# Patient Record
Sex: MALE | Race: WHITE | NOT HISPANIC OR LATINO | Employment: UNEMPLOYED | URBAN - METROPOLITAN AREA
[De-identification: names, ages, dates, MRNs, and addresses within clinical notes are randomized per-mention and may not be internally consistent; named-entity substitution may affect disease eponyms.]

---

## 2017-07-25 ENCOUNTER — TELEPHONE (OUTPATIENT)
Dept: PEDIATRICS | Facility: CLINIC | Age: 57
End: 2017-07-25

## 2017-07-25 NOTE — TELEPHONE ENCOUNTER
7/25/2017    Call Regarding Preventive Health Screening Colonoscopy    Attempt 1    Message Sounded like someone picked up, but did not say anything. Weird clicking noise in the backgrounds.     Comments:       Outreach   CC

## 2017-08-09 NOTE — TELEPHONE ENCOUNTER
8/9/2017    Call Regarding Preventive Health Screening Colonoscopy    Attempt 2    Message on voicemail     Comments:       Outreach   tina

## 2017-08-14 NOTE — TELEPHONE ENCOUNTER
8/14/2017    Call Regarding Preventive Health Screening Colonoscopy    Attempt 3    Message on voicemail     Comments:       Outreach   tina

## 2020-04-22 ENCOUNTER — HOSPITAL ENCOUNTER (INPATIENT)
Facility: CLINIC | Age: 60
LOS: 3 days | Discharge: HOME HEALTH | End: 2020-04-25
Attending: NURSE PRACTITIONER | Admitting: INTERNAL MEDICINE
Payer: COMMERCIAL

## 2020-04-22 ENCOUNTER — APPOINTMENT (OUTPATIENT)
Dept: GENERAL RADIOLOGY | Facility: CLINIC | Age: 60
End: 2020-04-22
Attending: INTERNAL MEDICINE
Payer: COMMERCIAL

## 2020-04-22 DIAGNOSIS — I10 BENIGN ESSENTIAL HYPERTENSION: Primary | ICD-10-CM

## 2020-04-22 DIAGNOSIS — M62.81 GENERALIZED MUSCLE WEAKNESS: ICD-10-CM

## 2020-04-22 DIAGNOSIS — N17.9 AKI (ACUTE KIDNEY INJURY) (H): ICD-10-CM

## 2020-04-22 DIAGNOSIS — R94.31 PROLONGED QT INTERVAL: ICD-10-CM

## 2020-04-22 DIAGNOSIS — E87.6 HYPOKALEMIA: ICD-10-CM

## 2020-04-22 LAB
ALBUMIN SERPL-MCNC: 3.4 G/DL (ref 3.4–5)
ALBUMIN UR-MCNC: NEGATIVE MG/DL
ALP SERPL-CCNC: 63 U/L (ref 40–150)
ALT SERPL W P-5'-P-CCNC: 36 U/L (ref 0–70)
ANION GAP SERPL CALCULATED.3IONS-SCNC: 10 MMOL/L (ref 3–14)
APPEARANCE UR: CLEAR
AST SERPL W P-5'-P-CCNC: 23 U/L (ref 0–45)
BASOPHILS # BLD AUTO: 0 10E9/L (ref 0–0.2)
BASOPHILS NFR BLD AUTO: 0.2 %
BILIRUB DIRECT SERPL-MCNC: 0.4 MG/DL (ref 0–0.2)
BILIRUB SERPL-MCNC: 2.1 MG/DL (ref 0.2–1.3)
BILIRUB UR QL STRIP: NEGATIVE
BUN SERPL-MCNC: 74 MG/DL (ref 7–30)
CALCIUM SERPL-MCNC: 9 MG/DL (ref 8.5–10.1)
CHLORIDE SERPL-SCNC: 87 MMOL/L (ref 94–109)
CO2 SERPL-SCNC: 33 MMOL/L (ref 20–32)
COLOR UR AUTO: ABNORMAL
CREAT SERPL-MCNC: 2.03 MG/DL (ref 0.66–1.25)
DIFFERENTIAL METHOD BLD: ABNORMAL
EOSINOPHIL # BLD AUTO: 0.1 10E9/L (ref 0–0.7)
EOSINOPHIL NFR BLD AUTO: 0.6 %
ERYTHROCYTE [DISTWIDTH] IN BLOOD BY AUTOMATED COUNT: 12.2 % (ref 10–15)
ETHANOL SERPL-MCNC: <0.01 G/DL
GFR SERPL CREATININE-BSD FRML MDRD: 35 ML/MIN/{1.73_M2}
GLUCOSE BLDC GLUCOMTR-MCNC: 138 MG/DL (ref 70–99)
GLUCOSE SERPL-MCNC: 126 MG/DL (ref 70–99)
GLUCOSE UR STRIP-MCNC: NEGATIVE MG/DL
HCO3 BLD-SCNC: 33 MMOL/L (ref 21–28)
HCT VFR BLD AUTO: 56.2 % (ref 40–53)
HGB BLD-MCNC: 19.6 G/DL (ref 13.3–17.7)
HGB UR QL STRIP: NEGATIVE
IMM GRANULOCYTES # BLD: 0.2 10E9/L (ref 0–0.4)
IMM GRANULOCYTES NFR BLD: 2.3 %
INR PPP: NORMAL (ref 0.86–1.14)
KETONES UR STRIP-MCNC: 10 MG/DL
LEUKOCYTE ESTERASE UR QL STRIP: NEGATIVE
LIPASE SERPL-CCNC: 624 U/L (ref 73–393)
LYMPHOCYTES # BLD AUTO: 1.5 10E9/L (ref 0.8–5.3)
LYMPHOCYTES NFR BLD AUTO: 17.6 %
MAGNESIUM SERPL-MCNC: 3 MG/DL (ref 1.6–2.3)
MCH RBC QN AUTO: 31.3 PG (ref 26.5–33)
MCHC RBC AUTO-ENTMCNC: 34.9 G/DL (ref 31.5–36.5)
MCV RBC AUTO: 90 FL (ref 78–100)
MONOCYTES # BLD AUTO: 1.8 10E9/L (ref 0–1.3)
MONOCYTES NFR BLD AUTO: 20.7 %
NEUTROPHILS # BLD AUTO: 5 10E9/L (ref 1.6–8.3)
NEUTROPHILS NFR BLD AUTO: 58.6 %
NITRATE UR QL: NEGATIVE
NRBC # BLD AUTO: 0 10*3/UL
NRBC BLD AUTO-RTO: 0 /100
O2/TOTAL GAS SETTING VFR VENT: ABNORMAL %
OXYHGB MFR BLD: 95 % (ref 92–100)
PCO2 BLD: 30 MM HG (ref 35–45)
PH BLD: 7.64 PH (ref 7.35–7.45)
PH UR STRIP: 6.5 PH (ref 5–7)
PLATELET # BLD AUTO: 227 10E9/L (ref 150–450)
PLATELET # BLD EST: ABNORMAL 10*3/UL
PO2 BLD: 68 MM HG (ref 80–105)
POTASSIUM SERPL-SCNC: 2.3 MMOL/L (ref 3.4–5.3)
PROT SERPL-MCNC: 7.5 G/DL (ref 6.8–8.8)
RBC # BLD AUTO: 6.27 10E12/L (ref 4.4–5.9)
RBC #/AREA URNS AUTO: 1 /HPF (ref 0–2)
RBC MORPH BLD: ABNORMAL
SODIUM SERPL-SCNC: 130 MMOL/L (ref 133–144)
SOURCE: ABNORMAL
SP GR UR STRIP: 1.02 (ref 1–1.03)
SQUAMOUS #/AREA URNS AUTO: <1 /HPF (ref 0–1)
UROBILINOGEN UR STRIP-MCNC: NORMAL MG/DL (ref 0–2)
WBC # BLD AUTO: 8.5 10E9/L (ref 4–11)
WBC #/AREA URNS AUTO: 3 /HPF (ref 0–5)

## 2020-04-22 PROCEDURE — 99285 EMERGENCY DEPT VISIT HI MDM: CPT | Mod: 25

## 2020-04-22 PROCEDURE — 82805 BLOOD GASES W/O2 SATURATION: CPT | Performed by: INTERNAL MEDICINE

## 2020-04-22 PROCEDURE — 80320 DRUG SCREEN QUANTALCOHOLS: CPT | Performed by: NURSE PRACTITIONER

## 2020-04-22 PROCEDURE — 84484 ASSAY OF TROPONIN QUANT: CPT | Performed by: NURSE PRACTITIONER

## 2020-04-22 PROCEDURE — 96365 THER/PROPH/DIAG IV INF INIT: CPT

## 2020-04-22 PROCEDURE — 25000132 ZZH RX MED GY IP 250 OP 250 PS 637: Performed by: NURSE PRACTITIONER

## 2020-04-22 PROCEDURE — 00000146 ZZHCL STATISTIC GLUCOSE BY METER IP

## 2020-04-22 PROCEDURE — 80076 HEPATIC FUNCTION PANEL: CPT | Performed by: NURSE PRACTITIONER

## 2020-04-22 PROCEDURE — 93005 ELECTROCARDIOGRAM TRACING: CPT

## 2020-04-22 PROCEDURE — 40000275 ZZH STATISTIC RCP TIME EA 10 MIN

## 2020-04-22 PROCEDURE — 25800030 ZZH RX IP 258 OP 636: Performed by: NURSE PRACTITIONER

## 2020-04-22 PROCEDURE — 25000128 H RX IP 250 OP 636: Performed by: NURSE PRACTITIONER

## 2020-04-22 PROCEDURE — 81001 URINALYSIS AUTO W/SCOPE: CPT | Performed by: NURSE PRACTITIONER

## 2020-04-22 PROCEDURE — 83735 ASSAY OF MAGNESIUM: CPT | Performed by: NURSE PRACTITIONER

## 2020-04-22 PROCEDURE — 87635 SARS-COV-2 COVID-19 AMP PRB: CPT | Performed by: INTERNAL MEDICINE

## 2020-04-22 PROCEDURE — 96366 THER/PROPH/DIAG IV INF ADDON: CPT

## 2020-04-22 PROCEDURE — 85379 FIBRIN DEGRADATION QUANT: CPT | Performed by: NURSE PRACTITIONER

## 2020-04-22 PROCEDURE — 71045 X-RAY EXAM CHEST 1 VIEW: CPT

## 2020-04-22 PROCEDURE — 74018 RADEX ABDOMEN 1 VIEW: CPT

## 2020-04-22 PROCEDURE — 87086 URINE CULTURE/COLONY COUNT: CPT | Performed by: NURSE PRACTITIONER

## 2020-04-22 PROCEDURE — 85025 COMPLETE CBC W/AUTO DIFF WBC: CPT | Performed by: NURSE PRACTITIONER

## 2020-04-22 PROCEDURE — 96375 TX/PRO/DX INJ NEW DRUG ADDON: CPT

## 2020-04-22 PROCEDURE — 80076 HEPATIC FUNCTION PANEL: CPT | Performed by: INTERNAL MEDICINE

## 2020-04-22 PROCEDURE — 25000125 ZZHC RX 250: Performed by: NURSE PRACTITIONER

## 2020-04-22 PROCEDURE — 80048 BASIC METABOLIC PNL TOTAL CA: CPT | Performed by: NURSE PRACTITIONER

## 2020-04-22 PROCEDURE — 36600 WITHDRAWAL OF ARTERIAL BLOOD: CPT

## 2020-04-22 PROCEDURE — 12000000 ZZH R&B MED SURG/OB

## 2020-04-22 PROCEDURE — 83690 ASSAY OF LIPASE: CPT | Performed by: NURSE PRACTITIONER

## 2020-04-22 RX ORDER — PROCHLORPERAZINE MALEATE 5 MG
10 TABLET ORAL EVERY 6 HOURS PRN
Status: DISCONTINUED | OUTPATIENT
Start: 2020-04-22 | End: 2020-04-25 | Stop reason: HOSPADM

## 2020-04-22 RX ORDER — LIDOCAINE 40 MG/G
CREAM TOPICAL
Status: DISCONTINUED | OUTPATIENT
Start: 2020-04-22 | End: 2020-04-25

## 2020-04-22 RX ORDER — POTASSIUM CL/LIDO/0.9 % NACL 10MEQ/0.1L
10 INTRAVENOUS SOLUTION, PIGGYBACK (ML) INTRAVENOUS
Status: DISCONTINUED | OUTPATIENT
Start: 2020-04-22 | End: 2020-04-24

## 2020-04-22 RX ORDER — LIDOCAINE 40 MG/G
CREAM TOPICAL
Status: DISCONTINUED | OUTPATIENT
Start: 2020-04-22 | End: 2020-04-25 | Stop reason: HOSPADM

## 2020-04-22 RX ORDER — METOCLOPRAMIDE HYDROCHLORIDE 5 MG/ML
10 INJECTION INTRAMUSCULAR; INTRAVENOUS ONCE
Status: COMPLETED | OUTPATIENT
Start: 2020-04-22 | End: 2020-04-22

## 2020-04-22 RX ORDER — POTASSIUM CHLORIDE 1.5 G/1.58G
20-40 POWDER, FOR SOLUTION ORAL
Status: DISCONTINUED | OUTPATIENT
Start: 2020-04-22 | End: 2020-04-24

## 2020-04-22 RX ORDER — POTASSIUM CHLORIDE 7.45 MG/ML
10 INJECTION INTRAVENOUS
Status: DISCONTINUED | OUTPATIENT
Start: 2020-04-22 | End: 2020-04-24

## 2020-04-22 RX ORDER — POTASSIUM CHLORIDE 29.8 MG/ML
20 INJECTION INTRAVENOUS
Status: DISCONTINUED | OUTPATIENT
Start: 2020-04-22 | End: 2020-04-24

## 2020-04-22 RX ORDER — POTASSIUM CHLORIDE 1500 MG/1
20 TABLET, EXTENDED RELEASE ORAL ONCE
Status: COMPLETED | OUTPATIENT
Start: 2020-04-22 | End: 2020-04-22

## 2020-04-22 RX ORDER — SODIUM CHLORIDE 9 MG/ML
INJECTION, SOLUTION INTRAVENOUS CONTINUOUS
Status: DISCONTINUED | OUTPATIENT
Start: 2020-04-22 | End: 2020-04-23

## 2020-04-22 RX ORDER — NITROGLYCERIN 0.4 MG/1
0.4 TABLET SUBLINGUAL EVERY 5 MIN PRN
Status: DISCONTINUED | OUTPATIENT
Start: 2020-04-22 | End: 2020-04-25 | Stop reason: HOSPADM

## 2020-04-22 RX ORDER — ACETAMINOPHEN 650 MG/1
650 SUPPOSITORY RECTAL EVERY 4 HOURS PRN
Status: DISCONTINUED | OUTPATIENT
Start: 2020-04-22 | End: 2020-04-25 | Stop reason: HOSPADM

## 2020-04-22 RX ORDER — NALOXONE HYDROCHLORIDE 0.4 MG/ML
.1-.4 INJECTION, SOLUTION INTRAMUSCULAR; INTRAVENOUS; SUBCUTANEOUS
Status: DISCONTINUED | OUTPATIENT
Start: 2020-04-22 | End: 2020-04-25 | Stop reason: HOSPADM

## 2020-04-22 RX ORDER — POTASSIUM CHLORIDE 1500 MG/1
20-40 TABLET, EXTENDED RELEASE ORAL
Status: DISCONTINUED | OUTPATIENT
Start: 2020-04-22 | End: 2020-04-24

## 2020-04-22 RX ORDER — SODIUM CHLORIDE 9 MG/ML
INJECTION, SOLUTION INTRAVENOUS CONTINUOUS
Status: DISCONTINUED | OUTPATIENT
Start: 2020-04-23 | End: 2020-04-25

## 2020-04-22 RX ORDER — AMPICILLIN AND SULBACTAM 2; 1 G/1; G/1
3 INJECTION, POWDER, FOR SOLUTION INTRAMUSCULAR; INTRAVENOUS EVERY 6 HOURS
Status: DISCONTINUED | OUTPATIENT
Start: 2020-04-23 | End: 2020-04-24

## 2020-04-22 RX ORDER — PROCHLORPERAZINE 25 MG
25 SUPPOSITORY, RECTAL RECTAL EVERY 12 HOURS PRN
Status: DISCONTINUED | OUTPATIENT
Start: 2020-04-22 | End: 2020-04-25 | Stop reason: HOSPADM

## 2020-04-22 RX ADMIN — Medication 10 MEQ: at 20:58

## 2020-04-22 RX ADMIN — SODIUM CHLORIDE: 9 INJECTION, SOLUTION INTRAVENOUS at 21:01

## 2020-04-22 RX ADMIN — Medication 10 MEQ: at 22:35

## 2020-04-22 RX ADMIN — POTASSIUM CHLORIDE 20 MEQ: 1500 TABLET, EXTENDED RELEASE ORAL at 20:59

## 2020-04-22 RX ADMIN — LIDOCAINE HYDROCHLORIDE 30 ML: 20 SOLUTION ORAL; TOPICAL at 21:02

## 2020-04-22 RX ADMIN — METOCLOPRAMIDE HYDROCHLORIDE 10 MG: 5 INJECTION INTRAMUSCULAR; INTRAVENOUS at 20:56

## 2020-04-22 ASSESSMENT — ENCOUNTER SYMPTOMS
HEADACHES: 0
VOMITING: 1
FEVER: 0
NERVOUS/ANXIOUS: 1
CHILLS: 0
FATIGUE: 1
DYSURIA: 1
NAUSEA: 1
SHORTNESS OF BREATH: 0
ABDOMINAL PAIN: 0

## 2020-04-23 ENCOUNTER — APPOINTMENT (OUTPATIENT)
Dept: SPEECH THERAPY | Facility: CLINIC | Age: 60
End: 2020-04-23
Attending: HOSPITALIST
Payer: COMMERCIAL

## 2020-04-23 ENCOUNTER — APPOINTMENT (OUTPATIENT)
Dept: CARDIOLOGY | Facility: CLINIC | Age: 60
End: 2020-04-23
Attending: HOSPITALIST
Payer: COMMERCIAL

## 2020-04-23 ENCOUNTER — APPOINTMENT (OUTPATIENT)
Dept: PHYSICAL THERAPY | Facility: CLINIC | Age: 60
End: 2020-04-23
Attending: INTERNAL MEDICINE
Payer: COMMERCIAL

## 2020-04-23 LAB
ALBUMIN SERPL-MCNC: 2.8 G/DL (ref 3.4–5)
ALP SERPL-CCNC: 50 U/L (ref 40–150)
ALT SERPL W P-5'-P-CCNC: 29 U/L (ref 0–70)
ANION GAP SERPL CALCULATED.3IONS-SCNC: 7 MMOL/L (ref 3–14)
ANION GAP SERPL CALCULATED.3IONS-SCNC: 9 MMOL/L (ref 3–14)
AST SERPL W P-5'-P-CCNC: 18 U/L (ref 0–45)
BACTERIA SPEC CULT: ABNORMAL
BACTERIA SPEC CULT: ABNORMAL
BASE EXCESS BLDA CALC-SCNC: 13 MMOL/L
BASE EXCESS BLDV CALC-SCNC: 11.1 MMOL/L
BILIRUB SERPL-MCNC: 1.4 MG/DL (ref 0.2–1.3)
BUN SERPL-MCNC: 59 MG/DL (ref 7–30)
BUN SERPL-MCNC: 61 MG/DL (ref 7–30)
CALCIUM SERPL-MCNC: 7.9 MG/DL (ref 8.5–10.1)
CALCIUM SERPL-MCNC: 8.3 MG/DL (ref 8.5–10.1)
CHLORIDE SERPL-SCNC: 95 MMOL/L (ref 94–109)
CHLORIDE SERPL-SCNC: 96 MMOL/L (ref 94–109)
CO2 SERPL-SCNC: 31 MMOL/L (ref 20–32)
CO2 SERPL-SCNC: 33 MMOL/L (ref 20–32)
CREAT SERPL-MCNC: 1.9 MG/DL (ref 0.66–1.25)
CREAT SERPL-MCNC: 1.99 MG/DL (ref 0.66–1.25)
D DIMER PPP FEU-MCNC: 0.5 UG/ML FEU (ref 0–0.5)
DEPRECATED S PYO AG THROAT QL EIA: NEGATIVE
ERYTHROCYTE [DISTWIDTH] IN BLOOD BY AUTOMATED COUNT: 12.2 % (ref 10–15)
FERRITIN SERPL-MCNC: 326 NG/ML (ref 26–388)
GFR SERPL CREATININE-BSD FRML MDRD: 35 ML/MIN/{1.73_M2}
GFR SERPL CREATININE-BSD FRML MDRD: 37 ML/MIN/{1.73_M2}
GLUCOSE SERPL-MCNC: 108 MG/DL (ref 70–99)
GLUCOSE SERPL-MCNC: 111 MG/DL (ref 70–99)
GRAM STN SPEC: ABNORMAL
HCO3 BLD-SCNC: 34 MMOL/L (ref 21–28)
HCO3 BLDV-SCNC: 35 MMOL/L (ref 21–28)
HCT VFR BLD AUTO: 51.2 % (ref 40–53)
HGB BLD-MCNC: 17.2 G/DL (ref 13.3–17.7)
INTERPRETATION ECG - MUSE: NORMAL
IRON SATN MFR SERPL: 31 % (ref 15–46)
IRON SERPL-MCNC: 72 UG/DL (ref 35–180)
LIPASE SERPL-CCNC: 534 U/L (ref 73–393)
Lab: ABNORMAL
Lab: ABNORMAL
MCH RBC QN AUTO: 31 PG (ref 26.5–33)
MCHC RBC AUTO-ENTMCNC: 33.6 G/DL (ref 31.5–36.5)
MCV RBC AUTO: 92 FL (ref 78–100)
O2/TOTAL GAS SETTING VFR VENT: 1 %
O2/TOTAL GAS SETTING VFR VENT: ABNORMAL %
OXYHGB MFR BLD: 97 % (ref 92–100)
OXYHGB MFR BLDV: 95 %
PCO2 BLD: 32 MM HG (ref 35–45)
PCO2 BLDV: 43 MM HG (ref 40–50)
PH BLD: 7.64 PH (ref 7.35–7.45)
PH BLDV: 7.52 PH (ref 7.32–7.43)
PHOSPHATE SERPL-MCNC: 2.7 MG/DL (ref 2.5–4.5)
PLATELET # BLD AUTO: 185 10E9/L (ref 150–450)
PO2 BLD: 83 MM HG (ref 80–105)
PO2 BLDV: 76 MM HG (ref 25–47)
POTASSIUM SERPL-SCNC: 2.7 MMOL/L (ref 3.4–5.3)
POTASSIUM SERPL-SCNC: 2.8 MMOL/L (ref 3.4–5.3)
POTASSIUM SERPL-SCNC: 3 MMOL/L (ref 3.4–5.3)
PROT SERPL-MCNC: 6.2 G/DL (ref 6.8–8.8)
RBC # BLD AUTO: 5.55 10E12/L (ref 4.4–5.9)
SARS-COV-2 PCR COMMENT: NORMAL
SARS-COV-2 RNA SPEC QL NAA+PROBE: NEGATIVE
SARS-COV-2 RNA SPEC QL NAA+PROBE: NORMAL
SODIUM SERPL-SCNC: 135 MMOL/L (ref 133–144)
SODIUM SERPL-SCNC: 136 MMOL/L (ref 133–144)
SPECIMEN SOURCE: ABNORMAL
SPECIMEN SOURCE: NORMAL
STREP GROUP A PCR: NOT DETECTED
TIBC SERPL-MCNC: 230 UG/DL (ref 240–430)
TROPONIN I SERPL-MCNC: 0.05 UG/L (ref 0–0.04)
TROPONIN I SERPL-MCNC: 0.05 UG/L (ref 0–0.04)
TROPONIN I SERPL-MCNC: 0.08 UG/L (ref 0–0.04)
WBC # BLD AUTO: 8.9 10E9/L (ref 4–11)

## 2020-04-23 PROCEDURE — 87205 SMEAR GRAM STAIN: CPT | Performed by: INTERNAL MEDICINE

## 2020-04-23 PROCEDURE — 36415 COLL VENOUS BLD VENIPUNCTURE: CPT | Performed by: HOSPITALIST

## 2020-04-23 PROCEDURE — 25800030 ZZH RX IP 258 OP 636: Performed by: INTERNAL MEDICINE

## 2020-04-23 PROCEDURE — 84484 ASSAY OF TROPONIN QUANT: CPT | Performed by: INTERNAL MEDICINE

## 2020-04-23 PROCEDURE — 40000275 ZZH STATISTIC RCP TIME EA 10 MIN

## 2020-04-23 PROCEDURE — 36600 WITHDRAWAL OF ARTERIAL BLOOD: CPT

## 2020-04-23 PROCEDURE — 80053 COMPREHEN METABOLIC PANEL: CPT | Performed by: INTERNAL MEDICINE

## 2020-04-23 PROCEDURE — 82728 ASSAY OF FERRITIN: CPT | Performed by: INTERNAL MEDICINE

## 2020-04-23 PROCEDURE — 84132 ASSAY OF SERUM POTASSIUM: CPT | Performed by: HOSPITALIST

## 2020-04-23 PROCEDURE — 87205 SMEAR GRAM STAIN: CPT | Performed by: HOSPITALIST

## 2020-04-23 PROCEDURE — 93010 ELECTROCARDIOGRAM REPORT: CPT | Performed by: INTERNAL MEDICINE

## 2020-04-23 PROCEDURE — 36415 COLL VENOUS BLD VENIPUNCTURE: CPT | Performed by: INTERNAL MEDICINE

## 2020-04-23 PROCEDURE — 92610 EVALUATE SWALLOWING FUNCTION: CPT | Mod: GN

## 2020-04-23 PROCEDURE — 85027 COMPLETE CBC AUTOMATED: CPT | Performed by: INTERNAL MEDICINE

## 2020-04-23 PROCEDURE — 92526 ORAL FUNCTION THERAPY: CPT | Mod: GN

## 2020-04-23 PROCEDURE — 97161 PT EVAL LOW COMPLEX 20 MIN: CPT | Mod: GP | Performed by: PHYSICAL THERAPIST

## 2020-04-23 PROCEDURE — 82805 BLOOD GASES W/O2 SATURATION: CPT | Performed by: INTERNAL MEDICINE

## 2020-04-23 PROCEDURE — 25000132 ZZH RX MED GY IP 250 OP 250 PS 637: Performed by: INTERNAL MEDICINE

## 2020-04-23 PROCEDURE — 40001204 ZZHCL STATISTIC STREP A RAPID: Performed by: INTERNAL MEDICINE

## 2020-04-23 PROCEDURE — 93306 TTE W/DOPPLER COMPLETE: CPT

## 2020-04-23 PROCEDURE — 83540 ASSAY OF IRON: CPT | Performed by: INTERNAL MEDICINE

## 2020-04-23 PROCEDURE — 83540 ASSAY OF IRON: CPT | Performed by: HOSPITALIST

## 2020-04-23 PROCEDURE — 83550 IRON BINDING TEST: CPT | Performed by: INTERNAL MEDICINE

## 2020-04-23 PROCEDURE — 84100 ASSAY OF PHOSPHORUS: CPT | Performed by: INTERNAL MEDICINE

## 2020-04-23 PROCEDURE — 12000011 ZZH R&B MS OVERFLOW

## 2020-04-23 PROCEDURE — 93005 ELECTROCARDIOGRAM TRACING: CPT

## 2020-04-23 PROCEDURE — 82805 BLOOD GASES W/O2 SATURATION: CPT | Performed by: HOSPITALIST

## 2020-04-23 PROCEDURE — 99233 SBSQ HOSP IP/OBS HIGH 50: CPT | Performed by: HOSPITALIST

## 2020-04-23 PROCEDURE — 80048 BASIC METABOLIC PNL TOTAL CA: CPT | Performed by: INTERNAL MEDICINE

## 2020-04-23 PROCEDURE — 25000132 ZZH RX MED GY IP 250 OP 250 PS 637: Performed by: HOSPITALIST

## 2020-04-23 PROCEDURE — 87651 STREP A DNA AMP PROBE: CPT | Performed by: INTERNAL MEDICINE

## 2020-04-23 PROCEDURE — 83690 ASSAY OF LIPASE: CPT | Performed by: INTERNAL MEDICINE

## 2020-04-23 PROCEDURE — 93306 TTE W/DOPPLER COMPLETE: CPT | Mod: 26 | Performed by: INTERNAL MEDICINE

## 2020-04-23 PROCEDURE — 97530 THERAPEUTIC ACTIVITIES: CPT | Mod: GP | Performed by: PHYSICAL THERAPIST

## 2020-04-23 PROCEDURE — 25000128 H RX IP 250 OP 636: Performed by: INTERNAL MEDICINE

## 2020-04-23 RX ORDER — TEMAZEPAM 15 MG/1
15 CAPSULE ORAL
Status: DISCONTINUED | OUTPATIENT
Start: 2020-04-23 | End: 2020-04-25 | Stop reason: HOSPADM

## 2020-04-23 RX ORDER — CALCIUM CARBONATE 500 MG/1
500 TABLET, CHEWABLE ORAL 2 TIMES DAILY PRN
Status: DISCONTINUED | OUTPATIENT
Start: 2020-04-23 | End: 2020-04-25 | Stop reason: HOSPADM

## 2020-04-23 RX ORDER — HYDROMORPHONE HYDROCHLORIDE 1 MG/ML
0.2 INJECTION, SOLUTION INTRAMUSCULAR; INTRAVENOUS; SUBCUTANEOUS
Status: DISCONTINUED | OUTPATIENT
Start: 2020-04-23 | End: 2020-04-25 | Stop reason: HOSPADM

## 2020-04-23 RX ORDER — MAGNESIUM SULFATE HEPTAHYDRATE 40 MG/ML
4 INJECTION, SOLUTION INTRAVENOUS EVERY 4 HOURS PRN
Status: DISCONTINUED | OUTPATIENT
Start: 2020-04-23 | End: 2020-04-25

## 2020-04-23 RX ORDER — BACLOFEN 10 MG/1
10 TABLET ORAL 3 TIMES DAILY
Status: DISCONTINUED | OUTPATIENT
Start: 2020-04-23 | End: 2020-04-25 | Stop reason: HOSPADM

## 2020-04-23 RX ORDER — ASPIRIN 81 MG/1
81 TABLET ORAL DAILY
Status: DISCONTINUED | OUTPATIENT
Start: 2020-04-23 | End: 2020-04-25 | Stop reason: HOSPADM

## 2020-04-23 RX ADMIN — BACLOFEN 10 MG: 10 TABLET ORAL at 16:18

## 2020-04-23 RX ADMIN — AMPICILLIN SODIUM AND SULBACTAM SODIUM 3 G: 2; 1 INJECTION, POWDER, FOR SOLUTION INTRAMUSCULAR; INTRAVENOUS at 01:12

## 2020-04-23 RX ADMIN — Medication 10 MEQ: at 04:02

## 2020-04-23 RX ADMIN — Medication 10 MEQ: at 02:55

## 2020-04-23 RX ADMIN — Medication 10 MEQ: at 10:47

## 2020-04-23 RX ADMIN — AMPICILLIN SODIUM AND SULBACTAM SODIUM 3 G: 2; 1 INJECTION, POWDER, FOR SOLUTION INTRAMUSCULAR; INTRAVENOUS at 13:42

## 2020-04-23 RX ADMIN — Medication 10 MEQ: at 11:56

## 2020-04-23 RX ADMIN — BACLOFEN 10 MG: 10 TABLET ORAL at 11:46

## 2020-04-23 RX ADMIN — AMPICILLIN SODIUM AND SULBACTAM SODIUM 3 G: 2; 1 INJECTION, POWDER, FOR SOLUTION INTRAMUSCULAR; INTRAVENOUS at 19:22

## 2020-04-23 RX ADMIN — Medication 10 MEQ: at 09:39

## 2020-04-23 RX ADMIN — Medication 10 MEQ: at 08:20

## 2020-04-23 RX ADMIN — POTASSIUM CHLORIDE 20 MEQ: 1500 TABLET, EXTENDED RELEASE ORAL at 20:37

## 2020-04-23 RX ADMIN — POTASSIUM CHLORIDE 40 MEQ: 1500 TABLET, EXTENDED RELEASE ORAL at 18:20

## 2020-04-23 RX ADMIN — SODIUM CHLORIDE: 9 INJECTION, SOLUTION INTRAVENOUS at 01:12

## 2020-04-23 RX ADMIN — Medication 10 MEQ: at 05:56

## 2020-04-23 RX ADMIN — AMPICILLIN SODIUM AND SULBACTAM SODIUM 3 G: 2; 1 INJECTION, POWDER, FOR SOLUTION INTRAMUSCULAR; INTRAVENOUS at 07:04

## 2020-04-23 ASSESSMENT — MIFFLIN-ST. JEOR
SCORE: 1664.02
SCORE: 1648.15

## 2020-04-23 ASSESSMENT — ACTIVITIES OF DAILY LIVING (ADL)
ADLS_ACUITY_SCORE: 21
ADLS_ACUITY_SCORE: 21
ADLS_ACUITY_SCORE: 13

## 2020-04-23 NOTE — ED TRIAGE NOTES
pt comes in from home with abd pain, constipation, anxiety, weakness, possible syncopal episodes? (pt reports waking up on floor) no BM for 2 days only drinking gatorade. evertime he eats he vomits. pt has been self quarwntining for almost 2 months. pt brother has been btinging him food pt lives at home alone.

## 2020-04-23 NOTE — PROGRESS NOTES
[facility]    Hospitalist Progress Note  Name: Delroy Christianson    MRN: [id number]  Provider:  Dale Nguyen DO MPH  Date of Service: 04/23/2020    Summary of Stay: Delroy Christianson is a 60 year old male with no previous known medical history admitted on 4/22/2020 with weakness, cough, and hiccups.  Developed some vomiting about 3 days ago which led to a subsequent cough and hiccups.  Since that time he has had progressively more weakness and feeling generally unwell so he came to the ED for evaluation.  Vitals were stable on arrival with only mild hypoxia.  He was noted to have a creatinine of 2.0 with potassium of 2.3.  Troponin elevation of 0.049.  Hemoglobin of 19.6.  COVID-19 PCR was negative.  Chest x-ray showed streaky atelectasis or infiltrate in the left lung base suspicious for aspiration pneumonia given his history.  EKG shows QTC of 613.  He was started on Unasyn and being admitted for further evaluation.    Problem List:   1. Acute hypoxic respiratory failure secondary to aspiration pneumonia: Seems like the most logical diagnosis.  Continue Unasyn and wean oxygen as able.  Currently n.p.o. on IV fluids, request SLP evaluation for dietary recommendations.  COVID-19 negative.  2. Acute versus chronic versus acute on chronic renal failure: Creatinine is 2.0 on admission and essentially unchanged despite IV fluids.  Creatinine in 2015 was 1.5 so this could be more chronic in nature.  Check iron studies and phosphorus level.  Consider renal ultrasound.  3. Elevated bilirubin: Suspect due to infection.  Trending down and no abdominal pain.  4. Hypokalemia: Aggressive replacement through the protocol.  Hesitant to add potassium to the IV fluids due to renal failure.  Could consider nephrology involvement to see if there is more of a genetic cause of his hypokalemia, renal failure, and alkalosis.  5. Troponin elevation: Suspect demand ischemia.  Troponin levels flat and not consistent with ACS.  Check  echocardiogram.  6. Prolonged QTC: Recheck EKG.  Suspect electrolyte derangement contributing.  Replace potassium and magnesium.  Recheck QTC on EKG today.  7. Hiccups: Suspect diaphragm irritation from his pneumonia.  Start scheduled baclofen, could add gabapentin as well.  Consider Protonix for possible GERD management if QTC improves.    DVT Prophylaxis: Pneumatic Compression Devices  Code Status: Full Code  Diet: Clear Liquid Diet    Owens Catheter: not present  Disposition: Expected discharge in 2 days to home. Goals prior to discharge include address the multiple issues outlined above.   Incidental Findings: None.  Family updated today: No     Interval History   Assumed care from previous hospitalist. The history was fully reviewed.  The patient reports doing well. Still hiccups. No chest pain or shortness of breath. No nausea, vomiting, diarrhea, constipation. No fevers. No other specific complaints identified.     -Data reviewed today: I personally reviewed all new labs and imaging results over the last 24 hours.     Physical Exam   Temp: 96.6  F (35.9  C) Temp src: Axillary BP: 126/78 Pulse: 60 Heart Rate: 72 Resp: 16 SpO2: 95 % O2 Device: None (Room air) Oxygen Delivery: 1 LPM  Vitals:    04/23/20 0020 04/23/20 0912   Weight: 80 kg (176 lb 6.4 oz) 81.6 kg (179 lb 14.4 oz)     Vital Signs with Ranges  Temp:  [96.6  F (35.9  C)-99.6  F (37.6  C)] 96.6  F (35.9  C)  Pulse:  [60-97] 60  Heart Rate:  [69-97] 72  Resp:  [14-35] 16  BP: ()/() 126/78  SpO2:  [83 %-98 %] 95 %  I/O last 3 completed shifts:  In: -   Out: 200 [Urine:200]    GENERAL: No apparent distress. Awake, alert, and fully oriented.  HEENT: Normocephalic, atraumatic. Extraocular movements intact.  CARDIOVASCULAR: Regular rate and rhythm without murmurs or rubs. No S3.  PULMONARY: Clear bilaterally.  GASTROINTESTINAL: Soft, non-tender, non-distended. Bowel sounds normoactive.   EXTREMITIES: No cyanosis or clubbing. No  edema.  NEUROLOGICAL: CN 2-12 grossly intact, no focal neurological deficits.  DERMATOLOGICAL: No rash, ulcer, bruising, nor jaundice.     Medications     sodium chloride 75 mL/hr at 04/23/20 0825       ampicillin-sulbactam (UNASYN) IV  3 g Intravenous Q6H     aspirin  81 mg Oral Daily     baclofen  10 mg Oral TID     sodium chloride (PF)  3 mL Intracatheter Q8H     Data     Laboratory:  Recent Labs   Lab 04/23/20  0650 04/22/20  1931   WBC 8.9 8.5   HGB 17.2 19.6*   HCT 51.2 56.2*   MCV 92 90    227     Recent Labs   Lab 04/23/20  0650 04/23/20  0408 04/22/20  1931    135 130*   POTASSIUM 2.8* 2.7* 2.3*   CHLORIDE 96 95 87*   CO2 33* 31 33*   ANIONGAP 7 9 10   * 111* 126*   BUN 59* 61* 74*   CR 1.99* 1.90* 2.03*   GFRESTIMATED 35* 37* 35*   GFRESTBLACK 41* 43* 40*   MINE 7.9* 8.3* 9.0     Recent Labs   Lab 04/23/20  0115 04/22/20 2008   CULT >10 Squamous epithelial cells/low power field indicates oral contamination. Please   recollect.  * PENDING       Imaging:  Recent Results (from the past 24 hour(s))   XR Chest Port 1 View    Narrative    EXAM: XR CHEST PORT 1 VW  LOCATION: [facility]  DATE/TIME: 4/22/2020 10:33 PM    INDICATION: Hypoxia.  COMPARISON: None.      Impression    IMPRESSION: Heart size within normal limits. The right lung is clear. Streaky atelectasis or infiltrate is present in the left lung base. The left upper lung zone is clear. No visible pneumothorax. No subdiaphragmatic free air is visualized.   XR Abdomen Port 1 View    Narrative    EXAM: XR ABDOMEN PORT 1 VW  LOCATION: [facility]  DATE/TIME: 4/22/2020 10:33 PM    INDICATION: Abdominal pain.  COMPARISON: None.      Impression    IMPRESSION: Low centered portable view of the abdomen. Visualized bowel gas pattern is nonobstructive. Multiple pelvic calcifications likely reflect phleboliths. Lower lumbar degenerative changes.          Dale Nguyen DO MPH  Formerly [facility] Hospitalist  [address] [place] [place] [address]  Pager: [phone]  04/23/2020

## 2020-04-23 NOTE — PLAN OF CARE
BP (!) 147/81 (BP Location: Left arm)   Pulse 60   Temp 96.4  F (35.8  C) (Axillary)   Resp 16   Ht 1.829 m (6')   Wt 81.6 kg (179 lb 14.4 oz)   SpO2 98%   BMI 24.40 kg/m    Neuro: AOx4.   Cardiac: SB/SR BBB and ST depression per tele. Echo-abnormal, CARDS consult  Lungs: Diminished in LLL. O/w clear. Possible aspiration PNA.   GI: Constipation.   : WNL  Pain: Reported a minor sore throat o/w denies  IV: NS @75  Meds: Baclofen for hiccups. IV unasyn q6.   Labs/tests: K+-2.8, replaced, re-check this afternoon. Cr-1.99. Blood gases abnormal. Covid-neg. Strep cultures pending. Sputum cultures pending,.   Diet: Speech cleared for regular diet  Activity: SBA  Plan: OT consult, PT-recommended home once strength returns, Speech-cleared for reg diet, CARDS consult.  Pend labs, fluids, IV abx.

## 2020-04-23 NOTE — ED NOTES
[facility]  ED Nurse Handoff Report    Delroy Christianson is a 60 year old male   ED Chief complaint: Generalized Weakness  . ED Diagnosis:   Final diagnoses:   Prolonged QT interval   Hypokalemia   Generalized muscle weakness   DAMION (acute kidney injury) (H)     Allergies: No Known Allergies    Code Status: Full Code  Activity level - Baseline/Home:  Independent. Activity Level - Current:   Assist X 1. Lift room needed: No. Bariatric: No   Needed: No   Isolation: No. Infection: Not Applicable.     Vital Signs:   Vitals:    04/22/20 2200 04/22/20 2215 04/22/20 2230 04/22/20 2245   BP: 115/70 104/70 108/73 124/76   Pulse: 86 85 79 84   Resp: 20 14 18 19   Temp:       TempSrc:       SpO2: 97% 92%  94%       Cardiac Rhythm:  ,      Pain level:    Patient confused: No. Patient Falls Risk: Yes.   Elimination Status: Has voided   Patient Report / Focused Assessment:    Neurological Cognitive - Cognitive/Neuro/Behavioral WDL:  (WEAKNESS AND ANXIETY)  [place] Coma Scale - Best Eye Response: 4-->(E4) spontaneous  Best Motor Response: 6-->(M6) obeys commands  Best Verbal Response: 5-->(V5) oriented  [place] Coma Scale Score: 15      Gastrointestinal Gastrointestinal - Gastrointestinal Comment: pt comes in from home with abd pain, constipation, anxiety, weakness, possible syncopal episodes? (pt reports waking up on floor) no BM for 2 days only drinking gatorade. evertime he eats he vomits. pt has been self quarwntining for almost 2 months. pt brother has been btinging him food pt lives at home alone.      Tests Performed/ Abnormal Results:    Labs Ordered and Resulted from Time of ED Arrival Up to the Time of Departure from the ED   ROUTINE UA WITH MICROSCOPIC - Abnormal; Notable for the following components:       Result Value    Ketones Urine 10 (*)     All other components within normal limits   CBC WITH PLATELETS DIFFERENTIAL - Abnormal; Notable for the following components:    RBC Count 6.27 (*)      Hemoglobin 19.6 (*)     Hematocrit 56.2 (*)     Absolute Monocytes 1.8 (*)     All other components within normal limits   BASIC METABOLIC PANEL - Abnormal; Notable for the following components:    Sodium 130 (*)     Potassium 2.3 (*)     Chloride 87 (*)     Carbon Dioxide 33 (*)     Glucose 126 (*)     Urea Nitrogen 74 (*)     Creatinine 2.03 (*)     GFR Estimate 35 (*)     GFR Estimate If Black 40 (*)     All other components within normal limits   MAGNESIUM - Abnormal; Notable for the following components:    Magnesium 3.0 (*)     All other components within normal limits   GLUCOSE BY METER - Abnormal; Notable for the following components:    Glucose 138 (*)     All other components within normal limits   GLUCOSE MONITOR NURSING POCT   INR   PULSE OXIMETRY NURSING   CARDIAC CONTINUOUS MONITORING   PERIPHERAL IV CATHETER   MAY SALINE LOCK IV   URINE CULTURE AEROBIC BACTERIAL     Labs Ordered and Resulted from Time of ED Arrival Up to the Time of Departure from the ED   ROUTINE UA WITH MICROSCOPIC - Abnormal; Notable for the following components:       Result Value    Ketones Urine 10 (*)     All other components within normal limits   CBC WITH PLATELETS DIFFERENTIAL - Abnormal; Notable for the following components:    RBC Count 6.27 (*)     Hemoglobin 19.6 (*)     Hematocrit 56.2 (*)     Absolute Monocytes 1.8 (*)     All other components within normal limits   BASIC METABOLIC PANEL - Abnormal; Notable for the following components:    Sodium 130 (*)     Potassium 2.3 (*)     Chloride 87 (*)     Carbon Dioxide 33 (*)     Glucose 126 (*)     Urea Nitrogen 74 (*)     Creatinine 2.03 (*)     GFR Estimate 35 (*)     GFR Estimate If Black 40 (*)     All other components within normal limits   MAGNESIUM - Abnormal; Notable for the following components:    Magnesium 3.0 (*)     All other components within normal limits   GLUCOSE BY METER - Abnormal; Notable for the following components:    Glucose 138 (*)     All other  components within normal limits   HEPATIC PANEL - Abnormal; Notable for the following components:    Bilirubin Direct 0.4 (*)     Bilirubin Total 2.1 (*)     All other components within normal limits   LIPASE - Abnormal; Notable for the following components:    Lipase 624 (*)     All other components within normal limits   GLUCOSE MONITOR NURSING POCT   INR   BLOOD GAS ARTERIAL AND OXYHGB   ALCOHOL ETHYL   PULSE OXIMETRY NURSING   CARDIAC CONTINUOUS MONITORING   PERIPHERAL IV CATHETER   MAY SALINE LOCK IV   URINE CULTURE AEROBIC BACTERIAL     No orders to display   Treatments provided: PIV, URINE, GLUCOSE, KRIDER, NS, CARDIAC, BP AND PULSE OX MONITORING.  Family Comments: NONE  OBS brochure/video discussed/provided to patient:  No  ED Medications:   Medications   sodium chloride 0.9% infusion ( Intravenous Stopped 4/22/20 2256)   potassium chloride 10 mEq in 100 mL intermittent infusion with 10 mg lidocaine (0 mEq Intravenous ED Infusing on Admission/transfer 4/22/20 2256)   potassium chloride 10 mEq in 100 mL intermittent infusion with 10 mg lidocaine (0 mEq Intravenous Stopped 4/22/20 2256)   metoclopramide (REGLAN) injection 10 mg (10 mg Intravenous Given 4/22/20 2056)   lidocaine (XYLOCAINE) 2 % 15 mL, alum & mag hydroxide-simethicone (MAALOX  ES) 15 mL GI Cocktail (30 mLs Oral Given 4/22/20 2102)   potassium chloride ER (KLOR-CON M) CR tablet 20 mEq (20 mEq Oral Given 4/22/20 2059)     Drips infusing:  Yes  For the majority of the shift, the patient's behavior Green. Interventions performed were none.    Sepsis treatment initiated: No     ED Nurse Name/Phone Number: Jam Barrios RN, [phone]  9:43 PM    RECEIVING UNIT ED HANDOFF REVIEW    Above ED Nurse Handoff Report was reviewed: Yes  Reviewed by: Kaitlynn Radford RN on April 22, 2020 at 11:26 PM

## 2020-04-23 NOTE — PROGRESS NOTES
Noted abnormal ABG on presentation while checking labs..  ABG repeated, due to Alkalosis and hypoxia on earlier one.  No change in PH, but improved hypoxemia.  He has both respiratory and metabolic alkalosis.   The metabolic alkalosis could be due to sever hypokalemia and cellular shift.  -Continue to replace K with caution as he has ARF.  VBG later today, expect to improve with K replacement.    Recent Labs   Lab 04/23/20  0323 04/22/20  2243   PH 7.64* 7.64*   PO2 83 68*   PCO2 32* 30*   HCO3 34* 33*

## 2020-04-23 NOTE — PLAN OF CARE
"OT mary attempted - pt declining participation stating \"I just need to get some sleep, I can't do anything until I get some sleep.\" Requesting to re-schedule evaluation at this time.   "

## 2020-04-23 NOTE — PLAN OF CARE
PT: Orders received. PT evaluation completed and treatment initiated. Pt reports living in a split level house with no stairs to enter. Pt lives alone. Pt does not use any assistive devices at baseline. Since early March pt has not left his home and has been using grocery delivery services for all shopping needs.     Discharge Planner PT   Patient plan for discharge: Home, once feeling better  Current status: Pt supine upon initiation. Educated in PT role and POC. Pt initially declining any OOB mobility due to fatigue and weakness, but after further education and encouragement pt participates in minimal session. Pt completes sit<>supine with Sudheer and increased time/effort. Pt completes sit<>stand with CGA. Pt reports that LISA LE feel very weak, but is able to ambulate with CGA x 10'. Pt limiting distance at this time due to fatigue. Anticipate pt would be able to tolerate more functional distance ambulation. Discussed with pt the importance of OOB mobility, particularly for daily tasks such as toileting and eating meals. Pt reports understanding. Pt supine upon completion of session, all needs in reach, alarm active. ECHO present with pt.     Pt would benefit from IP OT evaluation as well for ADLs management and energy conservation techniques. MD paged.     Barriers to return to prior living situation: Decreased strength and activity tolerance, decreased motivation, lives alone  Recommendations for discharge: Home   Rationale for recommendations: Anticipate that with continued IPPT and medical management the pt will be able to complete all mobility skills required for safe discharge home.       Entered by: Tabitha Calhoun 04/23/2020 11:09 AM

## 2020-04-23 NOTE — PROGRESS NOTES
Pt resulted NEGATIVE for COVID-19 swab, on-call provider discontinued isolation status and pt will be transferred to another unit. All personal belongings will be sent with patient and hand-off report given.  Switched to lab collect for draws.

## 2020-04-23 NOTE — PROGRESS NOTES
An ABG was completed on the pt's left radial @ 03:23 on a 1 L NC with no complications noted during the procedure.     Justo Dejesus RT

## 2020-04-23 NOTE — PROGRESS NOTES
Clinical Swallow Evaluation:      04/23/20 1419   General Information   Onset Date 04/22/20   Start of Care Date 04/23/20   Referring Physician Dr. Nguyen   Patient Profile Review/OT: Additional Occupational Profile Info See Profile for full history and prior level of function   Patient/Family Goals Statement to feel better and sleep   Swallowing Evaluation Bedside swallow evaluation   Behaviorial Observations Alert;Confused;Distractible   Mode of current nutrition Oral diet   Type of oral diet Thin liquid  (clear liquids)   Respiratory Status Room air   Comments Pt admitted with hiccups, nausea/vomiting followed by cough/SOB/mild hypoxia. CxR showing streaky atelectasis or infiltrate in the left lung base suspicious for aspiration pneumonia -- SLP evaluation given same. Pt reports the following timeline of symptoms: 1) started feeling food build up/food wasn't going down at mid chest/abdominal level causing discomfort, 2) regurgitation of solid food, 3) stopped eating food and just drank liquids, 4) vomiting up liquids, 5) throat pain and SOB after vomiting. He reports recalling this happening x1 time before, unable to provide time frame other than thinking it was within the last 2-3 years. He denies any symptoms of aspiration when asked - ? If aspiration occurred during vomiting instances. He also reports baseline reflux.   Clinical Swallow Evaluation   Oral Musculature generally intact   Oral Musculature Comments WNL   Additional Documentation Yes   Clinical Swallow Eval: Thin Liquid Texture Trial   Mode of Presentation, Thin Liquids cup;straw;self-fed   Volume of Liquid or Food Presented 8 oz   Oral Phase of Swallow WFL   Pharyngeal Phase of Swallow intact   Diagnostic Statement no signs/sx aspiration   Clinical Swallow Eval: Puree Solid Texture Trial   Mode of Presentation, Puree spoon;self-fed   Volume of Puree Presented 4 oz   Oral Phase, Puree WFL   Pharyngeal Phase, Puree intact   Diagnostic Statement no  signs/sx aspiration    Clinical Swallow Eval: Solid Food Texture Trial   Mode of Presentation, Solid self-fed   Volume of Solid Food Presented 1 michael cracker   Oral Phase, Solid WFL   Pharyngeal Phase, Solid intact   Diagnostic Statement no  signs/sx aspiration   Esophageal Phase of Swallow   Esophageal comments no globus sensation/regurgitation sensation during  evaluation - pt layed  down  after writer left  and  then reported  reflux sensation - encouraged to sit upright  for  30-60 more minutes   Swallow Eval: Clinical Impressions   Skilled Criteria for Therapy Intervention Skilled criteria met.  Treatment indicated.   Functional Assessment Scale (FAS) 6   Treatment Diagnosis minimal oropharyngeal dysphagia - suspect esophageal component   Diet texture recommendations Regular diet;Thin liquids   Recommended Feeding/Eating Techniques alternate between small bites and sips of food/liquid;maintain upright posture during/after eating for 30 mins;small sips/bites   Demonstrates Need for Referral to Another Service   (GI)   Therapy Frequency 3x/week   Predicted Duration of Therapy Intervention (days/wks) 1week   Anticipated Discharge Disposition home   Risks and Benefits of Treatment have been explained. Yes   Patient, family and/or staff in agreement with Plan of Care Yes   Clinical Impression Comments Clinical swallow evaluation completed with 8 oz thin liquids, 4 oz puree solids, 1-2 oz regular solids. He currently presents with a functional oropharyngeal swallow: timely mastication/oral clearance, adequate oral control, timely swallow, strong hyolaryngeal ROM to palpation; no signs/sx aspiration noted. Of note, hiccups observed prior to session which stopped during both conversation and swallow tasks with writer   Total Evaluation Time   Total Evaluation Time (Minutes) 58

## 2020-04-23 NOTE — H&P
[facility]    History and Physical - Hospitalist Service       Date of Admission:  4/22/2020    Assessment & Plan   Delroy Christianson is a 60 year old male admitted on 4/22/2020. He has no known significant past medical history.  He presented to the emergency room with nausea, vomiting, weakness, and abdominal pain.    1.  Pneumonia.  Possible aspiration.  Start IV Unasyn.  Check for SARS-CoV-2.    2.  Hypokalemia.  Potassium replacement protocol.    3.  Acute kidney injury.  Suspect dehydration.  Start continuous IV fluids.    4.  Hyponatremia.  Mild.  Continuous IV fluids.    5.  Nausea and vomiting.  N.p.o.  Continuous IV fluids.  Antiemetics as needed.    6.  Deconditioning.  Physical therapy consult.    7.  Sore throat.  Check check for strep.  IV Unasyn as noted above.    8.  Prolonged QT interval.  Monitor on telemetry.  Recheck EKG in the morning.  Replace potassium as noted above.      9.  Acute hypoxic respiratory failure.  Possibly due to aspiration pneumonia.  IV Unasyn as noted above.  Check d-dimer.  COVID 19 PCR pending.  Supplemental oxygen if needed.         Diet: NPO for Medical/Clinical Reasons Except for: No Exceptions    DVT Prophylaxis: Pneumatic Compression Devices  Owens Catheter: not present  Code Status: Full Code      Disposition Plan   Expected discharge: 2 - 3 days, recommended to prior living arrangement     Marco Antonio Patel, DO  [facility]    ______________________________________________________________________    Chief Complaint   Weakness.    History is obtained from the patient    History of Present Illness   Delroy Christianson is a 60 year old male who has no known significant past medical history.  He has been feeling quite weak for the past several days.  He states that he has been trying to isolate due to recent concerns for pandemic COVID 19.  He has not left his house for several days.  He began having hiccups, nausea, vomiting, and abdominal pain 3  days ago.  Has also been feeling short of breath.  He has been very concerned about the symptoms.  He has not been able to keep down much food or fluids for the past 3 days because of symptoms.  He has had some shortness of breath with this.  Has not noted a significant cough.  Has not had chest pain.  Has had chills.  He is not sure if he has had any fevers.  No diarrhea.  No dysuria.  He does not smoke or drink alcohol.  No other acute complaints.  He does feel better after medications given in emergency room.    Of note, due to current pandemic COVID 19 concerns, history taking was accomplished with face time on iPad.    Review of Systems    The 10 point Review of Systems is negative other than noted in the HPI     Past Medical History    I have reviewed this patient's medical history and updated it with pertinent information if needed.   Past Medical History:   Diagnosis Date     Allergic rhinitis      Sinusitis        Past Surgical History   I have reviewed this patient's surgical history and updated it with pertinent information if needed.  No past surgical history on file.    Social History   I have reviewed this patient's social history and updated it with pertinent information if needed.  Social History     Tobacco Use     Smoking status: Never Smoker     Smokeless tobacco: Never Used   Substance Use Topics     Alcohol use: No     Alcohol/week: 0.0 standard drinks     Drug use: No       Family History   I have reviewed this patient's family history and updated it with pertinent information if needed.   Family History   Problem Relation Age of Onset     Diabetes Mother      Coronary Artery Disease Mother      Hypertension Father        Prior to Admission Medications   None     Allergies   No Known Allergies    Physical Exam   Vital Signs: Temp: 99.6  F (37.6  C) Temp src: Oral BP: 117/80 Pulse: 79 Heart Rate: 77 Resp: 25 SpO2: 95 %      Weight: 0 lbs 0 oz    History and physical accomplished with face time  on iPad.    In general, patient is awake, alert, and seemingly oriented x3.  He does appear disheveled.    For the rest of physical exam, I did discuss case with emergency room provider, Wyatt Bates NP.    I also reviewed the physical exam section from his note of 4/22/2020 that was signed at 23:36.  It is noted that the patient appeared quite anxious and had poor eye contact during that exam.          Data   Data reviewed today: I reviewed all medications, new labs and imaging results over the last 24 hours. I personally reviewed the EKG tracing showing Sinus rhythm with occasional premature beats, prolonged QT interval.    Recent Labs   Lab 04/22/20  1931   WBC 8.5   HGB 19.6*   MCV 90      INR Canceled, Test credited   *   POTASSIUM 2.3*   CHLORIDE 87*   CO2 33*   BUN 74*   CR 2.03*   ANIONGAP 10   MINE 9.0   *   ALBUMIN 3.4   PROTTOTAL 7.5   BILITOTAL 2.1*   ALKPHOS 63   ALT 36   AST 23   LIPASE 624*

## 2020-04-23 NOTE — PROGRESS NOTES
04/23/20 1102   Quick Adds   Type of Visit Initial PT Evaluation   Living Environment   Lives With alone   Living Arrangements house   Home Accessibility stairs within home   Number of Stairs, Within Home, Primary 8  (Split level, ~8 stairs each flight)   Stair Railings, Within Home, Primary railing on left side (ascending)   Self-Care   Usual Activity Tolerance good   Current Activity Tolerance fair   Regular Exercise No   Equipment Currently Used at Home none   Functional Level Prior   Ambulation 0-->independent   Transferring 0-->independent   Toileting 0-->independent   Bathing 0-->independent   Fall history within last six months yes   Number of times patient has fallen within last six months 3   Which of the above functional risks had a recent onset or change? ambulation;transferring;toileting;dressing;bathing   General Information   Onset of Illness/Injury or Date of Surgery - Date 04/22/20   Referring Physician Marco Antonio Patel, DO    Patient/Family Goals Statement Discharge home when feeling better   Pertinent History of Current Problem (include personal factors and/or comorbidities that impact the POC) Delroy Christianson is a 60 year old male with no previous known medical history admitted on 4/22/2020 with weakness, cough, and hiccups.  Developed some vomiting about 3 days ago which led to a subsequent cough and hiccups.  Since that time he has had progressively more weakness and feeling generally unwell so he came to the ED for evaluation.  Vitals were stable on arrival with only mild hypoxia.  He was noted to have a creatinine of 2.0 with potassium of 2.3.  Troponin elevation of 0.049.  Hemoglobin of 19.6.  COVID-19 PCR was negative.  Chest x-ray showed streaky atelectasis or infiltrate in the left lung base suspicious for aspiration pneumonia given his history.  EKG shows QTC of 613.  He was started on Unasyn and being admitted for further evaluation.   Precautions/Limitations fall precautions    Weight-Bearing Status - LLE full weight-bearing   Weight-Bearing Status - RLE full weight-bearing   General Observations Pt supine upon initiation, agreeable to minimal mobility   Cognitive Status Examination   Orientation orientation to person, place and time   Level of Consciousness alert   Follows Commands and Answers Questions 100% of the time   Personal Safety and Judgment intact   Memory intact   Pain Assessment   Patient Currently in Pain No   Integumentary/Edema   Integumentary/Edema no deficits were identifed   Posture    Posture Forward head position;Protracted shoulders   Range of Motion (ROM)   ROM Comment WFL   Strength   Strength Comments Decreased functional strength as noted by pt's need for assist with mobility   Bed Mobility   Bed Mobility Comments sit<>supine Sudheer   Transfer Skills   Transfer Comments sit<>stand CGA   Gait   Gait Comments CGA with no AD   Balance   Balance Comments May benefit from UE support with dynamic mobility   Sensory Examination   Sensory Perception no deficits were identified   Coordination   Coordination no deficits were identified   Muscle Tone   Muscle Tone no deficits were identified   General Therapy Interventions   Planned Therapy Interventions bed mobility training;balance training;gait training;ROM;strengthening;stretching;transfer training;home program guidelines;progressive activity/exercise   Clinical Impression   Criteria for Skilled Therapeutic Intervention yes, treatment indicated   PT Diagnosis Impaired functional mobility   Influenced by the following impairments weakness, deconditioning, low motivation   Functional limitations due to impairments Difficulty with bed mobility, transfers, ambulation, stairs   Clinical Presentation Stable/Uncomplicated   Clinical Presentation Rationale medically progressing   Clinical Decision Making (Complexity) Low complexity   Therapy Frequency Daily   Predicted Duration of Therapy Intervention (days/wks) 5 days  "  Anticipated Discharge Disposition Home   Risk & Benefits of therapy have been explained Yes   Patient, Family & other staff in agreement with plan of care Yes   [facility] AM-PAC TM \"6 Clicks\"   2016, Trustees of [facility], under license to [employer].  All rights reserved.   6 Clicks Short Forms Basic Mobility Inpatient Short Form   [facility] AM-PAC  \"6 Clicks\" V.2 Basic Mobility Inpatient Short Form   1. Turning from your back to your side while in a flat bed without using bedrails? 4 - None   2. Moving from lying on your back to sitting on the side of a flat bed without using bedrails? 3 - A Little   3. Moving to and from a bed to a chair (including a wheelchair)? 3 - A Little   4. Standing up from a chair using your arms (e.g., wheelchair, or bedside chair)? 3 - A Little   5. To walk in hospital room? 3 - A Little   6. Climbing 3-5 steps with a railing? 2 - A Lot   Basic Mobility Raw Score (Score out of 24.Lower scores equate to lower levels of function) 18   Total Evaluation Time   Total Evaluation Time (Minutes) 8     "

## 2020-04-23 NOTE — PROGRESS NOTES
End of Shift Summary  For vital signs and complete assessments, please see documentation flowsheets.     Pertinent assessments: lethargic, a/o x4, denies pain, unsteady on feet, tele in place, using urinal at bedside  Major Shift Events consistent abn labs, COVID swab negative, sputum sent, strep sent, 1LPM O2 while sleeping  Treatment Plan: IVF, IV Unasyn, K+ replacement    Discharge Readiness: Medically active  Expected Discharge Date: 2-3 days  Discharge Disposition: Home with Self care  Barriers/Criteria for discharge: treatment plan

## 2020-04-23 NOTE — PROGRESS NOTES
An ABG was completed on the pt's right wrist @ 2243 on an FIO2 of RA with no complications noted during the procedure.      Sujata Shetty, RRT

## 2020-04-23 NOTE — PLAN OF CARE
Discharge Planner SLP   Patient plan for discharge: home  Current status: Pt admitted with hiccups, nausea/vomiting followed by cough/SOB/mild hypoxia. CxR showing streaky atelectasis or infiltrate in the left lung base suspicious for aspiration pneumonia -- SLP evaluation given same. Pt reports the following timeline of symptoms: 1) started feeling food build up/food wasn't going down at mid chest/abdominal level causing discomfort, 2) regurgitation of solid food, 3) stopped eating food and just drank liquids, 4) vomiting up liquids, 5) throat pain and SOB after vomiting. He reports recalling this happening x1 time before, unable to provide time frame other than thinking it was within the last 2-3 years. He denies any symptoms of aspiration when asked - ? If aspiration occurred during vomiting instances. He also reports baseline reflux.    Clinical swallow evaluation completed with 8 oz thin liquids, 4 oz puree solids, 1-2 oz regular solids. He currently presents with a functional oropharyngeal swallow: timely mastication/oral clearance, adequate oral control, timely swallow, strong hyolaryngeal ROM to palpation; no signs/sx aspiration noted. Of note, hiccups observed prior to session which stopped during both conversation and swallow tasks with writer   Addendum: hiccups resumed 5-10 minutes after evaluation when writer re-approached, pt was lying down and reporting reflux symptoms - encouraged him to remain upright for 30-60 minutes.     Recommendations: From an oropharyngeal standpoint regular/thin diet is appropriate - upright for 30-60 minutes post PO, slow pacing, alternate between solids/liquids. Pt's symptoms appear to be from an esophageal vs lower GI source - would recommend GI consult or pursuing a esophagram during IP stay for further assessment. Will text-page MD re: same.     Barriers to return to prior living situation: no speech barriers  Recommendations for discharge: home  Rationale for  recommendations: ongoing SLP for dysphagia management/work up.       Entered by: Krystyna Soto 04/23/2020 1:54 PM

## 2020-04-23 NOTE — ED PROVIDER NOTES
History     Chief Complaint:  Generalized Weakness       HPI   Delroy Christianson is a 60 year old male who presents with general complaint of feeling on well.  He states that over the last couple of days he has had hiccups and as a result has stopped eating and is only drinking Gatorade.  He states that he has had some vomiting as well.  He denies any diarrhea.  His main complaint is generalized weakness.  He has been living in self quarantine for almost 2 months in his house.  He states his only contact has been with brother who has been bringing food to him.  He denies any recent fevers, shortness of breath or cough or cold type symptoms.    Allergies:  No Known Allergies     Medications:    Medications reviewed. No current medications.      Past Medical History:    Medical history reviewed. No pertinent medical history.    Past Surgical History:    Surgical history reviewed. No pertinent surgical history.    Family History:    Diabetes   Coronary Artery Disease   Hypertension      Social History:  Smoking Status: Never Smoker  Smokeless Tobacco: Never Used  Alcohol Use: No  Drug Use: No  PCP: Chuy Holliday     Review of Systems   Constitutional: Positive for fatigue. Negative for chills and fever.   HENT: Negative for congestion.    Respiratory: Negative for shortness of breath.    Gastrointestinal: Positive for nausea and vomiting. Negative for abdominal pain.   Genitourinary: Positive for dysuria.   Neurological: Negative for headaches.   Psychiatric/Behavioral: The patient is nervous/anxious.    All other systems reviewed and are negative.        Physical Exam     Patient Vitals for the past 24 hrs:   BP Temp Temp src Pulse Heart Rate Resp SpO2   04/22/20 2145 135/77 -- -- 78 73 -- 95 %   04/22/20 2130 120/74 -- -- 81 84 28 98 %   04/22/20 2115 (!) 144/85 -- -- 68 69 (!) 35 91 %   04/22/20 2100 92/75 -- -- 95 95 26 95 %   04/22/20 1930 116/87 -- -- -- 85 (!) 33 95 %   04/22/20 1922 116/87 99.6  F (37.6   C) Oral 97 97 20 93 %   04/22/20 1915 (!) 121/109 -- -- 90 -- -- (!) 83 %        Physical Exam  General: Alert, Mild  discomfort, mildly unkempt  Eyes: PERRL, conjunctivae pink no scleral icterus or conjunctival injection  ENT:   Moist mucus membranes, posterior oropharynx clear without erythema or exudates, No lymphadenopathy, Normal voice  Resp:  Lungs clear to auscultation bilaterally, no crackles/rubs/wheezes. Good air movement  CV:  Normal rate and rhythm, no murmurs/rubs/gallops  GI:  Abdomen soft and non-distended.  Normoactive BS.  No tenderness, guarding or rebound, No masses  Skin:  Warm, dry.  No rashes or petechiae  Musculoskeletal: No peripheral edema or calf tenderness, Normal gross ROM   Neuro: Alert and oriented to person/place/time, normal sensation  Psychiatric: Extremely anxious, poor eye contact    Emergency Department Course   ECG:  ECG taken at 1934  Sinus rhythm with Premature atrial complexes  Nonspecific ST abnormality  Prolonged QT  Abnormal ECG  Vent. rate 93 BPM  MA interval 126 ms  QRS duration 100 ms  QT/QTc 494/614 ms  P-R-T axes 55 38 48    Laboratory:  Laboratory findings were communicated with the patient who voiced understanding of the findings.    UA with microscopic: urineketone 10 (A), o/w WNL     Urine culture: pending     Glucose by meter (1950): 138 (H)     CBC:  WBC 8.5, HGB 19.6 (H), , o/w WNL     BMP:  (L), Potassium 2.3 (LL), Chloride 87 (L), Co2 33 (H), Glucose 126 (H), BUN 74 (H), GFR estimate 35 (L), Creatinine 2.03 (H), o/w wnl      Magnesium: 3.0 (H)     Interventions:  2056 reglan 10 mg IV  2058 Potassium chloride ER 10 mEq IV infusion   2059 Potassium chloride ER 20 mEq oral    2101 0.9% sodium chloride infusion IV  2102 GI cocktail 30 mL oral     Emergency Department Course:  Past medical records, nursing notes, and vitals reviewed.    1935 I performed an exam of the patient as documented above.    IV was inserted and blood was drawn for laboratory  testing, results above.     The patient provided a urine sample here in the emergency department. This was sent for laboratory testing, findings above.       2147 I spoke with Dr. Patel of the Hospitalist service from [facility] regarding patient's presentation, findings, and plan of care.      2155 Patient rechecked and updated.       Findings and plan explained to the Patient who consents to admission. Discussed the patient with Dr. Patel, who will admit the patient to a adult med/surg bed for further monitoring, evaluation, and treatment.      Impression & Plan     Medical Decision Making:  Delroy Christianson is a 60 year old male who presents to the emergency department today with multiple complaints however main complaint is generalized weakness and feeling unwell.  Patient does state that he has had nausea and vomiting so is only been drinking Gatorade for the last couple of days.  His laboratory studies show a significantly low potassium at 2.3 and a prolonged QT segment on EKG.  His magnesium is actually slightly elevated and he has signs of dehydration with a mild acute kidney injury and a concentrated hemoglobin of 19.6.  The remainder his laboratory studies are noncontributory.  Given the hypokalemia and prolonged QT as well as the dehydration plan will be to replenish as above and admit to the hospitalist service for continued monitoring and treatment.  No evidence of urinary tract infection..  I discussed the case with the hospitalist who did accept patient to his service.  Patient will be admitted for continued treatment and monitoring.  Given that he has not left his house and does not have any respiratory symptoms it is unlikely to represent COVID-19.       Discharge Diagnosis:    ICD-10-CM    1. Prolonged QT interval  R94.31    2. Hypokalemia  E87.6    3. Generalized muscle weakness  M62.81    4. DAMION (acute kidney injury) (H)  N17.9        Disposition:  Admitted to Dr. Patel.    [place]  Disclosure:  I, Altaf Ellen, am serving as a scribe at 7:35 PM on 4/22/2020 to document services personally performed by Wyatt Bates APRN based on my observations and the provider's statements to me.      4/22/2020   Wyatt Bates APRN Dukes, Jason David, APRN CNP  04/22/20 9519

## 2020-04-23 NOTE — ED NOTES
"Bed: ED31  Expected date:   Expected time:   Means of arrival:   Comments:  HE? Evaluate by \"A\" RN before placement  "

## 2020-04-23 NOTE — PROGRESS NOTES
Attempted to collect sputum from patient- patient unable to get anything up for collection-will continue to attempt throughout shift.

## 2020-04-23 NOTE — PHARMACY-ADMISSION MEDICATION HISTORY
Admission medication history interview status for this patient is complete. See [facility] admission navigator for allergy information, prior to admission medications and immunization status.     Medication history interview done via telephone during Covid-19 pandemic, indicate source(s): Patient  Medication history resources (including written lists, pill bottles, clinic record):None  Primary pharmacy: N/A      Prior to Admission medications    Not on File

## 2020-04-24 ENCOUNTER — APPOINTMENT (OUTPATIENT)
Dept: OCCUPATIONAL THERAPY | Facility: CLINIC | Age: 60
End: 2020-04-24
Attending: HOSPITALIST
Payer: COMMERCIAL

## 2020-04-24 ENCOUNTER — APPOINTMENT (OUTPATIENT)
Dept: SPEECH THERAPY | Facility: CLINIC | Age: 60
End: 2020-04-24
Payer: COMMERCIAL

## 2020-04-24 ENCOUNTER — APPOINTMENT (OUTPATIENT)
Dept: CT IMAGING | Facility: CLINIC | Age: 60
End: 2020-04-24
Attending: INTERNAL MEDICINE
Payer: COMMERCIAL

## 2020-04-24 LAB
ANION GAP SERPL CALCULATED.3IONS-SCNC: 4 MMOL/L (ref 3–14)
BACTERIA SPEC CULT: NO GROWTH
BUN SERPL-MCNC: 37 MG/DL (ref 7–30)
CALCIUM SERPL-MCNC: 8 MG/DL (ref 8.5–10.1)
CHLORIDE SERPL-SCNC: 107 MMOL/L (ref 94–109)
CO2 SERPL-SCNC: 28 MMOL/L (ref 20–32)
CREAT SERPL-MCNC: 1.6 MG/DL (ref 0.66–1.25)
ERYTHROCYTE [DISTWIDTH] IN BLOOD BY AUTOMATED COUNT: 12.4 % (ref 10–15)
GFR SERPL CREATININE-BSD FRML MDRD: 46 ML/MIN/{1.73_M2}
GLUCOSE SERPL-MCNC: 89 MG/DL (ref 70–99)
HCT VFR BLD AUTO: 45.6 % (ref 40–53)
HGB BLD-MCNC: 15 G/DL (ref 13.3–17.7)
Lab: NORMAL
MAGNESIUM SERPL-MCNC: 2.6 MG/DL (ref 1.6–2.3)
MCH RBC QN AUTO: 30.3 PG (ref 26.5–33)
MCHC RBC AUTO-ENTMCNC: 32.9 G/DL (ref 31.5–36.5)
MCV RBC AUTO: 92 FL (ref 78–100)
PLATELET # BLD AUTO: 160 10E9/L (ref 150–450)
POTASSIUM SERPL-SCNC: 3.4 MMOL/L (ref 3.4–5.3)
POTASSIUM SERPL-SCNC: 3.5 MMOL/L (ref 3.4–5.3)
RBC # BLD AUTO: 4.95 10E12/L (ref 4.4–5.9)
SODIUM SERPL-SCNC: 139 MMOL/L (ref 133–144)
SPECIMEN SOURCE: NORMAL
WBC # BLD AUTO: 7.4 10E9/L (ref 4–11)

## 2020-04-24 PROCEDURE — 25000132 ZZH RX MED GY IP 250 OP 250 PS 637: Performed by: INTERNAL MEDICINE

## 2020-04-24 PROCEDURE — 36415 COLL VENOUS BLD VENIPUNCTURE: CPT | Performed by: HOSPITALIST

## 2020-04-24 PROCEDURE — 83735 ASSAY OF MAGNESIUM: CPT | Performed by: HOSPITALIST

## 2020-04-24 PROCEDURE — 97166 OT EVAL MOD COMPLEX 45 MIN: CPT | Mod: GO | Performed by: REHABILITATION PRACTITIONER

## 2020-04-24 PROCEDURE — 99221 1ST HOSP IP/OBS SF/LOW 40: CPT | Performed by: INTERNAL MEDICINE

## 2020-04-24 PROCEDURE — 74150 CT ABDOMEN W/O CONTRAST: CPT

## 2020-04-24 PROCEDURE — 85027 COMPLETE CBC AUTOMATED: CPT | Performed by: HOSPITALIST

## 2020-04-24 PROCEDURE — 84132 ASSAY OF SERUM POTASSIUM: CPT | Performed by: HOSPITALIST

## 2020-04-24 PROCEDURE — 12000011 ZZH R&B MS OVERFLOW

## 2020-04-24 PROCEDURE — 25000132 ZZH RX MED GY IP 250 OP 250 PS 637: Performed by: HOSPITALIST

## 2020-04-24 PROCEDURE — 97165 OT EVAL LOW COMPLEX 30 MIN: CPT | Mod: GO | Performed by: REHABILITATION PRACTITIONER

## 2020-04-24 PROCEDURE — 25800030 ZZH RX IP 258 OP 636: Performed by: INTERNAL MEDICINE

## 2020-04-24 PROCEDURE — 99233 SBSQ HOSP IP/OBS HIGH 50: CPT | Performed by: INTERNAL MEDICINE

## 2020-04-24 PROCEDURE — 80048 BASIC METABOLIC PNL TOTAL CA: CPT | Performed by: HOSPITALIST

## 2020-04-24 PROCEDURE — 92526 ORAL FUNCTION THERAPY: CPT | Mod: GN

## 2020-04-24 PROCEDURE — 25000128 H RX IP 250 OP 636: Performed by: INTERNAL MEDICINE

## 2020-04-24 PROCEDURE — 97530 THERAPEUTIC ACTIVITIES: CPT | Mod: GO | Performed by: REHABILITATION PRACTITIONER

## 2020-04-24 RX ORDER — POTASSIUM CHLORIDE 7.45 MG/ML
10 INJECTION INTRAVENOUS
Status: DISCONTINUED | OUTPATIENT
Start: 2020-04-24 | End: 2020-04-25 | Stop reason: HOSPADM

## 2020-04-24 RX ORDER — LORAZEPAM 2 MG/ML
.5-1 INJECTION INTRAMUSCULAR EVERY 4 HOURS PRN
Status: DISCONTINUED | OUTPATIENT
Start: 2020-04-24 | End: 2020-04-25 | Stop reason: HOSPADM

## 2020-04-24 RX ORDER — MAGNESIUM SULFATE HEPTAHYDRATE 40 MG/ML
4 INJECTION, SOLUTION INTRAVENOUS EVERY 4 HOURS PRN
Status: DISCONTINUED | OUTPATIENT
Start: 2020-04-24 | End: 2020-04-25 | Stop reason: HOSPADM

## 2020-04-24 RX ORDER — POTASSIUM CHLORIDE 1500 MG/1
20-40 TABLET, EXTENDED RELEASE ORAL
Status: DISCONTINUED | OUTPATIENT
Start: 2020-04-24 | End: 2020-04-25 | Stop reason: HOSPADM

## 2020-04-24 RX ORDER — POTASSIUM CHLORIDE 1.5 G/1.58G
20-40 POWDER, FOR SOLUTION ORAL
Status: DISCONTINUED | OUTPATIENT
Start: 2020-04-24 | End: 2020-04-25 | Stop reason: HOSPADM

## 2020-04-24 RX ORDER — POTASSIUM CL/LIDO/0.9 % NACL 10MEQ/0.1L
10 INTRAVENOUS SOLUTION, PIGGYBACK (ML) INTRAVENOUS
Status: DISCONTINUED | OUTPATIENT
Start: 2020-04-24 | End: 2020-04-25 | Stop reason: HOSPADM

## 2020-04-24 RX ORDER — POTASSIUM CHLORIDE 29.8 MG/ML
20 INJECTION INTRAVENOUS
Status: DISCONTINUED | OUTPATIENT
Start: 2020-04-24 | End: 2020-04-25 | Stop reason: HOSPADM

## 2020-04-24 RX ADMIN — SODIUM CHLORIDE: 9 INJECTION, SOLUTION INTRAVENOUS at 16:16

## 2020-04-24 RX ADMIN — BACLOFEN 10 MG: 10 TABLET ORAL at 16:11

## 2020-04-24 RX ADMIN — ASPIRIN 81 MG: 81 TABLET, COATED ORAL at 08:40

## 2020-04-24 RX ADMIN — AMPICILLIN SODIUM AND SULBACTAM SODIUM 3 G: 2; 1 INJECTION, POWDER, FOR SOLUTION INTRAMUSCULAR; INTRAVENOUS at 06:41

## 2020-04-24 RX ADMIN — BACLOFEN 10 MG: 10 TABLET ORAL at 08:40

## 2020-04-24 RX ADMIN — AMPICILLIN SODIUM AND SULBACTAM SODIUM 3 G: 2; 1 INJECTION, POWDER, FOR SOLUTION INTRAMUSCULAR; INTRAVENOUS at 00:45

## 2020-04-24 RX ADMIN — BACLOFEN 10 MG: 10 TABLET ORAL at 00:45

## 2020-04-24 ASSESSMENT — MIFFLIN-ST. JEOR: SCORE: 1654

## 2020-04-24 NOTE — PLAN OF CARE
"BP (!) 150/68 (BP Location: Left arm)   Pulse 60   Temp 98  F (36.7  C) (Oral)   Resp 20   Ht 1.829 m (6')   Wt 81.6 kg (179 lb 14.4 oz)   SpO2 98%   BMI 24.40 kg/m      Neuro: AOx4.   Cardiac: SB/SR per tele. Echo-abnormal, CARDS consult (will see 4/24  Lungs: Diminished in LLL. Possible aspiration PNA. on IV ABX   GI: Constipation. last BM 5 days ago. Patient states he \"doesn't have aything in him to poop out\". Patient tolerated clear and full liquid this evening.   : WNL  Pain: n/a   IV: NS @75  Meds: Baclofen for hiccups. IV unasyn q6.   Labs/tests: K+3.0, replaced, re-check at midnight thirty. Cr-1.99. Blood gases abnormal. Covid-neg. Strep cultures pending. Sputum cultures pending,.   Diet: Speech cleared for regular diet  Activity: IND  Plan: OT consult, PT-recommended home once strength returns, Speech-cleared for reg diet, CARDS consult.  Pend labs, fluids, IV abx.   "

## 2020-04-24 NOTE — PLAN OF CARE
Pt stated feeling overwhelmed this morning with all the consults and tests.  I discussed the plan of care with patient after he returned from CT ABD/chest today.  A large hiatal hernia was found.  Pt slept this afternoon.  Vitals are stable.  Gen surg consult states repair would be outpatient at [facility]. Pt has denied any pain or nausea today.  Speech consult done, see note.K is 3.5, Mag 2.6, Creat 1.60.  Continued IVF. Mech. Dental soft tray just ordered.  Will monitor pt tolerating.

## 2020-04-24 NOTE — PLAN OF CARE
"PRIMARY DIAGNOSIS: hypokalemia  OUTPATIENT/OBSERVATION GOALS TO BE MET BEFORE DISCHARGE:  ADLs back to baseline: No    Activity and level of assistance: Ax1 gait belt and walker    Pain status: currently denying pain    Return to near baseline physical activity: No     Discharge Planner Nurse   Safe discharge environment identified: No  Barriers to discharge: Yes       Entered by: Karen Rosas 04/24/2020 4:56 PM     Please review provider order for any additional goals.   Nurse to notify provider when observation goals have been met and patient is ready for discharge.    BP (!) 140/89 (BP Location: Left arm)   Pulse 60   Temp 97  F (36.1  C) (Oral)   Resp 16   Ht 1.829 m (6')   Wt 80.6 kg (177 lb 11.1 oz)   SpO2 98%   BMI 24.10 kg/m    Patient lying in bed, denying pain, however does have the hiccups. Reported he thought eating would make him \"more peppy\" however states he still feels fatigued.  Consults: surgery, cardiology, SLP - see notes. OT recommends TCU, SW following for safe discharge planning.  "

## 2020-04-24 NOTE — PROGRESS NOTES
[facility]    Hospitalist Progress Note  Name: Delroy Christianson    MRN: [id number]  Provider:  Olivia Martin MD,FACP    Date of Service: 04/24/2020    Summary of Stay: Delroy Christianson is a 60 year old male with no previous known medical history admitted on 4/22/2020 with weakness, cough, and hiccups.  Developed some vomiting about 3 days ago which led to a subsequent cough and hiccups.  Since that time he has had progressively more weakness and feeling generally unwell so he came to the ED for evaluation.  Vitals were stable on arrival with only mild hypoxia.  He was noted to have a creatinine of 2.0 with potassium of 2.3.  Troponin elevation of 0.049.  Hemoglobin of 19.6.  COVID-19 PCR was negative.  Chest x-ray showed streaky atelectasis or infiltrate in the left lung base suspicious for aspiration pneumonia given his history.  EKG shows QTC of 613.  He was started on Unasyn and being admitted for further evaluation.    Problem List:   1. Acute hypoxic respiratory failure secondary to aspiration pneumonia: --His hypoxemia is improved improved significantly, currently patient is in room air, COVID-19 negative, CT chest abdomen obtained does show atelectasis , no infiltrates, he received Unasyn for 2 days, will stop that and monitor without antibiotics, white count is within normal limits.    2. Acute on chronic CKD: Creatinine is 2.0 on admission .  Creatinine in 2015 was 1.5, his creatinine is improving significantly with IV fluids, today the creatinine is come down to 1.5.  Will monitor further on IV fluids.  CT abdomen obtained, no hydronephrosis noted.  3. Elevated bilirubin and lipase, possible pancreatitis versus the hiatal hernia identified with the stomach in the CT done on 4/24.  There is good because abnormal labs, will request them general surgery input regarding future surgery, patient is will effectively arrange terms of discussing medical issues, he have not seen a physician in night 10  years or more, and he is refusing many testing as well as discussions.  4. Hypokalemia: Patient has prolonged QT on admission, seen by cardiology, will aggressively replace potassium as well as magnesium levels.  5. Troponin elevation: Suspect demand ischemia.  Troponin levels flat and not consistent with ACS.  Echocardiogram noted which indicated possible left ventricular outlet obstruction and hypertrophic cardiomyopathy, mild for now he will need MRI when possible in the future to evaluate cardiac status.  6. Prolonged QTC: Still prolonged but improved since admission.  Replace electrolytes as needed  7. Hiccups: His hiccups possibly secondary to the hiatal hernia, they do have a contents of his stomach and it, this could worsen his ongoing hiccups.  Will have to avoid medications that would prolong QT.  Patient is quite anxious, I did spend more than 30 minutes discussing with him about the plan to obtain CT scan, I am not sure whether he would agree for further management of the hiatal hernia, still will request input from surgery.  Patient is quite deconditioned, he lives alone, due to lack of managed care over  time he is quite upset that he has multiple medical issues.  8. Appreciate PT/OT visiting with patient, he is very noncompliant to instructions at times.  Time spent 45 min, including discussion with patient , nursing and consultants  DVT Prophylaxis: Pneumatic Compression Devices  Code Status: Full Code  Diet: Mechanical/Dental Soft Diet    Owens Catheter: not present  Disposition: Expected discharge in 2 days to home. Goals prior to discharge include address the multiple issues outlined above.   Incidental Findings: None.  Family updated today:Patient did not want any family to be updated.     Interval History   Assumed care from previous hospitalist. The history was fully reviewed.  Patient continues to have hiccups, he is a concern of possibly diagnosis secondary to the large hiatal hernia he has,  his QTC is still prolonged but improved since admission, he is a  very poor historian, he is very suspicious about on most of the ER treatment plans that may provide to him, very emotional at times.  He wants to advance his diet, currently he is only on clears, he had elevated lipase so clears were started.    -Data reviewed today: I personally reviewed all new labs and imaging results over the last 24 hours.     Physical Exam   Temp: 97.2  F (36.2  C) Temp src: Oral BP: (!) 149/92   Heart Rate: 95 Resp: 16 SpO2: 94 % O2 Device: None (Room air)    Vitals:    04/23/20 0020 04/23/20 0912 04/24/20 0702   Weight: 80 kg (176 lb 6.4 oz) 81.6 kg (179 lb 14.4 oz) 80.6 kg (177 lb 11.1 oz)     Vital Signs with Ranges  Temp:  [96.5  F (35.8  C)-98  F (36.7  C)] 97.2  F (36.2  C)  Heart Rate:  [56-95] 95  Resp:  [16-20] 16  BP: (124-150)/(68-92) 149/92  SpO2:  [94 %-98 %] 94 %  I/O last 3 completed shifts:  In: 2164 [I.V.:2164]  Out: 1800 [Urine:1800]    Constitutional: Awake, alert, cooperative, no apparent distress  Respiratory: Clear to auscultation bilaterally, no crackles or wheezing  Cardiovascular: Regular rate and rhythm, normal S1 and S2, and no murmur noted  GI: Normal bowel sounds, soft, non-distended, non-tender  Skin/Integumen: No rashes, no cyanosis, no edema  Neuro : moving all 4 extremities, no focal deficit noted       Medications     sodium chloride 75 mL/hr at 04/24/20 1215       aspirin  81 mg Oral Daily     baclofen  10 mg Oral TID     sodium chloride (PF)  3 mL Intracatheter Q8H     Data     Laboratory:  Recent Labs   Lab 04/24/20  0621 04/23/20  0650 04/22/20  1931   WBC 7.4 8.9 8.5   HGB 15.0 17.2 19.6*   HCT 45.6 51.2 56.2*   MCV 92 92 90    185 227     Recent Labs   Lab 04/24/20  0621 04/24/20  0034 04/23/20  1707 04/23/20  0650 04/23/20  0408     --   --  136 135   POTASSIUM 3.5 3.4 3.0* 2.8* 2.7*   CHLORIDE 107  --   --  96 95   CO2 28  --   --  33* 31   ANIONGAP 4  --   --  7 9   GLC 89   --   --  108* 111*   BUN 37*  --   --  59* 61*   CR 1.60*  --   --  1.99* 1.90*   GFRESTIMATED 46*  --   --  35* 37*   GFRESTBLACK 53*  --   --  41* 43*   MINE 8.0*  --   --  7.9* 8.3*     Recent Labs   Lab 04/23/20  1150 04/23/20  0115 04/22/20 2008   CULT Canceled, Test credited  >10 Squamous epithelial cells/low power field indicates oral contamination. Please   recollect.  Notification of test cancellation was given to  Juani Clay RN on 4.23.20 at 1549. JRT  * >10 Squamous epithelial cells/low power field indicates oral contamination. Please   recollect.  * No growth       Imaging:  Recent Results (from the past 24 hour(s))   CT Chest Abodmen w/o Contrast    Narrative    CT CHEST ABDOMEN W/O CONTRAST 4/24/2020 11:50 AM    CLINICAL HISTORY: Weakness, cough, vomiting, hypoxia    TECHNIQUE: CT scan of the chest, abdomen was performed without IV  contrast. Multiplanar reformats were obtained. Dose reduction  techniques were used.   CONTRAST: None.    COMPARISON: None.    FINDINGS:   LUNGS AND PLEURA: There is mild compressive atelectasis adjacent to a  hiatal hernia. The lungs are otherwise clear.    MEDIASTINUM/AXILLAE: Large hiatal hernia containing an intrathoracic  stomach. No lymphadenopathy. Moderate coronary artery calcification.    HEPATOBILIARY: Hepatic steatosis.    PANCREAS: Normal.    SPLEEN: Normal.    ADRENAL GLANDS: Normal.    KIDNEYS/BLADDER: Normal.    BOWEL: Normal.    LYMPH NODES: Normal.    VASCULATURE: Unremarkable.    OTHER: None.    MUSCULOSKELETAL: Normal.      Impression    IMPRESSION:  1.  Large hiatal hernia.  2.  No evidence of pneumonia. No acute abnormality in the chest or  abdomen.    MD Dale JIMENES DO MPH  [facility] Hospitalist  [address] [facility] [place] [address]  Pager: [phone]  04/24/2020

## 2020-04-24 NOTE — PLAN OF CARE
PT: Pt attempted for session. Preparing for CT scan at this time, and is not available for PT at this time.

## 2020-04-24 NOTE — PROGRESS NOTES
04/24/20 0954   Quick Adds   Type of Visit Initial Occupational Therapy Evaluation   Living Environment   Lives With alone   Living Arrangements house   Home Accessibility stairs to enter home   Number of Stairs, Within Home, Primary 8  (Split level, ~8 stairs each flight)   Self-Care   Usual Activity Tolerance good   Current Activity Tolerance fair   Regular Exercise No   Equipment Currently Used at Home none   Activity/Exercise/Self-Care Comment patient working from home as    Functional Level   Ambulation 0-->independent   Transferring 0-->independent   Toileting 0-->independent   Bathing 0-->independent   Dressing 0-->independent   Eating 0-->independent   Communication 0-->understands/communicates without difficulty   Swallowing 0-->swallows foods/liquids without difficulty   Cognition 0 - no cognition issues reported   Fall history within last six months yes   Number of times patient has fallen within last six months 3   Which of the above functional risks had a recent onset or change? ambulation;transferring;toileting;bathing;dressing;fall history   General Information   Onset of Illness/Injury or Date of Surgery - Date 04/22/20   Referring Physician Dr. Nguyen   Patient/Family Goals Statement not stated   Additional Occupational Profile Info/Pertinent History of Current Problem Delroy Christianson is a 60 year old male with no previous known medical history admitted on 4/22/2020 with weakness, cough, and hiccups.  Developed some vomiting about 3 days ago which led to a subsequent cough and hiccups.  Since that time he has had progressively more weakness and feeling generally unwell so he came to the ED for evaluation.  Vitals were stable on arrival with only mild hypoxia.  He was noted to have a creatinine of 2.0 with potassium of 2.3.  Troponin elevation of 0.049.  Hemoglobin of 19.6.  COVID-19 PCR was negative.  Chest x-ray showed streaky atelectasis or infiltrate in the left lung base  suspicious for aspiration pneumonia given his history.  EKG shows QTC of 613.  He was started on Unasyn and being admitted for further evaluation.   Precautions/Limitations no known precautions/limitations   General Observations patient was in bed, agreeable to limited activity   Cognitive Status Examination   Orientation orientation to person, place and time   Level of Consciousness alert   Pain Assessment   Patient Currently in Pain Yes, see Vital Sign flowsheet  (reports pain in R shoulder area)   Integumentary/Edema   Integumentary/Edema no deficits were identifed   Range of Motion (ROM)   ROM Comment patient reports decreased ROM in R shoulder d/t increased  pain   Strength   Strength Comments generalized weakness, declined all OOB activity   Hand Strength   Hand Strength Comments intact   Muscle Tone Assessment   Muscle Tone Quick Adds No deficits were identified   Coordination   Upper Extremity Coordination No deficits were identified   Transfer Skills   Transfer Comments patient declined any OOB or EOB activity- defer to PT eval for details   Grooming   Level of [place]: Grooming   (treatment initiated-defer to OT daily note for details)   Eating/Self Feeding   Level of [place]: Eating independent   Instrumental Activities of Daily Living (IADL)   IADL Comments suspect patient was baseline for all IADl's, however patient not tolerating questions, will assess further as able   Activities of Daily Living Analysis   Impairments Contributing to Impaired Activities of Daily Living strength decreased;ROM decreased   General Therapy Interventions   Planned Therapy Interventions ADL retraining;progressive activity/exercise;transfer training;strengthening   Clinical Impression   Criteria for Skilled Therapeutic Interventions Met yes, treatment indicated   OT Diagnosis decreased ADLs/IADls   Influenced by the following impairments strength decreased;ROM decreased   Assessment of Occupational Performance 5 or  "more Performance Deficits   Identified Performance Deficits decreased ADLs'/IADLs- dsg, toileting, bathing, community/functional mobility, household chores, errands, outdoor chores   Clinical Decision Making (Complexity) Moderate complexity   Therapy Frequency 5x/week   Predicted Duration of Therapy Intervention (days/wks) 1 week   Anticipated Discharge Disposition Transitional Care Facility   Risks and Benefits of Treatment have been explained. Yes   Patient, Family & other staff in agreement with plan of care Yes   [facility] TM \"6 Clicks\"   2016, Trustees of [facility], under license to [employer].  All rights reserved.   6 Clicks Short Forms Daily Activity Inpatient Short Form   [facility] AM-PAC  \"6 Clicks\" Daily Activity Inpatient Short Form   1. Putting on and taking off regular lower body clothing? 3 - A Little   2. Bathing (including washing, rinsing, drying)? 2 - A Lot   3. Toileting, which includes using toilet, bedpan or urinal? 2 - A Lot   4. Putting on and taking off regular upper body clothing? 2 - A Lot   5. Taking care of personal grooming such as brushing teeth? 2 - A Lot   6. Eating meals? 4 - None   Daily Activity Raw Score (Score out of 24.Lower scores equate to lower levels of function) 15   Total Evaluation Time   Total Evaluation Time (Minutes) 10     "

## 2020-04-24 NOTE — CONSULTS
"[facility]    Cardiology Consultation     Date of Admission:  4/22/2020  Date of Consult: 4/24/2020  Requesting Physician: Dr. Nguyen  Primary care physician:Physician No Ref-Primary    Staff Cardiologist:  Dr. Romain Young    Reason for consult/ Chief Complaint: Troponin elevation, abnormal echocardiogram.       History of Present Illness   Delroy Christianson is a 60 year old male who does not routinely seek medical care (and no known significant PMH) who presents with complaints of generalized weakness, hiccups, and some vomiting with recent poor PO intake. He had been self isolating for several weeks due to COVID. On arrival he was found to be mildly hypoxemic, and CXR showed streaky atelectasis and infiltrate in left lung base suspicious for aspiration PNA. He was also found to be hypokalemic with potassium at 2.3 and a prolonged QT segment on EKG.  He was dehydrated with mild DAMION and concentrated hemoglobin of 19.6. He was admitted for treatment of pneumonia, and given IV fluids and antibiotics. His COVID testing was negative.    As part of his workup he had a troponin checked. This was mildly elevated and remained flat. 0.049->0.082->0.051. He went on to have an echocardiogram that showed hyperdynamic LV function with EF >70%. There was a question of hypertrophic cardiomyopathy with 2.2cm septum. Mild flow acceleration across the LVOT (rest gradient 15mmHg, peak 29mmHg). There were no signficant valvular abnormalities. It is in this setting we have been asked to participate in his care. Repeat EKG yesterday showed QTC down to 586 from 614 after electrolyte replacement. Renal function slowly improving.     He tells me that he is very overwhelmed with everything happening in the hospital and does \"not deal with medical issues well.\" While informing him of results of the echocardiogram he was very tearful and anxious and stated that \"if this is not life threatening and not related to what is going on, I do " "not want to talk about it anymore.\" He is a bit difficult to redirect but said that prior to the pandemic, he was active getting in and out of his house and running errands. He denied any ZAFAR and kept up fine with other people. He denies any chest pain, palpitations, or dizziness. He does not think he has had issues with swelling or lower extremity edema.       ASSESSMENT/PLAN:     1. Mild hypertrophic cardiomyopathy.   --Based on echocardiogram, hyperdynamic EF >70% (in the setting of dehydration), and a basal anteroseptum measurement of 2.2cm. Resting LVOT gradient was 15mmHg, and peak at 29mmHg with valsalva. No valvuar concerns.    --He has no cardiac complaints and is not currently interested in discussing this further unless it requires immediate intervention, as he is very overwhelmed. Should he choose to proceed with an outpatient workup later, can consider a CMRI and possibly an outpatient Ziopatch.     Please await final recommendations from attending MD, Dr Young.       Code Status    Full Code    Past Medical History   I have reviewed this patient's medical history and updated it with pertinent information if needed.   Past Medical History:   Diagnosis Date     Allergic rhinitis      Sinusitis        Past Surgical History   I have reviewed this patient's surgical history and updated it with pertinent information if needed.  No past surgical history on file.    Prior to Admission Medications   None     Allergies   No Known Allergies    Social History   I have reviewed this patient's social history and updated it with pertinent information if needed. Delroy Christianson  reports that he has never smoked. He has never used smokeless tobacco. He reports that he does not drink alcohol or use drugs.      Family History   I have reviewed this patient's family history and updated it with pertinent information if needed.   Family History   Problem Relation Age of Onset     Diabetes Mother      Coronary Artery Disease " Mother      Hypertension Father          Review of Systems:  Cardiovascular: negative for chest pain, palpitations, orthopnea, LE edema  Constitutional: negative for chills, sweats, fever.  Resp: Negative for dyspnea at rest, dyspnea on exertion, pos hiccups. Neg cough, sputum production, wheezing, known hx of chronic lung disease  Gastrointestinal: negative for abdominal pain, pos recent vomiting. Neg diarrhea.  Hematologic/lymphatic: negative for current blood thinners, hx of blood clots  Neurological: negative for focal weakness, LOC, seizures. Pos for feeling anxious and overwhelmed.    Physical Exam     /81 (BP Location: Left arm)   Pulse 60   Temp 96.6  F (35.9  C) (Oral)   Resp 16   Ht 1.829 m (6')   Wt 80.6 kg (177 lb 11.1 oz)   SpO2 95%   BMI 24.10 kg/m      Wt Readings from Last 4 Encounters:   04/24/20 80.6 kg (177 lb 11.1 oz)   12/06/16 90.3 kg (199 lb)   08/10/15 90.7 kg (200 lb)     I/O last 3 completed shifts:  In: 2164 [I.V.:2164]  Out: 1800 [Urine:1800]    Gen:   male, seen sitting up in bed, comfortable and in NAD on room air.   Neck: Supple, trachea midline, no JVD while upright.   Lungs: Clear to auscultation bilaterally with no wheezing, rales, or rhonchi.   CV:  RRR, no murmur, rub, or gallop appreciated.   Abd: Soft, nontender, nondistended.  Extr: Perfused with no clubbing or cyanosis.  No pitting LE edema.  Neuro: Awake and alert, interactive with no obvious focal deficits. He is quite anxious during exam, and was often tearful.       Data   Most Recent 3 CBC's:  Recent Labs   Lab Test 04/24/20  0621 04/23/20  0650 04/22/20  1931   WBC 7.4 8.9 8.5   HGB 15.0 17.2 19.6*   MCV 92 92 90    185 227     Most Recent 3 BMP's:  Recent Labs   Lab Test 04/24/20  0621 04/24/20  0034 04/23/20  1707 04/23/20  0650 04/23/20  0408     --   --  136 135   POTASSIUM 3.5 3.4 3.0* 2.8* 2.7*   CHLORIDE 107  --   --  96 95   CO2 28  --   --  33* 31   BUN 37*  --   --  59* 61*    CR 1.60*  --   --  1.99* 1.90*   ANIONGAP 4  --   --  7 9   MINE 8.0*  --   --  7.9* 8.3*   GLC 89  --   --  108* 111*     Most Recent 2 LFT's:  Recent Labs   Lab Test 04/23/20  0650 04/22/20 1931   AST 18 23   ALT 29 36   ALKPHOS 50 63   BILITOTAL 1.4* 2.1*     Most Recent 3 Troponin's:  Recent Labs   Lab Test 04/23/20  0650 04/23/20  0130 04/22/20 1931   TROPI 0.051* 0.082* 0.049*       Echo 4/23/2020  Hyperdynamic left ventricular function. The visual ejection fraction is  estimated at >70%.  Echocardiographic findings concerning for hypertrophic cardiomyopathy. Basal  anteroseptum measures 2.2cm. There is systolic anterior motion of the anterior  mitral leaflet present. Mild flow acceleration across the LVOT, rest gradient  15 mmHg. Peak gradient 29 mmHg wtih Valsalva maneuver.  Right ventricular global function is normal.  No significant valvular abnormalities.      Ni Saldivar PA-C  [facility]  Pager [phone]

## 2020-04-24 NOTE — PLAN OF CARE
OT- eval completed and treatment initiated.  Pt reports living in a split level house with no stairs to enter. Pt lives alone. Pt does not use any assistive devices at baseline. Since early March pt has not left his home and has been using grocery delivery services for all shopping needs.       Discharge Planner OT   Patient plan for discharge: home  Current status: Patient agreeable to very limited activity d/t weakness and generally unwell. Declined to sit EOB, preferred to sit in cross legged position in bed unsupported. With increased encouragement and extra time to complete, patient complete oral cares, light sponge bath/hair cleansing and combing hair I after set-up. Mod A to don/doff gown due to IV lines and fatigue. Patient self initiated multiple rest breaks throughout tasks.   Barriers to return to prior living situation: Decreased strength and activity tolerance, decreased motivation, lives alone, appears to have limited support and A available. Well below baseline for ADLs, functional mobility and activity tolerance to effectively complete IADLs  Recommendations for discharge: TCU  Rationale for recommendations: Patient would benefit from continued IP OT to advance ADLs, activity tolerance and functional mobility, at this time patient will well below baseline well below baseline for ADLs, functional mobility and activity tolerance to effectively complete ADLs/IADLs home. However, will continue to assess discharge needs and adjust recommendations as appropriate.    Entered by: Karolyn Shultz 04/24/2020 10:08 AM

## 2020-04-24 NOTE — CONSULTS
Received a consult request for a large hiatal hernia. None of the surgeons in our group currently repair hiatal hernias and we typically refer these patients to the [facility] bariatric group.  Recommend outpatient follow up at their clinic to discuss repair. Recommend PPI for GERD symptom management in the meantime.     [facility]  [facility]  [address] [place] [address]    Appointments:  [phone]    Diann Raines MD

## 2020-04-24 NOTE — PLAN OF CARE
Pt is alert and oriented, up with assist of 1. Pt's potassium level up to 3.4 with protocol. Pt voiding without difficulty per urinal. Pt tolerating full liquids, not interested in solids right now. Pt denies pain, denies nausea. Pt on IV Unasyn for aspiration pneumonia.

## 2020-04-24 NOTE — PLAN OF CARE
"Speech Language Therapy Discharge Summary    Reason for therapy discharge:    All goals and outcomes met, no further needs identified.    Progress towards therapy goal(s). See goals on Care Plan in [facility] electronic health record for goal details.  Goals met.    CT chest/abdomen completed this date which revealed very large hiatal hernia containing an intrathoracic stomach which was likely the case of his dysphagia symptoms (described in detail in my initial evaluation note from prior day). MD placed mechanical soft diet d/t same. Surgery consulted for hernia, though no ridges coverage for said surgery and therefore recommend OP follow up and management with PPI in meantime.     Swallow tx completed with lunch meal of mechanical soft solids, thin liquids. Pt aware of \"hernia\" during treatment session. Writer educated extensively on strategies to assist in esophageal clearance/transfer - pt verbalized understanding/demonstrated understanding during meal. No oropharyngeal dysphagia identified at bedside and no signs/sx aspiration. No further SLP intervention needed.     Recommendations: OK for mechanical soft, thin liquids per MD modifications. Strategies: reflux precautions (sit upright for meals, rec remain upright for a minimum of 60 minutes post PO, avoid caffeine/acidic foods/spicy foods), slow eating pace, alternate between solids/liquids, add extra moisture to solid food/avoid dryer meats/breads, 5-6 smaller meals a day vs 3 large.    Therapy recommendation(s):    No further therapy is recommended.        "

## 2020-04-24 NOTE — CONSULTS
"Care Transition Initial Assessment - RN        Met with: Patient.  DATA   Active Problems:    Hypokalemia       Cognitive Status: awake and alert.  Primary Care Clinic Name: (not established in clinic)     Contact information and PCP information verified: Yes, is not established with PCP or clinic.   Lives With: alone   Living Arrangements: house                 Insurance concerns: No Insurance issues identified  ASSESSMENT  Identified issues/concerns regarding health management: RN CTS met with patient as no PCP or clinic listed in patient chart. Patient verified that does not see a provider unless \"an emergency, they are available when I feel that I need them.\" Patient was overwhelmed and emotional with discussing recommendations for following up with a provider in clinic after discharge from the hospital. Discussed purpose and importance of establishing care with a PCP. Patient was resistant to recommendations and declined assistance at this time.   Patient stated that he will consider following up and making an appointment with a provider in clinic, but was unable to process that decision at this time.   PLAN  Financial costs for the patient include were not discussed .  Patient given options and choices for discharge Yes .  Patient/family is agreeable to the plan?  No     Appointments: None made at this time.       Care  (CTS) will continue to follow as needed.    Radha Green MA/RN Case Manager  Inpatient Care Coordination  [facility]   [phone]          "

## 2020-04-25 ENCOUNTER — APPOINTMENT (OUTPATIENT)
Dept: PHYSICAL THERAPY | Facility: CLINIC | Age: 60
End: 2020-04-25
Payer: COMMERCIAL

## 2020-04-25 ENCOUNTER — APPOINTMENT (OUTPATIENT)
Dept: OCCUPATIONAL THERAPY | Facility: CLINIC | Age: 60
End: 2020-04-25
Payer: COMMERCIAL

## 2020-04-25 VITALS
BODY MASS INDEX: 25.09 KG/M2 | DIASTOLIC BLOOD PRESSURE: 88 MMHG | HEART RATE: 60 BPM | TEMPERATURE: 97.2 F | OXYGEN SATURATION: 97 % | RESPIRATION RATE: 16 BRPM | WEIGHT: 185.2 LBS | HEIGHT: 72 IN | SYSTOLIC BLOOD PRESSURE: 149 MMHG

## 2020-04-25 LAB
ALBUMIN SERPL-MCNC: 2.6 G/DL (ref 3.4–5)
ALP SERPL-CCNC: 46 U/L (ref 40–150)
ALT SERPL W P-5'-P-CCNC: 30 U/L (ref 0–70)
ANION GAP SERPL CALCULATED.3IONS-SCNC: 6 MMOL/L (ref 3–14)
AST SERPL W P-5'-P-CCNC: 22 U/L (ref 0–45)
BILIRUB DIRECT SERPL-MCNC: 0.3 MG/DL (ref 0–0.2)
BILIRUB SERPL-MCNC: 0.8 MG/DL (ref 0.2–1.3)
BUN SERPL-MCNC: 28 MG/DL (ref 7–30)
CALCIUM SERPL-MCNC: 7.9 MG/DL (ref 8.5–10.1)
CHLORIDE SERPL-SCNC: 108 MMOL/L (ref 94–109)
CO2 SERPL-SCNC: 26 MMOL/L (ref 20–32)
CREAT SERPL-MCNC: 1.51 MG/DL (ref 0.66–1.25)
GFR SERPL CREATININE-BSD FRML MDRD: 49 ML/MIN/{1.73_M2}
GLUCOSE SERPL-MCNC: 79 MG/DL (ref 70–99)
LIPASE SERPL-CCNC: 380 U/L (ref 73–393)
POTASSIUM SERPL-SCNC: 3.7 MMOL/L (ref 3.4–5.3)
PROT SERPL-MCNC: 5.8 G/DL (ref 6.8–8.8)
SODIUM SERPL-SCNC: 140 MMOL/L (ref 133–144)

## 2020-04-25 PROCEDURE — 97116 GAIT TRAINING THERAPY: CPT | Mod: GP | Performed by: PHYSICAL THERAPIST

## 2020-04-25 PROCEDURE — 83690 ASSAY OF LIPASE: CPT | Performed by: INTERNAL MEDICINE

## 2020-04-25 PROCEDURE — 25000132 ZZH RX MED GY IP 250 OP 250 PS 637: Performed by: HOSPITALIST

## 2020-04-25 PROCEDURE — 25800030 ZZH RX IP 258 OP 636: Performed by: INTERNAL MEDICINE

## 2020-04-25 PROCEDURE — 99239 HOSP IP/OBS DSCHRG MGMT >30: CPT | Performed by: HOSPITALIST

## 2020-04-25 PROCEDURE — 25000132 ZZH RX MED GY IP 250 OP 250 PS 637: Performed by: INTERNAL MEDICINE

## 2020-04-25 PROCEDURE — 80076 HEPATIC FUNCTION PANEL: CPT | Performed by: INTERNAL MEDICINE

## 2020-04-25 PROCEDURE — 80048 BASIC METABOLIC PNL TOTAL CA: CPT | Performed by: INTERNAL MEDICINE

## 2020-04-25 PROCEDURE — 97530 THERAPEUTIC ACTIVITIES: CPT | Mod: GP | Performed by: PHYSICAL THERAPIST

## 2020-04-25 PROCEDURE — 97535 SELF CARE MNGMENT TRAINING: CPT | Mod: GO

## 2020-04-25 PROCEDURE — 36415 COLL VENOUS BLD VENIPUNCTURE: CPT | Performed by: INTERNAL MEDICINE

## 2020-04-25 RX ORDER — SPIRONOLACTONE 25 MG/1
25 TABLET ORAL DAILY
Qty: 30 TABLET | Refills: 1 | Status: SHIPPED | OUTPATIENT
Start: 2020-04-26 | End: 2020-05-04

## 2020-04-25 RX ORDER — SPIRONOLACTONE 25 MG/1
25 TABLET ORAL DAILY
Qty: 30 TABLET | Refills: 1 | Status: SHIPPED | OUTPATIENT
Start: 2020-04-26 | End: 2020-04-25

## 2020-04-25 RX ORDER — SPIRONOLACTONE 25 MG/1
25 TABLET ORAL DAILY
Status: DISCONTINUED | OUTPATIENT
Start: 2020-04-25 | End: 2020-04-25 | Stop reason: HOSPADM

## 2020-04-25 RX ADMIN — SODIUM CHLORIDE: 9 INJECTION, SOLUTION INTRAVENOUS at 05:33

## 2020-04-25 RX ADMIN — SPIRONOLACTONE 25 MG: 25 TABLET ORAL at 09:27

## 2020-04-25 RX ADMIN — ASPIRIN 81 MG: 81 TABLET, COATED ORAL at 08:12

## 2020-04-25 RX ADMIN — BACLOFEN 10 MG: 10 TABLET ORAL at 08:12

## 2020-04-25 ASSESSMENT — MIFFLIN-ST. JEOR: SCORE: 1688.06

## 2020-04-25 NOTE — PLAN OF CARE
PRIMARY DIAGNOSIS: HYPOKALEMIA  OUTPATIENT/OBSERVATION GOALS TO BE MET BEFORE DISCHARGE:  ADLs back to baseline: No    Activity and level of assistance: Up with Ax1 GB and walker    Pain status: Pain free.    Return to near baseline physical activity: No     Discharge Planner Nurse   Safe discharge environment identified: No  Barriers to discharge: Yes       Entered by: Iris Felder 04/25/2020 4:26 AM     Please review provider order for any additional goals.   Nurse to notify provider when observation goals have been met and patient is ready for discharge.    A/O cooperative with  cares. CT recommending TCU at discharge. Able to eat some tonight. Tolerated well. Will continue to monitor.

## 2020-04-25 NOTE — PLAN OF CARE
PRIMARY DIAGNOSIS: HYPOKALEMIA  OUTPATIENT/OBSERVATION GOALS TO BE MET BEFORE DISCHARGE:  1. ADLs back to baseline: No    2. Activity and level of assistance: Up with Ax1 GB and walker    3. Pain status: Pain free.    4. Return to near baseline physical activity: No     Discharge Planner Nurse   Safe discharge environment identified: No  Barriers to discharge: Yes       Entered by: Iris Felder 04/25/2020 2:22 AM     Please review provider order for any additional goals.   Nurse to notify provider when observation goals have been met and patient is ready for discharge.    Denies hiccups. Frustrated with requiring hospital stay. SW following for safe discharge. OT recommends TCU. Will continue to monitor

## 2020-04-25 NOTE — PLAN OF CARE
PRIMARY DIAGNOSIS: hypokalemia  OUTPATIENT/OBSERVATION GOALS TO BE MET BEFORE DISCHARGE:  ADLs back to baseline: No     Activity and level of assistance: Ax1 gait belt and walker     Pain status: currently denying pain     Return to near baseline physical activity: No          Discharge Planner Nurse   Safe discharge environment identified: No  Barriers to discharge: Yes            Please review provider order for any additional goals.   Nurse to notify provider when observation goals have been met and patient is ready for discharge.     BP (!) 153/79 (BP Location: Left arm)   Pulse 60   Temp 96.5  F (35.8  C) (Oral)   Resp 16   Ht 1.829 m (6')   Wt 80.6 kg (177 lb 11.1 oz)   SpO2 91%   BMI 24.10 kg/m      Patient sleeping.   Consults: surgery, cardiology, SLP - see notes. OT recommends TCU, SW following for safe discharge planning.

## 2020-04-25 NOTE — PLAN OF CARE
Discharge Planner OT   Patient plan for discharge: home    Current status: Pt in supine upon arrival, agreeable to session however very difficult to engage pt in OOB ax. Pt completed g/h tasks seated in song cross  position in bed with very kyphotic position. Pt would not complete tasks in bathroom or demonstrate any OOB mobility at this time. Pt very tangantile and slow to respond, asks off topic questions, and needs cues for attention. Discussed d/c options. Pt completed all g/h tasks and UE dressing with set- up A.     Barriers to return to prior living situation: Decreased strength and activity tolerance, decreased motivation, lives alone, appears to have limited support and A available. Well below baseline for ADLs, functional mobility and activity tolerance to effectively complete IADLs    Recommendations for discharge: per discussion with PT, pt stable on his feet for safe discharge home therefore rec changed to home with HHOT.     Rationale for recommendations: Pt would benefit from continued skilled OT services to improve independence and safety with ADL's and functional transfers as pt is not at baseline.  Difficult to make appropriate discharge recs as pt would not participate in OOB activity. After consulting with PT, pt safe for discharge home with hh therapy.    Entered by: Belem Quiroga 04/25/2020 7:59 AM     Occupational Therapy Discharge Summary    Reason for therapy discharge:    Discharged to home with home therapy.    Progress towards therapy goal(s). See goals on Care Plan in [facility] electronic health record for goal details.  Goals not met.  Barriers to achieving goals:   discharge from facility.    Therapy recommendation(s):    Continued therapy is recommended.  Rationale/Recommendations:  see above.

## 2020-04-25 NOTE — PROGRESS NOTES
OBSERVATION patient END time: 1517    Patient's After Visit Summary was reviewed with patient.   Patient verbalized understanding of After Visit Summary, recommended follow up and was given an opportunity to ask questions.   Discharge medications sent home with patient/family: YES, Rx sent to home pharmacy   Discharged with brother

## 2020-04-25 NOTE — PLAN OF CARE
Discharge Planner PT   Patient plan for discharge: Home  Current status: Pt needing increased time as pt is very methodical. Pt likes to think through plans and mobility and can be particular. Pt also fatigued with minimal movement, but not SOB, or lethargic. P Pt was educated on plan of ambulation and W/C transport to set of practice stairs. Pt agreeable. Pt was cued for supine to sit, SBA/mod I. Pt needing time before standing, more again due to processing and mild fatigue not SOB. Pt was cued for sit to stand. SBA/mod I with FWW, needing reminders for hand placement. Pt was cued for 25 feet of walking, conserving enegry for stairs trial. Pt was cued for 4 stairs with B railing, then 1 rail, then 1 rail and carrying walker up and down stairs. Pt able to perform all reciprocally, no LOB, no SOB, seemingly not effortful for patient.   Barriers to return to prior living situation: Decreased strength and activity tolerance, decreased motivation, lives alone  Recommendations for discharge: Home with Home PT   Rationale for recommendations: Anticipate that with continued IPPT and medical management the pt will be able to complete all mobility skills required for safe discharge home. HHPT indicated as it would be a taxing effort for the pt to leave the home for OPPT, limiting his ability to participate in therapies.          Entered by: Kenna Hui 04/25/2020 12:23 PM     Physical Therapy Discharge Summary    Reason for therapy discharge:    Discharged to home with home therapy.    Progress towards therapy goal(s). See goals on Care Plan in [facility] electronic health record for goal details.  Goals met    Therapy recommendation(s):    Continued therapy is recommended.  Rationale/Recommendations: Pt is below baseline for functional mobility, ROM and strength. Pt would benefit from continued skilled therapy to address these deficits.

## 2020-04-25 NOTE — DISCHARGE SUMMARY
[facility]  Hospitalist Discharge Summary      Date of Admission:  4/22/2020  Date of Discharge:  4/25/2020  Discharging Provider: Dick Greene MD      Discharge Diagnoses   Abdominal pain, nausea, vomiting. Acute renal failure. Acute hypoxic respiratory failure due to aspiration PNA    Follow-ups Needed After Discharge   Follow-up Appointments     Follow-up and recommended labs and tests       Follow up with primary care provider, within 7 days for hospital follow-   up.  The following labs/tests are recommended: chem 7 to check potassium   levels. Follow-up on your blood pressure. Our care coordinator is working   on getting you a primary care provider appointment, which will be listed   elsewhere in your Discharge Instructions.             Unresulted Labs Ordered in the Past 30 Days of this Admission     No orders found from 3/23/2020 to 4/23/2020.      These results will be followed up by NA    Discharge Disposition   Discharged to home  Condition at discharge: Stable      Hospital Course   Delroy Christianson is a 60 year old male who has no known significant past medical history.  He has been feeling quite weak for the past several days.  He states that he has been trying to isolate due to recent concerns for pandemic COVID 19.  He has not left his house for several days.  He began having hiccups, nausea, vomiting, and abdominal pain 3 days ago.  Has also been feeling short of breath.  He has been very concerned about the symptoms.  He has not been able to keep down much food or fluids for the past 3 days because of symptoms.  He has had some shortness of breath with this.  Has not noted a significant cough.  Has not had chest pain.  Has had chills.  He is not sure if he has had any fevers.  No diarrhea.  No dysuria.  He does not smoke or drink alcohol.  No other acute complaints.  He does feel better after medications given in emergency room.     Of note, due to current pandemic COVID 19  concerns, history taking was accomplished with face time on iPad.    Patient was negative for COVID 19. It was felt he had acute hypoxic respiratory failure due to an aspiration PNA/pneumonitis. He was seen by cards for prolonged QT and mild hypertrophic CMP, had trop leak due to demand. Patient declined any further workup. Patient was started on aldactone for HTN. Patient was arranged outpt follow-up. He was assessed on stairs by PT, as patient was hesitant to go home due to not having anyone there to help. He walked the whole unit with a walker, one was provided on discharge.    Consultations This Hospital Stay   PHYSICAL THERAPY ADULT IP CONSULT  SPEECH LANGUAGE PATH ADULT IP CONSULT  OCCUPATIONAL THERAPY ADULT IP CONSULT  CARDIOLOGY IP CONSULT  CARE COORDINATOR IP CONSULT  SURGERY GENERAL IP CONSULT    Code Status   Full Code    Time Spent on this Encounter   I, Dick Greene MD, personally saw the patient today and spent greater than 30 minutes discharging this patient.       Dick Greene MD  [facility]  ______________________________________________________________________    Physical Exam   Vital Signs: Temp: 99.2  F (37.3  C) Temp src: Oral BP: (!) 148/95   Heart Rate: 65 Resp: 16 SpO2: 95 % O2 Device: None (Room air)    Weight: 185 lbs 3.2 oz  Constitutional: awake, alert, cooperative, no apparent distress, and appears stated age  Eyes: Lids and lashes normal, pupils equal, round and reactive to light, extra ocular muscles intact, sclera clear, conjunctiva normal  ENT: Normocephalic, without obvious abnormality, atraumatic, sinuses nontender on palpation, external ears without lesions, oral pharynx with moist mucous membranes, tonsils without erythema or exudates, gums normal and good dentition.  Respiratory: No increased work of breathing, good air exchange, clear to auscultation bilaterally, no crackles or wheezing  Cardiovascular: Normal apical impulse, regular rate and rhythm,  normal S1 and S2, no S3 or S4, and no murmur noted  GI: No scars, normal bowel sounds, soft, non-distended, non-tender, no masses palpated, no hepatosplenomegally  Skin: no bruising or bleeding  Musculoskeletal: no lower extremity pitting edema present       Primary Care Physician   Physician No Ref-Primary    Discharge Orders      Home care nursing referral      Home Care PT Referral for Hospital Discharge      Reason for your hospital stay    Abdominal pain, nausea, vomiting, weakness     Follow-up and recommended labs and tests     Follow up with primary care provider, within 7 days for hospital follow- up.  The following labs/tests are recommended: chem 7 to check potassium levels. Follow-up on your blood pressure. Our care coordinator is working on getting you a primary care provider appointment, which will be listed elsewhere in your Discharge Instructions.     Activity    Your activity upon discharge: activity as tolerated     MD face to face encounter    Documentation of Face to Face and Certification for Home Health Services    I certify that patient: Delroy Christianson is under my care and that I, or a nurse practitioner or physician's assistant working with me, had a face-to-face encounter that meets the physician face-to-face encounter requirements with this patient on: 4/25/2020.    This encounter with the patient was in whole, or in part, for the following medical condition, which is the primary reason for home health care: weakness, Hypertension.    I certify that, based on my findings, the following services are medically necessary home health services: Nursing, Physical Therapy and home health aide.    My clinical findings support the need for the above services because: Nurse is needed: To assess blood pressure after changes in medications or other medical regimen.., Physical Therapy Services are needed to assess and treat the following functional impairments: weakness. and home health aide for  assistance in ADLs    Further, I certify that my clinical findings support that this patient is homebound (i.e. absences from home require considerable and taxing effort and are for medical reasons or [employer] services or infrequently or of short duration when for other reasons) because: Requires assistance of another person or specialized equipment to access medical services because patient: Requires supervision of another for safe transfer...    Based on the above findings. I certify that this patient is confined to the home and needs intermittent skilled nursing care, physical therapy and/or speech therapy.  The patient is under my care, and I have initiated the establishment of the plan of care.  This patient will be followed by a physician who will periodically review the plan of care.  Physician/Provider to provide follow up care: No Ref-Primary, Physician    Attending hospital physician (the Medicare certified EL provider): Dick Greene MD  Physician Signature: See electronic signature associated with these discharge orders.  Date: 4/25/2020     Walker    DME Documentation:   Describe the reason for need to support medical necessity: gait instability.     I, the undersigned, certify that the above prescribed supplies are medically necessary for this patient and is both reasonable and necessary in reference to accepted standards of medical and necessary in reference to accepted standards of medical practice in the treatment of this patient's condition and is not prescribed as a convenience.     Diet    Follow this diet upon discharge: Regular       Significant Results and Procedures   Most Recent 3 CBC's:  Recent Labs   Lab Test 04/24/20  0621 04/23/20  0650 04/22/20  1931   WBC 7.4 8.9 8.5   HGB 15.0 17.2 19.6*   MCV 92 92 90    185 227     Most Recent 3 BMP's:  Recent Labs   Lab Test 04/25/20  0538 04/24/20  0621 04/24/20  0034  04/23/20  0650    139  --   --  136   POTASSIUM 3.7 3.5 3.4    < > 2.8*   CHLORIDE 108 107  --   --  96   CO2 26 28  --   --  33*   BUN 28 37*  --   --  59*   CR 1.51* 1.60*  --   --  1.99*   ANIONGAP 6 4  --   --  7   MINE 7.9* 8.0*  --   --  7.9*   GLC 79 89  --   --  108*    < > = values in this interval not displayed.     Most Recent 2 LFT's:  Recent Labs   Lab Test 04/25/20  0538 04/23/20  0650   AST 22 18   ALT 30 29   ALKPHOS 46 50   BILITOTAL 0.8 1.4*   ,   Results for orders placed or performed during the hospital encounter of 04/22/20   XR Chest Port 1 View    Narrative    EXAM: XR CHEST PORT 1 VW  LOCATION: [facility]  DATE/TIME: 4/22/2020 10:33 PM    INDICATION: Hypoxia.  COMPARISON: None.      Impression    IMPRESSION: Heart size within normal limits. The right lung is clear. Streaky atelectasis or infiltrate is present in the left lung base. The left upper lung zone is clear. No visible pneumothorax. No subdiaphragmatic free air is visualized.   XR Abdomen Port 1 View    Narrative    EXAM: XR ABDOMEN PORT 1 VW  LOCATION: [facility]  DATE/TIME: 4/22/2020 10:33 PM    INDICATION: Abdominal pain.  COMPARISON: None.      Impression    IMPRESSION: Low centered portable view of the abdomen. Visualized bowel gas pattern is nonobstructive. Multiple pelvic calcifications likely reflect phleboliths. Lower lumbar degenerative changes.    CT Chest Abodmen w/o Contrast    Narrative    CT CHEST ABDOMEN W/O CONTRAST 4/24/2020 11:50 AM    CLINICAL HISTORY: Weakness, cough, vomiting, hypoxia    TECHNIQUE: CT scan of the chest, abdomen was performed without IV  contrast. Multiplanar reformats were obtained. Dose reduction  techniques were used.   CONTRAST: None.    COMPARISON: None.    FINDINGS:   LUNGS AND PLEURA: There is mild compressive atelectasis adjacent to a  hiatal hernia. The lungs are otherwise clear.    MEDIASTINUM/AXILLAE: Large hiatal hernia containing an intrathoracic  stomach. No lymphadenopathy. Moderate coronary artery  calcification.    HEPATOBILIARY: Hepatic steatosis.    PANCREAS: Normal.    SPLEEN: Normal.    ADRENAL GLANDS: Normal.    KIDNEYS/BLADDER: Normal.    BOWEL: Normal.    LYMPH NODES: Normal.    VASCULATURE: Unremarkable.    OTHER: None.    MUSCULOSKELETAL: Normal.      Impression    IMPRESSION:  1.  Large hiatal hernia.  2.  No evidence of pneumonia. No acute abnormality in the chest or  abdomen.    ADITHYA LEMOS MD   Echocardiogram Complete    Narrative    [id number]  [id number]  [id number]  [id number]^MARGARITO^SB^[facility]  Echocardiography Laboratory  [address] [place] [address]        Name: STACY NICOLE  MRN: [id number]  : 1960  Study Date: 2020 11:18 AM  Age: 60 yrs  Gender: Male  Patient Location: [facility]  Reason For Study: CHF  Ordering Physician: SB PIMENTEL  Referring Physician: SHERYL RUBIO  Performed By: Nabor Carpenter RDCS     BSA: 2.0 m2  Height: 72 in  Weight: 179 lb  HR: 65  BP: 126/78 mmHg  _____________________________________________________________________________  __        Procedure  Complete Portable Echo Adult.  _____________________________________________________________________________  __        Interpretation Summary        Hyperdynamic left ventricular function. The visual ejection fraction is  estimated at >70%.  Echocardiographic findings concerning for hypertrophic cardiomyopathy. Basal  anteroseptum measures 2.2cm. There is systolic anterior motion of the anterior  mitral leaflet present. Mild flow acceleration across the LVOT, rest gradient  15 mmHg. Peak gradient 29 mmHg wtih Valsalva maneuver.  Right ventricular global function is normal.  No significant valvular abnormalities.     There are no prior studies available for comparison.  _____________________________________________________________________________  __        Left Ventricle  The left ventricle is normal in size. The echo findings are consistent with  hypertrophic  cardiomyopathy. Echocardiographic findings concerning for  hypertrophic cardiomyopathy. Basal anteroseptum measures 2.2cm. There is  systolic anterior motion of the anterior mitral leaflet present. Mild flow  acceleration across the LVOT, rest gradient 15 mmHg. Peak gradient 29 mmHg  wtih Valsalva maneuver. Hyperdynamic left ventricular function. The visual  ejection fraction is estimated at >70%. Left ventricular diastolic function is  not assessable. No regional wall motion abnormalities noted.     Right Ventricle  The right ventricle is normal in structure, function and size.     Atria  Normal left atrial size. Right atrial size is normal.     Mitral Valve  There is systolic anterior motion of the mitral valve. There is no mitral  regurgitation noted.        Tricuspid Valve  The tricuspid valve is not well visualized, but is grossly normal. There is  trace tricuspid regurgitation. Right ventricular systolic pressure could not  be approximated due to inadequate tricuspid regurgitation.     Aortic Valve  The aortic valve is normal in structure and function.     Pulmonic Valve  The pulmonic valve is not well seen, but is grossly normal.     Vessels  The aortic root is normal size. Normal size ascending aorta. The inferior vena  cava was normal in size with preserved respiratory variability.     Pericardium  There is no pericardial effusion.     _____________________________________________________________________________  __  MMode/2D Measurements & Calculations  IVSd: 1.0 cm  LVIDd: 4.6 cm  LVIDs: 3.4 cm  LVPWd: 1.1 cm  FS: 26.4 %  LV mass(C)d: 174.4 grams  LV mass(C)dI: 85.8 grams/m2     Ao root diam: 3.2 cm  LA dimension: 2.7 cm  asc Aorta Diam: 3.0 cm  LA/Ao: 0.84  RWT: 0.47                 _____________________________________________________________________________  __           Report approved by: Jadon Cain 04/23/2020 01:32 PM            Discharge Medications   Current Discharge Medication List       START taking these medications    Details   spironolactone (ALDACTONE) 25 MG tablet Take 1 tablet (25 mg) by mouth daily  Qty: 30 tablet, Refills: 1    Associated Diagnoses: Benign essential hypertension           Allergies   No Known Allergies

## 2020-04-25 NOTE — PLAN OF CARE
A&Ox4, SBA w/ walker, regular diet, heart sounds- WNL, lungs- clear, bowels- active, voiding, ambulated in the halls with staff. Plan to discharge this afternoon to home, friend providing transportation.

## 2020-04-25 NOTE — CONSULTS
Discharge Planner   Discharge Plans in progress: Yes. Patient discharging home with home care services. Met with patient to discuss discharge plan. Patient was agreeable to home care. We reviewed home care criteria. Patient provided choices for home care agency. Education was given on medicare.gov and that star ratings are updated/maintained by Medicare and can be reviewed by visiting www.medicare.gov.     Patient elected [facility]. A referral was sent to [facility] to follow when discharge.      Patient does not have a PCP. Patient encouraged to follow up with a provider of his choice and establish care.He would like to establish care with [facility]. Patient requested Dr. Holliday, as he has seen this provider in the past. A telephonic post hospitalization follow up was scheduled with Dr. Holliday on Monday, April 27th at 10:30am.     Patient reports he has been self quarantining and practicing social distancing. He orders his groceries online and has them delivered. He admits to not eating healthy or preparing meals. Patient given some resources for meal support: Meals on Wheels, loaves and fishes and help at Your Door.     Patient had some concerns about Covid-19 and getting a ride at discharge. Patient offered a taxi, he declined. Stating he would prefer his friend, Yesenia, pick him up at discharge. Patient will give Yesenia a call to provide transportation today.     Barriers to discharge plan: barriers addressed.  Follow up plan:Patient to discharge home with home care. Friend, Yesenia, to provide transportation. CTS will continue to follow and assist as needed with discharge planning.       Entered by: Albert Perry 04/25/2020 1:55 PM     Albert Perry RN BSN PHN CCM   Inpatient Care Coordination   [facility]   [phone]

## 2020-04-25 NOTE — DISCHARGE INSTRUCTIONS
A Telephonic hospital follow up appointment has been schedule for you with  at [facility] on Monday, April 27th at 10:30am. You will be establishing care with Dr. Holliday. Please note the doctor will call you and speak with you via phone. You do not need to go to the clinic . Please call the clinic at [phone] if you need to reschedule or have questions.       Here are some Community Resources available for meal support:     Meals on Wheels [phone]    [employer] [phone]    Help at Your Door  [phone]    Your home care referral was sent to [facility]  If you haven't heard from them within the next 24-48 hours,  Please call them at [phone]

## 2020-04-27 ENCOUNTER — VIRTUAL VISIT (OUTPATIENT)
Dept: PEDIATRICS | Facility: CLINIC | Age: 60
End: 2020-04-27
Payer: COMMERCIAL

## 2020-04-27 ENCOUNTER — DOCUMENTATION ONLY (OUTPATIENT)
Dept: CARE COORDINATION | Facility: CLINIC | Age: 60
End: 2020-04-27

## 2020-04-27 DIAGNOSIS — E87.6 HYPOKALEMIA: ICD-10-CM

## 2020-04-27 DIAGNOSIS — N18.30 CKD (CHRONIC KIDNEY DISEASE) STAGE 3, GFR 30-59 ML/MIN (H): ICD-10-CM

## 2020-04-27 DIAGNOSIS — I42.2 HYPERTROPHIC CARDIOMYOPATHY (H): Primary | ICD-10-CM

## 2020-04-27 DIAGNOSIS — K44.9 HIATAL HERNIA: ICD-10-CM

## 2020-04-27 DIAGNOSIS — R94.31 PROLONGED QT INTERVAL: ICD-10-CM

## 2020-04-27 LAB — INTERPRETATION ECG - MUSE: NORMAL

## 2020-04-27 PROCEDURE — 99443 ZZC PHYSICIAN TELEPHONE EVALUATION 21-30 MIN: CPT | Performed by: INTERNAL MEDICINE

## 2020-04-27 NOTE — PROGRESS NOTES
"Delroy Christianson is a 60 year old male who is being evaluated via a billable telephone visit.      **Pt reports he is doing well since discharge from hosp. No new symptoms.    The patient has been notified of following:     \"This telephone visit will be conducted via a call between you and your physician/provider. We have found that certain health care needs can be provided without the need for a physical exam.  This service lets us provide the care you need with a short phone conversation.  If a prescription is necessary we can send it directly to your pharmacy.  If lab work is needed we can place an order for that and you can then stop by our lab to have the test done at a later time.    Telephone visits are billed at different rates depending on your insurance coverage. During this emergency period, for some insurers they may be billed the same as an in-person visit.  Please reach out to your insurance provider with any questions.    If during the course of the call the physician/provider feels a telephone visit is not appropriate, you will not be charged for this service.\"    Patient has given verbal consent for Telephone visit?  Yes Silvina Byrne CMA on 4/27/2020 at 9:25 AM      How would you like to obtain your AVS? E-Mail (inform patient AVS not encrypted) email verified in demographics.    Subjective     Delroy Christianson is a 60 year old male who presents to clinic today for the following health issues:    60-year-old male with no significant past medical history presents today for follow-up of recent hospitalization for evaluation of acute onset abdominal pain nausea vomiting and possible aspiration pneumonia.   Patient presented to the ER with complaints of generalized weakness after several days of nausea and vomiting mild shortness of breath.     On presentation to the emergency department was noted to have mild hypoxia with an elevated creatinine of 2 and potassium of 2.3.  Hemoglobin was 19 and COVID " testing was negative.  Chest x-ray showed possible infiltrate, ultimately underwent chest CT which was negative for pneumonia.          Pneumonia: Patient was treated presumptively for pneumonia with ampicillin sulbactam for the few days of the hospitalization but then was discontinued as the CT scan showed no evidence of pneumonia and had normal white count.  Since hospital discharge, patient states he has improved with some residual fatigue, denies shortness of breath or cough.    CKD.  Creatinine in 2015 was 1.5.  Creatinine of 2.0 on admission likely secondary to dehydration associated with several days of nausea and vomiting.  Creatinine returned to baseline with IV fluids.    Hypokalemia.  Received IV potassium replacement while hospitalized, serum potassium was normal at discharge.    Prolonged QT noted on admission EKG.  Patient was seen by cardiology and echocardiogram showed some evidence of possible hypertrophic cardiomyopathy.  Also noted trace troponin elevation that was attributed to demand ischemia and not suggestive of acute coronary syndrome.  Cardiology did recommend follow-up MRI.    Hiatal hernia noted on admission chest x-ray.  Does have episodic GERD type symptoms.    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    Patient Active Problem List   Diagnosis     Hypertrophic cardiomyopathy (H)     Prolonged QT interval     Hiatal hernia     CKD (chronic kidney disease) stage 3, GFR 30-59 ml/min (H)     History reviewed. No pertinent surgical history.    Social History     Tobacco Use     Smoking status: Never Smoker     Smokeless tobacco: Never Used   Substance Use Topics     Alcohol use: No     Alcohol/week: 0.0 standard drinks     Family History   Problem Relation Age of Onset     Diabetes Mother      Coronary Artery Disease  Mother      Hypertension Father          Current Outpatient Medications   Medication Sig Dispense Refill     spironolactone (ALDACTONE) 25 MG tablet Take 1 tablet (25 mg) by mouth daily 30 tablet 1     No Known Allergies    Reviewed and updated as needed this visit by Provider         Review of Systems   ROS COMP: Constitutional, HEENT, cardiovascular, pulmonary, gi and gu systems are negative, except as otherwise noted.       Objective   Reported vitals:  There were no vitals taken for this visit.         Assessment/Plan:  1. Hypertrophic cardiomyopathy (H)     Patient admits to a fair amount of anxiety with regard to medical evaluations.  Briefly discussed hypertrophic cardiomyopathy and recommended follow-up with cardiology and likely plan for additional imaging / MRI.   Patient states he is reluctant at this time but did agree to return for follow-up in about 2 months-patient indicates he is reluctant to leave home for the time being secondary to the coronavirus outbreak.    2. Prolonged QT interval      Prolonged QT, new diagnosis.  Discussed cardiology follow-up.    3. Hiatal hernia      Diagnosis discussed.  Patient does have ongoing intermittent GERD symptoms, discussed OTC Rx    4. CKD (chronic kidney disease) stage 3, GFR 30-59 ml/min (H)      History of CKD and recent acute on chronic renal failure.  Resolved with IV fluids.    5. Hypokalemia       Resolved prior to discharge.  ?secondary to N/V PTA    Also started on spironolactone at discharge for mildly elevated blood pressure.  Patient was resistant to coming in for a clinic visit in the setting of the virus outbreak, did agree to follow-up in about 2 months and likely repeat lab work at that time      No follow-ups on file.    Phone call duration:  25 minutes    Chuy Holliday MD

## 2020-04-27 NOTE — PROGRESS NOTES
FYI We received referral for Home Care SN and PT. Patient has refused these services and does not want home care.     Regina RN  [phone]

## 2020-04-30 ENCOUNTER — MYC MEDICAL ADVICE (OUTPATIENT)
Dept: PEDIATRICS | Facility: CLINIC | Age: 60
End: 2020-04-30

## 2020-05-02 ENCOUNTER — MYC MEDICAL ADVICE (OUTPATIENT)
Dept: PEDIATRICS | Facility: CLINIC | Age: 60
End: 2020-05-02

## 2020-05-02 DIAGNOSIS — I10 BENIGN ESSENTIAL HYPERTENSION: ICD-10-CM

## 2020-05-04 ENCOUNTER — MYC MEDICAL ADVICE (OUTPATIENT)
Dept: PEDIATRICS | Facility: CLINIC | Age: 60
End: 2020-05-04

## 2020-05-04 PROBLEM — I10 BENIGN ESSENTIAL HYPERTENSION: Status: ACTIVE | Noted: 2020-05-04

## 2020-05-04 RX ORDER — SPIRONOLACTONE 25 MG/1
25 TABLET ORAL DAILY
Qty: 90 TABLET | Refills: 3 | Status: SHIPPED | OUTPATIENT
Start: 2020-05-04 | End: 2021-03-19

## 2020-05-05 ENCOUNTER — MYC MEDICAL ADVICE (OUTPATIENT)
Dept: PEDIATRICS | Facility: CLINIC | Age: 60
End: 2020-05-05

## 2020-05-08 DIAGNOSIS — I10 BENIGN ESSENTIAL HYPERTENSION: ICD-10-CM

## 2020-05-08 LAB
ANION GAP SERPL CALCULATED.3IONS-SCNC: 6 MMOL/L (ref 3–14)
BUN SERPL-MCNC: 22 MG/DL (ref 7–30)
CALCIUM SERPL-MCNC: 9.5 MG/DL (ref 8.5–10.1)
CHLORIDE SERPL-SCNC: 105 MMOL/L (ref 94–109)
CO2 SERPL-SCNC: 26 MMOL/L (ref 20–32)
CREAT SERPL-MCNC: 1.45 MG/DL (ref 0.66–1.25)
GFR SERPL CREATININE-BSD FRML MDRD: 52 ML/MIN/{1.73_M2}
GLUCOSE SERPL-MCNC: 92 MG/DL (ref 70–99)
POTASSIUM SERPL-SCNC: 4.1 MMOL/L (ref 3.4–5.3)
SODIUM SERPL-SCNC: 137 MMOL/L (ref 133–144)

## 2020-05-08 PROCEDURE — 80048 BASIC METABOLIC PNL TOTAL CA: CPT | Performed by: INTERNAL MEDICINE

## 2020-05-08 PROCEDURE — 36415 COLL VENOUS BLD VENIPUNCTURE: CPT | Performed by: INTERNAL MEDICINE

## 2020-06-16 ENCOUNTER — MYC REFILL (OUTPATIENT)
Dept: PEDIATRICS | Facility: CLINIC | Age: 60
End: 2020-06-16

## 2020-06-16 DIAGNOSIS — I10 BENIGN ESSENTIAL HYPERTENSION: ICD-10-CM

## 2020-06-16 RX ORDER — SPIRONOLACTONE 25 MG/1
25 TABLET ORAL DAILY
Qty: 90 TABLET | Refills: 3 | Status: CANCELLED | OUTPATIENT
Start: 2020-06-16

## 2020-08-10 ENCOUNTER — MYC MEDICAL ADVICE (OUTPATIENT)
Dept: PEDIATRICS | Facility: CLINIC | Age: 60
End: 2020-08-10

## 2020-11-22 ENCOUNTER — HEALTH MAINTENANCE LETTER (OUTPATIENT)
Age: 60
End: 2020-11-22

## 2021-01-20 ENCOUNTER — NURSE TRIAGE (OUTPATIENT)
Dept: PEDIATRICS | Facility: CLINIC | Age: 61
End: 2021-01-20

## 2021-01-20 ENCOUNTER — TELEPHONE (OUTPATIENT)
Dept: PEDIATRICS | Facility: CLINIC | Age: 61
End: 2021-01-20

## 2021-01-20 ENCOUNTER — HOSPITAL ENCOUNTER (INPATIENT)
Facility: CLINIC | Age: 61
LOS: 2 days | Discharge: ANOTHER HOSPITAL | End: 2021-01-23
Attending: EMERGENCY MEDICINE | Admitting: INTERNAL MEDICINE
Payer: COMMERCIAL

## 2021-01-20 ENCOUNTER — APPOINTMENT (OUTPATIENT)
Dept: GENERAL RADIOLOGY | Facility: CLINIC | Age: 61
End: 2021-01-20
Attending: EMERGENCY MEDICINE
Payer: COMMERCIAL

## 2021-01-20 DIAGNOSIS — K44.9 HIATAL HERNIA: ICD-10-CM

## 2021-01-20 DIAGNOSIS — K31.89 MESENTEROAXIAL GASTRIC VOLVULUS: Primary | ICD-10-CM

## 2021-01-20 DIAGNOSIS — R11.2 INTRACTABLE NAUSEA AND VOMITING: ICD-10-CM

## 2021-01-20 LAB
ALBUMIN SERPL-MCNC: 3.9 G/DL (ref 3.4–5)
ALP SERPL-CCNC: 70 U/L (ref 40–150)
ALT SERPL W P-5'-P-CCNC: 23 U/L (ref 0–70)
ANION GAP SERPL CALCULATED.3IONS-SCNC: 6 MMOL/L (ref 3–14)
AST SERPL W P-5'-P-CCNC: 16 U/L (ref 0–45)
BASOPHILS # BLD AUTO: 0 10E9/L (ref 0–0.2)
BASOPHILS NFR BLD AUTO: 0.2 %
BILIRUB SERPL-MCNC: 0.8 MG/DL (ref 0.2–1.3)
BUN SERPL-MCNC: 16 MG/DL (ref 7–30)
CALCIUM SERPL-MCNC: 9.9 MG/DL (ref 8.5–10.1)
CHLORIDE SERPL-SCNC: 105 MMOL/L (ref 94–109)
CO2 SERPL-SCNC: 30 MMOL/L (ref 20–32)
CREAT SERPL-MCNC: 1.32 MG/DL (ref 0.66–1.25)
DIFFERENTIAL METHOD BLD: ABNORMAL
EOSINOPHIL # BLD AUTO: 0 10E9/L (ref 0–0.7)
EOSINOPHIL NFR BLD AUTO: 0.1 %
ERYTHROCYTE [DISTWIDTH] IN BLOOD BY AUTOMATED COUNT: 13.1 % (ref 10–15)
GFR SERPL CREATININE-BSD FRML MDRD: 58 ML/MIN/{1.73_M2}
GLUCOSE SERPL-MCNC: 152 MG/DL (ref 70–99)
HCT VFR BLD AUTO: 47.8 % (ref 40–53)
HGB BLD-MCNC: 16.2 G/DL (ref 13.3–17.7)
IMM GRANULOCYTES # BLD: 0 10E9/L (ref 0–0.4)
IMM GRANULOCYTES NFR BLD: 0.3 %
LIPASE SERPL-CCNC: 138 U/L (ref 73–393)
LYMPHOCYTES # BLD AUTO: 0.7 10E9/L (ref 0.8–5.3)
LYMPHOCYTES NFR BLD AUTO: 6.3 %
MCH RBC QN AUTO: 31.8 PG (ref 26.5–33)
MCHC RBC AUTO-ENTMCNC: 33.9 G/DL (ref 31.5–36.5)
MCV RBC AUTO: 94 FL (ref 78–100)
MONOCYTES # BLD AUTO: 0.6 10E9/L (ref 0–1.3)
MONOCYTES NFR BLD AUTO: 5.7 %
NEUTROPHILS # BLD AUTO: 9.2 10E9/L (ref 1.6–8.3)
NEUTROPHILS NFR BLD AUTO: 87.4 %
NRBC # BLD AUTO: 0 10*3/UL
NRBC BLD AUTO-RTO: 0 /100
PLATELET # BLD AUTO: 240 10E9/L (ref 150–450)
POTASSIUM SERPL-SCNC: 3.5 MMOL/L (ref 3.4–5.3)
PROT SERPL-MCNC: 8 G/DL (ref 6.8–8.8)
RBC # BLD AUTO: 5.09 10E12/L (ref 4.4–5.9)
SODIUM SERPL-SCNC: 141 MMOL/L (ref 133–144)
WBC # BLD AUTO: 10.5 10E9/L (ref 4–11)

## 2021-01-20 PROCEDURE — C9803 HOPD COVID-19 SPEC COLLECT: HCPCS

## 2021-01-20 PROCEDURE — C9113 INJ PANTOPRAZOLE SODIUM, VIA: HCPCS | Performed by: EMERGENCY MEDICINE

## 2021-01-20 PROCEDURE — 250N000011 HC RX IP 250 OP 636: Performed by: EMERGENCY MEDICINE

## 2021-01-20 PROCEDURE — 258N000003 HC RX IP 258 OP 636: Performed by: EMERGENCY MEDICINE

## 2021-01-20 PROCEDURE — 80053 COMPREHEN METABOLIC PANEL: CPT | Performed by: EMERGENCY MEDICINE

## 2021-01-20 PROCEDURE — 85025 COMPLETE CBC W/AUTO DIFF WBC: CPT | Performed by: EMERGENCY MEDICINE

## 2021-01-20 PROCEDURE — 250N000013 HC RX MED GY IP 250 OP 250 PS 637: Performed by: EMERGENCY MEDICINE

## 2021-01-20 PROCEDURE — 83690 ASSAY OF LIPASE: CPT | Performed by: EMERGENCY MEDICINE

## 2021-01-20 PROCEDURE — 99220 PR INITIAL OBSERVATION CARE,LEVEL III: CPT | Performed by: INTERNAL MEDICINE

## 2021-01-20 PROCEDURE — 96374 THER/PROPH/DIAG INJ IV PUSH: CPT

## 2021-01-20 PROCEDURE — 96375 TX/PRO/DX INJ NEW DRUG ADDON: CPT

## 2021-01-20 PROCEDURE — 93005 ELECTROCARDIOGRAM TRACING: CPT

## 2021-01-20 PROCEDURE — 71045 X-RAY EXAM CHEST 1 VIEW: CPT

## 2021-01-20 PROCEDURE — 99285 EMERGENCY DEPT VISIT HI MDM: CPT | Mod: 25

## 2021-01-20 PROCEDURE — 87635 SARS-COV-2 COVID-19 AMP PRB: CPT | Performed by: EMERGENCY MEDICINE

## 2021-01-20 PROCEDURE — 250N000009 HC RX 250: Performed by: EMERGENCY MEDICINE

## 2021-01-20 RX ORDER — LORAZEPAM 2 MG/ML
0.5 INJECTION INTRAMUSCULAR ONCE
Status: COMPLETED | OUTPATIENT
Start: 2021-01-20 | End: 2021-01-20

## 2021-01-20 RX ORDER — DIPHENHYDRAMINE HYDROCHLORIDE 50 MG/ML
25 INJECTION INTRAMUSCULAR; INTRAVENOUS ONCE
Status: DISCONTINUED | OUTPATIENT
Start: 2021-01-20 | End: 2021-01-21

## 2021-01-20 RX ORDER — DIPHENHYDRAMINE HYDROCHLORIDE 50 MG/ML
25 INJECTION INTRAMUSCULAR; INTRAVENOUS ONCE
Status: COMPLETED | OUTPATIENT
Start: 2021-01-20 | End: 2021-01-20

## 2021-01-20 RX ORDER — SUCRALFATE ORAL 1 G/10ML
1 SUSPENSION ORAL ONCE
Status: COMPLETED | OUTPATIENT
Start: 2021-01-20 | End: 2021-01-20

## 2021-01-20 RX ADMIN — SUCRALFATE 1 G: 1 SUSPENSION ORAL at 23:06

## 2021-01-20 RX ADMIN — SODIUM CHLORIDE 1000 ML: 9 INJECTION, SOLUTION INTRAVENOUS at 21:24

## 2021-01-20 RX ADMIN — LIDOCAINE HYDROCHLORIDE: 20 SOLUTION ORAL; TOPICAL at 21:58

## 2021-01-20 RX ADMIN — DIPHENHYDRAMINE HYDROCHLORIDE 25 MG: 50 INJECTION, SOLUTION INTRAMUSCULAR; INTRAVENOUS at 21:25

## 2021-01-20 RX ADMIN — LORAZEPAM 0.5 MG: 2 INJECTION INTRAMUSCULAR; INTRAVENOUS at 23:09

## 2021-01-20 RX ADMIN — PANTOPRAZOLE SODIUM 80 MG: 40 INJECTION, POWDER, FOR SOLUTION INTRAVENOUS at 23:05

## 2021-01-20 RX ADMIN — PROCHLORPERAZINE EDISYLATE 10 MG: 5 INJECTION INTRAMUSCULAR; INTRAVENOUS at 21:25

## 2021-01-20 NOTE — TELEPHONE ENCOUNTER
Addend on information given to patient as follows from Resources "[employer].      Epigastric Pain (Uncertain Cause)  Epigastric pain is pain in the upper abdomen. It can be a sign of disease. Common causes include:    Acid reflux (stomach acid flowing up into the esophagus)    Gastritis (irritation of the stomach lining) Most often this is from aspirin or NSAID medicines such as ibuprofen, bacteria called H. pylori, or frequent alcohol use.    Peptic ulcer disease    Inflammation of the pancreas    Gallstone    Infection in the gallbladder  Pain may be dull or burning. It may spread upward to the chest or to the back. There may be other symptoms such as belching, bloating, cramps or hunger pains. There may be weight loss or poor appetite, nausea or vomiting.  Since the cause of your pain is not certain yet,you may need more tests. Sometimes the doctor will treat you for the most likely condition to see if there is improvement before doing more tests.  Home care  Medicines    Antacids help neutralize the normal acids in your stomach.If you don t like the liquid, you can try a chewable one. You may find one works better than another for you. Overuse can cause diarrhea or constipation.    Acid blockers (H2 blockers) decrease acid production. Examples are cimetidine, famotidine, and ranitidine.    Acid inhibitors (PPIs) decrease acid production in a different way than the blockers. You may find they work better, but can take a little longer to take effect.  Examples are omeprazole, lansoprazole, pantoprazole, rabeprazole, and esomeprazole. Many of these are available over-the-counter or available as generics.    Take an antacid 30 to 60 minutes after eating and at bedtime, but not at the same time as an acid blocker.    Try not to take NSAIDs such as ibuprofen. Aspirin may also cause problems, but if taking it for your heart or other medical reasons, talk to your doctor before stopping it; you don't want to cause a  worse problem, like a heart attack or stroke.  Diet    If certain foods seem to cause your pain, try not to eat them. Certain foods can worsen symptoms of gastritis. Limit or avoid fatty, fried, and spicy foods, as well as coffee, chocolate, mint, and foods with high acid content such as tomatoes and citrus fruit and juices (orange, grapefruit, lemon).    Eat slowly and chew food well before swallowing. Symptoms of gastritis can be worsened by certain foods.    Don't drink alcohol. It can irritate the stomach.    Don't consume caffeine, or use tobacco. These can delay healing and worsen your problem.    Try eating smaller meals with snacks in between.    Keep an empty stomach for 2 to 3 hours before lying down.    Prop the head of the bed up if you have overnight symptoms. This helps acid clear from your esophagus.     Follow-up care  Follow up with your healthcare provider or as advised.  When to seek medical advice  Call your healthcare provider right away if any of the following occur:    Stomach pain worsens or moves to the right lower part of the abdomen    Chest pain appears, or if it worsens or spreads to the chest, back, neck, shoulder, or arm    Frequent vomiting (can t keep down liquids)    Blood in the stool or vomit (red or black color)    Feeling weak or dizzy, fainting, or having trouble breathing    Fever of 100.4 F (38 C) or higher, or as directed by your healthcare provider    Abdominal swelling  Carlos last reviewed this educational content on 3/1/2018    1743-5570 [employer]. [address] [place] [address], [place] [address]. All rights reserved. This information is not intended as a substitute for professional medical care. Always follow your healthcare professional's instructions.          Fern Elizondo RN

## 2021-01-20 NOTE — TELEPHONE ENCOUNTER
"Patient states he refuses to go to clinic or hospital or leave his house due to the fear of \"getting the COVID 19\".  Advised patient of home care remedies.  Advised patient to call with any further questions or concerns.  Advised patient to seek emergency care for worsening symptoms.  Patient stated understanding.    Additional Information    Negative: Passed out (i.e., fainted, collapsed and was not responding)    Negative: Shock suspected (e.g., cold/pale/clammy skin, too weak to stand, low BP, rapid pulse)    Negative: Sounds like a life-threatening emergency to the triager    Pain is mainly in upper abdomen (if needed ask: 'is it mainly above the belly button?')    Negative: Passed out (i.e., fainted, collapsed and was not responding)    Negative: Shock suspected (e.g., cold/pale/clammy skin, too weak to stand, low BP, rapid pulse)    Negative: Visible sweat on face or sweat is dripping down    Negative: Chest pain    Negative: SEVERE abdominal pain (e.g., excruciating)    Negative: Pain lasting > 10 minutes and over 50 years old    Negative: Pain lasting > 10 minutes and over 40 years old and associated chest, arm, neck, upper back, or jaw pain    Negative: Pain lasting > 10 minutes and over 35 years old and at least one cardiac risk factor    Negative: Pain lasting > 10 minutes and history of heart disease (i.e., heart attack, bypass surgery, angina, angioplasty, CHF)    Negative: Recent injury to the abdomen    Negative: Vomiting red blood or black (coffee ground) material    Negative: Bloody, black, or tarry bowel movements    Negative: Pregnant > 24 weeks and hand or face swelling    Negative: Constant abdominal pain lasting > 2 hours    Negative: Vomiting bile (green color)    Negative: Patient sounds very sick or weak to the triager    Negative: Vomiting and abdomen looks much more swollen than usual    Negative: White of the eyes have turned yellow (i.e.,  jaundice)    Negative: Fever > 103 F (39.4 C)    " "Negative: Fever > 101 F (38.3 C) and over 60 years of age    Negative: Fever > 100.0 F (37.8 C) and has diabetes mellitus or a weak immune system (e.g., HIV positive, cancer chemotherapy, organ transplant, splenectomy, chronic steroids)    Negative: Fever > 100.0 F (37.8 C) and bedridden (e.g., nursing home patient, stroke, chronic illness, recovering from surgery)    Negative: Age > 60 years    Negative: Patient wants to be seen    Negative: Mild pain that comes and goes (cramps) lasts > 24 hours    Negative: Alcohol abuse known or suspected    Intermittent burning pains radiating into chest or sour taste in mouth    Answer Assessment - Initial Assessment Questions  1. LOCATION: \"Where does it hurt?\"       Feels like in stomach, bloated    2. RADIATION: \"Does the pain shoot anywhere else?\" (e.g., chest, back)      No    3. ONSET: \"When did the pain begin?\" (Minutes, hours or days ago)       Yesterday had spicy food, vomiting    4. SUDDEN: \"Gradual or sudden onset?\"      Gradual    5. PATTERN \"Does the pain come and go, or is it constant?\"     - If constant: \"Is it getting better, staying the same, or worsening?\"       (Note: Constant means the pain never goes away completely; most serious pain is constant and it progresses)      - If intermittent: \"How long does it last?\" \"Do you have pain now?\"      (Note: Intermittent means the pain goes away completely between bouts)      Intermittent    6. SEVERITY: \"How bad is the pain?\"  (e.g., Scale 1-10; mild, moderate, or severe)     - MILD (1-3): doesn't interfere with normal activities, abdomen soft and not tender to touch      - MODERATE (4-7): interferes with normal activities or awakens from sleep, tender to touch      - SEVERE (8-10): excruciating pain, doubled over, unable to do any normal activities        Mild to moderate    7. RECURRENT SYMPTOM: \"Have you ever had this type of abdominal pain before?\" If so, ask: \"When was the last time?\" and \"What happened that " "time?\"       Yes, in April 2020 had food poisoning and went to ER for dehydration    8. CAUSE: \"What do you think is causing the abdominal pain?\"      Bad foot    9. RELIEVING/AGGRAVATING FACTORS: \"What makes it better or worse?\" (e.g., movement, antacids, bowel movement)      Vomiting, bowel movements    10. OTHER SYMPTOMS: \"Has there been any vomiting, diarrhea, constipation, or urine problems?\"        Vomiting, diarrhea    Answer Assessment - Initial Assessment Questions  1. LOCATION: \"Where does it hurt?\"       Upper stomach    2. RADIATION: \"Does the pain shoot anywhere else?\" (e.g., chest, back)      No    3. ONSET: \"When did the pain begin?\" (e.g., minutes, hours or days ago)       Yesterday    4. SUDDEN: \"Gradual or sudden onset?\"      Gradual after \"bad food\"    5. PATTERN \"Does the pain come and go, or is it constant?\"     - If constant: \"Is it getting better, staying the same, or worsening?\"       (Note: Constant means the pain never goes away completely; most serious pain is constant and it progresses)      - If intermittent: \"How long does it last?\" \"Do you have pain now?\"      (Note: Intermittent means the pain goes away completely between bouts)      Intermittent    6. SEVERITY: \"How bad is the pain?\"  (e.g., Scale 1-10; mild, moderate, or severe)     - MILD (1-3): doesn't interfere with normal activities, abdomen soft and not tender to touch      - MODERATE (4-7): interferes with normal activities or awakens from sleep, tender to touch      - SEVERE (8-10): excruciating pain, doubled over, unable to do any normal activities        Mild to moderate    7. RECURRENT SYMPTOM: \"Have you ever had this type of abdominal pain before?\" If so, ask: \"When was the last time?\" and \"What happened that time?\"       Yes, bad food    8. AGGRAVATING FACTORS: \"Does anything seem to cause this pain?\" (e.g., foods, stress, alcohol)      NA  9. CARDIAC SYMPTOMS: \"Do you have any of the following symptoms: chest pain, " "difficulty breathing, sweating, nausea?\"      No    10. OTHER SYMPTOMS: \"Do you have any other symptoms?\" (e.g., fever, vomiting, diarrhea)        Vomiting, diarrhea    11. PREGNANCY: \"Is there any chance you are pregnant?\" \"When was your last menstrual period?\"        NA    Protocols used: ABDOMINAL PAIN - MALE-A-OH, ABDOMINAL PAIN - UPPER-A-OH  Fern Elizondo RN    "

## 2021-01-20 NOTE — TELEPHONE ENCOUNTER
Called patient.   He states he has been vomiting since yesterday.   Thinks he has food poisoning.     Is worried about dehydration with taking Spironolactone.    Advised patient to drink small sips of fluids every 5-15 minutes. Increase amounts after a few hours of tolerating.  Eat bland foods and clear liquids if tolerated.    Informed he should still take him medications. Though it is very important he drinks fluids.     Patient has Gatorade being delivered.     Patient will continue to monitor at home and follow up prn.         
The patient call back and stated that he just got off the phone with Fern ruano nurse and he had one additional question.     While the patient was in the hospital last year, he was put on Blood pressure medication called  spironolactone, He stated that he was informed that this medication assists in removing water from his body to keep his B/P lower.  He is wondering if he should stop taking this medication for the next couple of days since he is fighting dehydration at the moment.  He would like a call back to discuss this.     Linda Lee          
oral

## 2021-01-21 ENCOUNTER — APPOINTMENT (OUTPATIENT)
Dept: GENERAL RADIOLOGY | Facility: CLINIC | Age: 61
End: 2021-01-21
Attending: HOSPITALIST
Payer: COMMERCIAL

## 2021-01-21 LAB
ALBUMIN UR-MCNC: 20 MG/DL
APPEARANCE UR: CLEAR
BILIRUB UR QL STRIP: NEGATIVE
COLOR UR AUTO: ABNORMAL
GLUCOSE UR STRIP-MCNC: 50 MG/DL
HGB UR QL STRIP: NEGATIVE
INTERPRETATION ECG - MUSE: NORMAL
KETONES UR STRIP-MCNC: 40 MG/DL
LABORATORY COMMENT REPORT: NORMAL
LEUKOCYTE ESTERASE UR QL STRIP: NEGATIVE
MUCOUS THREADS #/AREA URNS LPF: PRESENT /LPF
NITRATE UR QL: NEGATIVE
PH UR STRIP: 6.5 PH (ref 5–7)
RBC #/AREA URNS AUTO: 2 /HPF (ref 0–2)
SARS-COV-2 RNA RESP QL NAA+PROBE: NEGATIVE
SOURCE: ABNORMAL
SP GR UR STRIP: 1.03 (ref 1–1.03)
SPECIMEN SOURCE: NORMAL
UROBILINOGEN UR STRIP-MCNC: NORMAL MG/DL (ref 0–2)
WBC #/AREA URNS AUTO: 1 /HPF (ref 0–5)

## 2021-01-21 PROCEDURE — 258N000003 HC RX IP 258 OP 636: Performed by: INTERNAL MEDICINE

## 2021-01-21 PROCEDURE — 74250 X-RAY XM SM INT 1CNTRST STD: CPT

## 2021-01-21 PROCEDURE — G0378 HOSPITAL OBSERVATION PER HR: HCPCS

## 2021-01-21 PROCEDURE — 74019 RADEX ABDOMEN 2 VIEWS: CPT

## 2021-01-21 PROCEDURE — 250N000009 HC RX 250: Performed by: HOSPITALIST

## 2021-01-21 PROCEDURE — 81001 URINALYSIS AUTO W/SCOPE: CPT | Performed by: INTERNAL MEDICINE

## 2021-01-21 PROCEDURE — 250N000011 HC RX IP 250 OP 636: Performed by: INTERNAL MEDICINE

## 2021-01-21 PROCEDURE — 250N000013 HC RX MED GY IP 250 OP 250 PS 637: Performed by: INTERNAL MEDICINE

## 2021-01-21 PROCEDURE — 250N000011 HC RX IP 250 OP 636: Performed by: HOSPITALIST

## 2021-01-21 PROCEDURE — 250N000009 HC RX 250: Performed by: INTERNAL MEDICINE

## 2021-01-21 PROCEDURE — 99233 SBSQ HOSP IP/OBS HIGH 50: CPT | Performed by: HOSPITALIST

## 2021-01-21 PROCEDURE — 120N000004 HC R&B MS OVERFLOW

## 2021-01-21 RX ORDER — SODIUM CHLORIDE 9 MG/ML
INJECTION, SOLUTION INTRAVENOUS CONTINUOUS
Status: DISCONTINUED | OUTPATIENT
Start: 2021-01-21 | End: 2021-01-21

## 2021-01-21 RX ORDER — SPIRONOLACTONE 25 MG/1
25 TABLET ORAL DAILY
Status: DISCONTINUED | OUTPATIENT
Start: 2021-01-21 | End: 2021-01-23 | Stop reason: HOSPADM

## 2021-01-21 RX ORDER — SODIUM CHLORIDE AND POTASSIUM CHLORIDE 150; 900 MG/100ML; MG/100ML
INJECTION, SOLUTION INTRAVENOUS CONTINUOUS
Status: DISCONTINUED | OUTPATIENT
Start: 2021-01-21 | End: 2021-01-22

## 2021-01-21 RX ORDER — LORAZEPAM 2 MG/ML
0.5 INJECTION INTRAMUSCULAR EVERY 6 HOURS PRN
Status: DISCONTINUED | OUTPATIENT
Start: 2021-01-21 | End: 2021-01-21

## 2021-01-21 RX ORDER — SUCRALFATE 1 G/1
1 TABLET ORAL
Status: DISCONTINUED | OUTPATIENT
Start: 2021-01-21 | End: 2021-01-21

## 2021-01-21 RX ORDER — CALCIUM CARBONATE 500 MG/1
1 TABLET, CHEWABLE ORAL PRN
COMMUNITY
End: 2024-08-08

## 2021-01-21 RX ORDER — HYDRALAZINE HYDROCHLORIDE 20 MG/ML
10 INJECTION INTRAMUSCULAR; INTRAVENOUS EVERY 6 HOURS PRN
Status: DISCONTINUED | OUTPATIENT
Start: 2021-01-21 | End: 2021-01-23 | Stop reason: HOSPADM

## 2021-01-21 RX ORDER — LORAZEPAM 2 MG/ML
.5-1 INJECTION INTRAMUSCULAR EVERY 4 HOURS PRN
Status: DISCONTINUED | OUTPATIENT
Start: 2021-01-21 | End: 2021-01-21

## 2021-01-21 RX ORDER — CALCIUM CARBONATE 500 MG/1
1000 TABLET, CHEWABLE ORAL EVERY 4 HOURS PRN
Status: DISCONTINUED | OUTPATIENT
Start: 2021-01-21 | End: 2021-01-23 | Stop reason: HOSPADM

## 2021-01-21 RX ORDER — LORAZEPAM 2 MG/ML
0.5 INJECTION INTRAMUSCULAR ONCE
Status: COMPLETED | OUTPATIENT
Start: 2021-01-21 | End: 2021-01-21

## 2021-01-21 RX ORDER — DIPHENHYDRAMINE HYDROCHLORIDE 50 MG/ML
25 INJECTION INTRAMUSCULAR; INTRAVENOUS EVERY 6 HOURS PRN
Status: DISCONTINUED | OUTPATIENT
Start: 2021-01-21 | End: 2021-01-23 | Stop reason: HOSPADM

## 2021-01-21 RX ORDER — MAGNESIUM HYDROXIDE/ALUMINUM HYDROXICE/SIMETHICONE 120; 1200; 1200 MG/30ML; MG/30ML; MG/30ML
30 SUSPENSION ORAL EVERY 4 HOURS PRN
Status: DISCONTINUED | OUTPATIENT
Start: 2021-01-21 | End: 2021-01-23 | Stop reason: HOSPADM

## 2021-01-21 RX ORDER — AMLODIPINE BESYLATE 5 MG/1
5 TABLET ORAL DAILY
Status: DISCONTINUED | OUTPATIENT
Start: 2021-01-21 | End: 2021-01-23 | Stop reason: HOSPADM

## 2021-01-21 RX ORDER — METOPROLOL TARTRATE 1 MG/ML
5 INJECTION, SOLUTION INTRAVENOUS EVERY 6 HOURS
Status: DISCONTINUED | OUTPATIENT
Start: 2021-01-21 | End: 2021-01-23 | Stop reason: HOSPADM

## 2021-01-21 RX ORDER — ACETAMINOPHEN 325 MG/1
650 TABLET ORAL EVERY 4 HOURS PRN
Status: DISCONTINUED | OUTPATIENT
Start: 2021-01-21 | End: 2021-01-23 | Stop reason: HOSPADM

## 2021-01-21 RX ORDER — LORAZEPAM 2 MG/ML
0.5 INJECTION INTRAMUSCULAR EVERY 4 HOURS PRN
Status: DISCONTINUED | OUTPATIENT
Start: 2021-01-21 | End: 2021-01-23 | Stop reason: HOSPADM

## 2021-01-21 RX ORDER — ACETAMINOPHEN 650 MG/1
650 SUPPOSITORY RECTAL EVERY 4 HOURS PRN
Status: DISCONTINUED | OUTPATIENT
Start: 2021-01-21 | End: 2021-01-23 | Stop reason: HOSPADM

## 2021-01-21 RX ADMIN — FAMOTIDINE 20 MG: 10 INJECTION, SOLUTION INTRAVENOUS at 01:06

## 2021-01-21 RX ADMIN — SUCRALFATE 1 G: 1 TABLET ORAL at 08:04

## 2021-01-21 RX ADMIN — METOPROLOL TARTRATE 5 MG: 5 INJECTION INTRAVENOUS at 17:37

## 2021-01-21 RX ADMIN — METOPROLOL TARTRATE 5 MG: 5 INJECTION INTRAVENOUS at 11:57

## 2021-01-21 RX ADMIN — CALCIUM CARBONATE (ANTACID) CHEW TAB 500 MG 1000 MG: 500 CHEW TAB at 01:55

## 2021-01-21 RX ADMIN — SODIUM CHLORIDE: 9 INJECTION, SOLUTION INTRAVENOUS at 01:06

## 2021-01-21 RX ADMIN — AMLODIPINE BESYLATE 5 MG: 5 TABLET ORAL at 01:06

## 2021-01-21 RX ADMIN — DIPHENHYDRAMINE HYDROCHLORIDE 25 MG: 50 INJECTION, SOLUTION INTRAMUSCULAR; INTRAVENOUS at 08:07

## 2021-01-21 RX ADMIN — POTASSIUM CHLORIDE AND SODIUM CHLORIDE: 900; 150 INJECTION, SOLUTION INTRAVENOUS at 12:02

## 2021-01-21 RX ADMIN — METOPROLOL TARTRATE 5 MG: 5 INJECTION INTRAVENOUS at 23:53

## 2021-01-21 RX ADMIN — HYDRALAZINE HYDROCHLORIDE 10 MG: 20 INJECTION INTRAMUSCULAR; INTRAVENOUS at 04:30

## 2021-01-21 RX ADMIN — ALUMINUM HYDROXIDE, MAGNESIUM HYDROXIDE, AND SIMETHICONE 30 ML: 200; 200; 20 SUSPENSION ORAL at 05:17

## 2021-01-21 RX ADMIN — LORAZEPAM 0.5 MG: 2 INJECTION INTRAMUSCULAR; INTRAVENOUS at 04:40

## 2021-01-21 RX ADMIN — ACETAMINOPHEN 650 MG: 325 TABLET, FILM COATED ORAL at 01:06

## 2021-01-21 RX ADMIN — AMLODIPINE BESYLATE 5 MG: 5 TABLET ORAL at 08:04

## 2021-01-21 ASSESSMENT — MIFFLIN-ST. JEOR: SCORE: 1652.22

## 2021-01-21 NOTE — PROVIDER NOTIFICATION
4:55 AM  Provider notified: X cover  Reason: pt requesting order for Maalox for hiccups and gas.   Order: Dr. Belle ordered Maalox.

## 2021-01-21 NOTE — H&P (VIEW-ONLY)
[facility]  Hospitalist Admission Note  Name: Delroy Christianson    MRN: [id number]  YOB: 1960    Age: 61 year old  Date of admission: 1/20/2021  Primary care provider: Chuy Holliday            Assessment and Plan:   Delroy Christianson is a 61 year old male with history of hypertrophic cardiomyopathy, large hiatal hernia, prior hospitalization for nausea, vomiting complicated with DAMION, elevated creatinine, prolonged QT interval and lives by himself at home and reportedly was on her usual state of health until 1 night prior to this presentation the emergency room breath noticed multiple bouts of nausea and vomiting after taking some of his leftover food and had numerous recurrence the day after with mostly vomiting of previously ingested food with no reported bleeding tendencies.    1.  Nausea, vomiting  2.  Large hiatal hernia  3.  Elevated creatinine appears to be chronic  4.  Uncontrolled hypertension  5.  History of possible hypertrophic cardiomyopathy seen on prior echocardiogram done last year    [place] under observation.  Continue with IV fluid support.  Symptomatic management however has some limitations with pharmacotherapy in the setting of known QTC prolongation.  Receive Benadryl, lorazepam and Compazine earlier.  Added 1 dose of IV Pepcid.  May utilize as needed lorazepam and diphenhydramine for symptom control.  Started on Norvasc given elevated blood pressure with as needed Apresoline.  No previous notable diagnosis of hypertension listed in the chart.    Suspecting his large hiatal hernia might be playing a role here, continued earlier started sucralfate.  May benefit for PPI upon discharge  -Check UA for completion given nausea and vomiting    Code status: full    Prophylaxis: ambulate  Disposition: home in 1 day          Chief Complaint:   Nausea, vomiting of at least 1 day duration       Source of Information:   Patient with poor to fair reliability as patient is  currently sedated as he received multiple antiemetics in the emergency room.  Discussion with ED physician  Review of E chart records         History of Present Illness:   Delroy Christianson is a 61 year old male with history of hypertrophic cardiomyopathy, large hiatal hernia, prior hospitalization for nausea, vomiting complicated with DAMION, elevated creatinine, prolonged QT interval and lives by himself at home and reportedly was on her usual state of health until 1 night prior to this presentation the emergency room breath noticed multiple bouts of nausea and vomiting after taking some of his leftover food and had numerous recurrence the day after with mostly vomiting of previously ingested food with no reported bleeding tendencies.  He mentioned that this was accompanied with some loose bowel movement but no bleeding tendencies seen.  He has some epigastric tenderness most pronounced during vomiting spells but endorses no shortness of breath, chest pain, fevers, chills, urinary symptomatology, coughing spells, changes in mental state or fall events.   Upon presentation the emergency room he was found with elevated creatinine but appears to be stable levels, normal lipase levels, reassuring complete blood count and EKG did reveal previously known prolonged QTc interval.  He eventually received numerous dosing of antiemetics with limited due to prolonged QTC and had lorazepam, diphenhydramine and Compazine but still complaining of nausea and needs referral to us for further evaluation and care with this hospitalization.   During the time my exam I had some limited interaction with him given current sedated state.  However no further nausea vomiting reported by nursing service.            Past Medical History:     Past Medical History:   Diagnosis Date     Allergic rhinitis      Sinusitis              Past Surgical History:   No past surgical history on file.          Social History:     Social History     Tobacco Use      Smoking status: Never Smoker     Smokeless tobacco: Never Used   Substance Use Topics     Alcohol use: No     Alcohol/week: 0.0 standard drinks             Family History:   Family history was fully reviewed and non-contributory in this case.         Allergies:   No Known Allergies          Medications:     Prior to Admission medications    Medication Sig Last Dose Taking? Auth Provider   spironolactone (ALDACTONE) 25 MG tablet Take 1 tablet (25 mg) by mouth daily   Chuy Holliday MD             Review of Systems:   Cannot be obtained accurately given current sedated state           Physical Exam:   Blood pressure (!) 168/104, pulse 87, temperature 99  F (37.2  C), temperature source Oral, resp. rate 18, SpO2 (!) 89 %.  Wt Readings from Last 1 Encounters:   04/25/20 84 kg (185 lb 3.2 oz)     Exam:  GENERAL: No apparent distress.  Sedated but not in distress  HEENT: Normocephalic, atraumatic. Extraocular movements intact.  CARDIOVASCULAR: Regular rate and rhythm without murmurs or rubs. No JVD  PULMONARY: Clear to auscultation, no wheezes, crackles  ABDOMINAL: Soft, non-tender, non-distended. Bowel sounds normoactive. No hepatosplenomegaly.  EXTREMITIES: No cyanosis or clubbing. No edema.  NEUROLOGICAL: CN 2-12 grossly intact, awake and alert x1, spontaneous and coherent speech. no focal neurological deficits.  Sleepy but easily aroused with verbal stimuli  DERMATOLOGICAL: No rash, ulcer, ecchymoses, jaundice.  Psych: not agitation, not combative, pleasant mood         Data:   EKG:  EKG reviewed showed normal sinus rhythm at 94 bpm, prolonged QT with QTC of 485     Imaging:  Results for orders placed or performed during the hospital encounter of 01/20/21   XR Chest Port 1 View    Narrative    EXAM: XR CHEST PORT 1 VW  LOCATION: [facility]  DATE/TIME: 1/20/2021 10:40 PM    INDICATION: Chest pain  COMPARISON: 04/22/2020      Impression    IMPRESSION: Large hiatal hernia. Mild left basilar  atelectasis. Right lung clear. No vascular congestion. Tiny left effusion or pleural thickening.       Labs:  No results for input(s): CULT in the last 168 hours.  Recent Labs   Lab 01/20/21 2019   WBC 10.5   HGB 16.2   HCT 47.8   MCV 94        Recent Labs   Lab 01/20/21 2019      POTASSIUM 3.5   CHLORIDE 105   CO2 30   ANIONGAP 6   *   BUN 16   CR 1.32*   GFRESTIMATED 58*   GFRESTBLACK 67   MINE 9.9   PROTTOTAL 8.0   ALBUMIN 3.9   BILITOTAL 0.8   ALKPHOS 70   AST 16   ALT 23     No results for input(s): SED, CRP in the last 168 hours.  Recent Labs   Lab 01/20/21 2019   *     No results for input(s): INR in the last 168 hours.  Recent Labs   Lab 01/20/21 2019   LIPASE 138     No results for input(s): TROPONIN, TROPI, TROPR in the last 168 hours.    Invalid input(s): TROP, TROPONINIES  No results for input(s): COLOR, APPEARANCE, URINEGLC, URINEBILI, URINEKETONE, SG, UBLD, URINEPH, PROTEIN, UROBILINOGEN, NITRITE, LEUKEST, RBCU, WBCU in the last 168 hours.

## 2021-01-21 NOTE — PROGRESS NOTES
"HOSPITALIST X-COVER    Called by radiologist regarding SBFT results: see below.     \"gastric fundus and proximal body are  located inferiorly in the upper abdomen and the gastric antrum and  pylorus are located superiorly in an intrathoracic position. The  appearance is suggestive of a mesentero-axial gastric volvulus  (possibly with intermittent/subacute volvulus) although the  configuration is similar on the CT on 4/24/2020. \"    Looks like he may have an intermittent gastric volvulus but imaging is stable compared to CT in April so there is some component of chronicity.   Currently pt is pain free after NGT. Belly soft.   D/w Dr. Toth who reviewed the films as well, depending on if he clears stomach content may need to have this fixed this admission vs potentially discharge and then follow up. Needs specialized foregut surgeon so [facility] might be best.     Will get CT c/a/p with PO and IV contrast to better characterize hernia.   Formal GS consult tomorrow   KEEP NPO, IVF    "

## 2021-01-21 NOTE — PLAN OF CARE
PRIMARY DIAGNOSIS: Nausea, Vomiting, Abdominal pain    OUTPATIENT/OBSERVATION GOALS TO BE MET BEFORE DISCHARGE  1. Orthostatic performed: No    2. Tolerating PO fluid and/or antibiotics (if applicable):  pt taking sips of water, spitting up after    3. Nausea/Vomiting symptoms improved: Moderate to severe nausea upon arrival to unit. At 0200, pt stated nausea is mild and able to fall asleep for short periods. 50ml of brown liquid emesis noted.    4. Pain status: 4/10 abdominal pain. Tylenol given.    5. Return to near baseline physical activity: Yes    Discharge Planner Nurse   Safe discharge environment identified: Yes  Barriers to discharge: Yes       Entered by: Sujata Ruffin 01/21/2021 5:11 AM     Vitals are Temp: 97.7  F (36.5  C) Temp src: Oral BP: (!) 185/99 Pulse: 88   Resp: 18 SpO2: 97 %.  Patient is Alert and Oriented x4. They are SBA.  Pt is a Full liquid diet, but has only taken sips of water. They are complaining of 4/10 pain in their abdomen with hiccups. Tylenol given.  Pt appears exhausted and weak.  Patient has Normal Saline 0.9% running at 100 mL per hour. Will continue to monitor and provide cares.     Please review provider order for any additional goals.   Nurse to notify provider when observation goals have been met and patient is ready for discharge.

## 2021-01-21 NOTE — PLAN OF CARE
"PRIMARY DIAGNOSIS: hiatial hernia    OUTPATIENT/OBSERVATION GOALS TO BE MET BEFORE DISCHARGE  1. Orthostatic performed: No    2. Tolerating PO fluid and/or antibiotics (if applicable): No    3. Nausea/Vomiting/Diarrhea symptoms improved: No,  frequent and watery brown emesis    4. Pain status: Improved-controlled with oral pain medications.    5. Return to near baseline physical activity: Yes    Discharge Planner Nurse   Safe discharge environment identified: Yes  Barriers to discharge: Yes, continuous vomiting       Entered by: Sandy Leung 01/21/2021 10:08 AM   BP (!) 162/96 (BP Location: Right arm)   Pulse 97   Temp 98.4  F (36.9  C) (Oral)   Resp 18   Ht 1.803 m (5' 11\")   Wt 82.5 kg (181 lb 14.4 oz)   SpO2 95%   BMI 25.37 kg/m    Patient Aox4. Intermittent abdominal discomfort, declined interventions, states relieved with emesis. Patient states not nauseated, but frequent bouts of vomiting r/t hiccups and coughing. Not much improvement noted with PRNs. Abdominal xray noted distended with fluid. Will continue to monitor and follow plan of care.  Please review provider order for any additional goals.   Nurse to notify provider when observation goals have been met and patient is ready for discharge.  "

## 2021-01-21 NOTE — PLAN OF CARE
"PRIMARY DIAGNOSIS: hiatial hernia    OUTPATIENT/OBSERVATION GOALS TO BE MET BEFORE DISCHARGE  1. Orthostatic performed: No    2. Tolerating PO fluid and/or antibiotics (if applicable): No, NPO    3. Nausea/Vomiting/Diarrhea symptoms improved: yes, since NG placement    4. Pain status: Improved-controlled with oral pain medications.    5. Return to near baseline physical activity: Yes    Discharge Planner Nurse   Safe discharge environment identified: Yes  Barriers to discharge: Yes, continuous vomiting       Entered by: Sandy Leung 01/21/2021 2:04 PM   BP (!) 174/92 (BP Location: Right arm)   Pulse 99   Temp 99  F (37.2  C) (Oral)   Resp 18   Ht 1.803 m (5' 11\")   Wt 82.5 kg (181 lb 14.4 oz)   SpO2 94%   BMI 25.37 kg/m    Patient Aox4. Abdominal xray noted distended with fluid. Ng placed and got 2100 mL output since. Patient having  GI series with small bowel follow through. Is NPO. Meds changed to IV. Patient able to sleep since NG placement. Will continue to monitor and follow plan of care.  Please review provider order for any additional goals.   Nurse to notify provider when observation goals have been met and patient is ready for discharge.  "

## 2021-01-21 NOTE — PROGRESS NOTES
Maalox given for hiccups that reduced frequency of hiccups. Pt reports that after taking med, it made abdominal pain worse. Pt vomited additional 100ml brown liquid emesis. Will continue to monitor and provide cares.

## 2021-01-21 NOTE — PLAN OF CARE
PRIMARY DIAGNOSIS: Nausea, Vomiting, Abdominal pain    OUTPATIENT/OBSERVATION GOALS TO BE MET BEFORE DISCHARGE  1. Orthostatic performed: No    2. Tolerating PO fluid and/or antibiotics (if applicable):  pt taking sips of water, spitting up after    3. Nausea/Vomiting symptoms improved: Yes, ativan given. Pt denied nausea as of 0500. 100ml brown emesis in emesis bag. Pt stated amount is collected throughout the night.     4. Pain status: Pt stated he is unsure if pain is reduced with tylenol given earlier in shift. Pt has abdominal pain with hiccups, declined tylenol at this time.     5. Return to near baseline physical activity: Yes    Discharge Planner Nurse   Safe discharge environment identified: Yes  Barriers to discharge: Yes       Entered by: Sujata Ruffin 01/21/2021 5:02 AM     Vitals are Temp: 97.7  F (36.5  C) Temp src: Oral BP: (!) 185/99 Pulse: 88   Resp: 18 SpO2: 97 %.  Patient is Alert and Oriented x4. They are SBA.  Pt is a Full liquid diet, but has only taken sips of water. They are complaining of 4/10 pain in their abdomen with hiccups.  Declines need for intervention.  Pt appears exhausted and weak. Bed linens changed as emesis bag was spilled. Patient has Normal Saline 0.9% running at 100 mL per hour. Will continue to monitor and provide cares.     Please review provider order for any additional goals.   Nurse to notify provider when observation goals have been met and patient is ready for discharge.   Subjective


Serum bilirubin today at noon increased to 8.5 while on biliblanket.


In addition patient had a temperature of 100.8-this was taken after patient was 

nursing and was on the BiliBlanket and wrapped with another blanket





Mom report patient is cluster feeding-  feeding almost every hour














Objective





- Vital Signs


Vital signs: 


                                   Vital Signs











Temp  98.9 F   19 15:04


 


Pulse  148   19 12:00


 


Resp  44   19 12:00


 


BP      


 


Pulse Ox      








                                 Intake & Output











 19





 06:59 18:59 06:59


 


Intake Total 2 6 


 


Balance 2 6 


 


Weight 3.44 kg  


 


Intake:   


 


  Oral 2 6 


 


    Feeding Type 1 2 6 


 


Other:   


 


  Intake, Breast Feeding   





  Duration (minutes)   


 


    Feeding Type 1 20 0 


 


  # Voids 1 0 


 


  # Bowel Movements  0 














- Exam


General: Alert, strong cry, no gross facial dysmorphism


HEENT: Anterior fontanelle soft and flat. Ears appear normal bilateral. Nose is 

normal.


Mouth: Hard palate fused. Normal mucosa


Chest: Symmetrical movements.


Heart: S1 S2 heard, no murmurs. Femoral pulses palpable bilaterally.


Respiratory: Lungs clear to auscultation bilateral, respirations unlabored


Abdomen: Soft, non tender, no organomegaly. Bowel sounds normal. Umbilical cord 

looks intact








Assessment and Plan


(1) Single liveborn, born in hospital, delivered by vaginal delivery


Current Visit: Yes   Status: Acute   Code(s): Z38.00 - SINGLE LIVEBORN INFANT, 

DELIVERED VAGINALLY   SNOMED Code(s): 66351844723923


   





(2) Hyperbilirubinemia requiring phototherapy


Current Visit: Yes   Status: Acute   Code(s): P59.9 -  JAUNDICE, 

UNSPECIFIED   SNOMED Code(s): 28308090


   


Plan: 


Increase to double phototherapy  


Repeat serum bilirubin tomorrow at 6 AM





Continue to monitor temperatures


Supplement if needed

## 2021-01-21 NOTE — ED TRIAGE NOTES
"Nausea, hypertension, tachycardia. \"Sometimes when I burp the acid will get to me and cause a lot left upper abdominal pain\". Feels similar to episode last April when he was seen here.  "

## 2021-01-21 NOTE — UTILIZATION REVIEW
Admission Status; Secondary Review Determination       Under the authority of the Utilization Management Committee, the utilization review process indicated a secondary review on the above patient. The review outcome is based on review of the medical records, discussions with staff, and applying clinical experience noted on the date of the review.     (x) Inpatient Status Appropriate - This patient's medical care is consistent with medical management for inpatient care and reasonable inpatient medical practice.     RATIONALE FOR DETERMINATION   60 yo with history HTN, known hiatal hernia, CKD, hypertrophic CMP, prolonged QT admitted on 1/20/2021 with nausea and vomiting.  Initially placed on observation after being in observation overnight, she had no bowel sounds on exam and abdominal x-ray was obtained showing large amount of fluid in the stomach, a new NG tube inserted had almost 2 L out, patient n.p.o. IV fluid concern for obstruction.  Advance to inpatient appropriately for ongoing need for hospital care.  The expected length of stay at the time of admission was more than 2 nights because of the severity of illness, intensity of service provided, and risk for adverse outcome. Inpatient admission is appropriate.     This document was produced using voice recognition software       The information on this document is developed by the utilization review team in order for the business office to ensure compliance. This only denotes the appropriateness of proper admission status and does not reflect the quality of care rendered.   The definitions of Inpatient Status and Observation Status used in making the determination above are those provided in the CMS Coverage Manual, Chapter 1 and Chapter 6, section 70.4.   Sincerely,   NEENA MACIAS MD   System Medical Director   Utilization Management   [facility].

## 2021-01-21 NOTE — PROGRESS NOTES
[facility]    Medicine Progress Note - Hospitalist Service       Date of Admission:  1/20/2021  Assessment & Plan       62 yo with history HTN, known hiatal hernia, CKD, hypertrophic CMP, prolonged QT admitted on 1/20/2021 with nausea and vomiting.    Nausea and vomiting    - this am still having nausea and vomiting    - had absent bowel sounds on exam    - abdominal x rays obtained: large amount of fluid in stomach    - NG tube placed with >1950cc out immediately (within an hour)    - will obtain upper GI series with small bowel follow through to start    - depending on UGI, may consult GI    - keep NPO    - has prn antiemetics    - hold sucralfate    - change IVF to NS with KCL    Known large hiatal hernia    - this is likely contributing to his symptoms    HTN    - hold pta oral spironolactone    - start on scheduled IV metoprolol while NPO    CKD    - at his basleline    Hypertrophic CMP    - patient does not follow with Cards, he has been reluctant to see them/undergo any further testing    Has known prolonged QT    Offered to call family. He states he has a brother in [place], but they are not close  Admit to inpatient: continued NV, needing NGT, continued work-up needed     Diet: NPO per Anesthesia Guidelines for Procedure/Surgery Except for: Meds, Ice Chips    DVT Prophylaxis: Pneumatic Compression Devices, start heparin if has prolonged stay  Owens Catheter: not present  Code Status: Full Code           Disposition Plan   Expected discharge: 1-2 days, recommended to prior living arrangement once work-up done.  Entered: Dick Greene MD 01/21/2021, 11:04 AM       The patient's care was discussed with the Patient and GI Consultant.    Dick Greene MD  Hospitalist Service  [facility]  Contact information available via [facility] Paging/Directory    ______________________________________________________________________    Interval History   Patient seen  multiple times. Still had nausea and vomiting this am. Feels much better after NG tube placement    Data reviewed today: I reviewed all medications, new labs and imaging results over the last 24 hours. I personally reviewed the abdominal xray image(s) showing large amount of fluid in stomach.    Physical Exam   Vital Signs: Temp: 98.4  F (36.9  C) Temp src: Oral BP: (!) 162/96 Pulse: 97   Resp: 18 SpO2: 95 % O2 Device: None (Room air)    Weight: 181 lbs 14.4 oz  Constitutional: awake, alert, cooperative, no apparent distress, and appears stated age  Eyes: Lids and lashes normal, pupils equal, round and reactive to light, extra ocular muscles intact, sclera clear, conjunctiva normal  ENT: Normocephalic, without obvious abnormality, atraumatic, sinuses nontender on palpation, external ears without lesions, oral pharynx with moist mucous membranes, tonsils without erythema or exudates, gums normal and good dentition.  Respiratory: No increased work of breathing, good air exchange, clear to auscultation bilaterally, no crackles or wheezing  Cardiovascular: Normal apical impulse, regular rate and rhythm, normal S1 and S2, no S3 or S4, and no murmur noted  GI: soft, NT, absent bowel sounds  Skin: no bruising or bleeding  Musculoskeletal: no lower extremity pitting edema present    Data   Recent Labs   Lab 01/20/21 2019   WBC 10.5   HGB 16.2   MCV 94         POTASSIUM 3.5   CHLORIDE 105   CO2 30   BUN 16   CR 1.32*   ANIONGAP 6   MINE 9.9   *   ALBUMIN 3.9   PROTTOTAL 8.0   BILITOTAL 0.8   ALKPHOS 70   ALT 23   AST 16   LIPASE 138     Recent Results (from the past 24 hour(s))   XR Chest Port 1 View    Narrative    EXAM: XR CHEST PORT 1 VW  LOCATION: [facility]  DATE/TIME: 1/20/2021 10:40 PM    INDICATION: Chest pain  COMPARISON: 04/22/2020      Impression    IMPRESSION: Large hiatal hernia. Mild left basilar atelectasis. Right lung clear. No vascular congestion. Tiny left effusion or  pleural thickening.   XR Abdomen 2 Views    Narrative    ABDOMEN TWO VIEWS 1/21/2021 9:35 AM     HISTORY: Vomiting.    COMPARISON: 4/24/2020      Impression    IMPRESSION: Nonobstructive gas pattern. No evidence of free air,  organomegaly, or abnormal calculus. Stomach appears to be distended  with fluid.    WENDY BECERRIL MD

## 2021-01-21 NOTE — ED NOTES
[facility]  ED Nurse Handoff Report    Delroy Christianson is a 61 year old male   ED Chief complaint: No chief complaint on file.  . ED Diagnosis:   Final diagnoses:   Hiatal hernia   Intractable nausea and vomiting     Allergies: No Known Allergies    Code Status: Full Code  Activity level - Baseline/Home:  Stand by Assist. Activity Level - Current:   Stand by Assist. Lift room needed: No. Bariatric: No   Needed: No   Isolation: No. Infection: Not Applicable.     Vital Signs:   Vitals:    01/20/21 2045 01/20/21 2100 01/20/21 2115 01/20/21 2145   BP: (!) 162/102 (!) 160/105 (!) 157/97 (!) 168/104   Pulse:  87     Resp:       Temp:       TempSrc:       SpO2: 96% 97% 97% (!) 89%       Cardiac Rhythm:  ,      Pain level:    Patient confused: No. Patient Falls Risk: Yes.   Elimination Status: Has voided   Patient Report - Initial Complaint: complaints of nausea, and abdominal pain.  Previous episodes of this.  . Focused Assessment: Intermittent episodes, of pain.  Became anxious and called EMS for transport to ED.   Tests Performed: labs, . Abnormal Results:   Labs Ordered and Resulted from Time of ED Arrival Up to the Time of Departure from the ED   CBC WITH PLATELETS DIFFERENTIAL - Abnormal; Notable for the following components:       Result Value    Absolute Neutrophil 9.2 (*)     Absolute Lymphocytes 0.7 (*)     All other components within normal limits   COMPREHENSIVE METABOLIC PANEL - Abnormal; Notable for the following components:    Glucose 152 (*)     Creatinine 1.32 (*)     GFR Estimate 58 (*)     All other components within normal limits   LIPASE   SARS-COV-2 (COVID-19) VIRUS RT-PCR       .   Treatments provided: medications, fluids, ekg    Family Comments: no family present  OBS brochure/video discussed/provided to patient:  Yes  ED Medications:   Medications   diphenhydrAMINE (BENADRYL) injection 25 mg (has no administration in time range)   0.9% sodium chloride BOLUS (0 mLs Intravenous  Stopped 1/20/21 2225)   prochlorperazine (COMPAZINE) injection 10 mg (10 mg Intravenous Given 1/20/21 2125)   diphenhydrAMINE (BENADRYL) injection 25 mg (25 mg Intravenous Given 1/20/21 2125)   lidocaine (XYLOCAINE) 2 % 15 mL, alum & mag hydroxide-simethicone (MAALOX) 15 mL GI Cocktail ( Oral Given 1/20/21 2158)   sucralfate (CARAFATE) suspension 1 g (1 g Oral Given 1/20/21 2306)   pantoprazole (PROTONIX) IV push injection 80 mg (80 mg Intravenous Given 1/20/21 2305)   LORazepam (ATIVAN) injection 0.5 mg (0.5 mg Intravenous Given 1/20/21 2309)     Drips infusing no    For the majority of the shift, the patient's behavior Green. Interventions performed were     Sepsis treatment initiated: No     Patient tested for COVID 19 prior to admission: YES    ED Nurse Name/Phone Number: Ashlee Hooper RN,   11:45 PM    RECEIVING UNIT ED HANDOFF REVIEW    Above ED Nurse Handoff Report was reviewed: Yes  Reviewed by: Sujata Ruffin RN on January 21, 2021 at 12:22 AM

## 2021-01-21 NOTE — H&P
[facility]  Hospitalist Admission Note  Name: Delroy Christianson    MRN: [id number]  YOB: 1960    Age: 61 year old  Date of admission: 1/20/2021  Primary care provider: Chuy Holliday            Assessment and Plan:   Delroy Christianson is a 61 year old male with history of hypertrophic cardiomyopathy, large hiatal hernia, prior hospitalization for nausea, vomiting complicated with DAMION, elevated creatinine, prolonged QT interval and lives by himself at home and reportedly was on her usual state of health until 1 night prior to this presentation the emergency room breath noticed multiple bouts of nausea and vomiting after taking some of his leftover food and had numerous recurrence the day after with mostly vomiting of previously ingested food with no reported bleeding tendencies.    1.  Nausea, vomiting  2.  Large hiatal hernia  3.  Elevated creatinine appears to be chronic  4.  Uncontrolled hypertension  5.  History of possible hypertrophic cardiomyopathy seen on prior echocardiogram done last year    [place] under observation.  Continue with IV fluid support.  Symptomatic management however has some limitations with pharmacotherapy in the setting of known QTC prolongation.  Receive Benadryl, lorazepam and Compazine earlier.  Added 1 dose of IV Pepcid.  May utilize as needed lorazepam and diphenhydramine for symptom control.  Started on Norvasc given elevated blood pressure with as needed Apresoline.  No previous notable diagnosis of hypertension listed in the chart.    Suspecting his large hiatal hernia might be playing a role here, continued earlier started sucralfate.  May benefit for PPI upon discharge  -Check UA for completion given nausea and vomiting    Code status: full    Prophylaxis: ambulate  Disposition: home in 1 day          Chief Complaint:   Nausea, vomiting of at least 1 day duration       Source of Information:   Patient with poor to fair reliability as patient is  currently sedated as he received multiple antiemetics in the emergency room.  Discussion with ED physician  Review of E chart records         History of Present Illness:   Delroy Christianson is a 61 year old male with history of hypertrophic cardiomyopathy, large hiatal hernia, prior hospitalization for nausea, vomiting complicated with DAMION, elevated creatinine, prolonged QT interval and lives by himself at home and reportedly was on her usual state of health until 1 night prior to this presentation the emergency room breath noticed multiple bouts of nausea and vomiting after taking some of his leftover food and had numerous recurrence the day after with mostly vomiting of previously ingested food with no reported bleeding tendencies.  He mentioned that this was accompanied with some loose bowel movement but no bleeding tendencies seen.  He has some epigastric tenderness most pronounced during vomiting spells but endorses no shortness of breath, chest pain, fevers, chills, urinary symptomatology, coughing spells, changes in mental state or fall events.   Upon presentation the emergency room he was found with elevated creatinine but appears to be stable levels, normal lipase levels, reassuring complete blood count and EKG did reveal previously known prolonged QTc interval.  He eventually received numerous dosing of antiemetics with limited due to prolonged QTC and had lorazepam, diphenhydramine and Compazine but still complaining of nausea and needs referral to us for further evaluation and care with this hospitalization.   During the time my exam I had some limited interaction with him given current sedated state.  However no further nausea vomiting reported by nursing service.            Past Medical History:     Past Medical History:   Diagnosis Date     Allergic rhinitis      Sinusitis              Past Surgical History:   No past surgical history on file.          Social History:     Social History     Tobacco Use      Smoking status: Never Smoker     Smokeless tobacco: Never Used   Substance Use Topics     Alcohol use: No     Alcohol/week: 0.0 standard drinks             Family History:   Family history was fully reviewed and non-contributory in this case.         Allergies:   No Known Allergies          Medications:     Prior to Admission medications    Medication Sig Last Dose Taking? Auth Provider   spironolactone (ALDACTONE) 25 MG tablet Take 1 tablet (25 mg) by mouth daily   Chuy Holliday MD             Review of Systems:   Cannot be obtained accurately given current sedated state           Physical Exam:   Blood pressure (!) 168/104, pulse 87, temperature 99  F (37.2  C), temperature source Oral, resp. rate 18, SpO2 (!) 89 %.  Wt Readings from Last 1 Encounters:   04/25/20 84 kg (185 lb 3.2 oz)     Exam:  GENERAL: No apparent distress.  Sedated but not in distress  HEENT: Normocephalic, atraumatic. Extraocular movements intact.  CARDIOVASCULAR: Regular rate and rhythm without murmurs or rubs. No JVD  PULMONARY: Clear to auscultation, no wheezes, crackles  ABDOMINAL: Soft, non-tender, non-distended. Bowel sounds normoactive. No hepatosplenomegaly.  EXTREMITIES: No cyanosis or clubbing. No edema.  NEUROLOGICAL: CN 2-12 grossly intact, awake and alert x1, spontaneous and coherent speech. no focal neurological deficits.  Sleepy but easily aroused with verbal stimuli  DERMATOLOGICAL: No rash, ulcer, ecchymoses, jaundice.  Psych: not agitation, not combative, pleasant mood         Data:   EKG:  EKG reviewed showed normal sinus rhythm at 94 bpm, prolonged QT with QTC of 485     Imaging:  Results for orders placed or performed during the hospital encounter of 01/20/21   XR Chest Port 1 View    Narrative    EXAM: XR CHEST PORT 1 VW  LOCATION: [facility]  DATE/TIME: 1/20/2021 10:40 PM    INDICATION: Chest pain  COMPARISON: 04/22/2020      Impression    IMPRESSION: Large hiatal hernia. Mild left basilar  atelectasis. Right lung clear. No vascular congestion. Tiny left effusion or pleural thickening.       Labs:  No results for input(s): CULT in the last 168 hours.  Recent Labs   Lab 01/20/21 2019   WBC 10.5   HGB 16.2   HCT 47.8   MCV 94        Recent Labs   Lab 01/20/21 2019      POTASSIUM 3.5   CHLORIDE 105   CO2 30   ANIONGAP 6   *   BUN 16   CR 1.32*   GFRESTIMATED 58*   GFRESTBLACK 67   MINE 9.9   PROTTOTAL 8.0   ALBUMIN 3.9   BILITOTAL 0.8   ALKPHOS 70   AST 16   ALT 23     No results for input(s): SED, CRP in the last 168 hours.  Recent Labs   Lab 01/20/21 2019   *     No results for input(s): INR in the last 168 hours.  Recent Labs   Lab 01/20/21 2019   LIPASE 138     No results for input(s): TROPONIN, TROPI, TROPR in the last 168 hours.    Invalid input(s): TROP, TROPONINIES  No results for input(s): COLOR, APPEARANCE, URINEGLC, URINEBILI, URINEKETONE, SG, UBLD, URINEPH, PROTEIN, UROBILINOGEN, NITRITE, LEUKEST, RBCU, WBCU in the last 168 hours.

## 2021-01-21 NOTE — PROGRESS NOTES
ROOM # [id number]    Living Situation (if not independent, order SW consult): Home alone  Facility name:  : [facility]    Activity level at baseline: Ind  Activity level on admit: Sba      Patient registered to observation; given Patient Bill of Rights; given the opportunity to ask questions about observation status and their plan of care.  Patient has been oriented to the observation room, bathroom and call light is in place.    Discussed discharge goals and expectations with patient/family.

## 2021-01-21 NOTE — ED NOTES
Bed: ED15  Expected date: 1/20/21  Expected time: 8:03 PM  Means of arrival: Ambulance  Comments:  [facility]- 62 yo ABD pain

## 2021-01-21 NOTE — ED PROVIDER NOTES
History   Chief Complaint:  Abdominal pain  Nausea, vomiting     HPI   61-year-old male with a past medical history significant for hypertrophic cardiomyopathy, prolonged QT interval, hiatal hernia presents the emergency department with chief complaint of left lower quadrant epigastric abdominal pain, burning in nature, moderate to severe with associated nausea and vomiting, nonbloody onset last night while eating dinner.  Patient denies fevers, chills, cough, changes in urination, changes in bowel movements, rashes.  He reports a very similar episode that happened in April 2020 for which she was admitted and evaluated at [facility].  He denies previous abdominal surgeries.     Patient denies tobacco, alcohol, illicit drugs.    Review of Systems  Full ROS completed and negative other than pertinent positives and negatives noted in HPI      Allergies:  The patient has no known allergies.     Medications:  Aldactone    Past Medical History:    Allergic rhinitis   Benign essentia hypertension  Hypertrophic cardiomyopathy  Prolonged QT interval  Hiatal hernia  CKD Stage 3    Family History:     Diabetes  CAD  Hypertension     Social History:  The patient presents to the ED alone.  As above in HPI    Physical Exam     Patient Vitals for the past 24 hrs:   BP Temp Temp src Pulse Resp SpO2   01/20/21 2145 (!) 168/104 -- -- -- -- (!) 89 %   01/20/21 2115 (!) 157/97 -- -- -- -- 97 %   01/20/21 2100 (!) 160/105 -- -- 87 -- 97 %   01/20/21 2045 (!) 162/102 -- -- -- -- 96 %   01/20/21 2026 (!) 149/104 -- -- -- -- 97 %   01/20/21 2020 (!) 149/104 -- -- 104 -- 97 %   01/20/21 2019 -- -- -- -- -- 97 %   01/20/21 2017 (!) 152/123 99  F (37.2  C) Oral 110 18 94 %       Physical Exam  Constitutional: Well developed, nontox appearance  Head: Atraumatic.   Mouth/Throat: Oropharynx is clear and tacky  Neck:  no stridor  Eyes: no scleral icterus  Cardiovascular: RRR, 2+ bilat radial pulses  Pulmonary/Chest: nml resp  effort  Abdominal: ND, soft, NT, no rebound or guarding   Ext: Warm, well perfused, no edema  Neurological: A&O, symmetric facies, moves ext x4  Skin: Skin is warm and dry.   Psychiatric: Behavior is normal. Thought content normal.   Nursing note and vitals reviewed.    Emergency Department Course     ECG:  Indication: Abdominal Pain  Completed at 2055.  Read at 2059.   Normal sinus rhythm. Possible left atrial enlargement. Nonspecific T-wave abnormality. Prolonged Qt. Abnormal ECG.    Rate 94 bpm. KS interval 138. QRS duration 96. QT/QTc 388/485. P-R-T axes 38 35 112.    Imaging:  XR Chest Port 1 View:  IMPRESSION: Large hiatal hernia. Mild left basilar atelectasis. Right lung clear. No vascular congestion. Tiny left effusion or pleural thickening.  Report per radiology     Laboratory:  CBC: WBC 10.5, HGB 16.2,     CMP: Glucose 152 (H), Creatinine 1.32 (H), GFR 58 (L)    Lipase: 138     Asymptomatic COVID-19 Virus by PCR Nasopharyngeal swab: pending     Emergency Department Course:    Reviewed:  I reviewed nursing notes, vitals, past medical history and care everywhere    Assessments:  2050 I performed a physical exam of the patient. Findings as above.     2102 Patient rechecked and updated.     2224 Patient rechecked and updated. Plan of care discussed and questions answered.     Consults  2327 I discussed the patient with Dr. Salas, hospitalist, who agreed to admission     Interventions:  2124 NaCl 0.9% Bolus 1000 mL IV  2125 Compazine 10 mg IV  2125 Benadryl 25 mg IV  2158 GI Cocktail 15 mL Oral    Medications   sucralfate (CARAFATE) suspension 1 g (has no administration in time range)   pantoprazole (PROTONIX) IV push injection 80 mg (has no administration in time range)   diphenhydrAMINE (BENADRYL) injection 25 mg (has no administration in time range)   LORazepam (ATIVAN) injection 0.5 mg (has no administration in time range)   0.9% sodium chloride BOLUS (0 mLs Intravenous Stopped 1/20/21 2225)    prochlorperazine (COMPAZINE) injection 10 mg (10 mg Intravenous Given 21)   diphenhydrAMINE (BENADRYL) injection 25 mg (25 mg Intravenous Given 21)   lidocaine (XYLOCAINE) 2 % 15 mL, alum & mag hydroxide-simethicone (MAALOX) 15 mL GI Cocktail ( Oral Given 21)     Disposition:  The patient was admitted to the hospital under the care of Dr. Salas.       Impression & Plan     Medical Decision Makin year old male presenting w/ nausea, vomiting, abdominal pain     DDx includes gastroparesis, hiatal hernia, cyclic vomiting syndrome, electrolyte abnormality, pancreatitis, hepatitis, gastritis, GERD, esophageal spasm.  EKG interpretation as noted above and without acute ischemic findings.  The patient's previous hospitalization, he had a mild elevated troponin level and reassuring echo making ACS unlikely.  Given his reassuring EKG and description of symptoms, do not feel further work-up for cardiac etiology including troponin level is indicated at this time.  Doubt hollow viscus perforation, mesenteric ischemia, AAA given symptomatology, physical exam and previous episode.  Labs largely unremarkable.  Imaging sig for redemonstration of known hiatal hernia.  Interventions as noted above with minimal improvement in symptoms.  Symptoms are consistent with GERD and gastritis secondary to hiatal hernia.  Doubt esophageal food impaction.   Pt admitted to hospitalist service for further symptom control.  Patient counseled on all results, disposition and diagnosis.  They are understanding and agreeable to plan. Patient admitted in stable condition.      Covid-19  Delroy Christianson was evaluated during a global COVID-19 pandemic, which necessitated consideration that the patient might be at risk for infection with the SARS-CoV-2 virus that causes COVID-19.   Applicable protocols for evaluation were followed during the patient's care.   COVID-19 was considered as part of the patient's  evaluation. The plan for testing is:  a test was obtained during this visit.    Diagnosis:    ICD-10-CM    1. Hiatal hernia  K44.9 Asymptomatic SARS-CoV-2 COVID-19 Virus (Coronavirus) by PCR     UA with Microscopic reflex to Culture   2. Intractable nausea and vomiting  R11.2        Scribe Disclosure:  I, Roberto Britta, am serving as a scribe at 8:39 PM on 1/20/2021 to document services personally performed by Balta Watts MD based on my observations and the provider's statements to me.     [facility]         Balta Watts MD  01/21/21 1408

## 2021-01-21 NOTE — PROVIDER NOTIFICATION
1:31 AM  Provider notified: X cover  Reason: pt having severe nausea and is not time yet for prn anti-emetics. Can we get order for additional anti-emetic that does not prolong Qt?  Order: Dr. Belle ordered one time dose of ativan now and modified ativan order to 0.5mg IV q4h prn.

## 2021-01-21 NOTE — PHARMACY-ADMISSION MEDICATION HISTORY
Admission medication history interview status for this patient is complete. See [facility] admission navigator for allergy information, prior to admission medications and immunization status.     Medication history interview done via telephone during Covid-19 pandemic, indicate source(s): Patient  Medication history resources (including written lists, pill bottles, clinic record):None  Pharmacy: [facility]    Changes made to PTA medication list:  Added: antacid, aspirin  Deleted: none  Changed: none    Actions taken by pharmacist (provider contacted, etc):None     Additional medication history information:None    Medication reconciliation/reorder completed by provider prior to medication history?  Y   (Y/N)     Prior to Admission medications    Medication Sig Last Dose Taking? Auth Provider   aspirin (ASA) 325 MG EC tablet Take 325 mg by mouth as needed for moderate pain  at prn Yes Unknown, Entered By History   calcium carbonate (TUMS) 500 MG chewable tablet Take 1 chew tab by mouth as needed for heartburn  at prn Yes Unknown, Entered By History   spironolactone (ALDACTONE) 25 MG tablet Take 1 tablet (25 mg) by mouth daily 1/20/2021 at Unknown time Yes Chuy Holliday MD

## 2021-01-22 ENCOUNTER — APPOINTMENT (OUTPATIENT)
Dept: GENERAL RADIOLOGY | Facility: CLINIC | Age: 61
End: 2021-01-22
Attending: RADIOLOGY
Payer: COMMERCIAL

## 2021-01-22 ENCOUNTER — TRANSFERRED RECORDS (OUTPATIENT)
Dept: HEALTH INFORMATION MANAGEMENT | Facility: CLINIC | Age: 61
End: 2021-01-22

## 2021-01-22 LAB
ALBUMIN SERPL-MCNC: 3.1 G/DL (ref 3.4–5)
ALP SERPL-CCNC: 50 U/L (ref 40–150)
ALT SERPL W P-5'-P-CCNC: 18 U/L (ref 0–70)
ANION GAP SERPL CALCULATED.3IONS-SCNC: 3 MMOL/L (ref 3–14)
AST SERPL W P-5'-P-CCNC: 25 U/L (ref 0–45)
BILIRUB DIRECT SERPL-MCNC: 0.2 MG/DL (ref 0–0.2)
BILIRUB SERPL-MCNC: 0.9 MG/DL (ref 0.2–1.3)
BUN SERPL-MCNC: 20 MG/DL (ref 7–30)
CALCIUM SERPL-MCNC: 8.9 MG/DL (ref 8.5–10.1)
CHLORIDE SERPL-SCNC: 110 MMOL/L (ref 94–109)
CO2 SERPL-SCNC: 28 MMOL/L (ref 20–32)
CREAT SERPL-MCNC: 1.16 MG/DL (ref 0.66–1.25)
ERYTHROCYTE [DISTWIDTH] IN BLOOD BY AUTOMATED COUNT: 13.6 % (ref 10–15)
GFR SERPL CREATININE-BSD FRML MDRD: 68 ML/MIN/{1.73_M2}
GLUCOSE BLDC GLUCOMTR-MCNC: 74 MG/DL (ref 70–99)
GLUCOSE BLDC GLUCOMTR-MCNC: 85 MG/DL (ref 70–99)
GLUCOSE BLDC GLUCOMTR-MCNC: 87 MG/DL (ref 70–99)
GLUCOSE SERPL-MCNC: 85 MG/DL (ref 70–99)
HCT VFR BLD AUTO: 44.6 % (ref 40–53)
HGB BLD-MCNC: 14.5 G/DL (ref 13.3–17.7)
INR PPP: 1.29 (ref 0.86–1.14)
MAGNESIUM SERPL-MCNC: 2.2 MG/DL (ref 1.6–2.3)
MCH RBC QN AUTO: 31.4 PG (ref 26.5–33)
MCHC RBC AUTO-ENTMCNC: 32.5 G/DL (ref 31.5–36.5)
MCV RBC AUTO: 97 FL (ref 78–100)
PHOSPHATE SERPL-MCNC: 2.4 MG/DL (ref 2.5–4.5)
PLATELET # BLD AUTO: 211 10E9/L (ref 150–450)
POTASSIUM SERPL-SCNC: 3.6 MMOL/L (ref 3.4–5.3)
PROT SERPL-MCNC: 6.7 G/DL (ref 6.8–8.8)
RBC # BLD AUTO: 4.62 10E12/L (ref 4.4–5.9)
SODIUM SERPL-SCNC: 141 MMOL/L (ref 133–144)
TRIGL SERPL-MCNC: 130 MG/DL
WBC # BLD AUTO: 13.5 10E9/L (ref 4–11)

## 2021-01-22 PROCEDURE — 74019 RADEX ABDOMEN 2 VIEWS: CPT

## 2021-01-22 PROCEDURE — 120N000004 HC R&B MS OVERFLOW

## 2021-01-22 PROCEDURE — 272N000097 HC TRAY VALVED DOUBLE LUMEN

## 2021-01-22 PROCEDURE — 85610 PROTHROMBIN TIME: CPT | Performed by: INTERNAL MEDICINE

## 2021-01-22 PROCEDURE — 80076 HEPATIC FUNCTION PANEL: CPT | Performed by: INTERNAL MEDICINE

## 2021-01-22 PROCEDURE — 250N000009 HC RX 250: Performed by: INTERNAL MEDICINE

## 2021-01-22 PROCEDURE — 36569 INSJ PICC 5 YR+ W/O IMAGING: CPT

## 2021-01-22 PROCEDURE — 250N000011 HC RX IP 250 OP 636: Performed by: HOSPITALIST

## 2021-01-22 PROCEDURE — 36415 COLL VENOUS BLD VENIPUNCTURE: CPT | Performed by: INTERNAL MEDICINE

## 2021-01-22 PROCEDURE — 85027 COMPLETE CBC AUTOMATED: CPT | Performed by: INTERNAL MEDICINE

## 2021-01-22 PROCEDURE — 84100 ASSAY OF PHOSPHORUS: CPT | Performed by: INTERNAL MEDICINE

## 2021-01-22 PROCEDURE — 99233 SBSQ HOSP IP/OBS HIGH 50: CPT | Performed by: HOSPITALIST

## 2021-01-22 PROCEDURE — 80048 BASIC METABOLIC PNL TOTAL CA: CPT | Performed by: INTERNAL MEDICINE

## 2021-01-22 PROCEDURE — 999N001017 HC STATISTIC GLUCOSE BY METER IP

## 2021-01-22 PROCEDURE — 84478 ASSAY OF TRIGLYCERIDES: CPT | Performed by: INTERNAL MEDICINE

## 2021-01-22 PROCEDURE — 258N000003 HC RX IP 258 OP 636: Performed by: HOSPITALIST

## 2021-01-22 PROCEDURE — 250N000009 HC RX 250: Performed by: HOSPITALIST

## 2021-01-22 PROCEDURE — 250N000013 HC RX MED GY IP 250 OP 250 PS 637: Performed by: HOSPITALIST

## 2021-01-22 PROCEDURE — 83735 ASSAY OF MAGNESIUM: CPT | Performed by: INTERNAL MEDICINE

## 2021-01-22 RX ORDER — DEXTROSE MONOHYDRATE 100 MG/ML
INJECTION, SOLUTION INTRAVENOUS CONTINUOUS PRN
Status: DISCONTINUED | OUTPATIENT
Start: 2021-01-22 | End: 2021-01-23 | Stop reason: HOSPADM

## 2021-01-22 RX ORDER — DEXTROSE MONOHYDRATE 25 G/50ML
25-50 INJECTION, SOLUTION INTRAVENOUS
Status: DISCONTINUED | OUTPATIENT
Start: 2021-01-22 | End: 2021-01-23 | Stop reason: HOSPADM

## 2021-01-22 RX ORDER — SODIUM CHLORIDE, SODIUM LACTATE, POTASSIUM CHLORIDE, CALCIUM CHLORIDE 600; 310; 30; 20 MG/100ML; MG/100ML; MG/100ML; MG/100ML
INJECTION, SOLUTION INTRAVENOUS CONTINUOUS
Status: DISCONTINUED | OUTPATIENT
Start: 2021-01-22 | End: 2021-01-23 | Stop reason: HOSPADM

## 2021-01-22 RX ORDER — IOPAMIDOL 755 MG/ML
500 INJECTION, SOLUTION INTRAVASCULAR ONCE
Status: DISCONTINUED | OUTPATIENT
Start: 2021-01-22 | End: 2021-01-22 | Stop reason: CLARIF

## 2021-01-22 RX ORDER — SUCRALFATE ORAL 1 G/10ML
1 SUSPENSION ORAL 2 TIMES DAILY
Status: DISCONTINUED | OUTPATIENT
Start: 2021-01-22 | End: 2021-01-23 | Stop reason: HOSPADM

## 2021-01-22 RX ORDER — NICOTINE POLACRILEX 4 MG
15-30 LOZENGE BUCCAL
Status: DISCONTINUED | OUTPATIENT
Start: 2021-01-22 | End: 2021-01-23 | Stop reason: HOSPADM

## 2021-01-22 RX ADMIN — METOPROLOL TARTRATE 5 MG: 5 INJECTION INTRAVENOUS at 11:50

## 2021-01-22 RX ADMIN — SUCRALFATE 1 G: 1 SUSPENSION ORAL at 13:23

## 2021-01-22 RX ADMIN — I.V. FAT EMULSION 250 ML: 20 EMULSION INTRAVENOUS at 19:42

## 2021-01-22 RX ADMIN — PHENOL 1 ML: 1.5 LIQUID ORAL at 20:03

## 2021-01-22 RX ADMIN — POTASSIUM CHLORIDE AND SODIUM CHLORIDE: 900; 150 INJECTION, SOLUTION INTRAVENOUS at 03:26

## 2021-01-22 RX ADMIN — ASCORBIC ACID, VITAMIN A PALMITATE, CHOLECALCIFEROL, THIAMINE HYDROCHLORIDE, RIBOFLAVIN-5 PHOSPHATE SODIUM, PYRIDOXINE HYDROCHLORIDE, NIACINAMIDE, DEXPANTHENOL, ALPHA-TOCOPHEROL ACETATE, VITAMIN K1, FOLIC ACID, BIOTIN, CYANOCOBALAMIN: 200; 3300; 200; 6; 3.6; 6; 40; 15; 10; 150; 600; 60; 5 INJECTION, SOLUTION INTRAVENOUS at 15:57

## 2021-01-22 RX ADMIN — SODIUM CHLORIDE, POTASSIUM CHLORIDE, SODIUM LACTATE AND CALCIUM CHLORIDE: 600; 310; 30; 20 INJECTION, SOLUTION INTRAVENOUS at 09:19

## 2021-01-22 RX ADMIN — METOPROLOL TARTRATE 5 MG: 5 INJECTION INTRAVENOUS at 05:27

## 2021-01-22 RX ADMIN — PHENOL 1 ML: 1.5 LIQUID ORAL at 18:19

## 2021-01-22 RX ADMIN — PHENOL 1 ML: 1.5 LIQUID ORAL at 13:23

## 2021-01-22 RX ADMIN — METOPROLOL TARTRATE 5 MG: 5 INJECTION INTRAVENOUS at 18:20

## 2021-01-22 RX ADMIN — METOPROLOL TARTRATE 5 MG: 5 INJECTION INTRAVENOUS at 23:26

## 2021-01-22 NOTE — PROGRESS NOTES
[facility]    Medicine Progress Note - Hospitalist Service       Date of Admission:  1/20/2021  Assessment & Plan       60 yo with history HTN, known hiatal hernia, CKD, hypertrophic CMP, prolonged QT admitted on 1/20/2021 with nausea and vomiting.    Nausea and vomiting    - abdominal x rays obtained on 1/21: large amount of fluid in stomach    - NG tube placed with >1950cc out immediately (within an hour)    - next an upper GI series was done on 1/21 which showed: gastric fundus and proximal body are located inferiorly in the upper abdomen and the gastric antrum and pylorus are located superiorly in an intrathoracic position. The appearance is suggestive of a mesentero-axial gastric volvulus (possibly with intermittent/subacute volvulus).    - I personally spoke with our surgical team who recommended transfer to the [facility]    - I did order a CT chest/abdo/pelvis today (1/22) and patient was in radiology to have this done but  (Radiology) performed a KUB first and stated the CT scan would be non-diagnostic due to the contrast still present (of note, he did have contrast in the colon, which excludes a complete obstruction)    - I spoke with Dr. Meneses (Hospitalist [facility]) and Dr. Jernigan (Thoracic Surgery [facility])    - patient has been accepted at the [facility] for transfer but will need to wait for a bed (will be admitted to the Thoracic Surgery Service)    - cont IVF with LR    - will have central line placed to start TPN (as per Dr. Jernigan's request), nutrition consulted    Known large hiatal hernia    - this is likely contributing to his symptoms    - plan as above    HTN    - hold pta oral spironolactone    - start on scheduled IV metoprolol while NPO    CKD    - at his basleline    Hypertrophic CMP    - seen on ECHO done in April 2021    - patient denies any symptoms of chest pain or sob, including when climbing 2 flights of stairs    - patient does not follow with Cards, he has been  reluctant to see them/undergo any further testing    - patient currently on scheduled metoprolol, so is well beta-blocked     - would continue metoprolol (would change to oral dosing once tolerating PO)    - no further testing needed at this time    - likely moderate risk for an urgent/necessary surgery    Has known prolonged QT    Offered to call family. He states he has a brother in [place], but they are not close       Diet: NPO per Anesthesia Guidelines for Procedure/Surgery Except for: Meds, Ice Chips    DVT Prophylaxis: Pneumatic Compression Devices, start heparin if has prolonged stay  Owens Catheter: not present  Code Status: Full Code           Disposition Plan   Expected discharge: will transfer to [facility] for higher level of care, need for services not offered here, recommended to prior living arrangement once surgery done.  Entered: Dick Greene MD 01/22/2021, 12:27 PM       The patient's care was discussed with the patient and Surgical Team, as well as the [facility] Hospitalist and Thoracic Surgeon.    Dick Greene MD  Hospitalist [facility]  Contact information available via [facility] Paging/Directory    ______________________________________________________________________    Interval History   Patient seen multiple times. Still had nausea and vomiting this am. Feels much better after NG tube placement    Data reviewed today: I reviewed all medications, new labs and imaging results over the last 24 hours. I personally reviewed the abdominal xray image(s) showing large amount of fluid in stomach.    Physical Exam   Vital Signs: Temp: 97  F (36.1  C) Temp src: Oral BP: 133/89 Pulse: 75   Resp: 18 SpO2: 93 % O2 Device: None (Room air)    Weight: 181 lbs 14.4 oz  Constitutional: awake, alert, cooperative, no apparent distress, and appears stated age  Eyes: Lids and lashes normal, pupils equal, round and reactive to light, extra ocular muscles intact, sclera clear,  conjunctiva normal  ENT: Normocephalic, without obvious abnormality, atraumatic, sinuses nontender on palpation, external ears without lesions, oral pharynx with moist mucous membranes, tonsils without erythema or exudates, gums normal and good dentition.  Respiratory: No increased work of breathing, good air exchange, clear to auscultation bilaterally, no crackles or wheezing  Cardiovascular: Normal apical impulse, regular rate and rhythm, normal S1 and S2, no S3 or S4, and no murmur noted  GI: soft, NT, +BS, NG tube in place  Skin: no bruising or bleeding  Musculoskeletal: no lower extremity pitting edema present    Data   Recent Labs   Lab 01/22/21  0704 01/20/21 2019   WBC 13.5* 10.5   HGB 14.5 16.2   MCV 97 94    240    141   POTASSIUM 3.6 3.5   CHLORIDE 110* 105   CO2 28 30   BUN 20 16   CR 1.16 1.32*   ANIONGAP 3 6   MINE 8.9 9.9   GLC 85 152*   ALBUMIN 3.1* 3.9   PROTTOTAL 6.7* 8.0   BILITOTAL 0.9 0.8   ALKPHOS 50 70   ALT 18 23   AST 25 16   LIPASE  --  138     Recent Results (from the past 24 hour(s))   XR Small Bowel    Narrative    XR SMALL BOWEL 1/21/2021 4:26 PM    HISTORY: nausea, vomiting hiatal hernia    COMPARISON: CT chest, abdomen and pelvis 4/4/2020    FINDINGS: An upper GI evaluation was performed. Approximately 150 cc  dilute barium contrast was injected through the existing NG tube. The  NG tube tube is in the gastroesophageal junction/gastric fundus. Bowel  gas pattern is nonobstructive.    Injected contrast opacifies the stomach. The gastric fundus and body  are in the abdomen but the distal stomach/antrum and pylorus is in an  intrathoracic location compatible with the known hiatal hernia. The  orientation of the stomach appears similar to the CT on 4/24/2020.  There is at least partial obstruction at the gastric antrum and the  enteric contrast passes to the duodenum only after 60 minutes. The  opacified portions of the proximal duodenum are normal in caliber.      Impression     IMPRESSION:   1. Large hiatal hernia. The gastric fundus and proximal body are  located inferiorly in the upper abdomen and the gastric antrum and  pylorus are located superiorly in an intrathoracic position. The  appearance is suggestive of a mesentero-axial gastric volvulus  (possibly with intermittent/subacute volvulus) although the  configuration is similar on the CT on 4/24/2020. Partial obstruction  at the pylorus with contrast reaching the duodenum after 60 minutes.  2. Additional small bowel imaging was not performed as the bowel gas  pattern is nonobstructive.    Findings were discussed with the on-call hospitalist at 4:40 PM.    SOLO HULL MD   XR KUB    Narrative    ABDOMEN ONE VIEW  1/22/2021 8:32 AM     HISTORY: Nausea, vomiting.    COMPARISON: 1/21/2021      Impression    IMPRESSION: Contrast is present in nondilated colon. No dilated small  bowel loops are present. A nasogastric tube is present. A large hiatal  hernia is noted but much of this is above the imaged portions of the  abdomen. No free air.    WENDY BECERRIL MD

## 2021-01-22 NOTE — PLAN OF CARE
"[id number]    Inpatient Progress Note:    /87 (BP Location: Right arm)   Pulse 88   Temp 96.1  F (35.6  C) (Oral)   Resp 16   Ht 1.803 m (5' 11\")   Wt 82.5 kg (181 lb 14.4 oz)   SpO2 93%   BMI 25.37 kg/m         Orientation: WNL  Pain status: Denies, bedside a sore throat  Activity: SBAx1  Resp: LS dim  Cardiac: WNL, receiving Metoprolol Q6H IV. Amlodipine and Aldactone po held by provider.   GI: NG to low int. Suction, measuring at 44cm. BS active, passing flatus, denies nausea or pain. Very minimal output overnight- about 25cc of light brown fluid.   :  WNL  LDA: PIV infusing  Pertinent Labs: Covid neg, see xray results. CT to be done this AM.  Diet: NPO, ice chips  Consults: Gen surg to see  Discharge Plan: TBD    Will continue to monitor and provide cares.     Linda Doshi RN        "

## 2021-01-22 NOTE — PROGRESS NOTES
CLINICAL NUTRITION SERVICES  -  ASSESSMENT NOTE    Recommendations Ordered by Registered Dietitian (RD):   Diet per MD/Surgery   Recommend TPN as follows: via PICC  Initiation: Clinimix D5 AA4.25 at 90 mL/hr (2160 mL total) + 250 mL 20% lipids daily   Goal rate: Clinimix D15 AA5 at 90 mL/hr (2160 mL total) + 250 mL 20% lipids daily   This provides 2034 kcal/day, 108 g amino acids, 324 g dextrose and 25% kcal from fat.   Dosing weight: 83 kg  Total fluids (TPN + IVF) = 100 mL per hour or per MD order  Daily electrolyte check until goal rate reached - electrolyte replacement protocol    Malnutrition:   % Weight Loss:  Weight loss does not meet criteria for malnutrition   % Intake:  Decreased intake does not meet criteria for malnutrition   Subcutaneous Fat Loss:  Orbital region mild depletion and Upper arm region mild depletion  Muscle Loss:  Temporal region mild depletion, clavicle region mild depletion and clavicle region mild  Fluid Retention:  None noted    Malnutrition Diagnosis: Non-Severe malnutrition  In Context of:  Acute illness or injury     REASON FOR ASSESSMENT  Delroy Christianson is a 61 year old male seen by Registered Dietitian for Pharmacy/Nutrition to Start and Manage PN    NUTRITION HISTORY  - Information obtained from patient and chart   - Patient admitted for nausea/vomiting and a large hiatal hernia   - History of hypertrophic cardiomyopathy, large hiatal hernia, prior hospitalization for nausea, vomiting complicated with DAMION, elevated creatinine, prolonged QT interval and lives by himself at home   - Typical diet at home: regular diet   - Typical food/fluid intake PTA: pt states that he was eating normally up until Tuesday night when he felt a pain in his abdomen and experienced increased nausea/vomiting. Typically consumes 3 meals per day  - Supplements: none   - Chewing/swallowing difficulty: none   - Food allergies: NKFA    CURRENT NUTRITION ORDERS  Diet Order:     NPO     Current  "Intake/Tolerance:  NPO     NUTRITION FOCUSED PHYSICAL ASSESSMENT FOR DIAGNOSING MALNUTRITION)  Yes            Observed:    Muscle wasting (refer to documentation in Malnutrition section) and Subcutaneous fat loss (refer to documentation in Malnutrition section)    Obtained from Chart/Interdisciplinary Team:  - pt vomiting throughout the day on 1/21  - NG tube placed   - SBFT on 1/21  - Per surgery note on 1/22: \"known large HH which is now symptomatic, pylorus appears to be above diaphragm on small bowel series. Clearly needs this repaired at this point but his surgical needs are not met by the skill set in our facility. Discussed transfer with Dr Greene\"   - PICC line placed on 1/22    ANTHROPOMETRICS  Height: 5' 11\"  Weight: 82.5 kg ( 181 lbs 14.4 oz)   Body mass index is 25.37 kg/m .  Weight Status:  Overweight BMI 25-29.9  Weight History:  Weight is down slightly 1.5 kg over the past 9 months. Limited weight history to comment on. This equates to a weight loss of 2%. Pt states that his normal body weight is around 205 lbs, unsure of when he lost this weight. Does not have a scale.   Wt Readings from Last 10 Encounters:   01/21/21 82.5 kg (181 lb 14.4 oz)   04/25/20 84 kg (185 lb 3.2 oz)   12/06/16 90.3 kg (199 lb)   08/10/15 90.7 kg (200 lb)     LABS  Labs reviewed    Electrolytes  Potassium (mmol/L)   Date Value   01/22/2021 3.6   01/20/2021 3.5   05/08/2020 4.1     Phosphorus (mg/dL)   Date Value   04/23/2020 2.7    Blood Glucose  Glucose (mg/dL)   Date Value   01/22/2021 85   01/20/2021 152 (H)   05/08/2020 92   04/25/2020 79   04/24/2020 89    Inflammatory Markers  WBC (10e9/L)   Date Value   01/22/2021 13.5 (H)   01/20/2021 10.5   04/24/2020 7.4     Albumin (g/dL)   Date Value   01/20/2021 3.9   04/25/2020 2.6 (L)   04/23/2020 2.8 (L)      Magnesium (mg/dL)   Date Value   04/24/2020 2.6 (H)   04/22/2020 3.0 (H)     Sodium (mmol/L)   Date Value   01/22/2021 141   01/20/2021 141   05/08/2020 137    Renal  Urea " Nitrogen (mg/dL)   Date Value   01/22/2021 20   01/20/2021 16   05/08/2020 22     Creatinine (mg/dL)   Date Value   01/22/2021 1.16   01/20/2021 1.32 (H)   05/08/2020 1.45 (H)     Additional  Ketones Urine (mg/dL)   Date Value   01/21/2021 40 (A)        MEDICATIONS  Medications reviewed    [Held by provider] amLODIPine  5 mg Oral Daily     metoprolol  5 mg Intravenous Q6H     [Held by provider] spironolactone  25 mg Oral Daily        lactated ringers 100 mL/hr at 01/22/21 0919      acetaminophen, acetaminophen, alum & mag hydroxide-simethicone, calcium carbonate, diphenhydrAMINE, hydrALAZINE, LORazepam, melatonin     ASSESSED NUTRITION NEEDS PER APPROVED PRACTICE GUIDELINES:    Dosing Weight 82.5 kg   Estimated Energy Needs: 4376-5607 kcals (25-30 Kcal/Kg)  Justification: maintenance  Estimated Protein Needs: 83-99+ grams protein (1-1.2+ g pro/Kg)  Justification: maintenance  Estimated Fluid Needs: per MD     MALNUTRITION:  % Weight Loss:  Weight loss does not meet criteria for malnutrition   % Intake:  Decreased intake does not meet criteria for malnutrition   Subcutaneous Fat Loss:  Orbital region mild depletion and Upper arm region mild depletion  Muscle Loss:  Temporal region mild depletion, clavicle region mild depletion and clavicle region mild  Fluid Retention:  None noted    Malnutrition Diagnosis: Non-Severe malnutrition  In Context of:  Acute illness or injury    NUTRITION DIAGNOSIS:  Predicted inadequate nutrient intake (protein/energy) related to restricted diet order (NPO) in the setting of a hiatal hernia evidenced by pt NPO x 2 days with the potential to decline, awaiting TPN orders    NUTRITION INTERVENTIONS  Recommendations / Nutrition Prescription  Diet per MD/Surgery   Recommend TPN as follows:   Via PICC  Initiation: Clinimix D5 AA4.25 at 90 mL/hr (2160 mL total) + 250 mL 20% lipids daily   Goal rate: Clinimix D15 AA5 at 90 mL/hr (2160 mL total) + 250 mL 20% lipids daily   This provides 2034  kcal/day, 108 g amino acids, 324 g dextrose and 25% kcal from fat.   Dosing weight: 83 kg  Total fluids (TPN + IVF) = 100 mL per hour or per MD order  Daily electrolyte check until goal rate reached - electrolyte replacement protocol     Implementation  Nutrition education: Provided education on the role of the dietitian   PN Composition, PN Schedule: as above     Nutrition Goals  TPN to meet % of nutritional needs at goal   TPN to goal rate in 72 hours     MONITORING AND EVALUATION:  Progress towards goals will be monitored and evaluated per protocol and Practice Guidelines    Natalie Eugene, RD, LD  Clinical Dietitian

## 2021-01-22 NOTE — PROGRESS NOTES
"[facility]  Transfer Triage Note    Date of call: 01/22/21  Time of call: 8:42 AM    Is pandemic COVID-19 a concern? NO    Reason for transfer: Procedure can be done here and not at referring hospital   Diagnosis: mesentero-axial gastric volvulus chronic    Outside Records: Available  Additional records requested to be faxed to [phone].    Stability of Patient: Patient is at risk for instability, however patient requires urgent transfer and does not meet ICU criteria   ICU: No    Expected Time of Arrival for Transfer: 24-48 hours    Arrival Location:  [address] [address] Phone: [phone]    Recommendations for Management and Stabilization: - Start TPN and Keep NG in place. And repeat CT when able. If pain occurs - this could suggest ischemia. As long as not painful    Additional Comments Per Dr. David Greene at [facility] Mr. Delroy Christianson is a 61 y.o. male with HTN, CKD, hypertrophic CMP, long QT who was admitted with nausea and vomiting with known large hiatal hernia. Abdominal series had a lot of fluid in his stomach. NG placed and >2L in 2 hours. Small bowel series \"The gastric fundus and proximal body are located inferiorly in the upper abdomen and the gastric antrum and pylorus are located superiorly in an intrathoracic position. The appearance is suggestive of a mesentero-axial gastric volvulus (possibly with intermittent/subacute volvulus) although the configuration is similar on the CT on 4/24/2020\"     With NG in place patient is clinically stable, no isolation, negative covid testing, not bariatric. Would be sent the [facility] for a surgical procedure to correct the volvulus/hernia. Discussed with Dr. Reynolds from colorectal who stated that as this involves the stomach it should go to general surgery.Dr. Rizo with general surgery deferred to thoracic surgery. Discussed with Dr. Delon Brown in Thoracic and will " admit to Thoracic.       Due to the small bowel series and contrast, radiology at [facility] felt like a CT at this point in time would not be helpful. Therefore, they got a KUB which did show contrast into the colon, indicating likely only a partial obstruction and not complete obstruction at this time.     Dori Meneses MD

## 2021-01-22 NOTE — PROCEDURES
[facility]    Double Lumen PICC Placement    Date/Time: 1/22/2021 11:35 AM  Performed by: Maurice Walls RN  Authorized by: Dick Greene MD   Indications: vascular access    UNIVERSAL PROTOCOL   Site Marked: Yes  Prior Images Obtained and Reviewed:  Yes  Required items: Required blood products, implants, devices and special equipment available    Patient identity confirmed:  Verbally with patient, arm band and anonymous protocol, patient vented/unresponsive  NA - No sedation, light sedation, or local anesthesia  Confirmation Checklist:  Patient's identity using two indicators, relevant allergies, procedure was appropriate and matched the consent or emergent situation and correct equipment/implants were available  Time out: Immediately prior to the procedure a time out was called (with Sonu Ritter RN)    Universal Protocol: the Joint Commission Universal Protocol was followed    Preparation: Patient was prepped and draped in usual sterile fashion           ANESTHESIA    Anesthesia: Local infiltration  Local Anesthetic:  Lidocaine 1% without epinephrine  Anesthetic Total (mL):  1      SEDATION    Patient Sedated: No        Preparation: skin prepped with 2% chlorhexidine  Skin prep agent: skin prep agent completely dried prior to procedure  Sterile barriers: maximum sterile barriers were used: cap, mask, sterile gown, sterile gloves, and large sterile sheet  Hand hygiene: hand hygiene performed prior to central venous catheter insertion  Type of line used: Power PICC  Catheter type: double lumen  Lumen type: valved  Catheter size: 5 Fr  Brand: Bard  Lot number: [id number]  Placement method: venipuncture, ultrasound and tip confirmation system  Number of attempts: 1  Successful placement: yes  Orientation: right  Location: basilic vein (0.67cm)  Arm circumference: adults 10 cm  Extremity circumference: 37  Visible catheter length: 3  Internal length: 38 cm  Total catheter length: 41  Dressing  and securement: chlorhexidine disc applied, securement device and occlusive dressing applied  Post procedure assessment: blood return through all ports  PROCEDURE   Patient Tolerance:  Patient tolerated the procedure well with no immediate complications     PICC ok to use, verified with 3cg Tip Confirmation, Sonu Ritter RN informed

## 2021-01-22 NOTE — PROGRESS NOTES
Surgery-Teresa  Consult received, chart reviewed, specifically imaging from this admission and last April; known large HH which is now symptomatic, pylorus appears to be above diaphragm on small bowel series. Clearly needs this repaired at this point but his surgical needs are not met by the skill set in our facility. Discussed transfer with Dr Greene.

## 2021-01-22 NOTE — PLAN OF CARE
"Sift note for [id number]    AOx4. Up SBA. NG to intermittent suction-output 250 since 1500. NG at 44cm- per flowsheet previously at 49cm- provider updated, no need for re-imaging at this time unless nausea/pain/change in output. Denies nausea-denies pain. BS-hypoactive. NPO except ice chips/meds. CT abd w/contrast ordered-attempted to be done tonight- pt refusing use of current IV for contrast- and questioning why the need for contrast- educated pt- pt. Wanting provider to weigh in on need for contrast.  Brought pt back to floor, spoke with provider, re-educated pt on importance of contrast for good imaging-pt agreeable-to be done first thing tomorrow morning per CT dept. Gen surg consult tomorrow as well. BP (!) 155/99 (BP Location: Right arm)   Pulse 90   Temp 98.4  F (36.9  C) (Oral)   Resp 18   Ht 1.803 m (5' 11\")   Wt 82.5 kg (181 lb 14.4 oz)   SpO2 94%   BMI 25.37 kg/m     "

## 2021-01-22 NOTE — PLAN OF CARE
"Pt alert and oriented x4. Having sore throat pain - using chloraseptic sprayand ice chips. NG tube WNL, to intermittent low suction. Minimal output today. Denies stomach pain or nausea. Active bowel sounds, passing flatus. Up assist x1. NPO except meds and ice chips. Gen surgery consult - he will be transferring to [facility]. PICC line placed today for TPN and lipids. TPN to start this afternoon. LR @ 100 ml/hr. Will cont supportive cares.    /89 (BP Location: Right arm)   Pulse 75   Temp 97  F (36.1  C)   Resp 18   Ht 1.803 m (5' 11\")   Wt 82.5 kg (181 lb 14.4 oz)   SpO2 93%   BMI 25.37 kg/m      "

## 2021-01-23 ENCOUNTER — APPOINTMENT (OUTPATIENT)
Dept: GENERAL RADIOLOGY | Facility: CLINIC | Age: 61
End: 2021-01-23
Attending: STUDENT IN AN ORGANIZED HEALTH CARE EDUCATION/TRAINING PROGRAM
Payer: COMMERCIAL

## 2021-01-23 ENCOUNTER — HOSPITAL ENCOUNTER (INPATIENT)
Facility: CLINIC | Age: 61
LOS: 14 days | Discharge: HOME | End: 2021-02-06
Attending: THORACIC SURGERY (CARDIOTHORACIC VASCULAR SURGERY) | Admitting: THORACIC SURGERY (CARDIOTHORACIC VASCULAR SURGERY)
Payer: COMMERCIAL

## 2021-01-23 ENCOUNTER — APPOINTMENT (OUTPATIENT)
Dept: CT IMAGING | Facility: CLINIC | Age: 61
End: 2021-01-23
Attending: THORACIC SURGERY (CARDIOTHORACIC VASCULAR SURGERY)
Payer: COMMERCIAL

## 2021-01-23 VITALS
HEART RATE: 90 BPM | BODY MASS INDEX: 26.44 KG/M2 | OXYGEN SATURATION: 92 % | WEIGHT: 188.9 LBS | SYSTOLIC BLOOD PRESSURE: 145 MMHG | HEIGHT: 71 IN | DIASTOLIC BLOOD PRESSURE: 86 MMHG | RESPIRATION RATE: 16 BRPM | TEMPERATURE: 97.9 F

## 2021-01-23 DIAGNOSIS — K44.9 HIATAL HERNIA: Primary | ICD-10-CM

## 2021-01-23 LAB
ALBUMIN SERPL-MCNC: 3.2 G/DL (ref 3.4–5)
ALP SERPL-CCNC: 51 U/L (ref 40–150)
ALT SERPL W P-5'-P-CCNC: 17 U/L (ref 0–70)
ANION GAP SERPL CALCULATED.3IONS-SCNC: 4 MMOL/L (ref 3–14)
ANION GAP SERPL CALCULATED.3IONS-SCNC: 9 MMOL/L (ref 3–14)
APTT PPP: 28 SEC (ref 22–37)
AST SERPL W P-5'-P-CCNC: 19 U/L (ref 0–45)
BASOPHILS # BLD AUTO: 0 10E9/L (ref 0–0.2)
BASOPHILS # BLD AUTO: 0 10E9/L (ref 0–0.2)
BASOPHILS NFR BLD AUTO: 0.2 %
BASOPHILS NFR BLD AUTO: 0.2 %
BILIRUB DIRECT SERPL-MCNC: <0.1 MG/DL (ref 0–0.2)
BILIRUB SERPL-MCNC: 0.6 MG/DL (ref 0.2–1.3)
BUN SERPL-MCNC: 25 MG/DL (ref 7–30)
BUN SERPL-MCNC: 25 MG/DL (ref 7–30)
CALCIUM SERPL-MCNC: 8.7 MG/DL (ref 8.5–10.1)
CALCIUM SERPL-MCNC: 9 MG/DL (ref 8.5–10.1)
CHLORIDE SERPL-SCNC: 103 MMOL/L (ref 94–109)
CHLORIDE SERPL-SCNC: 104 MMOL/L (ref 94–109)
CO2 SERPL-SCNC: 25 MMOL/L (ref 20–32)
CO2 SERPL-SCNC: 30 MMOL/L (ref 20–32)
CREAT SERPL-MCNC: 1.05 MG/DL (ref 0.66–1.25)
CREAT SERPL-MCNC: 1.06 MG/DL (ref 0.66–1.25)
DIFFERENTIAL METHOD BLD: ABNORMAL
DIFFERENTIAL METHOD BLD: NORMAL
EOSINOPHIL # BLD AUTO: 0.1 10E9/L (ref 0–0.7)
EOSINOPHIL # BLD AUTO: 0.1 10E9/L (ref 0–0.7)
EOSINOPHIL NFR BLD AUTO: 0.5 %
EOSINOPHIL NFR BLD AUTO: 0.6 %
ERYTHROCYTE [DISTWIDTH] IN BLOOD BY AUTOMATED COUNT: 13.3 % (ref 10–15)
ERYTHROCYTE [DISTWIDTH] IN BLOOD BY AUTOMATED COUNT: 13.4 % (ref 10–15)
GFR SERPL CREATININE-BSD FRML MDRD: 75 ML/MIN/{1.73_M2}
GFR SERPL CREATININE-BSD FRML MDRD: 76 ML/MIN/{1.73_M2}
GLUCOSE BLDC GLUCOMTR-MCNC: 115 MG/DL (ref 70–99)
GLUCOSE BLDC GLUCOMTR-MCNC: 123 MG/DL (ref 70–99)
GLUCOSE SERPL-MCNC: 106 MG/DL (ref 70–99)
GLUCOSE SERPL-MCNC: 79 MG/DL (ref 70–99)
HCT VFR BLD AUTO: 45.1 % (ref 40–53)
HCT VFR BLD AUTO: 45.9 % (ref 40–53)
HGB BLD-MCNC: 14.7 G/DL (ref 13.3–17.7)
HGB BLD-MCNC: 15 G/DL (ref 13.3–17.7)
IMM GRANULOCYTES # BLD: 0 10E9/L (ref 0–0.4)
IMM GRANULOCYTES # BLD: 0 10E9/L (ref 0–0.4)
IMM GRANULOCYTES NFR BLD: 0.2 %
IMM GRANULOCYTES NFR BLD: 0.3 %
INR PPP: 1.13 (ref 0.86–1.14)
INR PPP: 1.18 (ref 0.86–1.14)
LYMPHOCYTES # BLD AUTO: 1.3 10E9/L (ref 0.8–5.3)
LYMPHOCYTES # BLD AUTO: 1.4 10E9/L (ref 0.8–5.3)
LYMPHOCYTES NFR BLD AUTO: 11.6 %
LYMPHOCYTES NFR BLD AUTO: 12.2 %
MAGNESIUM SERPL-MCNC: 2.2 MG/DL (ref 1.6–2.3)
MAGNESIUM SERPL-MCNC: 2.3 MG/DL (ref 1.6–2.3)
MCH RBC QN AUTO: 31.3 PG (ref 26.5–33)
MCH RBC QN AUTO: 31.3 PG (ref 26.5–33)
MCHC RBC AUTO-ENTMCNC: 32 G/DL (ref 31.5–36.5)
MCHC RBC AUTO-ENTMCNC: 33.3 G/DL (ref 31.5–36.5)
MCV RBC AUTO: 94 FL (ref 78–100)
MCV RBC AUTO: 98 FL (ref 78–100)
MONOCYTES # BLD AUTO: 1.2 10E9/L (ref 0–1.3)
MONOCYTES # BLD AUTO: 1.4 10E9/L (ref 0–1.3)
MONOCYTES NFR BLD AUTO: 10.8 %
MONOCYTES NFR BLD AUTO: 11.7 %
NEUTROPHILS # BLD AUTO: 8.1 10E9/L (ref 1.6–8.3)
NEUTROPHILS # BLD AUTO: 8.9 10E9/L (ref 1.6–8.3)
NEUTROPHILS NFR BLD AUTO: 75.7 %
NEUTROPHILS NFR BLD AUTO: 76 %
NRBC # BLD AUTO: 0 10*3/UL
NRBC # BLD AUTO: 0 10*3/UL
NRBC BLD AUTO-RTO: 0 /100
NRBC BLD AUTO-RTO: 0 /100
PHOSPHATE SERPL-MCNC: 2.8 MG/DL (ref 2.5–4.5)
PHOSPHATE SERPL-MCNC: 3.1 MG/DL (ref 2.5–4.5)
PLATELET # BLD AUTO: 194 10E9/L (ref 150–450)
PLATELET # BLD AUTO: 196 10E9/L (ref 150–450)
POTASSIUM SERPL-SCNC: 3.3 MMOL/L (ref 3.4–5.3)
POTASSIUM SERPL-SCNC: 3.6 MMOL/L (ref 3.4–5.3)
PREALB SERPL IA-MCNC: 17 MG/DL (ref 15–45)
PROT SERPL-MCNC: 6.9 G/DL (ref 6.8–8.8)
RBC # BLD AUTO: 4.69 10E12/L (ref 4.4–5.9)
RBC # BLD AUTO: 4.8 10E12/L (ref 4.4–5.9)
SODIUM SERPL-SCNC: 137 MMOL/L (ref 133–144)
SODIUM SERPL-SCNC: 138 MMOL/L (ref 133–144)
TRIGL SERPL-MCNC: 233 MG/DL
WBC # BLD AUTO: 10.7 10E9/L (ref 4–11)
WBC # BLD AUTO: 11.8 10E9/L (ref 4–11)

## 2021-01-23 PROCEDURE — 250N000011 HC RX IP 250 OP 636: Performed by: INTERNAL MEDICINE

## 2021-01-23 PROCEDURE — 999N001017 HC STATISTIC GLUCOSE BY METER IP

## 2021-01-23 PROCEDURE — 250N000011 HC RX IP 250 OP 636: Performed by: THORACIC SURGERY (CARDIOTHORACIC VASCULAR SURGERY)

## 2021-01-23 PROCEDURE — 84100 ASSAY OF PHOSPHORUS: CPT | Performed by: HOSPITALIST

## 2021-01-23 PROCEDURE — 84134 ASSAY OF PREALBUMIN: CPT | Performed by: HOSPITALIST

## 2021-01-23 PROCEDURE — 3E0436Z INTRODUCTION OF NUTRITIONAL SUBSTANCE INTO CENTRAL VEIN, PERCUTANEOUS APPROACH: ICD-10-PCS | Performed by: THORACIC SURGERY (CARDIOTHORACIC VASCULAR SURGERY)

## 2021-01-23 PROCEDURE — 71260 CT THORAX DX C+: CPT | Mod: 26 | Performed by: STUDENT IN AN ORGANIZED HEALTH CARE EDUCATION/TRAINING PROGRAM

## 2021-01-23 PROCEDURE — 85025 COMPLETE CBC W/AUTO DIFF WBC: CPT

## 2021-01-23 PROCEDURE — 85610 PROTHROMBIN TIME: CPT | Performed by: HOSPITALIST

## 2021-01-23 PROCEDURE — 84100 ASSAY OF PHOSPHORUS: CPT

## 2021-01-23 PROCEDURE — 250N000013 HC RX MED GY IP 250 OP 250 PS 637: Performed by: HOSPITALIST

## 2021-01-23 PROCEDURE — 250N000009 HC RX 250: Performed by: THORACIC SURGERY (CARDIOTHORACIC VASCULAR SURGERY)

## 2021-01-23 PROCEDURE — 85730 THROMBOPLASTIN TIME PARTIAL: CPT

## 2021-01-23 PROCEDURE — 80048 BASIC METABOLIC PNL TOTAL CA: CPT

## 2021-01-23 PROCEDURE — 999N000065 XR CHEST PORT 1 VW

## 2021-01-23 PROCEDURE — 99239 HOSP IP/OBS DSCHRG MGMT >30: CPT | Performed by: PHYSICIAN ASSISTANT

## 2021-01-23 PROCEDURE — 3E0436Z INTRODUCTION OF NUTRITIONAL SUBSTANCE INTO CENTRAL VEIN, PERCUTANEOUS APPROACH: ICD-10-PCS | Performed by: INTERNAL MEDICINE

## 2021-01-23 PROCEDURE — 74177 CT ABD & PELVIS W/CONTRAST: CPT | Mod: 26 | Performed by: STUDENT IN AN ORGANIZED HEALTH CARE EDUCATION/TRAINING PROGRAM

## 2021-01-23 PROCEDURE — 74018 RADEX ABDOMEN 1 VIEW: CPT | Mod: 26 | Performed by: STUDENT IN AN ORGANIZED HEALTH CARE EDUCATION/TRAINING PROGRAM

## 2021-01-23 PROCEDURE — 83735 ASSAY OF MAGNESIUM: CPT

## 2021-01-23 PROCEDURE — 250N000009 HC RX 250: Performed by: HOSPITALIST

## 2021-01-23 PROCEDURE — 71045 X-RAY EXAM CHEST 1 VIEW: CPT | Mod: 26 | Performed by: STUDENT IN AN ORGANIZED HEALTH CARE EDUCATION/TRAINING PROGRAM

## 2021-01-23 PROCEDURE — 36592 COLLECT BLOOD FROM PICC: CPT

## 2021-01-23 PROCEDURE — 84478 ASSAY OF TRIGLYCERIDES: CPT | Performed by: HOSPITALIST

## 2021-01-23 PROCEDURE — 83735 ASSAY OF MAGNESIUM: CPT | Performed by: HOSPITALIST

## 2021-01-23 PROCEDURE — 120N000002 HC R&B MED SURG/OB UMMC

## 2021-01-23 PROCEDURE — 71260 CT THORAX DX C+: CPT

## 2021-01-23 PROCEDURE — 250N000013 HC RX MED GY IP 250 OP 250 PS 637: Performed by: INTERNAL MEDICINE

## 2021-01-23 PROCEDURE — 85610 PROTHROMBIN TIME: CPT

## 2021-01-23 PROCEDURE — 258N000003 HC RX IP 258 OP 636

## 2021-01-23 PROCEDURE — 250N000011 HC RX IP 250 OP 636: Performed by: PHYSICIAN ASSISTANT

## 2021-01-23 PROCEDURE — 999N000065 XR ABDOMEN PORT 1 VW

## 2021-01-23 PROCEDURE — 82248 BILIRUBIN DIRECT: CPT | Performed by: HOSPITALIST

## 2021-01-23 PROCEDURE — 80053 COMPREHEN METABOLIC PANEL: CPT | Performed by: HOSPITALIST

## 2021-01-23 PROCEDURE — 85025 COMPLETE CBC W/AUTO DIFF WBC: CPT | Performed by: HOSPITALIST

## 2021-01-23 RX ORDER — PROCHLORPERAZINE 25 MG
25 SUPPOSITORY, RECTAL RECTAL EVERY 12 HOURS PRN
Status: DISCONTINUED | OUTPATIENT
Start: 2021-01-23 | End: 2021-01-24

## 2021-01-23 RX ORDER — POTASSIUM CHLORIDE 7.45 MG/ML
10 INJECTION INTRAVENOUS
Status: DISCONTINUED | OUTPATIENT
Start: 2021-01-23 | End: 2021-01-23 | Stop reason: HOSPADM

## 2021-01-23 RX ORDER — NALOXONE HYDROCHLORIDE 0.4 MG/ML
0.4 INJECTION, SOLUTION INTRAMUSCULAR; INTRAVENOUS; SUBCUTANEOUS
Status: DISCONTINUED | OUTPATIENT
Start: 2021-01-23 | End: 2021-02-06 | Stop reason: HOSPADM

## 2021-01-23 RX ORDER — METOPROLOL TARTRATE 1 MG/ML
5 INJECTION, SOLUTION INTRAVENOUS EVERY 6 HOURS
Status: DISCONTINUED | OUTPATIENT
Start: 2021-01-23 | End: 2021-01-23

## 2021-01-23 RX ORDER — METOPROLOL TARTRATE 1 MG/ML
5 INJECTION, SOLUTION INTRAVENOUS EVERY 6 HOURS
Status: ON HOLD | DISCHARGE
Start: 2021-01-23 | End: 2021-01-24

## 2021-01-23 RX ORDER — ONDANSETRON 2 MG/ML
4 INJECTION INTRAMUSCULAR; INTRAVENOUS EVERY 6 HOURS PRN
Status: DISCONTINUED | OUTPATIENT
Start: 2021-01-23 | End: 2021-01-24

## 2021-01-23 RX ORDER — ACETAMINOPHEN 325 MG/1
650 TABLET ORAL EVERY 6 HOURS
Status: DISCONTINUED | OUTPATIENT
Start: 2021-01-23 | End: 2021-01-29

## 2021-01-23 RX ORDER — LIDOCAINE 40 MG/G
CREAM TOPICAL
Status: DISCONTINUED | OUTPATIENT
Start: 2021-01-23 | End: 2021-02-06 | Stop reason: HOSPADM

## 2021-01-23 RX ORDER — PROCHLORPERAZINE MALEATE 5 MG
10 TABLET ORAL EVERY 6 HOURS PRN
Status: DISCONTINUED | OUTPATIENT
Start: 2021-01-23 | End: 2021-01-24

## 2021-01-23 RX ORDER — OXYCODONE HYDROCHLORIDE 5 MG/1
5-10 TABLET ORAL
Status: DISCONTINUED | OUTPATIENT
Start: 2021-01-23 | End: 2021-01-25

## 2021-01-23 RX ORDER — ONDANSETRON 4 MG/1
4 TABLET, ORALLY DISINTEGRATING ORAL EVERY 6 HOURS PRN
Status: DISCONTINUED | OUTPATIENT
Start: 2021-01-23 | End: 2021-01-24

## 2021-01-23 RX ORDER — IOPAMIDOL 755 MG/ML
114 INJECTION, SOLUTION INTRAVASCULAR ONCE
Status: COMPLETED | OUTPATIENT
Start: 2021-01-23 | End: 2021-01-23

## 2021-01-23 RX ORDER — NALOXONE HYDROCHLORIDE 0.4 MG/ML
0.2 INJECTION, SOLUTION INTRAMUSCULAR; INTRAVENOUS; SUBCUTANEOUS
Status: DISCONTINUED | OUTPATIENT
Start: 2021-01-23 | End: 2021-02-06 | Stop reason: HOSPADM

## 2021-01-23 RX ORDER — SPIRONOLACTONE 25 MG/1
25 TABLET ORAL DAILY
Status: DISCONTINUED | OUTPATIENT
Start: 2021-01-24 | End: 2021-01-25

## 2021-01-23 RX ORDER — HYDROMORPHONE HYDROCHLORIDE 1 MG/ML
.3-.5 INJECTION, SOLUTION INTRAMUSCULAR; INTRAVENOUS; SUBCUTANEOUS
Status: DISCONTINUED | OUTPATIENT
Start: 2021-01-23 | End: 2021-01-29

## 2021-01-23 RX ORDER — DEXTROSE MONOHYDRATE 100 MG/ML
INJECTION, SOLUTION INTRAVENOUS CONTINUOUS PRN
Status: DISCONTINUED | OUTPATIENT
Start: 2021-01-23 | End: 2021-02-02

## 2021-01-23 RX ORDER — SODIUM CHLORIDE 9 MG/ML
INJECTION, SOLUTION INTRAVENOUS CONTINUOUS
Status: DISCONTINUED | OUTPATIENT
Start: 2021-01-23 | End: 2021-01-25

## 2021-01-23 RX ADMIN — ASCORBIC ACID, VITAMIN A PALMITATE, CHOLECALCIFEROL, THIAMINE HYDROCHLORIDE, RIBOFLAVIN-5 PHOSPHATE SODIUM, PYRIDOXINE HYDROCHLORIDE, NIACINAMIDE, DEXPANTHENOL, ALPHA-TOCOPHEROL ACETATE, VITAMIN K1, FOLIC ACID, BIOTIN, CYANOCOBALAMIN: 200; 3300; 200; 6; 3.6; 6; 40; 15; 10; 150; 600; 60; 5 INJECTION, SOLUTION INTRAVENOUS at 20:21

## 2021-01-23 RX ADMIN — POTASSIUM CHLORIDE 10 MEQ: 7.46 INJECTION, SOLUTION INTRAVENOUS at 09:57

## 2021-01-23 RX ADMIN — SODIUM CHLORIDE: 9 INJECTION, SOLUTION INTRAVENOUS at 18:08

## 2021-01-23 RX ADMIN — IOPAMIDOL 114 ML: 755 INJECTION, SOLUTION INTRAVENOUS at 22:00

## 2021-01-23 RX ADMIN — I.V. FAT EMULSION 250 ML: 20 EMULSION INTRAVENOUS at 20:22

## 2021-01-23 RX ADMIN — DIPHENHYDRAMINE HYDROCHLORIDE 25 MG: 50 INJECTION, SOLUTION INTRAMUSCULAR; INTRAVENOUS at 02:32

## 2021-01-23 RX ADMIN — PHENOL 1 ML: 1.5 LIQUID ORAL at 08:33

## 2021-01-23 RX ADMIN — ACETAMINOPHEN 650 MG: 325 TABLET, FILM COATED ORAL at 03:21

## 2021-01-23 RX ADMIN — SUCRALFATE 1 G: 1 SUSPENSION ORAL at 08:35

## 2021-01-23 RX ADMIN — POTASSIUM CHLORIDE 10 MEQ: 7.46 INJECTION, SOLUTION INTRAVENOUS at 08:28

## 2021-01-23 RX ADMIN — METOPROLOL TARTRATE 5 MG: 5 INJECTION INTRAVENOUS at 06:25

## 2021-01-23 ASSESSMENT — MIFFLIN-ST. JEOR
SCORE: 1674.45
SCORE: 1683.97

## 2021-01-23 ASSESSMENT — ACTIVITIES OF DAILY LIVING (ADL): ADLS_ACUITY_SCORE: 12

## 2021-01-23 NOTE — PLAN OF CARE
End of Shift Note  See flowsheets for vital signs and complete assessments.    Pertinent Assessments: A&Ox4. Denies pain, nausea, SOB, numbness & tingling. TMax of 100.5, resolved with tylenol. NG to LIS with 50mL out.     Treatment Plan: TPN, Lipids, IVF, IV metoprolol    Discharge: Awaiting transfer to [facility]    Bedside RN: Jovana Khoury

## 2021-01-23 NOTE — PLAN OF CARE
VSS, on RA, afebrile. Pt SBA. A&O. Denies pain. Denies nausea. Uses bedside urinal. NG hooked up to low intermittent suction, output not enough to measure output brown. Passing gas, bowel sounds hypoactive. Pt gave himself a bed bath per pt preference, ng nose tape came off called spd for new tape but spd unsure if they had it when I called spd for a second time no answer so there is regular surgical tape on his nose to hold tube in place. TPN, lipids, and IVF all infusing through his PICC. Pt will transfer to the  of  for surgery when a bed becomes available.

## 2021-01-23 NOTE — PLAN OF CARE
"Vitals:    01/23/21 1211   BP: (!) 143/98   BP Location: Left arm   Pulse: 91   Resp: 16   Temp: 96.7  F (35.9  C)   TempSrc: Oral   SpO2: 92%   Weight: 84.7 kg (186 lb 12.8 oz)   Height: 1.803 m (5' 11\")       Neuro: alert and oriented     VS:afebrile, /89 slight tachycardic, sating 92% on room air,    Cardiac: 92    Pain/Nausea: denies  Pain,     Lines/ right double lumen Drains:NG to intermittent suction, TPN and lipid started     GI/:voided adequate amount,     Diet/Appetite: npo    Activity:up independent, had abdominal pelvic and chest CT     Plan:  pt just got here from [facility]   "

## 2021-01-23 NOTE — PLAN OF CARE
Patient's After Visit Summary was reviewed with patient -- transferring to [facility].   Patient verbalized understanding of After Visit Summary, recommended follow up and was given an opportunity to ask questions.   Discharge medications sent home with patient/family: Not applicable   Discharged with other:[facility]    Leaving to [facility] via [facility].

## 2021-01-23 NOTE — DISCHARGE SUMMARY
[facility]  Discharge Summary  Hospitalist    Date of Admission:  1/20/2021  Date of Discharge:  1/23/2021    Discharging Provider: Radha Orantes PAC    Discharge Diagnoses   1. Mesentero-axial gastric volvulus; transferring to [facility]  2. HTN  3. Stage II-III CKD with baseline Cr 1.3-1.5  4. Hypertrophic CM per Echo 4/2020 which showed EF >70% with findings consistent with hypertrophic cardiomyopathy (basal anteroseptum 2.2 cm with mild flow acceleration across the LVOT)  5. Prolonged QTc (485 m/s on EKG 1/20/2021)    History of Present Illness   Delroy Christianson is an 61 year old male with hypertension, stage II-III CKD, hypertrophic cardiomyopathy, prolonged QTc, and known hiatal hernia who presented to [facility] ED on 1/20/2021 with nausea and vomiting.  CMP notable for creatinine 1.32 with GFR 58.  Glucose 152.  CBC within normal limits.  UA relatively bland.  COVID-19 negative.  EKG with NSR, nonspecific T wave changes, and QTc 485.  CXR revealed a large hiatal hernia but no acute pulmonary process.  XR Abdomen 1/21/2021 revealed a nonobstructive gas pattern, distended stomach with fluid, and no evidence of free air.  NG placed with >1950 mL output within 60 minutes and significant improvement in symptoms.  General Surgery consulted and recommended small bowel follow-through.     XR Small Bowel 1/21/2021 revealed a large hiatal hernia with gastric fundus and proximal body located inferiorly in the abdomen and gastric antrum and pylorus located superiorly in the intrathoracic position concerning for mesentero-axial gastric volvulus with partial obstruction of the pylorus.  Requires foregut surgical eval not available at [facility].      [facility] contacted 1/22/2021 and case discussed with  Dr Nicholas (Thoracic) and Dr Rizo (General Surgery).  Patient was accepted but no beds available for same-day transfer.  At request of accepting provider, TPN + lipid started (PICC placed for access) and NG kept  to BIBIANA.  LR also running.      Patient seen this morning and he notes increased bloating and nausea today.  He is also reporting he had a sponge bath yesterday (1/22) and sounds as though the tape securing the NG to his nose became loose and the tube was pulled out about 4 inches.  Patient subsequently pushed it back in and the tube was secured to his nose with new tape.  Question if the NG is in the correct position?  Continues to have bilious output.  Patient leaving shortly for [facility]; defer additional investigation to the Thoracic team.       Please note, patient's PTA medications including ASA and spironolactone were held.  He has been receiving q6h IV metoprolol.  He does not follow with Cardiology regularly and has been reluctant to undergo any further testing for his HCM.  He has been largely asymptomatic with regard to chest pain or dyspnea and is able to climb 2 flights of stairs without stopping.     CHEYENNE Aguero      Code Status   Full Code       Primary Care Physician   Chuy Holliday MD    Discharge Disposition   Transferred to [facility]   Condition at discharge: Stable    Consultations This Hospital Stay   SURGERY GENERAL IP CONSULT  PHARMACY/NUTRITION TO START AND MANAGE TPN  VASCULAR ACCESS ADULT IP CONSULT  VASCULAR ACCESS ADULT IP CONSULT  PHARMACY IP CONSULT  PHARMACY IP CONSULT  PHARMACY IP CONSULT  PHARMACY IP CONSULT    Time Spent on this Encounter   I, CHEYENNE Aguero, personally saw the patient today and spent greater than 30 minutes discharging this patient.    Discharge Orders   No discharge procedures on file.    Discharge Medications   Current Discharge Medication List      CONTINUE these medications which have NOT CHANGED    Details   aspirin (ASA) 325 MG EC tablet Take 325 mg by mouth as needed for moderate pain      calcium carbonate (TUMS) 500 MG chewable tablet Take 1 chew tab by mouth as needed for heartburn      spironolactone (ALDACTONE) 25 MG tablet Take 1 tablet (25 mg) by  mouth daily  Qty: 90 tablet, Refills: 3    Associated Diagnoses: Benign essential hypertension             Allergies   No Known Allergies    Physical Exam   Temp: 97.9  F (36.6  C) Temp src: Oral BP: (!) 145/86 Pulse: 90   Resp: 16 SpO2: 92 % O2 Device: None (Room air)    Vitals:    01/21/21 0040 01/23/21 0621   Weight: 82.5 kg (181 lb 14.4 oz) 85.7 kg (188 lb 14.4 oz)     Vital Signs with Ranges  Temp:  [96.3  F (35.7  C)-100.5  F (38.1  C)] 97.9  F (36.6  C)  Pulse:  [74-90] 90  Resp:  [16-18] 16  BP: (127-149)/(74-90) 145/86  SpO2:  [92 %-94 %] 92 %  I/O last 3 completed shifts:  In: 1467   Out: 1125 [Urine:1075; Emesis/NG output:50]      GENERAL:  Pleasant, cooperative, alert. NAD. Appears depressed.    HEENT: Normocephalic, atraumatic.  Extra occular mm intact.  Sclera clear. PERRL   PULMONOLOGY: Clear    CARDIAC: Regular    ABD: GI with bilious drainage  MUSCULOSKELETAL:  Moving x 4 spontaneously with CMS intact x4.  Normal bulk and tone.     NEURO: Alert and oriented x3.  CN II-XII grossly intact and symmetric.  No ataxia or tremor.  Nonfocal.  SKIN: Warm, pink, dry      Data   Most Recent 3 CBC's:  Recent Labs   Lab Test 01/23/21  0600 01/22/21  0704 01/20/21 2019   WBC 11.8* 13.5* 10.5   HGB 14.7 14.5 16.2   MCV 98 97 94    211 240      Most Recent 3 BMP's:  Recent Labs   Lab Test 01/23/21  0600 01/22/21  0704 01/20/21 2019    141 141   POTASSIUM 3.3* 3.6 3.5   CHLORIDE 103 110* 105   CO2 30 28 30   BUN 25 20 16   CR 1.06 1.16 1.32*   ANIONGAP 4 3 6   MINE 8.7 8.9 9.9   * 85 152*     Most Recent 2 LFT's:  Recent Labs   Lab Test 01/23/21  0600 01/22/21  0704   AST 19 25   ALT 17 18   ALKPHOS 51 50   BILITOTAL 0.6 0.9     Most Recent INR's and Anticoagulation Dosing History:  Anticoagulation Dose History     Recent Dosing and Labs Latest Ref Rng & Units 4/22/2020 1/22/2021 1/23/2021    INR 0.86 - 1.14 Canceled, Test credited  1.29(H) 1.18(H)        Most Recent 3 Troponin's:  Recent Labs    Lab Test 04/23/20  0650 04/23/20  0130 04/22/20  1931   TROPI 0.051* 0.082* 0.049*     Most Recent Cholesterol Panel:  Recent Labs   Lab Test 01/23/21  0600   TRIG 233*     Most Recent 6 Bacteria Isolates From Any Culture (See EPIC Reports for Culture Details):  Recent Labs   Lab Test 04/23/20  1150 04/23/20  0115 04/22/20 2008   CULT Canceled, Test credited  >10 Squamous epithelial cells/low power field indicates oral contamination. Please   recollect.  Notification of test cancellation was given to  Juani Clay RN on 4.23.20 at 1549. JRT  * >10 Squamous epithelial cells/low power field indicates oral contamination. Please   recollect.  * No growth     Most Recent TSH, T4 and A1c Labs:No lab results found.  Results for orders placed or performed during the hospital encounter of 01/20/21   XR Chest Port 1 View    Narrative    EXAM: XR CHEST PORT 1 VW  LOCATION: [facility]  DATE/TIME: 1/20/2021 10:40 PM    INDICATION: Chest pain  COMPARISON: 04/22/2020      Impression    IMPRESSION: Large hiatal hernia. Mild left basilar atelectasis. Right lung clear. No vascular congestion. Tiny left effusion or pleural thickening.   XR Abdomen 2 Views    Narrative    ABDOMEN TWO VIEWS 1/21/2021 9:35 AM     HISTORY: Vomiting.    COMPARISON: 4/24/2020      Impression    IMPRESSION: Nonobstructive gas pattern. No evidence of free air,  organomegaly, or abnormal calculus. Stomach appears to be distended  with fluid.    WENDY BECERRIL MD   XR Small Bowel    Narrative    XR SMALL BOWEL 1/21/2021 4:26 PM    HISTORY: nausea, vomiting hiatal hernia    COMPARISON: CT chest, abdomen and pelvis 4/4/2020    FINDINGS: An upper GI evaluation was performed. Approximately 150 cc  dilute barium contrast was injected through the existing NG tube. The  NG tube tube is in the gastroesophageal junction/gastric fundus. Bowel  gas pattern is nonobstructive.    Injected contrast opacifies the stomach. The gastric fundus and body  are in  the abdomen but the distal stomach/antrum and pylorus is in an  intrathoracic location compatible with the known hiatal hernia. The  orientation of the stomach appears similar to the CT on 4/24/2020.  There is at least partial obstruction at the gastric antrum and the  enteric contrast passes to the duodenum only after 60 minutes. The  opacified portions of the proximal duodenum are normal in caliber.      Impression    IMPRESSION:   1. Large hiatal hernia. The gastric fundus and proximal body are  located inferiorly in the upper abdomen and the gastric antrum and  pylorus are located superiorly in an intrathoracic position. The  appearance is suggestive of a mesentero-axial gastric volvulus  (possibly with intermittent/subacute volvulus) although the  configuration is similar on the CT on 4/24/2020. Partial obstruction  at the pylorus with contrast reaching the duodenum after 60 minutes.  2. Additional small bowel imaging was not performed as the bowel gas  pattern is nonobstructive.    Findings were discussed with the on-call hospitalist at 4:40 PM.    SOLO HULL MD   XR KUB    Narrative    ABDOMEN ONE VIEW  1/22/2021 8:32 AM     HISTORY: Nausea, vomiting.    COMPARISON: 1/21/2021      Impression    IMPRESSION: Contrast is present in nondilated colon. No dilated small  bowel loops are present. A nasogastric tube is present. A large hiatal  hernia is noted but much of this is above the imaged portions of the  abdomen. No free air.    WENDY BECERRIL MD

## 2021-01-23 NOTE — H&P
[facility], [place]    Thoracic Surgery  History and Physical    Delroy Christianosn  : 1960  MRN # [id number]    ADMIT DATE: 2021    PCP: Chuy Holliday    CHIEF COMPLAINT:  N/V. Direct admit from [facility]    HPI: Delroy Christianson is a 61 year old male with hypertension, stage II-III CKD (Cr 1.06), hypertrophic cardiomyopathy, prolonged QTc, and known hiatal hernia who presented to [facility] ED on 2021 with nausea and vomiting. CXR revealed a large hiatal hernia but no acute pulmonary process. COVID-19 negative.   XR Abdomen 2021 revealed a nonobstructive gas pattern, distended stomach with fluid, and no evidence of free air.  NG placed with >1950 mL output within 60 minutes and significant improvement in symptoms.  XR Small Bowel 2021 revealed a large hiatal hernia with gastric fundus and proximal body located inferiorly in the abdomen and gastric antrum and pylorus located superiorly in the intrathoracic position concerning for mesentero-axial gastric volvulus with partial obstruction of the pylorus.  Requires foregut surgical eval not available at [facility].    The patient was discussed with  Dr Nicholas (Thoracic) and Dr Rizo (General Surgery). the patient was transferred from [facility] on 21. TPN + lipid started (PICC placed for access) and NG kept to LIS.  LR also running.   The patient does not complain of CP, SOB, epigastric pain, N/V since the NGT placement. Passed gas and had BM this AM.     ROS:   The 10 point Review of Systems is negative other than noted in the HPI     PMH:  Past Medical History:   Diagnosis Date     Allergic rhinitis      Sinusitis        PSH:  No past surgical history on file.    MEDICATIONS:  Prior to Admission Medications   Prescriptions Last Dose Informant Patient Reported? Taking?   aspirin (ASA) 325 MG EC tablet   Yes No   Sig: Take 325 mg by mouth as needed for moderate pain   calcium carbonate (TUMS) 500 MG chewable tablet    Yes No   Sig: Take 1 chew tab by mouth as needed for heartburn   lipids (INTRALIPID) 20 % infusion   No No   Sig: Inject 250 mLs into the vein every 24 hours   metoprolol (LOPRESSOR) 5 MG/5ML injection   No No   Sig: Inject 5 mLs (5 mg) into the vein every 6 hours   parenteral nutrition - PTA/DISCHARGE ORDER   No No   Sig: The TPN formula will print on the After Visit Summary Report.   spironolactone (ALDACTONE) 25 MG tablet   No No   Sig: Take 1 tablet (25 mg) by mouth daily      Facility-Administered Medications: None        ALLERGIES:   No Known Allergies    FAMILY HISTORY:  Family History   Problem Relation Age of Onset     Diabetes Mother      Coronary Artery Disease Mother      Hypertension Father        SOCIAL HISTORY:  Social History     Socioeconomic History     Marital status: Single     Spouse name: Not on file     Number of children: Not on file     Years of education: Not on file     Highest education level: Not on file   Occupational History     Not on file   Social Needs     Financial resource strain: Not on file     Food insecurity     Worry: Not on file     Inability: Not on file     Transportation needs     Medical: Not on file     Non-medical: Not on file   Tobacco Use     Smoking status: Never Smoker     Smokeless tobacco: Never Used   Substance and Sexual Activity     Alcohol use: No     Alcohol/week: 0.0 standard drinks     Drug use: No     Sexual activity: Never   Lifestyle     Physical activity     Days per week: Not on file     Minutes per session: Not on file     Stress: Not on file   Relationships     Social connections     Talks on phone: Not on file     Gets together: Not on file     Attends Latter-day service: Not on file     Active member of club or organization: Not on file     Attends meetings of clubs or organizations: Not on file     Relationship status: Not on file     Intimate partner violence     Fear of current or ex partner: Not on file     Emotionally abused: Not on file      "Physically abused: Not on file     Forced sexual activity: Not on file   Other Topics Concern     Not on file   Social History Narrative     Not on file       PHYSICAL EXAM:  Blood pressure 139/89, pulse 89, temperature 96.4  F (35.8  C), temperature source Oral, resp. rate 16, height 1.803 m (5' 11\"), weight 84.7 kg (186 lb 12.8 oz), SpO2 91 %.  GENERAL: Appears alert and oriented times three.   HEENT: Eye symmetrical and free of discharge bilaterally. Mucous membranes moist and without lesions. NGT in place.   NECK: Supple and without lymphadenopathy.  CV: RRR, S1S2 present without murmur, rub, or gallop.   RESPIRATORY: Respirations regular, even, and unlabored. Lungs CTA, decreased on L lung base.   GI: Soft and non distended and nontender in all quadrants. No rebound, guarding. No organomegaly.   EXTREMITIES: No peripheral edema. 2+ bilateral pedal pulses.   NEUROLOGIC: Alert and orientated x 3. CN II-XII grossly intact. No focal deficits.   MUSCULOSKELETAL: No joint swelling or tenderness. PICC on RUE  SKIN: No jaundice. No rashes or lesions.     LABS:  CBC:  Recent Labs   Lab Test 01/23/21  0600   WBC 11.8*   RBC 4.69   HGB 14.7   HCT 45.9   MCV 98   MCH 31.3   MCHC 32.0   RDW 13.4          CMP:  Recent Labs   Lab Test 01/23/21  0600      POTASSIUM 3.3*   CHLORIDE 103   MINE 8.7   CO2 30   BUN 25   CR 1.06   *   AST 19   ALT 17   BILITOTAL 0.6   ALBUMIN 3.2*   PROTTOTAL 6.9   ALKPHOS 51       Results for orders placed or performed during the hospital encounter of 01/23/21 (from the past 24 hour(s))   CBC with platelets differential   Result Value Ref Range    WBC 10.7 4.0 - 11.0 10e9/L    RBC Count 4.80 4.4 - 5.9 10e12/L    Hemoglobin 15.0 13.3 - 17.7 g/dL    Hematocrit 45.1 40.0 - 53.0 %    MCV 94 78 - 100 fl    MCH 31.3 26.5 - 33.0 pg    MCHC 33.3 31.5 - 36.5 g/dL    RDW 13.3 10.0 - 15.0 %    Platelet Count 196 150 - 450 10e9/L    Diff Method Automated Method     % Neutrophils 76.0 %    % " Lymphocytes 12.2 %    % Monocytes 10.8 %    % Eosinophils 0.6 %    % Basophils 0.2 %    % Immature Granulocytes 0.2 %    Nucleated RBCs 0 0 /100    Absolute Neutrophil 8.1 1.6 - 8.3 10e9/L    Absolute Lymphocytes 1.3 0.8 - 5.3 10e9/L    Absolute Monocytes 1.2 0.0 - 1.3 10e9/L    Absolute Eosinophils 0.1 0.0 - 0.7 10e9/L    Absolute Basophils 0.0 0.0 - 0.2 10e9/L    Abs Immature Granulocytes 0.0 0 - 0.4 10e9/L    Absolute Nucleated RBC 0.0    INR   Result Value Ref Range    INR 1.13 0.86 - 1.14   Partial thromboplastin time   Result Value Ref Range    PTT 28 22 - 37 sec   Basic metabolic panel   Result Value Ref Range    Sodium 138 133 - 144 mmol/L    Potassium 3.6 3.4 - 5.3 mmol/L    Chloride 104 94 - 109 mmol/L    Carbon Dioxide 25 20 - 32 mmol/L    Anion Gap 9 3 - 14 mmol/L    Glucose 79 70 - 99 mg/dL    Urea Nitrogen 25 7 - 30 mg/dL    Creatinine 1.05 0.66 - 1.25 mg/dL    GFR Estimate 76 >60 mL/min/[1.73_m2]    GFR Estimate If Black 88 >60 mL/min/[1.73_m2]    Calcium 9.0 8.5 - 10.1 mg/dL   Magnesium   Result Value Ref Range    Magnesium 2.3 1.6 - 2.3 mg/dL   Phosphorus   Result Value Ref Range    Phosphorus 2.8 2.5 - 4.5 mg/dL       IMAGING:  CXR, AXR consistent with hiatal hernia and incarceration.   CT CAP pending.  TTE pending    ASSESSMENT & PLAN:  Delroy Christianson is a 61 year old male with history of hypertension, 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 He came into [facility] ED on 1/20/21 d/t N/V. CXR, AXR and small bowel follow through were consistent with hiatal hernia. NG was placed, started TPN and LR were starte. The patient was transferred to [facility] on 1/23/21 for consideration of repair of hiatal hernia.  The patient does not complain of CP, SOB, epigastric pain, N/V since the NGT placement. Passed gas and had BM this AM. There are no signs of strangulation at the moment and bowel opened up this AM.    - Admit to thoracic surgery  - no acute surgery,  but likely OR this week if CT is unremarkable.   - NS at 100 ml/h and restart TPN.  - NPO, NGT to LIS  - AXR, CXR consistent with hiatal hernia  - CT CAP, TTE  - Prior to admission medications reviewed, metoprolol restarted  - pain control      Kathy Esqueda MD  Surgery, PGY1    The patient was discussed with thoracic fellow, Dr. Ramirez, who talked to staff, Dr. Jernigan.

## 2021-01-23 NOTE — PLAN OF CARE
"Pt alert and oriented x4. He denies pain and nausea this am. Up assist x1. NPO with ice chips. PICC WNL. TPN running continuously. LR @ 100 ml/hr. K 3.3 this am, given 2 bags of 10 mEq K this am before transfer. NG tube to low continuous suction. Clear brown output --350 ml today. VSS. transfer to [facility] today.     BP (!) 145/86 (BP Location: Left arm)   Pulse 90   Temp 97.9  F (36.6  C) (Oral)   Resp 16   Ht 1.803 m (5' 11\")   Wt 85.7 kg (188 lb 14.4 oz)   SpO2 92%   BMI 26.35 kg/m        Report given to Jose Francisco on unit 7B at 10:15 AM. PICC line flushed and NG tube clamped for HE transport ride to [facility].  "

## 2021-01-24 ENCOUNTER — ANESTHESIA (OUTPATIENT)
Dept: SURGERY | Facility: CLINIC | Age: 61
End: 2021-01-24
Payer: COMMERCIAL

## 2021-01-24 ENCOUNTER — APPOINTMENT (OUTPATIENT)
Dept: CARDIOLOGY | Facility: CLINIC | Age: 61
End: 2021-01-24
Attending: THORACIC SURGERY (CARDIOTHORACIC VASCULAR SURGERY)
Payer: COMMERCIAL

## 2021-01-24 ENCOUNTER — APPOINTMENT (OUTPATIENT)
Dept: GENERAL RADIOLOGY | Facility: CLINIC | Age: 61
End: 2021-01-24
Attending: THORACIC SURGERY (CARDIOTHORACIC VASCULAR SURGERY)
Payer: COMMERCIAL

## 2021-01-24 ENCOUNTER — ANESTHESIA EVENT (OUTPATIENT)
Dept: SURGERY | Facility: CLINIC | Age: 61
End: 2021-01-24
Payer: COMMERCIAL

## 2021-01-24 LAB
ALBUMIN SERPL-MCNC: 2.8 G/DL (ref 3.4–5)
ALP SERPL-CCNC: 49 U/L (ref 40–150)
ALT SERPL W P-5'-P-CCNC: 26 U/L (ref 0–70)
ANION GAP SERPL CALCULATED.3IONS-SCNC: 7 MMOL/L (ref 3–14)
AST SERPL W P-5'-P-CCNC: 22 U/L (ref 0–45)
BASOPHILS # BLD AUTO: 0 10E9/L (ref 0–0.2)
BASOPHILS NFR BLD AUTO: 0.1 %
BILIRUB DIRECT SERPL-MCNC: <0.1 MG/DL (ref 0–0.2)
BILIRUB SERPL-MCNC: 1 MG/DL (ref 0.2–1.3)
BUN SERPL-MCNC: 23 MG/DL (ref 7–30)
CALCIUM SERPL-MCNC: 8.6 MG/DL (ref 8.5–10.1)
CHLORIDE SERPL-SCNC: 107 MMOL/L (ref 94–109)
CO2 SERPL-SCNC: 24 MMOL/L (ref 20–32)
CREAT SERPL-MCNC: 1.03 MG/DL (ref 0.66–1.25)
DIFFERENTIAL METHOD BLD: NORMAL
EOSINOPHIL # BLD AUTO: 0.1 10E9/L (ref 0–0.7)
EOSINOPHIL NFR BLD AUTO: 1.3 %
ERYTHROCYTE [DISTWIDTH] IN BLOOD BY AUTOMATED COUNT: 13.2 % (ref 10–15)
GFR SERPL CREATININE-BSD FRML MDRD: 78 ML/MIN/{1.73_M2}
GLUCOSE BLDC GLUCOMTR-MCNC: 113 MG/DL (ref 70–99)
GLUCOSE BLDC GLUCOMTR-MCNC: 134 MG/DL (ref 70–99)
GLUCOSE BLDC GLUCOMTR-MCNC: 154 MG/DL (ref 70–99)
GLUCOSE SERPL-MCNC: 146 MG/DL (ref 70–99)
HCT VFR BLD AUTO: 43.6 % (ref 40–53)
HGB BLD-MCNC: 14.7 G/DL (ref 13.3–17.7)
IMM GRANULOCYTES # BLD: 0.1 10E9/L (ref 0–0.4)
IMM GRANULOCYTES NFR BLD: 0.5 %
INR PPP: 1.15 (ref 0.86–1.14)
LYMPHOCYTES # BLD AUTO: 1 10E9/L (ref 0.8–5.3)
LYMPHOCYTES NFR BLD AUTO: 9.5 %
MAGNESIUM SERPL-MCNC: 2.4 MG/DL (ref 1.6–2.3)
MCH RBC QN AUTO: 32 PG (ref 26.5–33)
MCHC RBC AUTO-ENTMCNC: 33.7 G/DL (ref 31.5–36.5)
MCV RBC AUTO: 95 FL (ref 78–100)
MONOCYTES # BLD AUTO: 1.1 10E9/L (ref 0–1.3)
MONOCYTES NFR BLD AUTO: 10.5 %
NEUTROPHILS # BLD AUTO: 7.9 10E9/L (ref 1.6–8.3)
NEUTROPHILS NFR BLD AUTO: 78.1 %
NRBC # BLD AUTO: 0 10*3/UL
NRBC BLD AUTO-RTO: 0 /100
PHOSPHATE SERPL-MCNC: 2.7 MG/DL (ref 2.5–4.5)
PLATELET # BLD AUTO: 192 10E9/L (ref 150–450)
POTASSIUM SERPL-SCNC: 3.4 MMOL/L (ref 3.4–5.3)
PROT SERPL-MCNC: 6.6 G/DL (ref 6.8–8.8)
RADIOLOGIST FLAGS: ABNORMAL
RADIOLOGIST FLAGS: NORMAL
RBC # BLD AUTO: 4.6 10E12/L (ref 4.4–5.9)
SODIUM SERPL-SCNC: 138 MMOL/L (ref 133–144)
WBC # BLD AUTO: 10.1 10E9/L (ref 4–11)

## 2021-01-24 PROCEDURE — 83735 ASSAY OF MAGNESIUM: CPT | Performed by: THORACIC SURGERY (CARDIOTHORACIC VASCULAR SURGERY)

## 2021-01-24 PROCEDURE — 93306 TTE W/DOPPLER COMPLETE: CPT | Mod: 26 | Performed by: INTERNAL MEDICINE

## 2021-01-24 PROCEDURE — 710N000010 HC RECOVERY PHASE 1, LEVEL 2, PER MIN: Performed by: THORACIC SURGERY (CARDIOTHORACIC VASCULAR SURGERY)

## 2021-01-24 PROCEDURE — 85025 COMPLETE CBC W/AUTO DIFF WBC: CPT | Performed by: THORACIC SURGERY (CARDIOTHORACIC VASCULAR SURGERY)

## 2021-01-24 PROCEDURE — C9113 INJ PANTOPRAZOLE SODIUM, VIA: HCPCS | Performed by: STUDENT IN AN ORGANIZED HEALTH CARE EDUCATION/TRAINING PROGRAM

## 2021-01-24 PROCEDURE — 999N000179 XR SURGERY CARM FLUORO LESS THAN 5 MIN W STILLS: Mod: TC

## 2021-01-24 PROCEDURE — 36592 COLLECT BLOOD FROM PICC: CPT | Performed by: THORACIC SURGERY (CARDIOTHORACIC VASCULAR SURGERY)

## 2021-01-24 PROCEDURE — 84100 ASSAY OF PHOSPHORUS: CPT | Performed by: THORACIC SURGERY (CARDIOTHORACIC VASCULAR SURGERY)

## 2021-01-24 PROCEDURE — C1769 GUIDE WIRE: HCPCS | Performed by: THORACIC SURGERY (CARDIOTHORACIC VASCULAR SURGERY)

## 2021-01-24 PROCEDURE — 258N000003 HC RX IP 258 OP 636: Performed by: STUDENT IN AN ORGANIZED HEALTH CARE EDUCATION/TRAINING PROGRAM

## 2021-01-24 PROCEDURE — 120N000002 HC R&B MED SURG/OB UMMC

## 2021-01-24 PROCEDURE — 258N000003 HC RX IP 258 OP 636

## 2021-01-24 PROCEDURE — 370N000017 HC ANESTHESIA TECHNICAL FEE, PER MIN: Performed by: THORACIC SURGERY (CARDIOTHORACIC VASCULAR SURGERY)

## 2021-01-24 PROCEDURE — 250N000009 HC RX 250: Performed by: THORACIC SURGERY (CARDIOTHORACIC VASCULAR SURGERY)

## 2021-01-24 PROCEDURE — 250N000025 HC SEVOFLURANE, PER MIN: Performed by: THORACIC SURGERY (CARDIOTHORACIC VASCULAR SURGERY)

## 2021-01-24 PROCEDURE — 80053 COMPREHEN METABOLIC PANEL: CPT | Performed by: THORACIC SURGERY (CARDIOTHORACIC VASCULAR SURGERY)

## 2021-01-24 PROCEDURE — 93306 TTE W/DOPPLER COMPLETE: CPT

## 2021-01-24 PROCEDURE — 250N000009 HC RX 250: Performed by: STUDENT IN AN ORGANIZED HEALTH CARE EDUCATION/TRAINING PROGRAM

## 2021-01-24 PROCEDURE — 272N000001 HC OR GENERAL SUPPLY STERILE: Performed by: THORACIC SURGERY (CARDIOTHORACIC VASCULAR SURGERY)

## 2021-01-24 PROCEDURE — 84134 ASSAY OF PREALBUMIN: CPT | Performed by: THORACIC SURGERY (CARDIOTHORACIC VASCULAR SURGERY)

## 2021-01-24 PROCEDURE — 82248 BILIRUBIN DIRECT: CPT | Performed by: THORACIC SURGERY (CARDIOTHORACIC VASCULAR SURGERY)

## 2021-01-24 PROCEDURE — 999N000141 HC STATISTIC PRE-PROCEDURE NURSING ASSESSMENT: Performed by: THORACIC SURGERY (CARDIOTHORACIC VASCULAR SURGERY)

## 2021-01-24 PROCEDURE — 43752 NASAL/OROGASTRIC W/TUBE PLMT: CPT | Mod: GC | Performed by: THORACIC SURGERY (CARDIOTHORACIC VASCULAR SURGERY)

## 2021-01-24 PROCEDURE — 999N001017 HC STATISTIC GLUCOSE BY METER IP

## 2021-01-24 PROCEDURE — 85610 PROTHROMBIN TIME: CPT | Performed by: THORACIC SURGERY (CARDIOTHORACIC VASCULAR SURGERY)

## 2021-01-24 PROCEDURE — 250N000011 HC RX IP 250 OP 636: Performed by: STUDENT IN AN ORGANIZED HEALTH CARE EDUCATION/TRAINING PROGRAM

## 2021-01-24 PROCEDURE — 360N000075 HC SURGERY LEVEL 2, PER MIN: Performed by: THORACIC SURGERY (CARDIOTHORACIC VASCULAR SURGERY)

## 2021-01-24 RX ORDER — FENTANYL CITRATE 50 UG/ML
INJECTION, SOLUTION INTRAMUSCULAR; INTRAVENOUS PRN
Status: DISCONTINUED | OUTPATIENT
Start: 2021-01-24 | End: 2021-01-24

## 2021-01-24 RX ORDER — LABETALOL HYDROCHLORIDE 5 MG/ML
10 INJECTION, SOLUTION INTRAVENOUS
Status: DISCONTINUED | OUTPATIENT
Start: 2021-01-24 | End: 2021-01-24 | Stop reason: HOSPADM

## 2021-01-24 RX ORDER — PROPOFOL 10 MG/ML
INJECTION, EMULSION INTRAVENOUS PRN
Status: DISCONTINUED | OUTPATIENT
Start: 2021-01-24 | End: 2021-01-24

## 2021-01-24 RX ORDER — ONDANSETRON 2 MG/ML
4 INJECTION INTRAMUSCULAR; INTRAVENOUS EVERY 30 MIN PRN
Status: DISCONTINUED | OUTPATIENT
Start: 2021-01-24 | End: 2021-01-24

## 2021-01-24 RX ORDER — NALOXONE HYDROCHLORIDE 0.4 MG/ML
0.4 INJECTION, SOLUTION INTRAMUSCULAR; INTRAVENOUS; SUBCUTANEOUS
Status: DISCONTINUED | OUTPATIENT
Start: 2021-01-24 | End: 2021-01-24

## 2021-01-24 RX ORDER — FENTANYL CITRATE 50 UG/ML
25-50 INJECTION, SOLUTION INTRAMUSCULAR; INTRAVENOUS
Status: DISCONTINUED | OUTPATIENT
Start: 2021-01-24 | End: 2021-01-24 | Stop reason: HOSPADM

## 2021-01-24 RX ORDER — SODIUM CHLORIDE, SODIUM LACTATE, POTASSIUM CHLORIDE, CALCIUM CHLORIDE 600; 310; 30; 20 MG/100ML; MG/100ML; MG/100ML; MG/100ML
INJECTION, SOLUTION INTRAVENOUS CONTINUOUS
Status: DISCONTINUED | OUTPATIENT
Start: 2021-01-24 | End: 2021-01-24 | Stop reason: HOSPADM

## 2021-01-24 RX ORDER — LIDOCAINE HYDROCHLORIDE 20 MG/ML
INJECTION, SOLUTION INFILTRATION; PERINEURAL PRN
Status: DISCONTINUED | OUTPATIENT
Start: 2021-01-24 | End: 2021-01-24

## 2021-01-24 RX ORDER — HYDROMORPHONE HYDROCHLORIDE 1 MG/ML
.3-.5 INJECTION, SOLUTION INTRAMUSCULAR; INTRAVENOUS; SUBCUTANEOUS EVERY 5 MIN PRN
Status: DISCONTINUED | OUTPATIENT
Start: 2021-01-24 | End: 2021-01-24 | Stop reason: HOSPADM

## 2021-01-24 RX ORDER — NALOXONE HYDROCHLORIDE 0.4 MG/ML
0.2 INJECTION, SOLUTION INTRAMUSCULAR; INTRAVENOUS; SUBCUTANEOUS
Status: DISCONTINUED | OUTPATIENT
Start: 2021-01-24 | End: 2021-01-24

## 2021-01-24 RX ORDER — ONDANSETRON 4 MG/1
4 TABLET, ORALLY DISINTEGRATING ORAL EVERY 30 MIN PRN
Status: DISCONTINUED | OUTPATIENT
Start: 2021-01-24 | End: 2021-01-24

## 2021-01-24 RX ORDER — ONDANSETRON 2 MG/ML
INJECTION INTRAMUSCULAR; INTRAVENOUS PRN
Status: DISCONTINUED | OUTPATIENT
Start: 2021-01-24 | End: 2021-01-24

## 2021-01-24 RX ORDER — DEXAMETHASONE SODIUM PHOSPHATE 4 MG/ML
INJECTION, SOLUTION INTRA-ARTICULAR; INTRALESIONAL; INTRAMUSCULAR; INTRAVENOUS; SOFT TISSUE PRN
Status: DISCONTINUED | OUTPATIENT
Start: 2021-01-24 | End: 2021-01-24

## 2021-01-24 RX ADMIN — PHENYLEPHRINE HYDROCHLORIDE 100 MCG: 10 INJECTION INTRAVENOUS at 11:41

## 2021-01-24 RX ADMIN — PROPOFOL 20 MG: 10 INJECTION, EMULSION INTRAVENOUS at 11:46

## 2021-01-24 RX ADMIN — PHENYLEPHRINE HYDROCHLORIDE 50 MCG: 10 INJECTION INTRAVENOUS at 11:59

## 2021-01-24 RX ADMIN — PROPOFOL 20 MG: 10 INJECTION, EMULSION INTRAVENOUS at 11:53

## 2021-01-24 RX ADMIN — ASCORBIC ACID, VITAMIN A PALMITATE, CHOLECALCIFEROL, THIAMINE HYDROCHLORIDE, RIBOFLAVIN-5 PHOSPHATE SODIUM, PYRIDOXINE HYDROCHLORIDE, NIACINAMIDE, DEXPANTHENOL, ALPHA-TOCOPHEROL ACETATE, VITAMIN K1, FOLIC ACID, BIOTIN, CYANOCOBALAMIN: 200; 3300; 200; 6; 3.6; 6; 40; 15; 10; 150; 600; 60; 5 INJECTION, SOLUTION INTRAVENOUS at 20:10

## 2021-01-24 RX ADMIN — I.V. FAT EMULSION 250 ML: 20 EMULSION INTRAVENOUS at 20:10

## 2021-01-24 RX ADMIN — ONDANSETRON 4 MG: 2 INJECTION INTRAMUSCULAR; INTRAVENOUS at 11:52

## 2021-01-24 RX ADMIN — PANTOPRAZOLE SODIUM 40 MG: 40 INJECTION, POWDER, FOR SOLUTION INTRAVENOUS at 20:10

## 2021-01-24 RX ADMIN — DEXAMETHASONE SODIUM PHOSPHATE 4 MG: 4 INJECTION, SOLUTION INTRA-ARTICULAR; INTRALESIONAL; INTRAMUSCULAR; INTRAVENOUS; SOFT TISSUE at 11:51

## 2021-01-24 RX ADMIN — FENTANYL CITRATE 100 MCG: 50 INJECTION, SOLUTION INTRAMUSCULAR; INTRAVENOUS at 11:32

## 2021-01-24 RX ADMIN — PHENYLEPHRINE HYDROCHLORIDE 100 MCG: 10 INJECTION INTRAVENOUS at 11:56

## 2021-01-24 RX ADMIN — SODIUM CHLORIDE: 9 INJECTION, SOLUTION INTRAVENOUS at 06:11

## 2021-01-24 RX ADMIN — LIDOCAINE HYDROCHLORIDE 100 MG: 20 INJECTION, SOLUTION INFILTRATION; PERINEURAL at 11:32

## 2021-01-24 RX ADMIN — PANTOPRAZOLE SODIUM 40 MG: 40 INJECTION, POWDER, FOR SOLUTION INTRAVENOUS at 08:38

## 2021-01-24 RX ADMIN — PROPOFOL 150 MG: 10 INJECTION, EMULSION INTRAVENOUS at 11:32

## 2021-01-24 ASSESSMENT — ACTIVITIES OF DAILY LIVING (ADL)
ADLS_ACUITY_SCORE: 12
ADLS_ACUITY_SCORE: 13

## 2021-01-24 ASSESSMENT — MIFFLIN-ST. JEOR: SCORE: 1657.21

## 2021-01-24 ASSESSMENT — ENCOUNTER SYMPTOMS: DYSRHYTHMIAS: 1

## 2021-01-24 NOTE — PROGRESS NOTES
CLINICAL NUTRITION SERVICES - ASSESSMENT NOTE     Nutrition Prescription    RECOMMENDATIONS FOR MDs/PROVIDERS TO ORDER:   --Decrease or d/c  IVF with TPN support to avoid volume up trend. Patient is currently receiving 2 liters of volume daily   --Ongoing Lyte monitoring, refeeding precaution    Malnutrition Status:    Non-Severe malnutrition In the context of acute illness or injury    Recommendations already ordered by Registered Dietitian (RD):  No changes to TPN support today     Future/Additional Recommendations:  Monitor daily wt and weekly LFT's   IF tolerating TPN without signs of refeeding x 24 hours, may consider to start an 18 hour PN cycle.        REASON FOR ASSESSMENT  Delroy Christianson is a/an 61 year old male assessed by the dietitian for Pharmacy/Nutrition to Start and Manage PN, indication: bowel obstruction     Chart reviewed: Forwarded surgery note below:   PMH: HTN, stage II-III CKD (Cr 1.06), hypertrophic cardiomyopathy, prolonged QTc, and known hiatal hernia,    --Presented to [facility] ED on 1/20/2021 with nausea and vomiting.   -CXR, AXR and small bowel follow through were consistent with hiatal hernia.    --The patient was transferred to [facility] on 1/23/21 for consideration of repair of hiatal hernia.    NUTRITION HISTORY  --Presented with N/V to OSH. Now direct admit from [facility]    --Information was obtained from the [facility] RD note on 1/22 + 1/23:   --Typical diet at home: regular diet   - Typical food/fluid intake PTA: pt states that he was eating normally up until Tuesday night when he felt a pain in his abdomen and experienced increased nausea/vomiting. Typically consumes 3 meals per day  - Supplements: none   - Chewing/swallowing difficulty: none   - Food allergies: NKFA    --Started on Peripheral Clinimix Per Surgery recommendation on 1/22, Switched over to central TPN on 1/23.      --TPN started 1/23 @ OSH  --Access: PICC placed 1/23  --Dosing wt: 83 kg dry admit wt on 1/21 to OSH  --PN: Clinimix  "E (D15, AA5), 2 liters daily @ 83 ml/hr  --Provision: 1920 kcal/day ( 23 kcal/kg), 100 gm AA (1.2 gm/kg), 300 gm dextrose/day (GIR: 2.5 mg/kg/min) with 26% daily lipids.     --MVI: Infuvite 10 ml daily, Trace elements: 1 ml daily     CURRENT NUTRITION ORDERS  Diet: NPO, NG to gravity drainage  (1900 ml NG output in 1 hour)    Intake/Tolerance: Restarted on TPN last night  (Standard Clinimix + lipid started), PICC placed for access,  NG kept to LIS.     IVF: NS at 100 ml/h.     LABS  K+:3.4, Mg++:2.4(elevated), Phos: 2.7 -all acceptable range   B (H)  LFT's: Alk phos:49, ALT:26, AST:22  T ()  BUN:23, Cr:1.03 -> both normal range   Prealbumin:17 (),     MEDICATIONS  Medications reviewed    ANTHROPOMETRICS  Height: 180.3 cm (5' 11\")  Most Recent Weight: 82.5 kg ( 181 lbs 14.4 oz) dry admit wt on  to OSH  IBW: 78 kg ( 106% IBW)  BMI: 25.4 kg /m2  Overweight BMI 25-29.9  Weight History:   Wt Readings from Last 10 Encounters:   21 84.7 kg (186 lb 12.8 oz)   21 85.7 kg (188 lb 14.4 oz)   20 84 kg (185 lb 3.2 oz)   16 90.3 kg (199 lb)   08/10/15 90.7 kg (200 lb)       Dosing Weight: 83 kg dry admit wt to OSH on , TPN dosing wt    ASSESSED NUTRITION NEEDS  Estimated Energy Needs: 3996-3415 kcals/day (20 - 25 kcals/kg)  Justification: Maintenance, lower end for TPN maintenance   Estimated Protein Needs:  - 125 grams protein/day (1.0- 1.2 - 1.5 grams of pro/kg)  Justification: Higher end for post op status pending CKD status  Estimated Fluid Needs: (1 mL/kcal)   Justification: Maintenance    PHYSICAL FINDINGS  EXTREMITIES: No peripheral edema.     MALNUTRITION  % Intake: Decreased intake does not meet criteria  % Weight Loss: Wt loss does not meet criteria   Subcutaneous Fat Loss: Orbital region mild depletion and Upper arm region mild depletion  Muscle Loss: Temporal region mild depletion, clavicle region mild depletion  Fluid Accumulation/Edema: None " noted  Malnutrition Diagnosis Non-Severe malnutrition In the context of acute illness or injury    NUTRITION DIAGNOSIS  Inadequate oral intake related to Gastric outlet obstruction associated with hiatal hernia, hindering patients ability to tolerate PO as evidenced by Kept NPO since admit to OSH on 1/21, NG for venting with TPN support to meet nutrition needs.       INTERVENTIONS  Implementation  Nutrition Education: No education needs assessed at this time   Parenteral Nutrition/IV Fluids - No changes today     Goals  Total avg nutritional intake to meet a minimum of 20 kcal/kg and 1.0 PRO/kg daily (per dosing wt 83 kg dry admit wt at OSH on 1/21).     Monitoring/Evaluation  Progress toward goals will be monitored and evaluated per protocol.    Deny Jaramillo RD/ASHLI  Weekend / Holiday Pager [phone]

## 2021-01-24 NOTE — PLAN OF CARE
Vitals:    21 1230 21 1245 21 1300 21 1315   BP: (!) 141/105 137/89 (!) 150/97 (!) 154/104   BP Location:       Pulse: 89 92 84 86   Resp: 18 18 16 14   Temp:  99.3  F (37.4  C)     TempSrc:  Oral     SpO2: 94% 92% 95% 95%   Weight:       Height:           Neuro: alert and oriented, pt arrived from PACU after having EGD and NG tube placed sutured,     VS:afebrile hypertensive, sating 95% on Capnography     B-136    Cardiac: 86    Pain/Nausea: denies any nausea, complain of throat discomfort, Chloraseptic spray given,   pt received 100 mcg fentanyl down in PACU,     Lines/Drains: right double lumen PICC, TPN and lipid infusing,  NG sutured, small blood around tube site, cleaned,     GI/: voided 450 on arrival, No BM yet, hypo  BS,     Diet/Appetite: NPO     Activity: up out of bed ambulated  with stand by assist, OT PT ordered     Plan:  continue with plan of care.

## 2021-01-24 NOTE — PLAN OF CARE
Neuro: A&Ox4.   Cardiac:  VSS.   Respiratory: Sating WNL on RA.  GI/: Adequate urine output. No BM. MD placed order to put NG to gravity drainage and then later put back to LIS. Has had 50 mls out tonight,  Diet/appetite: NPO   Activity:  Bedrest overnight. Pt instructed to move around in bed to prevent breakdown.  Pain: Denies.  Skin: Redness on sacrum and heels.  LDA's:  DL PICC with TPN, lipids, and NS infusing.    Plan: Continue with POC. Notify primary team with changes.

## 2021-01-24 NOTE — PROGRESS NOTES
2 person skin check done with Marcellus Quesada and myself. Blanchable redness of sacrum and heels.

## 2021-01-24 NOTE — PHARMACY-ADMISSION MEDICATION HISTORY
Admission Medication History Completed by Pharmacy    See [facility] Admission Navigator for allergy information, preferred outpatient pharmacy, prior to admission medications and immunization status.     Medication History Sources:     Admission medication history documentation by Karolyn Cohen Rph from [facility] on 1/21/2021    Discharge summary documentation from CHEYENNE Hurtado from [facility] on 1/23/2021    Changes made to PTA medication list (reason):    Added: None    Deleted:   o Lipids (not a PTA med)  o Metoprolol IV (not a PTA med)  o TPN (not a PTA med)    Changed: None    Additional Information:    Pt was transferred from OSH. Per discharge summary, aspirin and spironolactone were held at OSH.    Prior to Admission medications    Medication Sig Last Dose Taking? Auth Provider   aspirin (ASA) 325 MG EC tablet Take 325 mg by mouth as needed for moderate pain Unknown at Unknown Yes Unknown, Entered By History   calcium carbonate (TUMS) 500 MG chewable tablet Take 1 chew tab by mouth as needed for heartburn Unknown at Unknown Yes Unknown, Entered By History   spironolactone (ALDACTONE) 25 MG tablet Take 1 tablet (25 mg) by mouth daily Unknown at Unknown Yes Chuy Holliday MD       Date completed: 01/24/21    Medication history completed by: Neris Braden [facility]

## 2021-01-24 NOTE — PROGRESS NOTES
"Thoracic Surgery Progress Note    Subjective: Doing well today, nervous he has never had surgery     Objective:   BP (!) 149/90 (BP Location: Left arm)   Pulse 78   Temp 96.2  F (35.7  C) (Axillary)   Resp 16   Ht 1.803 m (5' 11\")   Wt 84.7 kg (186 lb 12.8 oz)   SpO2 92%   BMI 26.05 kg/m      PE:  Gen: Alert, oriented,   CV: Regular rate and non-cyanotic appearing   Resp: non-labored breathing on room air  Abd: Soft, non-distended, non-tender, no rigidity or guarding  Ext: warm and well perfused    Intake/Output Summary (Last 24 hours) at 1/24/2021 0714  Last data filed at 1/24/2021 0600  Gross per 24 hour   Intake 2208 ml   Output 950 ml   Net 1258 ml       Recent labs reviewed.    Recent imaging reviewed.    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 He came into [facility] ED on 1/20/21 d/t N/V. CXR, AXR and small bowel follow through were consistent with hiatal hernia. There are no signs of strangulation at the moment however imaging shows worsening hiatal hernia.        - LR at 100   -TPN 83mL/hr  - EGD and fluoroscopic guided NGT placement in OR  - Strict NPO  - NGT to LIS  - TTE  - PT/OT  - Protonix 40 BID      Patient seen and discussed with Dr. Jernigan.    Izabel Carrera MD  General Surgery, PGY-1               "

## 2021-01-24 NOTE — PROCEDURES
[facility] Brief Operative Note    Pre-operative diagnosis: Hiatal hernia [K44.9]   Post-operative diagnosis Same    Procedure: Procedure(s):  ESOPHAGOGASTRODUODENOSCOPY (EGD) WITH FLUOROSCOPIC GUIDED PLACEMENT OF NASOGASTRIC TUBE   Surgeon: Deejay Jernigan MD   Assistants(s): Izabel Carrera MD   Estimated blood loss: None    Specimens: None     Izabel Carrera MD  General Surgery, PGY-1

## 2021-01-24 NOTE — ANESTHESIA PROCEDURE NOTES
Airway   Date/Time: 1/24/2021 11:36 AM   Patient location during procedure: OR  Staff -   Anesthesiologist:  Zbigniew Stout MD  Resident/Fellow: Juan Luis Holly MD  Performed By: resident    Consent for Airway   Urgency: elective    Indications and Patient Condition  Indications for airway management: arjun-procedural  Induction type:RSIMask difficulty assessment: 0 - not attempted    Final Airway Details  Final airway type: endotracheal airway  Successful airway:ETT - single  Endotracheal Airway Details   ETT size (mm): 7.5  Cuffed: yes  Successful intubation technique: direct laryngoscopy  Grade View of Cords: 1  Adjucts: stylet  Measured from: gums/teeth  Secured at (cm): 23  Secured with: pink tape  Bite block used: None    Post intubation assessment   Placement verified by: capnometry, equal breath sounds and chest rise   Number of attempts at approach: 1  Secured with:pink tape  Ease of procedure: easy  Dentition: Intact and Unchanged

## 2021-01-24 NOTE — ANESTHESIA CARE TRANSFER NOTE
Patient: Delroy Christianson    Procedure(s):  ESOPHAGOGASTRODUODENOSCOPY (EGD) WITH FLUOROSCOPIC GUIDED PLACEMENT OF NASOGASTRIC TUBE    Diagnosis: Hiatal hernia [K44.9]  Diagnosis Additional Information: No value filed.    Anesthesia Type:   General     Note:    Oropharynx: oropharynx clear of all foreign objects and nasal airway in place  Level of Consciousness: drowsy  Oxygen Supplementation: nasal cannula  Level of Supplemental Oxygen: 4  Independent Airway: airway patency satisfactory and stable  Dentition: dentition unchanged  Vital Signs Stable: post-procedure vital signs reviewed and stable  Report to RN Given: handoff report given  Patient transferred to: PACU  Comments: Pt extubated after following commands and adequately ventilating independently. Was brought to PACU with NC in place. VSS on arrival.  Handoff Report: Identifed the Patient, Identified the Reponsible Provider, Reviewed the pertinent medical history, Discussed the surgical course, Reviewed Intra-OP anesthesia mangement and issues during anesthesia, Set expectations for post-procedure period and Allowed opportunity for questions and acknowledgement of understanding      Vitals: (Last set prior to Anesthesia Care Transfer)  CRNA VITALS  1/24/2021 1150 - 1/24/2021 1226      1/24/2021             NIBP:  (!) 144/99    Ht Rate:  90    SpO2:  93 %        Electronically Signed By: Juan Luis Holly MD  January 24, 2021  12:26 PM

## 2021-01-24 NOTE — PLAN OF CARE
7B - Per discussion with RN, plan to go to OR today. Will HOLD PT evaluation until after procedure.

## 2021-01-24 NOTE — PLAN OF CARE
OT/7B: Evaluation received however per PT patient going to OR today - will reschedule evaluation as appropriate.

## 2021-01-24 NOTE — ANESTHESIA POSTPROCEDURE EVALUATION
Patient: Delroy Christianson    Procedure(s):  ESOPHAGOGASTRODUODENOSCOPY (EGD) WITH FLUOROSCOPIC GUIDED PLACEMENT OF NASOGASTRIC TUBE    Diagnosis:Hiatal hernia [K44.9]  Diagnosis Additional Information: No value filed.    Anesthesia Type:  General    Note:  Disposition: Outpatient   Postop Pain Control: Uneventful            Sign Out: Well controlled pain   PONV: No   Neuro/Psych: Uneventful            Sign Out: Acceptable/Baseline neuro status   Airway/Respiratory: Uneventful            Sign Out: Acceptable/Baseline resp. status   CV/Hemodynamics: Uneventful            Sign Out: Acceptable CV status   Other NRE: NONE   DID A NON-ROUTINE EVENT OCCUR? No         Last vitals:  Vitals:    01/24/21 1408 01/24/21 1415 01/24/21 1417   BP: (!) 156/99 (!) 139/95 (!) 149/92   Pulse: 82 83 86   Resp: 16 16 16   Temp: 37.2  C (98.9  F)     SpO2: 95% 98% 95%       Electronically Signed By: Zbigniew Stout MD  January 24, 2021  2:33 PM

## 2021-01-25 ENCOUNTER — APPOINTMENT (OUTPATIENT)
Dept: GENERAL RADIOLOGY | Facility: CLINIC | Age: 61
End: 2021-01-25
Attending: STUDENT IN AN ORGANIZED HEALTH CARE EDUCATION/TRAINING PROGRAM
Payer: COMMERCIAL

## 2021-01-25 ENCOUNTER — ANESTHESIA (OUTPATIENT)
Dept: SURGERY | Facility: CLINIC | Age: 61
End: 2021-01-25
Payer: COMMERCIAL

## 2021-01-25 ENCOUNTER — APPOINTMENT (OUTPATIENT)
Dept: OCCUPATIONAL THERAPY | Facility: CLINIC | Age: 61
End: 2021-01-25
Attending: STUDENT IN AN ORGANIZED HEALTH CARE EDUCATION/TRAINING PROGRAM
Payer: COMMERCIAL

## 2021-01-25 ENCOUNTER — ANESTHESIA EVENT (OUTPATIENT)
Dept: SURGERY | Facility: CLINIC | Age: 61
End: 2021-01-25
Payer: COMMERCIAL

## 2021-01-25 LAB
ALBUMIN SERPL-MCNC: 2.6 G/DL (ref 3.4–5)
ALP SERPL-CCNC: 42 U/L (ref 40–150)
ALT SERPL W P-5'-P-CCNC: 34 U/L (ref 0–70)
ANION GAP SERPL CALCULATED.3IONS-SCNC: 5 MMOL/L (ref 3–14)
AST SERPL W P-5'-P-CCNC: 20 U/L (ref 0–45)
BASOPHILS # BLD AUTO: 0 10E9/L (ref 0–0.2)
BASOPHILS NFR BLD AUTO: 0.1 %
BILIRUB SERPL-MCNC: 0.3 MG/DL (ref 0.2–1.3)
BUN SERPL-MCNC: 21 MG/DL (ref 7–30)
CALCIUM SERPL-MCNC: 8.5 MG/DL (ref 8.5–10.1)
CHLORIDE SERPL-SCNC: 110 MMOL/L (ref 94–109)
CO2 SERPL-SCNC: 26 MMOL/L (ref 20–32)
CREAT SERPL-MCNC: 0.98 MG/DL (ref 0.66–1.25)
DIFFERENTIAL METHOD BLD: ABNORMAL
EOSINOPHIL # BLD AUTO: 0 10E9/L (ref 0–0.7)
EOSINOPHIL NFR BLD AUTO: 0.5 %
ERYTHROCYTE [DISTWIDTH] IN BLOOD BY AUTOMATED COUNT: 13.1 % (ref 10–15)
GFR SERPL CREATININE-BSD FRML MDRD: 83 ML/MIN/{1.73_M2}
GLUCOSE BLDC GLUCOMTR-MCNC: 108 MG/DL (ref 70–99)
GLUCOSE BLDC GLUCOMTR-MCNC: 108 MG/DL (ref 70–99)
GLUCOSE BLDC GLUCOMTR-MCNC: 139 MG/DL (ref 70–99)
GLUCOSE BLDC GLUCOMTR-MCNC: 84 MG/DL (ref 70–99)
GLUCOSE BLDC GLUCOMTR-MCNC: 99 MG/DL (ref 70–99)
GLUCOSE SERPL-MCNC: 132 MG/DL (ref 70–99)
HCT VFR BLD AUTO: 38.2 % (ref 40–53)
HGB BLD-MCNC: 12.5 G/DL (ref 13.3–17.7)
IMM GRANULOCYTES # BLD: 0.1 10E9/L (ref 0–0.4)
IMM GRANULOCYTES NFR BLD: 0.6 %
INR PPP: 1.13 (ref 0.86–1.14)
LYMPHOCYTES # BLD AUTO: 1.1 10E9/L (ref 0.8–5.3)
LYMPHOCYTES NFR BLD AUTO: 12.4 %
MAGNESIUM SERPL-MCNC: 2.3 MG/DL (ref 1.6–2.3)
MAGNESIUM SERPL-MCNC: 2.4 MG/DL (ref 1.6–2.3)
MCH RBC QN AUTO: 30.6 PG (ref 26.5–33)
MCHC RBC AUTO-ENTMCNC: 32.7 G/DL (ref 31.5–36.5)
MCV RBC AUTO: 94 FL (ref 78–100)
MONOCYTES # BLD AUTO: 1.2 10E9/L (ref 0–1.3)
MONOCYTES NFR BLD AUTO: 14.3 %
NEUTROPHILS # BLD AUTO: 6.1 10E9/L (ref 1.6–8.3)
NEUTROPHILS NFR BLD AUTO: 72.1 %
NRBC # BLD AUTO: 0 10*3/UL
NRBC BLD AUTO-RTO: 0 /100
PHOSPHATE SERPL-MCNC: 2.8 MG/DL (ref 2.5–4.5)
PHOSPHATE SERPL-MCNC: 3.3 MG/DL (ref 2.5–4.5)
PLATELET # BLD AUTO: 196 10E9/L (ref 150–450)
POTASSIUM SERPL-SCNC: 3.3 MMOL/L (ref 3.4–5.3)
POTASSIUM SERPL-SCNC: 3.4 MMOL/L (ref 3.4–5.3)
POTASSIUM SERPL-SCNC: 3.6 MMOL/L (ref 3.4–5.3)
PREALB SERPL IA-MCNC: 18 MG/DL (ref 15–45)
PREALB SERPL IA-MCNC: 18 MG/DL (ref 15–45)
PROT SERPL-MCNC: 6.2 G/DL (ref 6.8–8.8)
RBC # BLD AUTO: 4.08 10E12/L (ref 4.4–5.9)
SODIUM SERPL-SCNC: 140 MMOL/L (ref 133–144)
WBC # BLD AUTO: 8.5 10E9/L (ref 4–11)

## 2021-01-25 PROCEDURE — C9113 INJ PANTOPRAZOLE SODIUM, VIA: HCPCS | Performed by: STUDENT IN AN ORGANIZED HEALTH CARE EDUCATION/TRAINING PROGRAM

## 2021-01-25 PROCEDURE — 84132 ASSAY OF SERUM POTASSIUM: CPT | Performed by: THORACIC SURGERY (CARDIOTHORACIC VASCULAR SURGERY)

## 2021-01-25 PROCEDURE — 84100 ASSAY OF PHOSPHORUS: CPT | Performed by: STUDENT IN AN ORGANIZED HEALTH CARE EDUCATION/TRAINING PROGRAM

## 2021-01-25 PROCEDURE — 84134 ASSAY OF PREALBUMIN: CPT | Performed by: THORACIC SURGERY (CARDIOTHORACIC VASCULAR SURGERY)

## 2021-01-25 PROCEDURE — 85610 PROTHROMBIN TIME: CPT | Performed by: THORACIC SURGERY (CARDIOTHORACIC VASCULAR SURGERY)

## 2021-01-25 PROCEDURE — 250N000013 HC RX MED GY IP 250 OP 250 PS 637: Performed by: STUDENT IN AN ORGANIZED HEALTH CARE EDUCATION/TRAINING PROGRAM

## 2021-01-25 PROCEDURE — 80053 COMPREHEN METABOLIC PANEL: CPT | Performed by: THORACIC SURGERY (CARDIOTHORACIC VASCULAR SURGERY)

## 2021-01-25 PROCEDURE — 250N000011 HC RX IP 250 OP 636: Performed by: STUDENT IN AN ORGANIZED HEALTH CARE EDUCATION/TRAINING PROGRAM

## 2021-01-25 PROCEDURE — 84132 ASSAY OF SERUM POTASSIUM: CPT | Performed by: STUDENT IN AN ORGANIZED HEALTH CARE EDUCATION/TRAINING PROGRAM

## 2021-01-25 PROCEDURE — 0DJ08ZZ INSPECTION OF UPPER INTESTINAL TRACT, VIA NATURAL OR ARTIFICIAL OPENING ENDOSCOPIC: ICD-10-PCS | Performed by: THORACIC SURGERY (CARDIOTHORACIC VASCULAR SURGERY)

## 2021-01-25 PROCEDURE — 97165 OT EVAL LOW COMPLEX 30 MIN: CPT | Mod: GO

## 2021-01-25 PROCEDURE — 250N000013 HC RX MED GY IP 250 OP 250 PS 637: Performed by: THORACIC SURGERY (CARDIOTHORACIC VASCULAR SURGERY)

## 2021-01-25 PROCEDURE — 999N000147 HC STATISTIC PT IP EVAL DEFER

## 2021-01-25 PROCEDURE — 83735 ASSAY OF MAGNESIUM: CPT | Performed by: STUDENT IN AN ORGANIZED HEALTH CARE EDUCATION/TRAINING PROGRAM

## 2021-01-25 PROCEDURE — 97530 THERAPEUTIC ACTIVITIES: CPT | Mod: GO

## 2021-01-25 PROCEDURE — 36592 COLLECT BLOOD FROM PICC: CPT | Performed by: THORACIC SURGERY (CARDIOTHORACIC VASCULAR SURGERY)

## 2021-01-25 PROCEDURE — 85025 COMPLETE CBC W/AUTO DIFF WBC: CPT | Performed by: THORACIC SURGERY (CARDIOTHORACIC VASCULAR SURGERY)

## 2021-01-25 PROCEDURE — 83735 ASSAY OF MAGNESIUM: CPT | Performed by: THORACIC SURGERY (CARDIOTHORACIC VASCULAR SURGERY)

## 2021-01-25 PROCEDURE — 258N000003 HC RX IP 258 OP 636

## 2021-01-25 PROCEDURE — 36592 COLLECT BLOOD FROM PICC: CPT | Performed by: STUDENT IN AN ORGANIZED HEALTH CARE EDUCATION/TRAINING PROGRAM

## 2021-01-25 PROCEDURE — 99223 1ST HOSP IP/OBS HIGH 75: CPT | Mod: GC | Performed by: STUDENT IN AN ORGANIZED HEALTH CARE EDUCATION/TRAINING PROGRAM

## 2021-01-25 PROCEDURE — 250N000009 HC RX 250: Performed by: THORACIC SURGERY (CARDIOTHORACIC VASCULAR SURGERY)

## 2021-01-25 PROCEDURE — 84100 ASSAY OF PHOSPHORUS: CPT | Performed by: THORACIC SURGERY (CARDIOTHORACIC VASCULAR SURGERY)

## 2021-01-25 PROCEDURE — 97535 SELF CARE MNGMENT TRAINING: CPT | Mod: GO

## 2021-01-25 PROCEDURE — 120N000002 HC R&B MED SURG/OB UMMC

## 2021-01-25 PROCEDURE — 71045 X-RAY EXAM CHEST 1 VIEW: CPT

## 2021-01-25 PROCEDURE — 0D9670Z DRAINAGE OF STOMACH WITH DRAINAGE DEVICE, VIA NATURAL OR ARTIFICIAL OPENING: ICD-10-PCS | Performed by: THORACIC SURGERY (CARDIOTHORACIC VASCULAR SURGERY)

## 2021-01-25 PROCEDURE — 71045 X-RAY EXAM CHEST 1 VIEW: CPT | Mod: 26 | Performed by: RADIOLOGY

## 2021-01-25 PROCEDURE — 999N001017 HC STATISTIC GLUCOSE BY METER IP

## 2021-01-25 RX ORDER — SPIRONOLACTONE 25 MG/1
25 TABLET ORAL DAILY
Status: DISCONTINUED | OUTPATIENT
Start: 2021-01-25 | End: 2021-02-06 | Stop reason: HOSPADM

## 2021-01-25 RX ORDER — POTASSIUM CHLORIDE 750 MG/1
20 TABLET, EXTENDED RELEASE ORAL ONCE
Status: DISCONTINUED | OUTPATIENT
Start: 2021-01-25 | End: 2021-01-29

## 2021-01-25 RX ORDER — POTASSIUM CHLORIDE 7.45 MG/ML
10 INJECTION INTRAVENOUS
Status: COMPLETED | OUTPATIENT
Start: 2021-01-25 | End: 2021-01-25

## 2021-01-25 RX ORDER — POTASSIUM CHLORIDE 750 MG/1
20 TABLET, EXTENDED RELEASE ORAL ONCE
Status: COMPLETED | OUTPATIENT
Start: 2021-01-25 | End: 2021-01-25

## 2021-01-25 RX ORDER — POTASSIUM CHLORIDE 7.45 MG/ML
10 INJECTION INTRAVENOUS
Status: CANCELLED | OUTPATIENT
Start: 2021-01-25 | End: 2021-01-25

## 2021-01-25 RX ADMIN — POTASSIUM CHLORIDE 10 MEQ: 7.46 INJECTION, SOLUTION INTRAVENOUS at 13:44

## 2021-01-25 RX ADMIN — SPIRONOLACTONE 25 MG: 25 TABLET ORAL at 17:24

## 2021-01-25 RX ADMIN — Medication: at 19:52

## 2021-01-25 RX ADMIN — SODIUM CHLORIDE: 9 INJECTION, SOLUTION INTRAVENOUS at 08:03

## 2021-01-25 RX ADMIN — I.V. FAT EMULSION 250 ML: 20 EMULSION INTRAVENOUS at 19:52

## 2021-01-25 RX ADMIN — PANTOPRAZOLE SODIUM 40 MG: 40 INJECTION, POWDER, FOR SOLUTION INTRAVENOUS at 19:51

## 2021-01-25 RX ADMIN — POTASSIUM CHLORIDE 10 MEQ: 7.46 INJECTION, SOLUTION INTRAVENOUS at 15:00

## 2021-01-25 RX ADMIN — POTASSIUM CHLORIDE 10 MEQ: 7.46 INJECTION, SOLUTION INTRAVENOUS at 12:41

## 2021-01-25 RX ADMIN — POTASSIUM CHLORIDE 20 MEQ: 750 TABLET, EXTENDED RELEASE ORAL at 22:29

## 2021-01-25 RX ADMIN — POTASSIUM CHLORIDE 10 MEQ: 7.46 INJECTION, SOLUTION INTRAVENOUS at 11:24

## 2021-01-25 RX ADMIN — PANTOPRAZOLE SODIUM 40 MG: 40 INJECTION, POWDER, FOR SOLUTION INTRAVENOUS at 08:10

## 2021-01-25 ASSESSMENT — ACTIVITIES OF DAILY LIVING (ADL)
ADLS_ACUITY_SCORE: 12
PREVIOUS_RESPONSIBILITIES: MEAL PREP;HOUSEKEEPING;LAUNDRY;SHOPPING;DRIVING;MEDICATION MANAGEMENT;FINANCES
ADLS_ACUITY_SCORE: 12

## 2021-01-25 ASSESSMENT — ENCOUNTER SYMPTOMS: DYSRHYTHMIAS: 1

## 2021-01-25 NOTE — OP NOTE
Procedure Date: 01/24/2021      PREOPERATIVE DIAGNOSIS:  Giant paraesophageal hernia with incarceration.      POSTOPERATIVE DIAGNOSIS:  Giant paraesophageal hernia with incarceration.      PROCEDURES PERFORMED:   1.  Urgent EGD and endoscopic decompression of stomach.   2.  Nasogastric tube placement under fluoroscopic guidance.   3.  Supervision and interpretation of intraoperative imaging.      ANESTHESIA:  General endotracheal anesthesia.      SURGEON:  Deejay Brown MD      ASSISTANT:  Jennifer Day MD, General Surgery Resident      COMPLICATIONS:  None.      ESTIMATED BLOOD LOSS:  Minimal.      SPECIMENS:  None.      DRAINS:  An 18-Citizen of the [place] NG tube.      OPERATIVE FINDINGS:  The patient had an incarcerated giant hiatal hernia.  The stomach appeared to have inflammatory changes, but no evidence of ischemia or necrosis.  The scope was navigated into the gastric portion above the diaphragm, and an NG tube was placed in this location with excellent decompression of the stomach confirmed by fluoroscopy.      DESCRIPTION OF PROCEDURE IN DETAIL:  The patient was taken to the operating room and laid supine.  We use RSI for induction, given the risk of aspiration.  Once general anesthesia was induced, a formal timeout was carried out confirming the name of the patient and correct procedure.  He had SCDs in place and functioning prior to induction of anesthesia.  After the timeout was complete, we started by introducing the adult scope.  We carefully then navigated the scope through the esophagus into the stomach with minimal difficulty.  We were able to visualize clearly the part of the stomach that was above the diaphragm.  We identified the pylorus.  Next, the scope was advanced to the distended part of the stomach that was located above the diaphragm.  An Amplatz Super Stiff wire was then advanced through this location and under fluoroscopy, we then advanced an NG tube in this position.  The tube was secured  to the skin using a stitch.  We connected to suction and under fluoroscopy, we confirmed decompression of the stomach.  The patient tolerated the procedure well.      All instrument and sponge counts were correct at the end of the case.  The patient was extubated and transferred to PACU for recovery.         LANDRY STEVEN MD             D: 2021   T: 2021   MT: OKSANA      Name:     STACY NICOLE   MRN:      [id number]        Account:        [id number]   :      1960           Procedure Date: 2021      Document: [id number]

## 2021-01-25 NOTE — PLAN OF CARE
PT 7B: Defer    Discharge Planner PT   Patient plan for discharge: Home  Current status: PT orders acknowledged and appreciated. Following chart review and discussion with OT following their evaluation, pt does not require IP PT at this time. OT to continue following patient to reduce deconditioning risk and for therapy following potential additional surgical intervention. Pt is mobilizing SBA-IND >500ft without balance concerns. Will complete orders. Thank you for your referral.   Barriers to return to prior living situation: medical status  Recommendations for discharge: Defer to OT  Rationale for recommendations: See above.        Entered by: Ginny Castanon 01/25/2021 10:27 AM

## 2021-01-25 NOTE — CONSULTS
Cardiology Consult         Date of Service (when I saw the patient): 01/25/21    ASSESSMENT:   Delroy Christianson is a 61 year old male who presents with PMHx of HOCM who is being admitted for hiatal hernia with plan for surgery. Cardiology is consulted for pre-op clearance.    Has a noted history of basal septal hypertrophic cardiomyopathy with an obstructive gradient, he endorses no symptoms of SOB. His gradient, at this stage of his disease, is unlikely to have an effect on his functional status. Prior consultations with Cardiology did recommended cardiac MRI to further work up his disease, which we did discuss with him further today. With his history of HOCM, and NSTEMI on 4/2020, we feel that he would be best served undergoing a cardiac MRI with stress to assess both his HOCM, his LVOT gradient and to assess for ischemia. He has a history of chest pain a few years ago that could be anxiety related, but with his recent NSTEMI and his CT chest showing coronary calcification he would benefit from further risk stratifcation with a cardiac MRI.    Presently his RCRI score is 1(intraabdominal surgery) giving him a 6% risk of MACE up to 30 days after surgery. He is low risk. As above, we would like to obtain a cMRI to further risk stratify. We discussed this with Mr. Christianson and the thoracic surgery team and both parties favor this approach.       RECOMMEND:  - We have scheduled a cMRI with contrast and stress for 1/26/2020, ensure patient is NPO@Mn  - Resume PTA aldactone for hypertension  - Please stop IV fluids, patient is euvolemic, encourage oral hydration  - Once patient is discharged will need zio patch for risk stratification for sudden cardiac death. Will allow us to assess if there are PVCs or afib. Currently neither rhtyhm has been noted through patient's history.    Staff with Dr. Kaitlynn Feng MD  Cardiology Fellow  PGY5    REASON FOR CONSULT:  Pre op clearance     History of Present Illness    Delroy Christianson is a 61 year old male who presents with PMHx of HOCM who is being admitted for hiatal hernia with plan for surgery. Cardiology is consulted for pre-op clearance.    Mr. Christianson was admitted to [facility] for PNA on 4/2020. During the time of his visit, he bumpted a troponin of 0.082 that was thought to be secondary to demaind ischemia. TTE revealed a mild HOCM with peak gradient of 29mmHG on Valsalva. QTC:614 likely due to electrolyte deficiency. He was discharged with plans to follow up in the outpatient setting.    He was again admitted on 1/20/2021 for nasuea and vomiting. 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 He was then tras[facility]ferred to [facility] for surgical eval. Cardiology was consulted for pre-op eval.    At bedside, patient denied chest pain, SOB, lightheaedness and dizziness.    Past Medical History    I have reviewed this patient's medical history and updated it with pertinent information if needed.   Past Medical History:   Diagnosis Date     Allergic rhinitis      Sinusitis        Past Surgical History   I have reviewed this patient's surgical history and updated it with pertinent information if needed.  History reviewed. No pertinent surgical history.    Prior to Admission Medications   Prior to Admission Medications   Prescriptions Last Dose Informant Patient Reported? Taking?   aspirin (ASA) 325 MG EC tablet Unknown at Unknown  Yes Yes   Sig: Take 325 mg by mouth as needed for moderate pain   calcium carbonate (TUMS) 500 MG chewable tablet Unknown at Unknown  Yes Yes   Sig: Take 1 chew tab by mouth as needed for heartburn   spironolactone (ALDACTONE) 25 MG tablet Unknown at Unknown  No Yes   Sig: Take 1 tablet (25 mg) by mouth daily      Facility-Administered Medications: None     Allergies   No Known  Allergies    Social History   I have reviewed this patient's social history and updated it with pertinent information if needed. Delroy Christianson  reports that he has never smoked. He has never used smokeless tobacco. He reports that he does not drink alcohol or use drugs.    Family History   I have reviewed this patient's family history and updated it with pertinent information if needed.   Family History   Problem Relation Age of Onset     Diabetes Mother      Coronary Artery Disease Mother      Hypertension Father        Review of Systems   The 10 point Review of Systems is negative other than noted in the HPI or here. None    Physical Exam   Temp: 96.5  F (35.8  C) Temp src: Oral BP: (!) 147/80 Pulse: 70   Resp: 18 SpO2: 95 % O2 Device: Nasal cannula Oxygen Delivery: 3 LPM  Vital Signs with Ranges  Temp:  [96.5  F (35.8  C)-99.3  F (37.4  C)] 96.5  F (35.8  C)  Pulse:  [] 70  Resp:  [14-20] 18  BP: (135-162)/() 147/80  SpO2:  [91 %-98 %] 95 %  183 lbs 0 oz    GEN: NAD, pleasant  HEENT: no icterus  CV: RRR, normal s1/s2, no murmurs/rubs/s3/s4, no heave. JVP 6cm.   CHEST: CTAB  ABD: soft, NT/ND, NABS  : no flank/suprapubic tenderness  NEURO: AA&Ox3, fluent/appropriate, motor grossly nonfocal  PSYCH: cooperative, affect appropriate    Data   Data reviewed today:  I personally reviewed the EKG tracing showing NSR.  Recent Labs   Lab 01/25/21  0609 01/24/21  0722 01/23/21  1718 01/20/21 2019 01/20/21 2019   WBC 8.5 10.1 10.7   < > 10.5   HGB 12.5* 14.7 15.0   < > 16.2   MCV 94 95 94   < > 94    192 196   < > 240   INR 1.13 1.15* 1.13   < >  --     138 138   < > 141   POTASSIUM 3.3* 3.4 3.6   < > 3.5   CHLORIDE 110* 107 104   < > 105   CO2 26 24 25   < > 30   BUN 21 23 25   < > 16   CR 0.98 1.03 1.05   < > 1.32*   ANIONGAP 5 7 9   < > 6   MINE 8.5 8.6 9.0   < > 9.9   * 146* 79   < > 152*   ALBUMIN 2.6* 2.8*  --    < > 3.9   PROTTOTAL 6.2* 6.6*  --    < > 8.0   BILITOTAL 0.3 1.0  --     < > 0.8   ALKPHOS 42 49  --    < > 70   ALT 34 26  --    < > 23   AST 20 22  --    < > 16   LIPASE  --   --   --   --  138    < > = values in this interval not displayed.       Recent Results (from the past 24 hour(s))   Echo Complete    Narrative    [id number]  [id number]  [id number]  [id number]^JUAN MANUEL^ABIGAIL                                                                          Version 2        [facility]  Echocardiography Laboratory  [address] [place] [address]     Name: STACY NICOLE  MRN: [id number]  : 1960  Study Date: 2021 10:46 AM  Age: 61 yrs  Gender: Male  Patient Location: [facility]  Reason For Study: Abn EKG  Ordering Physician: ABIGAIL ZAMBRANO  Referring Physician: GRACE SHERMAN  Performed By: Zayra Cooper LUISITO     BSA: 2.0 m2  Height: 71 in  Weight: 186 lb  HR: 96  BP: 139/89 mmHg  _____________________________________________________________________________  __        Procedure  Complete Portable Echo Adult. Technically difficult study.  _____________________________________________________________________________  __        Interpretation Summary  Global and regional left ventricular function is hyperkinetic with an EF of  65-70%.  Global right ventricular function is normal.  Asymmetric septal hypertrophy noted with basal septum thickness at 1.5 cm.  Resting LVOT gradient of 19 mmHg increases to 35 mmHg. No discernible GINA.  LVOT gradient might be due to hyperdynamic LV function.  Recommend cardiac MRI as an elective test to further assess LVH pattern.  No significant valvular abnormalities were noted.     This study was compared with the study from 2020 .No GINA noted in this  study otherwise there has been no change.  _____________________________________________________________________________  __        Left Ventricle  Left ventricular size is normal. Global and regional left ventricular function  is hyperkinetic with an EF of 65-70%.  Asymmetric septal hypertrophy noted with  basal septum thickness at 1.5 cm. Diastolic function not assessed due to  tachycardia.     Right Ventricle  The right ventricle is normal size. Global right ventricular function is  normal.     Atria  The atria cannot be assessed.     Mitral Valve  The mitral valve is normal.        Aortic Valve  The valve leaflets are not well visualized. On Doppler interrogation, there is  no significant stenosis or regurgitation.     Tricuspid Valve  The tricuspid valve is normal.     Pulmonic Valve  On Doppler interrogation, there is no significant stenosis or regurgitation.     Vessels  The inferior vena cava was normal in size with preserved respiratory  variability. The aorta root cannot be assessed. IVC diameter <2.1 cm  collapsing >50% with sniff suggests a normal RA pressure of 3 mmHg.     Pericardium  No pericardial effusion is present.        Compared to Previous Study  This study was compared with the study from 2020 . There has been no  change.  _____________________________________________________________________________  __  MMode/2D Measurements & Calculations  IVSd: 1.6 cm     LVIDd: 2.2 cm  LVPWd: 0.83 cm  LV mass(C)d: 74.2 grams  LV mass(C)dI: 36.3 grams/m2  LVOT diam: 2.1 cm  LVOT area: 3.5 cm2  RWT: 0.75  TAPSE: 2.2 cm        Doppler Measurements & Calculations  LV V1 max P.6 mmHg  LV V1 max: 128.1 cm/sec  LV V1 VTI: 18.8 cm  SV(LVOT): 65.1 ml  SI(LVOT): 31.8 ml/m2     _____________________________________________________________________________  __           Report approved by: Kingston FISHER 2021 11:59 AM      XR Surgery FLORES L/T 5 Min Fluoro w Stills    Narrative    This exam was marked as non-reportable because it will not be read by a   radiologist or a [place] non-radiologist provider.

## 2021-01-25 NOTE — ANESTHESIA PREPROCEDURE EVALUATION
Anesthesia Pre-Procedure Evaluation    Patient: Delroy Christianson   MRN: [id number] : 1960        Preoperative Diagnosis: * No pre-op diagnosis entered *   Procedure : * No procedures listed *     Past Medical History:   Diagnosis Date     Allergic rhinitis      Sinusitis       History reviewed. No pertinent surgical history.   No Known Allergies   Social History     Tobacco Use     Smoking status: Never Smoker     Smokeless tobacco: Never Used   Substance Use Topics     Alcohol use: No     Alcohol/week: 0.0 standard drinks      Wt Readings from Last 1 Encounters:   21 83 kg (183 lb)        Anesthesia Evaluation   Pt has had prior anesthetic. Type: General (RSI. ETT 7.5 mm;  MAC 3; Attempts: 1; ).    No history of anesthetic complications       ROS/MED HX  ENT/Pulmonary:  - neg pulmonary ROS     Neurologic:  - neg neurologic ROS     Cardiovascular: Comment: Hypertrophic cardiomyopathy    (+) hypertension-----dysrhythmias, Other and Long QT, Previous cardiac testing (2021)   Echo: Date: 2020 Results:  Global and regional left ventricular function is hyperkinetic with an EF of 65-70%.  Global right ventricular function is normal.  Asymmetric septal hypertrophy noted with basal septum thickness at 1.5 cm.  Resting LVOT gradient of 19 mmHg increases to 35 mmHg. No discernible GINA.  LVOT gradient might be due to hyperdynamic LV function.  Recommend cardiac MRI as an elective test to further assess LVH pattern.  No significant valvular abnormalities were noted.     This study was compared with the study from 2020 .No GINA noted in this study otherwise there has been no change.  Stress Test: Date: Results:    ECG Reviewed: Date: 2021 Results:  Qtc 485, sinus rhythm, left atrial enlargement   Cath: Date: Results:      METS/Exercise Tolerance:     Hematologic:  - neg hematologic  ROS     Musculoskeletal:  - neg musculoskeletal ROS     GI/Hepatic:     (+) hiatal hernia,     Renal/Genitourinary:     (+)  renal disease, type: CRI,     Endo:  - neg endo ROS     Psychiatric/Substance Use:  - neg psychiatric ROS     Infectious Disease:  - neg infectious disease ROS     Malignancy:  - neg malignancy ROS     Other:  - neg other ROS          Physical Exam    Airway        Mallampati: II   TM distance: > 3 FB   Neck ROM: full     Respiratory Devices and Support         Dental  no notable dental history         Cardiovascular   cardiovascular exam normal          Pulmonary   pulmonary exam normal                OUTSIDE LABS:  CBC:   Lab Results   Component Value Date    WBC 8.5 01/25/2021    WBC 10.1 01/24/2021    HGB 12.5 (L) 01/25/2021    HGB 14.7 01/24/2021    HCT 38.2 (L) 01/25/2021    HCT 43.6 01/24/2021     01/25/2021     01/24/2021     BMP:   Lab Results   Component Value Date     01/25/2021     01/24/2021    POTASSIUM 3.4 01/25/2021    POTASSIUM 3.3 (L) 01/25/2021    CHLORIDE 110 (H) 01/25/2021    CHLORIDE 107 01/24/2021    CO2 26 01/25/2021    CO2 24 01/24/2021    BUN 21 01/25/2021    BUN 23 01/24/2021    CR 0.98 01/25/2021    CR 1.03 01/24/2021     (H) 01/25/2021     (H) 01/24/2021     COAGS:   Lab Results   Component Value Date    PTT 28 01/23/2021    INR 1.13 01/25/2021     POC:   Lab Results   Component Value Date     (H) 01/25/2021     HEPATIC:   Lab Results   Component Value Date    ALBUMIN 2.6 (L) 01/25/2021    PROTTOTAL 6.2 (L) 01/25/2021    ALT 34 01/25/2021    AST 20 01/25/2021    ALKPHOS 42 01/25/2021    BILITOTAL 0.3 01/25/2021     OTHER:   Lab Results   Component Value Date    PH 7.64 (HH) 04/23/2020    MINE 8.5 01/25/2021    PHOS 3.3 01/25/2021    MAG 2.3 01/25/2021    LIPASE 138 01/20/2021       Anesthesia Plan     History & Physical Review      ASA Status:  3. NPO Status:  ELEVATED Aspiration Risk/Unknown. .  Plan for General with RSI induction. Maintenance will be Inhalation.     Additional equipment: Arterial Line.    PONV prophylaxis:  Ondansetron (or  other 5HT-3) and Dexamethasone or Solumedrol.    Comments: Elevated aspiration risk. NG tube in place. Will place to suction prior to induction   HOCM, will place post induction arterial line with flotrac for intravascular volume status monitoring.       Consents  Anesthesia Plan(s) and associated risks, benefits, and realistic alternatives discussed.    Questions answered and patient/representative(s) expressed understanding.    Discussed with:  Patient.       Extended Intubation/Ventilatory Support Discussed No No     Use of blood products discussed: No.          Postoperative Care  Postoperative pain management: IV analgesics.           Jennie Hunter MD

## 2021-01-25 NOTE — PROGRESS NOTES
01/25/21 0840   Quick Adds   Type of Visit Initial Occupational Therapy Evaluation       Present no   Language Englsih   Living Environment   People in home alone   Current Living Arrangements other (see comments)  ([facility])   Home Accessibility stairs within home   Number of Stairs, Within Home, Primary 7   Stair Railings, Within Home, Primary railings safe and in good condition   Transportation Anticipated family or friend will provide;car, drives self   Living Environment Comments Pt lives alone with cat in split level home, has tub shower w/ no shower chair/grab bars   Self-Care   Usual Activity Tolerance good   Current Activity Tolerance good   Regular Exercise No   Equipment Currently Used at Home walker, standard  (Does not currently use)   Activity/Exercise/Self-Care Comment Pt was IND with ADL PTA. Pt reports walking around neighborhood in summer for exercise but has not done this so far this winter   Disability/Function   Hearing Difficulty or Deaf no   Wear Glasses or Blind yes   Vision Management glasses   Concentrating, Remembering or Making Decisions Difficulty no   Difficulty Communicating no   Difficulty Eating/Swallowing no   Walking or Climbing Stairs Difficulty no   Dressing/Bathing Difficulty no   Toileting issues no   Doing Errands Independently Difficulty (such as shopping) no   Fall history within last six months no   Change in Functional Status Since Onset of Current Illness/Injury yes   General Information   Onset of Illness/Injury or Date of Surgery 01/23/21   Referring Physician Deejay Lee MD   Patient/Family Therapy Goal Statement (OT) Return to PLOF, to go home   Additional Occupational Profile Info/Pertinent History of Current Problem Delroy Christianson is a 61 year old male with history of hypertension, 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 He came into [facility] ED on 1/20/21 d/t N/V. CXR, AXR and  small bowel follow through were consistent with hiatal hernia. There are no signs of strangulation at the moment however imaging shows worsening hiatal hernia. He is now POD#1 s/p EGD and fluoroscopically guided NGT placement.    Existing Precautions/Restrictions fall   Left Upper Extremity (Weight-bearing Status) full weight-bearing (FWB)   Right Upper Extremity (Weight-bearing Status) full weight-bearing (FWB)   Left Lower Extremity (Weight-bearing Status) full weight-bearing (FWB)   Right Lower Extremity (Weight-bearing Status) full weight-bearing (FWB)   Heart Disease Risk Factors Medical history;Gender;Age   General Observations and Info Activity: Up ad polo   Cognitive Status Examination   Orientation Status orientation to person, place and time   Cognitive Status Comments Appears WNL, will continue to monitor   Visual Perception   Visual Impairment/Limitations corrective lenses for distance   Impact of Vision Impairment on Function (Vision) Pt has very old glasses at hospital, does not have appropriate frames for distance i.e. has difficulty seeing his whiteboard, clock, etc   Sensory   Sensory Quick Adds No deficits were identified   Pain Assessment   Patient Currently in Pain No   Integumentary/Edema   Integumentary/Edema no deficits were identifed   Posture   Posture not impaired   Range of Motion Comprehensive   General Range of Motion no range of motion deficits identified   Strength Comprehensive (MMT)   General Manual Muscle Testing (MMT) Assessment no strength deficits identified   Muscle Tone Assessment   Muscle Tone Quick Adds No deficits were identified   Coordination   Upper Extremity Coordination No deficits were identified   Coordination Comments Mild hand weakness/tremor noted    Instrumental Activities of Daily Living (IADL)   Previous Responsibilities meal prep;housekeeping;laundry;shopping;driving;medication management;finances   Clinical Impression   Criteria for Skilled Therapeutic  Interventions Met (OT) yes;skilled treatment is necessary   OT Diagnosis Decreased ADL I   OT Problem List-Impairments impacting ADL problems related to;activity tolerance impaired;fear & anxiety;strength;other (see comments)  (post-surgical precautions if surgery is performed )   Assessment of Occupational Performance 1-3 Performance Deficits   Identified Performance Deficits home mgmt, dressing, g/h   Planned Therapy Interventions (OT) ADL retraining;strengthening;home program guidelines;progressive activity/exercise;risk factor education   Clinical Decision Making Complexity (OT) low complexity   Therapy Frequency (OT) 5x/week   Predicted Duration of Therapy 2 weeks   Anticipated Equipment Needs Upon Discharge (OT) shower chair;reacher   Risks and Benefits of Treatment have been explained. Yes   Patient, Family & other staff in agreement with plan of care Yes   Comment-Clinical Impression Pt will benefit from skilled OT services while inpatient to prevent deconditioning and support/progress ADL I   OT Discharge Planning    OT Discharge Recommendation (DC Rec) Home with assist   OT Rationale for DC Rec Pending progress/status after surgery, anticipate pt will be appropriate to discharge home w/ A. Pt is currently mobilizing w/ SBA and performing most ADL w/ SBA.    OT Brief overview of current status  SBA w/ IV pole   Total Evaluation Time (Minutes)   Total Evaluation Time (Minutes) 10

## 2021-01-25 NOTE — PROGRESS NOTES
CLINICAL NUTRITION SERVICES - BRIEF NOTE     Nutrition Prescription    RECOMMENDATIONS FOR MDs/PROVIDERS TO ORDER:  - Recommend decreasing MIVF unless NG output increases (also recommended by cardiologist).      Recommendations already ordered by Registered Dietitian (RD):  - **Rec begin PN/lipids with following formulation based on 83 kg dosing weight:    83 mL/hr (2000 mL total) with 250 grams dextrose, 110 grams AA and 250 mL lipids daily.  Provides: 1790 kcals/day (21.6 kcal/kg/day), 1.3 g PRO/kg/day, 250 g CHO/day, GIR 2.1 mg/kg/min and 28 % kcals from Fat.    Future/Additional Recommendations:  - Follow post op feeding plan, I/Os, weights.      EVALUATION OF THE PROGRESS TOWARD GOALS   Diet: NPO  Nutrition Support: CNP-83ml/hr with clinimix (D15, AA5) and 250 ml 20% daily lipids--providing 100 gm protein (1.2 gm/kg), 300 g dextrose (GIR 2.5) and 26% kcal from lipids.       NEW FINDINGS   Labs--renal labs wnl.    Weights noted.  No weight 1/25  No edema documented by nursing.    Dosing Weight: 83 kg dry admit wt to OSH on 1/21, TPN dosing wt    ASSESSED NUTRITION NEEDS  Estimated Energy Needs: [id number] kcals/day (20 - 25-30 kcals/kg)  Justification: Maintenance, lower end for TPN maintenance   Estimated Protein Needs: 100 - 125 grams protein/day (1.2 - 1.5 grams of pro/kg)  Justification: Higher end for post op status)  Estimated Fluid Needs: (1 mL/kcal)   Justification: Maintenance      INTERVENTIONS  See recommendations above.    Monitoring/Evaluation  Progress toward goals will be monitored and evaluated per protocol.     Viji Ivy RD, LD   5A (rooms 5201-1 through 5211-2) / 7B Pager: [phone]

## 2021-01-25 NOTE — PLAN OF CARE
"No acute events overnight. Delroy denies pain, nausea, or dyspnea. Did not get much sleep overnight. Afebrile, vitals stable. NGT to LIS with 200 mL bilious output. TPN/Lipid infusing via PICC. Glucose WNL.  BP (!) 147/80 (BP Location: Left arm)   Pulse 70   Temp 96.5  F (35.8  C) (Oral)   Resp 18   Ht 1.803 m (5' 11\")   Wt 83 kg (183 lb)   SpO2 95%   BMI 25.52 kg/m      Addendum: KCL result 3.3 this AM, no replacement protocol ordered. Britt Jurado on 1/25/2021 at 6:56 AM    Problem: Pain (Intestinal Obstruction)  Goal: Acceptable Pain Control  Outcome: No Change     Problem: Nausea and Vomiting (Intestinal Obstruction)  Goal: Nausea and Vomiting Relief  Outcome: No Change     "

## 2021-01-25 NOTE — PROGRESS NOTES
"Thoracic Surgery Progress Note    Subjective: Doing well today, he feels better but tired. Endorses gas but no BM today.     Objective:   BP (!) 147/80 (BP Location: Left arm)   Pulse 70   Temp 96.5  F (35.8  C) (Oral)   Resp 18   Ht 1.803 m (5' 11\")   Wt 83 kg (183 lb)   SpO2 95%   BMI 25.52 kg/m      PE:  Gen: Alert, oriented, NGT in place  CV: Regular rate and non-cyanotic appearing   Resp: non-labored breathing on room air  Abd: Soft, non-distended, non-tender, no rigidity or guarding  Ext: warm and well perfused    Intake/Output Summary (Last 24 hours) at 1/24/2021 0714  Last data filed at 1/24/2021 0600  Gross per 24 hour   Intake 2208 ml   Output 950 ml   Net 1258 ml       Recent labs reviewed.    Recent imaging reviewed.    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 He came into [facility] ED on 1/20/21 d/t N/V. CXR, AXR and small bowel follow through were consistent with hiatal hernia. There are no signs of strangulation at the moment however imaging shows worsening hiatal hernia. He is now POD#1 s/p EGD and fluoroscopically guided NGT placement.        - LR at down to 50   - TPN 83mL/hr   - EGD and fluoroscopic guided NGT placement yesterday   - NPO, ice chips okay   - Electrolyte replacement protocol   - NGT to LIS   - PT/OT  - Protonix 40 BID  - Cardiology and anesthesia consult for pre-operative clearance     Patient seen and discussed with Dr. Flores.    Izabel Carrera MD  General Surgery, PGY-1               "

## 2021-01-26 ENCOUNTER — APPOINTMENT (OUTPATIENT)
Dept: OCCUPATIONAL THERAPY | Facility: CLINIC | Age: 61
End: 2021-01-26
Attending: THORACIC SURGERY (CARDIOTHORACIC VASCULAR SURGERY)
Payer: COMMERCIAL

## 2021-01-26 ENCOUNTER — APPOINTMENT (OUTPATIENT)
Dept: GENERAL RADIOLOGY | Facility: CLINIC | Age: 61
End: 2021-01-26
Attending: STUDENT IN AN ORGANIZED HEALTH CARE EDUCATION/TRAINING PROGRAM
Payer: COMMERCIAL

## 2021-01-26 ENCOUNTER — APPOINTMENT (OUTPATIENT)
Dept: MRI IMAGING | Facility: CLINIC | Age: 61
End: 2021-01-26
Attending: STUDENT IN AN ORGANIZED HEALTH CARE EDUCATION/TRAINING PROGRAM
Payer: COMMERCIAL

## 2021-01-26 LAB
ANION GAP SERPL CALCULATED.3IONS-SCNC: 6 MMOL/L (ref 3–14)
BASOPHILS # BLD AUTO: 0 10E9/L (ref 0–0.2)
BASOPHILS NFR BLD AUTO: 0.4 %
BUN SERPL-MCNC: 23 MG/DL (ref 7–30)
CALCIUM SERPL-MCNC: 8.7 MG/DL (ref 8.5–10.1)
CHLORIDE SERPL-SCNC: 108 MMOL/L (ref 94–109)
CO2 SERPL-SCNC: 23 MMOL/L (ref 20–32)
CREAT SERPL-MCNC: 1.04 MG/DL (ref 0.66–1.25)
DIFFERENTIAL METHOD BLD: NORMAL
EOSINOPHIL # BLD AUTO: 0.4 10E9/L (ref 0–0.7)
EOSINOPHIL NFR BLD AUTO: 3.9 %
ERYTHROCYTE [DISTWIDTH] IN BLOOD BY AUTOMATED COUNT: 13.1 % (ref 10–15)
GFR SERPL CREATININE-BSD FRML MDRD: 77 ML/MIN/{1.73_M2}
GLUCOSE BLDC GLUCOMTR-MCNC: 110 MG/DL (ref 70–99)
GLUCOSE BLDC GLUCOMTR-MCNC: 92 MG/DL (ref 70–99)
GLUCOSE SERPL-MCNC: 106 MG/DL (ref 70–99)
HCT VFR BLD AUTO: 42.8 % (ref 40–53)
HGB BLD-MCNC: 14.2 G/DL (ref 13.3–17.7)
IMM GRANULOCYTES # BLD: 0.1 10E9/L (ref 0–0.4)
IMM GRANULOCYTES NFR BLD: 1 %
LYMPHOCYTES # BLD AUTO: 1.4 10E9/L (ref 0.8–5.3)
LYMPHOCYTES NFR BLD AUTO: 15.6 %
MAGNESIUM SERPL-MCNC: 2.3 MG/DL (ref 1.6–2.3)
MCH RBC QN AUTO: 31.3 PG (ref 26.5–33)
MCHC RBC AUTO-ENTMCNC: 33.2 G/DL (ref 31.5–36.5)
MCV RBC AUTO: 94 FL (ref 78–100)
MONOCYTES # BLD AUTO: 1.1 10E9/L (ref 0–1.3)
MONOCYTES NFR BLD AUTO: 12 %
NEUTROPHILS # BLD AUTO: 6 10E9/L (ref 1.6–8.3)
NEUTROPHILS NFR BLD AUTO: 67.1 %
NRBC # BLD AUTO: 0 10*3/UL
NRBC BLD AUTO-RTO: 0 /100
PHOSPHATE SERPL-MCNC: 3.5 MG/DL (ref 2.5–4.5)
PLATELET # BLD AUTO: 210 10E9/L (ref 150–450)
POTASSIUM SERPL-SCNC: 3.9 MMOL/L (ref 3.4–5.3)
RBC # BLD AUTO: 4.54 10E12/L (ref 4.4–5.9)
SODIUM SERPL-SCNC: 136 MMOL/L (ref 133–144)
WBC # BLD AUTO: 9 10E9/L (ref 4–11)

## 2021-01-26 PROCEDURE — 999N001017 HC STATISTIC GLUCOSE BY METER IP

## 2021-01-26 PROCEDURE — 97535 SELF CARE MNGMENT TRAINING: CPT | Mod: GO | Performed by: OCCUPATIONAL THERAPIST

## 2021-01-26 PROCEDURE — 75563 CARD MRI W/STRESS IMG & DYE: CPT

## 2021-01-26 PROCEDURE — 75563 CARD MRI W/STRESS IMG & DYE: CPT | Mod: 26 | Performed by: INTERNAL MEDICINE

## 2021-01-26 PROCEDURE — 93016 CV STRESS TEST SUPVJ ONLY: CPT | Performed by: INTERNAL MEDICINE

## 2021-01-26 PROCEDURE — 84100 ASSAY OF PHOSPHORUS: CPT

## 2021-01-26 PROCEDURE — 93005 ELECTROCARDIOGRAM TRACING: CPT

## 2021-01-26 PROCEDURE — 255N000002 HC RX 255 OP 636: Performed by: THORACIC SURGERY (CARDIOTHORACIC VASCULAR SURGERY)

## 2021-01-26 PROCEDURE — A9585 GADOBUTROL INJECTION: HCPCS | Performed by: THORACIC SURGERY (CARDIOTHORACIC VASCULAR SURGERY)

## 2021-01-26 PROCEDURE — 250N000013 HC RX MED GY IP 250 OP 250 PS 637

## 2021-01-26 PROCEDURE — 250N000009 HC RX 250: Performed by: THORACIC SURGERY (CARDIOTHORACIC VASCULAR SURGERY)

## 2021-01-26 PROCEDURE — 36592 COLLECT BLOOD FROM PICC: CPT

## 2021-01-26 PROCEDURE — 250N000013 HC RX MED GY IP 250 OP 250 PS 637: Performed by: THORACIC SURGERY (CARDIOTHORACIC VASCULAR SURGERY)

## 2021-01-26 PROCEDURE — 83735 ASSAY OF MAGNESIUM: CPT

## 2021-01-26 PROCEDURE — 85025 COMPLETE CBC W/AUTO DIFF WBC: CPT

## 2021-01-26 PROCEDURE — 71045 X-RAY EXAM CHEST 1 VIEW: CPT | Mod: 26 | Performed by: RADIOLOGY

## 2021-01-26 PROCEDURE — 80048 BASIC METABOLIC PNL TOTAL CA: CPT

## 2021-01-26 PROCEDURE — 93010 ELECTROCARDIOGRAM REPORT: CPT | Mod: 76 | Performed by: INTERNAL MEDICINE

## 2021-01-26 PROCEDURE — 250N000013 HC RX MED GY IP 250 OP 250 PS 637: Performed by: STUDENT IN AN ORGANIZED HEALTH CARE EDUCATION/TRAINING PROGRAM

## 2021-01-26 PROCEDURE — 93018 CV STRESS TEST I&R ONLY: CPT | Performed by: INTERNAL MEDICINE

## 2021-01-26 PROCEDURE — 120N000002 HC R&B MED SURG/OB UMMC

## 2021-01-26 PROCEDURE — 71045 X-RAY EXAM CHEST 1 VIEW: CPT

## 2021-01-26 PROCEDURE — 250N000011 HC RX IP 250 OP 636: Performed by: INTERNAL MEDICINE

## 2021-01-26 PROCEDURE — 93017 CV STRESS TEST TRACING ONLY: CPT

## 2021-01-26 RX ORDER — PANTOPRAZOLE SODIUM 40 MG/1
40 TABLET, DELAYED RELEASE ORAL
Status: DISCONTINUED | OUTPATIENT
Start: 2021-01-26 | End: 2021-01-29

## 2021-01-26 RX ORDER — REGADENOSON 0.08 MG/ML
0.4 INJECTION, SOLUTION INTRAVENOUS ONCE
Status: COMPLETED | OUTPATIENT
Start: 2021-01-26 | End: 2021-01-26

## 2021-01-26 RX ORDER — GADOBUTROL 604.72 MG/ML
10 INJECTION INTRAVENOUS ONCE
Status: COMPLETED | OUTPATIENT
Start: 2021-01-26 | End: 2021-01-26

## 2021-01-26 RX ORDER — LORAZEPAM 2 MG/ML
0.5 INJECTION INTRAMUSCULAR
Status: DISCONTINUED | OUTPATIENT
Start: 2021-01-26 | End: 2021-01-29

## 2021-01-26 RX ORDER — AMINOPHYLLINE 25 MG/ML
100 INJECTION, SOLUTION INTRAVENOUS ONCE
Status: COMPLETED | OUTPATIENT
Start: 2021-01-26 | End: 2021-01-26

## 2021-01-26 RX ADMIN — AMINOPHYLLINE 100 MG: 25 INJECTION, SOLUTION INTRAVENOUS at 11:45

## 2021-01-26 RX ADMIN — PANTOPRAZOLE SODIUM 40 MG: 40 TABLET, DELAYED RELEASE ORAL at 07:55

## 2021-01-26 RX ADMIN — GADOBUTROL 10 ML: 604.72 INJECTION INTRAVENOUS at 12:21

## 2021-01-26 RX ADMIN — SPIRONOLACTONE 25 MG: 25 TABLET ORAL at 07:55

## 2021-01-26 RX ADMIN — REGADENOSON 0.4 MG: 0.08 INJECTION, SOLUTION INTRAVENOUS at 11:42

## 2021-01-26 RX ADMIN — ACETAMINOPHEN 650 MG: 325 TABLET, FILM COATED ORAL at 13:26

## 2021-01-26 RX ADMIN — I.V. FAT EMULSION 250 ML: 20 EMULSION INTRAVENOUS at 21:07

## 2021-01-26 RX ADMIN — PANTOPRAZOLE SODIUM 40 MG: 40 TABLET, DELAYED RELEASE ORAL at 16:51

## 2021-01-26 RX ADMIN — Medication: at 21:07

## 2021-01-26 ASSESSMENT — ACTIVITIES OF DAILY LIVING (ADL)
ADLS_ACUITY_SCORE: 12

## 2021-01-26 NOTE — PLAN OF CARE
Alert, oriented and able to ambulate. Ng sutured to nose, LIS with approximately 100cc out this shift. Lungs clear, HR 70s and regular. Hypo bowel sounds. Good pulses and CMS throughout all extremities. To MRI today. Able to have ice chips again. Throat spray ordered.

## 2021-01-26 NOTE — PROGRESS NOTES
"Thoracic Surgery Progress Note    Subjective: Doing well today, he feels better but tired. Endorses gas and BM since hospitalization. No abdominal or chest pain at this time.     Objective:   BP (!) 150/91 (BP Location: Left arm)   Pulse 75   Temp 96.7  F (35.9  C) (Oral)   Resp 18   Ht 1.803 m (5' 11\")   Wt 83 kg (183 lb)   SpO2 95%   BMI 25.52 kg/m      PE:  Gen: Alert, oriented, NGT in place  CV: Regular rate and non-cyanotic appearing   Resp: non-labored breathing on room air  Abd: Soft, non-distended, non-tender, no rigidity or guarding  Ext: warm and well perfused    Intake/Output Summary (Last 24 hours) at 1/24/2021 0714  Last data filed at 1/24/2021 0600  Gross per 24 hour   Intake 2208 ml   Output 950 ml   Net 1258 ml     Recent labs reviewed.    Recent imaging reviewed.    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 He came into [facility] ED on 1/20/21 d/t N/V. CXR, AXR and small bowel follow through were consistent with hiatal hernia. There are no signs of strangulation at the moment however imaging shows worsening hiatal hernia. He is now s/p EGD and fluoroscopically guided NGT placement. Plan for cardiac MRI today and surgical planning for later this week.     - NPO at midnight for cardiac MRI   - No mIVF  - Restarted spironol actone yesterday  - Pills and sip/chips okay otherwise NPO  - TPN 83mL/hr   - Electrolyte replacement protocol   - NGT to LIS   - PT/OT  - Protonix 40 BID  - Cardiology and anesthesia consult for pre-operative clearance, appreciate recommendations   - Once patient is discharged will need zio patch for risk stratification for sudden cardiac death. Will allow us to assess if there are PVCs or afib. Currently neither rhtyhm has been noted through patient's history.    Patient seen and discussed with Dr. Flores.    Izabel Carrera MD  General Surgery, PGY-1  "

## 2021-01-26 NOTE — PROGRESS NOTES
Pt arrived for cardiac MRI with stress. Allergies, medications, and safety checklist reviewed with patient. Test explained and all questions were answered. Denies caffeine intake. Lungs are clear. Lexiscan 0.4 mg given over 10 seconds followed by 5 mL of saline. After stress imaging complete, patient was given 100mg IV Aminophylline per MD order. Pt tolerated the scan, medications, and contrast well. Pre and post EKG completed. Pt monitored after MRI and transported back to floor via wheelchair.

## 2021-01-26 NOTE — PLAN OF CARE
Time: 2404-0569    Reason for admission: Hiatal hernia    Pt is A&Ox4, up ad polo. VSS on RA ex mild HTN. Denies pain, refusing scheduled tylenol. Respiratory WNL. Voiding good, yellow UO in bedside urinal, LBM 1/25. Strict NPO since midnight. R DL PICC infusing TPN/lipids and TKO. NG to low intermittent suction    Plan: K recheck this AM. Stress test and CT w/ contrast today

## 2021-01-26 NOTE — PLAN OF CARE
NPO except for ice chips and medication. NG to LIS with 100cc of clear tan fluid. Positive bowel sounds and had BM today. Lungs clear. HR 70s and regular.  Up in chair and ambulated long distance x3 in vela. Denies pain. Ng sutured to nose. K+ 3.3 and replacement done. Redraw 3.4 but drawn too early. Repeat reordered. Bgs 132, 103 and 99.

## 2021-01-26 NOTE — PLAN OF CARE
2740-3844: AVSS. Denied pain and N/V. NPO except meds and ice chips. TPN infusing. NG to LIS. Adequate urine output. Reports not passing flatus. Bowel sounds hypoactive. Awaiting surgical plan. Continue with POC.

## 2021-01-26 NOTE — PROGRESS NOTES
Pt denies pain but c/o post nasal drip requiring him to clear his throat frequently. 20 mEq potassium given for level of 3.6. Voiding per urinal. NPO at midnight for CT and stress test tomorrow. Continue with plan of care.

## 2021-01-27 ENCOUNTER — APPOINTMENT (OUTPATIENT)
Dept: GENERAL RADIOLOGY | Facility: CLINIC | Age: 61
End: 2021-01-27
Attending: STUDENT IN AN ORGANIZED HEALTH CARE EDUCATION/TRAINING PROGRAM
Payer: COMMERCIAL

## 2021-01-27 ENCOUNTER — APPOINTMENT (OUTPATIENT)
Dept: OCCUPATIONAL THERAPY | Facility: CLINIC | Age: 61
End: 2021-01-27
Attending: THORACIC SURGERY (CARDIOTHORACIC VASCULAR SURGERY)
Payer: COMMERCIAL

## 2021-01-27 LAB
ANION GAP SERPL CALCULATED.3IONS-SCNC: 8 MMOL/L (ref 3–14)
BASOPHILS # BLD AUTO: 0.1 10E9/L (ref 0–0.2)
BASOPHILS NFR BLD AUTO: 0.5 %
BUN SERPL-MCNC: 26 MG/DL (ref 7–30)
CALCIUM SERPL-MCNC: 9.3 MG/DL (ref 8.5–10.1)
CHLORIDE SERPL-SCNC: 106 MMOL/L (ref 94–109)
CO2 SERPL-SCNC: 23 MMOL/L (ref 20–32)
CREAT SERPL-MCNC: 1.08 MG/DL (ref 0.66–1.25)
DIFFERENTIAL METHOD BLD: NORMAL
EOSINOPHIL # BLD AUTO: 0.3 10E9/L (ref 0–0.7)
EOSINOPHIL NFR BLD AUTO: 3.2 %
ERYTHROCYTE [DISTWIDTH] IN BLOOD BY AUTOMATED COUNT: 12.9 % (ref 10–15)
GFR SERPL CREATININE-BSD FRML MDRD: 74 ML/MIN/{1.73_M2}
GLUCOSE SERPL-MCNC: 111 MG/DL (ref 70–99)
HCT VFR BLD AUTO: 43.8 % (ref 40–53)
HGB BLD-MCNC: 14.8 G/DL (ref 13.3–17.7)
IMM GRANULOCYTES # BLD: 0.2 10E9/L (ref 0–0.4)
IMM GRANULOCYTES NFR BLD: 1.7 %
LYMPHOCYTES # BLD AUTO: 1.5 10E9/L (ref 0.8–5.3)
LYMPHOCYTES NFR BLD AUTO: 16.2 %
MAGNESIUM SERPL-MCNC: 2.4 MG/DL (ref 1.6–2.3)
MCH RBC QN AUTO: 31.2 PG (ref 26.5–33)
MCHC RBC AUTO-ENTMCNC: 33.8 G/DL (ref 31.5–36.5)
MCV RBC AUTO: 92 FL (ref 78–100)
MONOCYTES # BLD AUTO: 1.3 10E9/L (ref 0–1.3)
MONOCYTES NFR BLD AUTO: 13.6 %
NEUTROPHILS # BLD AUTO: 6.1 10E9/L (ref 1.6–8.3)
NEUTROPHILS NFR BLD AUTO: 64.8 %
NRBC # BLD AUTO: 0 10*3/UL
NRBC BLD AUTO-RTO: 0 /100
PHOSPHATE SERPL-MCNC: 3.9 MG/DL (ref 2.5–4.5)
PLATELET # BLD AUTO: 241 10E9/L (ref 150–450)
POTASSIUM SERPL-SCNC: 3.8 MMOL/L (ref 3.4–5.3)
RBC # BLD AUTO: 4.75 10E12/L (ref 4.4–5.9)
SODIUM SERPL-SCNC: 137 MMOL/L (ref 133–144)
WBC # BLD AUTO: 9.4 10E9/L (ref 4–11)

## 2021-01-27 PROCEDURE — 36592 COLLECT BLOOD FROM PICC: CPT

## 2021-01-27 PROCEDURE — 99233 SBSQ HOSP IP/OBS HIGH 50: CPT | Mod: GC | Performed by: STUDENT IN AN ORGANIZED HEALTH CARE EDUCATION/TRAINING PROGRAM

## 2021-01-27 PROCEDURE — 85025 COMPLETE CBC W/AUTO DIFF WBC: CPT

## 2021-01-27 PROCEDURE — 250N000013 HC RX MED GY IP 250 OP 250 PS 637: Performed by: THORACIC SURGERY (CARDIOTHORACIC VASCULAR SURGERY)

## 2021-01-27 PROCEDURE — 250N000013 HC RX MED GY IP 250 OP 250 PS 637: Performed by: STUDENT IN AN ORGANIZED HEALTH CARE EDUCATION/TRAINING PROGRAM

## 2021-01-27 PROCEDURE — 250N000009 HC RX 250: Performed by: THORACIC SURGERY (CARDIOTHORACIC VASCULAR SURGERY)

## 2021-01-27 PROCEDURE — 83735 ASSAY OF MAGNESIUM: CPT

## 2021-01-27 PROCEDURE — 71045 X-RAY EXAM CHEST 1 VIEW: CPT

## 2021-01-27 PROCEDURE — 80048 BASIC METABOLIC PNL TOTAL CA: CPT

## 2021-01-27 PROCEDURE — 84100 ASSAY OF PHOSPHORUS: CPT

## 2021-01-27 PROCEDURE — 120N000002 HC R&B MED SURG/OB UMMC

## 2021-01-27 PROCEDURE — 250N000013 HC RX MED GY IP 250 OP 250 PS 637

## 2021-01-27 PROCEDURE — 97535 SELF CARE MNGMENT TRAINING: CPT | Mod: GO | Performed by: OCCUPATIONAL THERAPIST

## 2021-01-27 PROCEDURE — 71045 X-RAY EXAM CHEST 1 VIEW: CPT | Mod: 26 | Performed by: RADIOLOGY

## 2021-01-27 PROCEDURE — 97110 THERAPEUTIC EXERCISES: CPT | Mod: GO | Performed by: OCCUPATIONAL THERAPIST

## 2021-01-27 PROCEDURE — 97530 THERAPEUTIC ACTIVITIES: CPT | Mod: GO | Performed by: OCCUPATIONAL THERAPIST

## 2021-01-27 PROCEDURE — 250N000011 HC RX IP 250 OP 636: Performed by: THORACIC SURGERY (CARDIOTHORACIC VASCULAR SURGERY)

## 2021-01-27 RX ORDER — POTASSIUM CHLORIDE 7.45 MG/ML
10 INJECTION INTRAVENOUS
Status: COMPLETED | OUTPATIENT
Start: 2021-01-27 | End: 2021-01-27

## 2021-01-27 RX ADMIN — SPIRONOLACTONE 25 MG: 25 TABLET ORAL at 11:24

## 2021-01-27 RX ADMIN — PANTOPRAZOLE SODIUM 40 MG: 40 TABLET, DELAYED RELEASE ORAL at 11:24

## 2021-01-27 RX ADMIN — Medication: at 20:26

## 2021-01-27 RX ADMIN — POTASSIUM CHLORIDE 10 MEQ: 7.46 INJECTION, SOLUTION INTRAVENOUS at 20:25

## 2021-01-27 RX ADMIN — ACETAMINOPHEN 650 MG: 325 TABLET, FILM COATED ORAL at 11:29

## 2021-01-27 RX ADMIN — ACETAMINOPHEN 650 MG: 325 TABLET, FILM COATED ORAL at 16:38

## 2021-01-27 RX ADMIN — PANTOPRAZOLE SODIUM 40 MG: 40 TABLET, DELAYED RELEASE ORAL at 16:38

## 2021-01-27 RX ADMIN — I.V. FAT EMULSION 250 ML: 20 EMULSION INTRAVENOUS at 20:26

## 2021-01-27 RX ADMIN — POTASSIUM CHLORIDE 10 MEQ: 7.46 INJECTION, SOLUTION INTRAVENOUS at 19:03

## 2021-01-27 ASSESSMENT — ACTIVITIES OF DAILY LIVING (ADL)
ADLS_ACUITY_SCORE: 12
ADLS_ACUITY_SCORE: 11
ADLS_ACUITY_SCORE: 12
ADLS_ACUITY_SCORE: 11

## 2021-01-27 ASSESSMENT — MIFFLIN-ST. JEOR: SCORE: 1656.31

## 2021-01-27 NOTE — PROGRESS NOTES
"Thoracic Surgery Progress Note    Subjective: Doing well today    Objective:   /89 (BP Location: Left arm)   Pulse 80   Temp 96.3  F (35.7  C) (Oral)   Resp 16   Ht 1.803 m (5' 11\")   Wt 83 kg (183 lb)   SpO2 98%   BMI 25.52 kg/m      PE:  Gen: Alert, oriented, NGT in place  CV: Regular rate and good color   Resp: non-labored breathing on room air  Abd: Soft, non-distended, non-tender, no rigidity or guarding  Ext: warm and well perfused      Intake/Output Summary (Last 24 hours) at 1/27/2021 1132  Last data filed at 1/27/2021 0800  Gross per 24 hour   Intake 30 ml   Output 1125 ml   Net -1095 ml          Recent labs reviewed.    Recent imaging reviewed.    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 He came into [facility] ED on 1/20/21 d/t N/V. CXR, AXR and small bowel follow through were consistent with hiatal hernia. There are no signs of strangulation at the moment however imaging shows worsening hiatal hernia. He is now s/p EGD and fluoroscopically guided NGT placement.     -Cards input for preop risk eval  -OR likely on 1/29  - No mIVF  - Restarted spironol actone yesterday  - Pills and sip/chips okay otherwise NPO  - TPN 83mL/hr   - Electrolyte replacement protocol   - NGT to LIS   - PT/OT  - Protonix 40 BID  - Once patient is discharged will need zio patch for risk stratification for sudden cardiac death. Will allow us to assess if there are PVCs or afib. Currently neither rhtyhm has been noted through patient's history.    D/w staff    Jajgit Raines PA-C  Thoracic & Foregut Surgery              "

## 2021-01-27 NOTE — PLAN OF CARE
"/75 (BP Location: Left arm)   Pulse 85   Temp 97.4  F (36.3  C) (Oral)   Resp 18   Ht 1.803 m (5' 11\")   Wt 83 kg (183 lb)   SpO2 93%   BMI 25.52 kg/m       Neuros: A&Ox4. Able to make needs known.   Activity: SBA  Cardiac: Denies cardiac chest pain. Vitals stable   Respiratory: WNL. Sating well on RA. Denies SOB  Diet: NPO  GI/Gu: Voiding without difficulty.No BM this shift. Hypoactive BS. Denies N/V  LDA: L DL PICC infusing TPN at 83mL/hr and lipids. NG to LIS  Pain: Denies pain   PRN: No PRNs this shift   Changes: No acute changes this shift   Plan: Continue POC     ROBIN CARCAMO RN on 1/27/2021 at 5:29 AM       "

## 2021-01-27 NOTE — PROGRESS NOTES
Cardiology Consult         Date of Service (when I saw the patient): 01/27/21    ASSESSMENT:   Delroy Christianson is a 61 year old male who presents with PMHx of HOCM who is being admitted for hiatal hernia with plan for surgery. Cardiology is consulted for pre-op clearance.    Has a noted history of basal septal hypertrophic cardiomyopathy with an obstructive gradient, he endorses no symptoms of SOB. His gradient, at this stage of his disease, is unlikely to have an effect on his functional status. Prior consultations with Cardiology did recommended cardiac MRI to further work up his disease, which we did discuss with him further today. With his history of HOCM, and NSTEMI on 4/2020, we feel that he would have been best served with a cMRI which he subsequently underwent on 1/26. cMRI revealed normal biventricular function and no ischemia or fibrosis. He does have a mild LVOT gradient seen on cMRI. This will not barricade him from getting surgery, but would benefit from having cardiac anesthesia involved for case. If patient were to decompensate, would highly consider LVOT obstruction as cause and treat with fluids, BB, and vasopressors such as vasopressin or phenylephrine.    Presently his RCRI score is 1(intraabdominal surgery) giving him a 6% risk of MACE up to 30 days after surgery. His risk is outlined above. There are no additional cardiac testing that will change his MACE. He can proceed to surgery.       RECOMMEND:  - Proceed with surgery  - Continue aldactone  - Be cognizant of LVOT obstruction: would require fluids, BB and phenyl/vasopressin if he decompensates during case and there is suspicion for LVOT obstruction.     We will sign off    Staff with Dr. Kaitlynn Feng MD  Cardiology Fellow  PGY5    Physician Attestation   Kaitlynn KIRKPATRICK, saw and evaluated Delroy Christianson as part of a shared visit.  I have reviewed and discussed with the advanced practice provider their history,  physical and plan.    I personally reviewed the vital signs, medications, labs, and imaging.    Kaitlynn Delacruz  Date of Service (when I saw the patient): 01/27/21    REASON FOR CONSULT:  Pre op clearance     History of Present Illness   Delroy Christianson is a 61 year old male who presents with PMHx of HOCM who is being admitted for hiatal hernia with plan for surgery. Cardiology is consulted for pre-op clearance.    S/IE:  - Underwent cMRI    Past Medical History    I have reviewed this patient's medical history and updated it with pertinent information if needed.   Past Medical History:   Diagnosis Date     Allergic rhinitis      Sinusitis        Past Surgical History   I have reviewed this patient's surgical history and updated it with pertinent information if needed.  Past Surgical History:   Procedure Laterality Date     ESOPHAGOSCOPY, GASTROSCOPY, DUODENOSCOPY (EGD), COMBINED N/A 1/24/2021    Procedure: ESOPHAGOGASTRODUODENOSCOPY (EGD) WITH FLUOROSCOPIC GUIDED PLACEMENT OF NASOGASTRIC TUBE;  Surgeon: eDejay Donovan MD;  Location: [facility] OR       Prior to Admission Medications   Prior to Admission Medications   Prescriptions Last Dose Informant Patient Reported? Taking?   aspirin (ASA) 325 MG EC tablet Unknown at Unknown  Yes Yes   Sig: Take 325 mg by mouth as needed for moderate pain   calcium carbonate (TUMS) 500 MG chewable tablet Unknown at Unknown  Yes Yes   Sig: Take 1 chew tab by mouth as needed for heartburn   spironolactone (ALDACTONE) 25 MG tablet Unknown at Unknown  No Yes   Sig: Take 1 tablet (25 mg) by mouth daily      Facility-Administered Medications: None     Allergies   No Known Allergies    Social History   I have reviewed this patient's social history and updated it with pertinent information if needed. Delroy Christianson  reports that he has never smoked. He has never used smokeless tobacco. He reports that he does not drink alcohol or use drugs.    Family History   I have reviewed  this patient's family history and updated it with pertinent information if needed.   Family History   Problem Relation Age of Onset     Diabetes Mother      Coronary Artery Disease Mother      Hypertension Father        Review of Systems   The 10 point Review of Systems is negative other than noted in the HPI or here. None    Physical Exam   Temp: 96.3  F (35.7  C) Temp src: Oral BP: 124/89 Pulse: 80   Resp: 16 SpO2: 98 % O2 Device: None (Room air)    Vital Signs with Ranges  Temp:  [96.1  F (35.6  C)-97.4  F (36.3  C)] 96.3  F (35.7  C)  Pulse:  [] 80  Resp:  [16-18] 16  BP: (116-139)/(58-89) 124/89  SpO2:  [93 %-98 %] 98 %  183 lbs 0 oz    GEN: NAD, pleasant  HEENT: no icterus  CV: RRR, normal s1/s2, no murmurs/rubs/s3/s4, no heave. JVP 6cm.   CHEST: CTAB  ABD: soft, NT/ND, NABS  : no flank/suprapubic tenderness  NEURO: AA&Ox3, fluent/appropriate, motor grossly nonfocal  PSYCH: cooperative, affect appropriate    Data   Data reviewed today:  I personally reviewed the EKG tracing showing NSR.  Recent Labs   Lab 01/27/21  0653 01/26/21  0619 01/25/21 2009 01/25/21  0609 01/25/21  0609 01/24/21  0722 01/23/21  1718 01/20/21 2019 01/20/21 2019   WBC 9.4 9.0  --   --  8.5 10.1 10.7   < > 10.5   HGB 14.8 14.2  --   --  12.5* 14.7 15.0   < > 16.2   MCV 92 94  --   --  94 95 94   < > 94    210  --   --  196 192 196   < > 240   INR  --   --   --   --  1.13 1.15* 1.13   < >  --     136  --   --  140 138 138   < > 141   POTASSIUM 3.8 3.9 3.6   < > 3.3* 3.4 3.6   < > 3.5   CHLORIDE 106 108  --   --  110* 107 104   < > 105   CO2 23 23  --   --  26 24 25   < > 30   BUN 26 23  --   --  21 23 25   < > 16   CR 1.08 1.04  --   --  0.98 1.03 1.05   < > 1.32*   ANIONGAP 8 6  --   --  5 7 9   < > 6   MINE 9.3 8.7  --   --  8.5 8.6 9.0   < > 9.9   * 106*  --   --  132* 146* 79   < > 152*   ALBUMIN  --   --   --   --  2.6* 2.8*  --    < > 3.9   PROTTOTAL  --   --   --   --  6.2* 6.6*  --    < > 8.0    BILITOTAL  --   --   --   --  0.3 1.0  --    < > 0.8   ALKPHOS  --   --   --   --  42 49  --    < > 70   ALT  --   --   --   --  34 26  --    < > 23   AST  --   --   --   --  20 22  --    < > 16   LIPASE  --   --   --   --   --   --   --   --  138    < > = values in this interval not displayed.       Recent Results (from the past 24 hour(s))   MR Cardiac w Contrast and Stress    Narrative                                                     [facility]                                                     CMR Report      MRN:                  [id number]                                  Name:            STACY NICOLE                                  :                  1960                                  Scan Date:   2021 11:11:58                                  Electronically signed by Sylvain Carrillo  15:10:01    SUMMARY   ==========================================================================================================    Clinical history: 61 year old male who suspected HCM on echo, NSTEMI last year. CMR for further  characterization of HCM and pre operative clearance.    Comparison CMR: none    1. The LV cavity size is small with moderate-severe asymmetric septal hypertrophy. The global systolic  function is hyper-dynamic. The LVEF is 65%. There are no regional wall motion abnormalities.    2. The RV is normal in cavity size. The global systolic function is normal. The RVEF is 61%.     3. Both atria are normal in size.    4. There is no significant valvular disease. There is mild chordal GINA with minimal LVOT obstruction at  rest.     5. Late gadolinium enhancement imaging shows no MI, fibrosis or infiltrative disease.     6. Regadenoson stress perfusion imaging shows no ischemia.    7. There is no pericardial effusion or thickening.    8. There is no intracardiac thrombus.    CONCLUSIONS: Hypertrophic cardiomyopathy, asymmetric septal phenotype. Maximal wall thickness is 2.0  cm, no  mitral valve GINA with minimal LVOT obstruction, and no fibrosis. No myocardial ischemia.     CORE EXAM   ==========================================================================================================    MEASUREMENTS   ----------------------------------------------------------------------------------------      VOLUMETRIC ANALYSIS       ----------------------------------------------  .--------------------------------------------------------.                  LV    Reference   RV    Reference    +-----+----------+------+------------+------+------------+   EDV  ml          75   (109-191)    92   (105-205)         ml/mï         36   (60-95)      45   ()     ESV  ml          26   (27-72)      36   (20-80)           ml/mï         13   (14-36)      17   (11-40)      CO   L/min     4.31              4.93                     L/min/mï    2.09              2.39                     g/mï                                              SV   ml          49   ()     56   ()          ml/mï         24   (40-64)      27   (37-69)      EF   %           65   (58-76)      61   (55-81)     '-----+----------+------+------------+------+------------'            CARDIAC OUTPUT HR:  88 bpm      LV DIMENSIONS       ----------------------------------------------          WALL THICKNESS - ANTEROSEPTAL:  2.0 cm          WALL THICKNESS - INFEROLATERAL:  1.1 cm          WALL THICKNESS - MAXIMUM:  2.0 cm          LV LAURA:  3.6 cm          LV ESD:  1.8 cm        LA DIMENSIONS (LV SYSTOLE)       ----------------------------------------------          DIAMETER:  3.1 cm        AORTIC ROOT DIMENSIONS       ----------------------------------------------          SINUS OF VALSALVA:  2.9 cm      ANATOMY   ----------------------------------------------------------------------------------------      LEFT VENTRICLE        ----------------------------------------------          WALL THICKNESS:  MODERATE HYPERTROPHY          LVH PATTERN:  ASYMMETRIC HYPERTROPHY          CAVITY SIZE:  Normal        RIGHT VENTRICLE       ----------------------------------------------          WALL THICKNESS:  Normal          CONTRACTILITY:  Normal          CAVITY SIZE:  Normal          MASS/THROMBUS:  None        LEFT ATRIUM       ----------------------------------------------          CAVITY SIZE:  Normal        RIGHT ATRIUM       ----------------------------------------------          CAVITY SIZE:  Normal        PERICARDIUM       ----------------------------------------------          Normal          EFFUSION:  None        PLEURAL EFFUSION       ----------------------------------------------   None       VALVES   ----------------------------------------------------------------------------------------      AORTIC VALVE       ----------------------------------------------          AORTIC VALVE LEAFLETS:  Trileaflet        MITRAL VALVE       ----------------------------------------------          MITRAL VALVE LEAFLETS:  Normal Leaflets   MITRAL MORPHOLOGY:  CHORDAL GINA         TRICUSPID VALVE       ----------------------------------------------          TRICUSPID VALVE LEAFLETS:  Normal Leaflets        PULMONIC VALVE       ----------------------------------------------          PULMONIC VALVE LEAFLETS:  Normal Leaflets      17 SEGMENT   ----------------------------------------------------------------------------------------  .------------------------------------------------------------------------------------------.   Segments            Wall Motion   Hyperenhancement  Stress Perfusion  Interpretation   +--------------------+--------------+------------------+------------------+----------------+   Base Anterior       Normal/Hyper  None              Normal            Normal            Base Anteroseptal   Normal/Hyper  None               Normal            Normal            Base Inferoseptal   Normal/Hyper  None              Normal            Normal            Base Inferior       Normal/Hyper  None              Normal            Normal            Base Inferolateral  Normal/Hyper  None              Normal            Normal            Base Anterolateral  Normal/Hyper  None              Normal            Normal            Mid Anterior        Normal/Hyper  None              Normal            Normal            Mid Anteroseptal    Normal/Hyper  None              Normal            Normal            Mid Inferoseptal    Normal/Hyper  None              Normal            Normal            Mid Inferior        Normal/Hyper  None              Normal            Normal            Mid Inferolateral   Normal/Hyper  None              Normal            Normal            Mid Anterolateral   Normal/Hyper  None              Normal            Normal            Apical Anterior     Normal/Hyper  None              Normal            Normal            Apical Septal       Normal/Hyper  None              Normal            Normal            Apical Inferior     Normal/Hyper  None              Normal            Normal            Apical Lateral      Normal/Hyper  None              Normal            Normal            [place]                Normal/Hyper  None              Normal            Normal           +--------------------+--------------+------------------+------------------+----------------+   RV Segments         Wall Motion   Hyperenhancement  Stress Perfusion  Interpretation   +--------------------+--------------+------------------+------------------+----------------+   RV Basal Anterior   Normal/Hyper  None              Normal            Normal            RV Basal Inferior   Normal/Hyper  None              Normal            Normal            RV Mid               Normal/Hyper  None              Normal            Normal            RV Apical           Normal/Hyper  None              Normal            Normal           '--------------------+--------------+------------------+------------------+----------------'        FINDINGS       ----------------------------------------------          SCAR SIZE:  0 %      STRESS   ==========================================================================================================    STRESS PROTOCOL   ----------------------------------------------------------------------------------------      Regadenoson      AMOUNT:  0.4 mg      AMINOPHYLLINE DOSE:  100 mg    RESTING DATA   ----------------------------------------------------------------------------------------      ECG:  NSR       SYSTOLIC BP:  155 mmHg      DIASTOLIC BP:  72 mmHg      RESTING HR:  76 BPM    STRESS DATA   ----------------------------------------------------------------------------------------      REASON FOR TERMINATION:  Protocol Complete      COMPLICATIONS:  None       POST STRESS ECG:  No change from pre-stress ECG      SCAN INFO   ==========================================================================================================    GENERAL   ----------------------------------------------------------------------------------------      CONTRAST AGENT       ----------------------------------------------          TYPE:  Gadavist          GD CONCENTRATION:  0.5 M          VOLUME ADMINISTERED:  20 ml          DOSAGE:  0.12 mmol/kg        SEDATION       ----------------------------------------------          SEDATION USED?:  No        VITALS       ----------------------------------------------          HEIGHT:  71.00 in          HEIGHT:  180.34 cm          WEIGHT:  189.99 lbs          WEIGHT:  86.18 kgs          BSA:  2.06 mï          PULSE SEQUENCES       ----------------------------------------------          Single-Shot SSFP, IR GRE - Segmented, IR  SSFP - Single Shot, SSFP Cine, SR GRE Perfusion         SETUP       ----------------------------------------------          SCAN TYPE:  Clinical          PATIENT TYPE:  Inpatient          INCOMPLETE SCAN:  No          REASON(S) FOR SCAN:  Ischemia eval: preop, HCM (known/suspect)           REFERRING PHYSICIAN:  GRACE SHERMAN          ATTENDING PHYSICIAN:  LANDRY STEVEN      Report generated by [employer], a product of [employer]   XR Chest Port 1 View    Narrative    Exam: XR CHEST PORT 1 VW, 1/27/2021 9:23 AM    Indication: known hiatal hernia, s/p NGT placement with fluoro/EGD    Comparison: 1/26/2021    Findings:   Heart enlarged but stable. Silhouetting of the medial aspect of the  left hemidiaphragm and blunting of the left costophrenic angle  remains. Pulmonary vasculature normal. Right IJ PICC tip in the high  superior vena cava. Enteric tube seen looping behind the heart.  Patient has known hiatal hernia.      Impression    Impression:   1. Stable support devices  2. Continued left lower lobe atelectasis.    ARNOLD REHMAN MD

## 2021-01-28 ENCOUNTER — APPOINTMENT (OUTPATIENT)
Dept: OCCUPATIONAL THERAPY | Facility: CLINIC | Age: 61
End: 2021-01-28
Attending: THORACIC SURGERY (CARDIOTHORACIC VASCULAR SURGERY)
Payer: COMMERCIAL

## 2021-01-28 LAB
ANION GAP SERPL CALCULATED.3IONS-SCNC: 5 MMOL/L (ref 3–14)
BASOPHILS # BLD AUTO: 0.1 10E9/L (ref 0–0.2)
BASOPHILS NFR BLD AUTO: 0.5 %
BUN SERPL-MCNC: 27 MG/DL (ref 7–30)
CALCIUM SERPL-MCNC: 9.1 MG/DL (ref 8.5–10.1)
CHLORIDE SERPL-SCNC: 106 MMOL/L (ref 94–109)
CO2 SERPL-SCNC: 24 MMOL/L (ref 20–32)
CREAT SERPL-MCNC: 1.11 MG/DL (ref 0.66–1.25)
DIFFERENTIAL METHOD BLD: NORMAL
EOSINOPHIL # BLD AUTO: 0.2 10E9/L (ref 0–0.7)
EOSINOPHIL NFR BLD AUTO: 2.4 %
ERYTHROCYTE [DISTWIDTH] IN BLOOD BY AUTOMATED COUNT: 12.9 % (ref 10–15)
GFR SERPL CREATININE-BSD FRML MDRD: 71 ML/MIN/{1.73_M2}
GLUCOSE BLDC GLUCOMTR-MCNC: 107 MG/DL (ref 70–99)
GLUCOSE SERPL-MCNC: 114 MG/DL (ref 70–99)
HCT VFR BLD AUTO: 44.9 % (ref 40–53)
HGB BLD-MCNC: 14.9 G/DL (ref 13.3–17.7)
IMM GRANULOCYTES # BLD: 0.2 10E9/L (ref 0–0.4)
IMM GRANULOCYTES NFR BLD: 2 %
INTERPRETATION ECG - MUSE: NORMAL
INTERPRETATION ECG - MUSE: NORMAL
LABORATORY COMMENT REPORT: NORMAL
LYMPHOCYTES # BLD AUTO: 1.5 10E9/L (ref 0.8–5.3)
LYMPHOCYTES NFR BLD AUTO: 15.6 %
MAGNESIUM SERPL-MCNC: 2.2 MG/DL (ref 1.6–2.3)
MCH RBC QN AUTO: 31.1 PG (ref 26.5–33)
MCHC RBC AUTO-ENTMCNC: 33.2 G/DL (ref 31.5–36.5)
MCV RBC AUTO: 94 FL (ref 78–100)
MONOCYTES # BLD AUTO: 1.2 10E9/L (ref 0–1.3)
MONOCYTES NFR BLD AUTO: 13.1 %
NEUTROPHILS # BLD AUTO: 6.2 10E9/L (ref 1.6–8.3)
NEUTROPHILS NFR BLD AUTO: 66.4 %
NRBC # BLD AUTO: 0 10*3/UL
NRBC BLD AUTO-RTO: 0 /100
PHOSPHATE SERPL-MCNC: 3.6 MG/DL (ref 2.5–4.5)
PLATELET # BLD AUTO: 212 10E9/L (ref 150–450)
POTASSIUM SERPL-SCNC: 4.2 MMOL/L (ref 3.4–5.3)
RBC # BLD AUTO: 4.79 10E12/L (ref 4.4–5.9)
SARS-COV-2 RNA RESP QL NAA+PROBE: NEGATIVE
SODIUM SERPL-SCNC: 135 MMOL/L (ref 133–144)
SPECIMEN SOURCE: NORMAL
WBC # BLD AUTO: 9.4 10E9/L (ref 4–11)

## 2021-01-28 PROCEDURE — 97530 THERAPEUTIC ACTIVITIES: CPT | Mod: GO | Performed by: OCCUPATIONAL THERAPIST

## 2021-01-28 PROCEDURE — 97535 SELF CARE MNGMENT TRAINING: CPT | Mod: GO | Performed by: OCCUPATIONAL THERAPIST

## 2021-01-28 PROCEDURE — 80048 BASIC METABOLIC PNL TOTAL CA: CPT

## 2021-01-28 PROCEDURE — 36415 COLL VENOUS BLD VENIPUNCTURE: CPT

## 2021-01-28 PROCEDURE — 250N000013 HC RX MED GY IP 250 OP 250 PS 637: Performed by: THORACIC SURGERY (CARDIOTHORACIC VASCULAR SURGERY)

## 2021-01-28 PROCEDURE — 250N000009 HC RX 250: Performed by: THORACIC SURGERY (CARDIOTHORACIC VASCULAR SURGERY)

## 2021-01-28 PROCEDURE — 250N000013 HC RX MED GY IP 250 OP 250 PS 637

## 2021-01-28 PROCEDURE — 250N000011 HC RX IP 250 OP 636

## 2021-01-28 PROCEDURE — 250N000013 HC RX MED GY IP 250 OP 250 PS 637: Performed by: STUDENT IN AN ORGANIZED HEALTH CARE EDUCATION/TRAINING PROGRAM

## 2021-01-28 PROCEDURE — 85025 COMPLETE CBC W/AUTO DIFF WBC: CPT

## 2021-01-28 PROCEDURE — U0005 INFEC AGEN DETEC AMPLI PROBE: HCPCS | Performed by: STUDENT IN AN ORGANIZED HEALTH CARE EDUCATION/TRAINING PROGRAM

## 2021-01-28 PROCEDURE — 999N001017 HC STATISTIC GLUCOSE BY METER IP

## 2021-01-28 PROCEDURE — 84100 ASSAY OF PHOSPHORUS: CPT

## 2021-01-28 PROCEDURE — 120N000002 HC R&B MED SURG/OB UMMC

## 2021-01-28 PROCEDURE — U0003 INFECTIOUS AGENT DETECTION BY NUCLEIC ACID (DNA OR RNA); SEVERE ACUTE RESPIRATORY SYNDROME CORONAVIRUS 2 (SARS-COV-2) (CORONAVIRUS DISEASE [COVID-19]), AMPLIFIED PROBE TECHNIQUE, MAKING USE OF HIGH THROUGHPUT TECHNOLOGIES AS DESCRIBED BY CMS-2020-01-R: HCPCS | Performed by: STUDENT IN AN ORGANIZED HEALTH CARE EDUCATION/TRAINING PROGRAM

## 2021-01-28 PROCEDURE — 83735 ASSAY OF MAGNESIUM: CPT

## 2021-01-28 PROCEDURE — 97110 THERAPEUTIC EXERCISES: CPT | Mod: GO | Performed by: OCCUPATIONAL THERAPIST

## 2021-01-28 RX ORDER — HEPARIN SODIUM 5000 [USP'U]/.5ML
5000 INJECTION, SOLUTION INTRAVENOUS; SUBCUTANEOUS EVERY 8 HOURS
Status: DISCONTINUED | OUTPATIENT
Start: 2021-01-28 | End: 2021-02-06 | Stop reason: HOSPADM

## 2021-01-28 RX ORDER — MAGNESIUM CARB/ALUMINUM HYDROX 105-160MG
148 TABLET,CHEWABLE ORAL ONCE
Status: COMPLETED | OUTPATIENT
Start: 2021-01-28 | End: 2021-01-28

## 2021-01-28 RX ADMIN — PANTOPRAZOLE SODIUM 40 MG: 40 TABLET, DELAYED RELEASE ORAL at 08:45

## 2021-01-28 RX ADMIN — MAGNESIUM CITRATE 148 ML: 1.75 LIQUID ORAL at 08:48

## 2021-01-28 RX ADMIN — HEPARIN SODIUM 5000 UNITS: 5000 INJECTION, SOLUTION INTRAVENOUS; SUBCUTANEOUS at 11:29

## 2021-01-28 RX ADMIN — ACETAMINOPHEN 650 MG: 325 TABLET, FILM COATED ORAL at 18:11

## 2021-01-28 RX ADMIN — Medication: at 20:15

## 2021-01-28 RX ADMIN — ACETAMINOPHEN 650 MG: 325 TABLET, FILM COATED ORAL at 11:29

## 2021-01-28 RX ADMIN — HEPARIN SODIUM 5000 UNITS: 5000 INJECTION, SOLUTION INTRAVENOUS; SUBCUTANEOUS at 18:11

## 2021-01-28 RX ADMIN — ACETAMINOPHEN 650 MG: 325 TABLET, FILM COATED ORAL at 06:12

## 2021-01-28 RX ADMIN — PANTOPRAZOLE SODIUM 40 MG: 40 TABLET, DELAYED RELEASE ORAL at 16:15

## 2021-01-28 RX ADMIN — I.V. FAT EMULSION 250 ML: 20 EMULSION INTRAVENOUS at 20:13

## 2021-01-28 RX ADMIN — SPIRONOLACTONE 25 MG: 25 TABLET ORAL at 08:45

## 2021-01-28 ASSESSMENT — ACTIVITIES OF DAILY LIVING (ADL)
ADLS_ACUITY_SCORE: 11

## 2021-01-28 NOTE — PLAN OF CARE
"Vital signs:  /88 (BP Location: Left arm)   Pulse 74   Temp 98.1  F (36.7  C) (Oral)   Resp 16   Ht 1.803 m (5' 11\")   Wt 82.9 kg (182 lb 12.8 oz)   SpO2 96%   BMI 25.50 kg/m      Shift:4305-2800       Activity: SBA  Neuros: A&O x4  Cardiac:WDL, denies chest pain  Respiratory: LS: clear bilaterally, denies SOB, room air    GI/: hypoactive BS, LBM 1/25, voiding adequately, +gas  Diet: NPO ex ice chips   Skin: intact ex PICC line. NG tube on LIS  Lines:NG on LIS, R DL PICC  Labs:K 3.8-replaced and reordered in the morning, MG 2.4 Phos 3.9-daily labs already ordered, WBC 9.4 Hgb 14.8   Pain/nausea:denies   Plan:Cont POC, Hiatal hernia repair this week.       "

## 2021-01-28 NOTE — PLAN OF CARE
Alert, oriented and pleasant. Lungs clear, HR 80s and regular. Bowel sounds faint. Pt states he passed small amount of flatus. No BM today. Walked in vela with standby assist. Showered with OT. Denies pain. NG sutures to nose, output 400cc of light brown, clear fluid. K+ 3.8 and replacement started. Redraw in AM.

## 2021-01-28 NOTE — PLAN OF CARE
"Vital signs:  /87 (BP Location: Left arm)   Pulse 82   Temp 95.8  F (35.4  C) (Oral)   Resp 18   Ht 1.803 m (5' 11\")   Wt 82.9 kg (182 lb 12.8 oz)   SpO2 94%   BMI 25.50 kg/m      Shift:1164-1086       Activity: SBA  Neuros: A&O x4  Cardiac:WDL, denies chest pain   Respiratory: LS:clear bilaterally, denies SOB, adequate oxygen on room air   GI/: hypoactive BS, voiding adequately, LBM 1/25  Diet: NPO ex ice chips and meds  Skin: intact ex R DL PICC  Lines:R DL PICC  Labs: K 4.2 Mg 2.2 Phos 3.6- all electrolytes reordered for tomorrow WBC 9.4 Hgb 14.9   Pain/nausea: denies   Plan:Cont POC, surgery tomorrow       "

## 2021-01-28 NOTE — PLAN OF CARE
B/P slightly high; pt is on B/P meds.A&Ox4. NG to LIS with greenish liquid output.NPO except meds and ice chips.TPN is infusing at 83ml/hr.Continue with POC.

## 2021-01-28 NOTE — PROGRESS NOTES
"Thoracic Surgery Progress Note    Subjective: Doing well today. Patient states he has not had a bowel movement in the past several days. He has spent the majority of his days laying in his bed and has walked minimally. Patient was educated on OR procedure that is scheduled for tomorrow in detail. Extensively answered patients questions regarding his procedure and his hiatal hernia.     Objective:   BP (!) 132/93 (BP Location: Left arm)   Pulse 85   Temp 96.6  F (35.9  C) (Oral)   Resp 18   Ht 1.803 m (5' 11\")   Wt 82.9 kg (182 lb 12.8 oz)   SpO2 95%   BMI 25.50 kg/m      PE:  Gen: Alert, oriented, in no acute distress, NGT in place  CV: Regular rate and good color   Resp: non-labored breathing on room air  Abd: Soft, non-distended, non-tender, no rigidity or guarding  Ext: warm and well perfused      Intake/Output Summary (Last 24 hours) at 1/27/2021 1132  Last data filed at 1/27/2021 0800  Gross per 24 hour   Intake 30 ml   Output 1125 ml   Net -1095 ml     Recent labs reviewed.    Recent imaging reviewed.    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 He came into [facility] ED on 1/20/21 d/t N/V. CXR, AXR and small bowel follow through were consistent with hiatal hernia. There are no signs of strangulation at the moment however imaging shows worsening hiatal hernia. He is now s/p EGD and fluoroscopically guided NGT placement.     -OR for hiatal hernia repair 1/29  -Start subcutaneous heparin  -increased bowel regimen: Mg citrate given  -Cards recs: proceed with Surgery  -Continue spironol actone  - No mIVF  - Pills and sip/chips okay otherwise NPO  - TPN 83mL/hr   - Electrolyte replacement protocol   - NGT to LIS   - COVID test for pre-op placed   - Patient consented for surgery and pre-operative orders placed   - PT/OT  - Protonix 40 BID  - Once patient is discharged will need zio patch " for risk stratification for sudden cardiac death. Will allow us to assess if there are PVCs or afib. Currently neither rhtyhm has been noted through patient's history.    Topher Junior, MS3,  has participated in the care of this patient.     Patient discussed with fellow and staff.    Izabel Carrera MD  General Surgery, PGY-1

## 2021-01-28 NOTE — PLAN OF CARE
"BP (!) 144/93 (BP Location: Left arm)   Pulse 81   Temp 97.7  F (36.5  C) (Oral)   Resp 18   Ht 1.803 m (5' 11\")   Wt 82.9 kg (182 lb 12.8 oz)   SpO2 93%   BMI 25.50 kg/m       Neuros: A&Ox4. Able to make needs known.   Activity: SBA  Cardiac: Denies cardiac chest pain. Vitals stable   Respiratory: WNL. Sating well on RA. Denies SOB  Diet: NPO  GI/Gu: Voiding without difficulty.No BM this shift. Hypoactive BS. Denies N/V  LDA: L DL PICC infusing TPN at 83mL/hr and lipids. NG to LIS  Pain: Denies pain   PRN: No PRNs this shift   Changes: No acute changes this shift   Plan: Continue POC. Possible surgery tomorrow        ROBIN CARCAMO, RN on 1/28/2021 at 6:07 AM       "

## 2021-01-29 ENCOUNTER — APPOINTMENT (OUTPATIENT)
Dept: GENERAL RADIOLOGY | Facility: CLINIC | Age: 61
End: 2021-01-29
Attending: THORACIC SURGERY (CARDIOTHORACIC VASCULAR SURGERY)
Payer: COMMERCIAL

## 2021-01-29 LAB
ANION GAP SERPL CALCULATED.3IONS-SCNC: 5 MMOL/L (ref 3–14)
BASOPHILS # BLD AUTO: 0.1 10E9/L (ref 0–0.2)
BASOPHILS NFR BLD AUTO: 0.7 %
BUN SERPL-MCNC: 30 MG/DL (ref 7–30)
CALCIUM SERPL-MCNC: 9.3 MG/DL (ref 8.5–10.1)
CHLORIDE SERPL-SCNC: 106 MMOL/L (ref 94–109)
CO2 SERPL-SCNC: 24 MMOL/L (ref 20–32)
CREAT SERPL-MCNC: 1.17 MG/DL (ref 0.66–1.25)
DIFFERENTIAL METHOD BLD: NORMAL
EOSINOPHIL # BLD AUTO: 0.2 10E9/L (ref 0–0.7)
EOSINOPHIL NFR BLD AUTO: 2.1 %
ERYTHROCYTE [DISTWIDTH] IN BLOOD BY AUTOMATED COUNT: 13 % (ref 10–15)
GFR SERPL CREATININE-BSD FRML MDRD: 67 ML/MIN/{1.73_M2}
GLUCOSE BLDC GLUCOMTR-MCNC: 133 MG/DL (ref 70–99)
GLUCOSE BLDC GLUCOMTR-MCNC: 138 MG/DL (ref 70–99)
GLUCOSE BLDC GLUCOMTR-MCNC: 151 MG/DL (ref 70–99)
GLUCOSE BLDC GLUCOMTR-MCNC: 174 MG/DL (ref 70–99)
GLUCOSE BLDC GLUCOMTR-MCNC: 180 MG/DL (ref 70–99)
GLUCOSE BLDC GLUCOMTR-MCNC: 196 MG/DL (ref 70–99)
GLUCOSE SERPL-MCNC: 97 MG/DL (ref 70–99)
HCT VFR BLD AUTO: 44.7 % (ref 40–53)
HGB BLD-MCNC: 14.6 G/DL (ref 13.3–17.7)
HGB BLD-MCNC: 14.8 G/DL (ref 13.3–17.7)
IMM GRANULOCYTES # BLD: 0.2 10E9/L (ref 0–0.4)
IMM GRANULOCYTES NFR BLD: 2.1 %
LYMPHOCYTES # BLD AUTO: 1.5 10E9/L (ref 0.8–5.3)
LYMPHOCYTES NFR BLD AUTO: 14.7 %
MAGNESIUM SERPL-MCNC: 2.2 MG/DL (ref 1.6–2.3)
MCH RBC QN AUTO: 30.8 PG (ref 26.5–33)
MCHC RBC AUTO-ENTMCNC: 33.1 G/DL (ref 31.5–36.5)
MCV RBC AUTO: 93 FL (ref 78–100)
MONOCYTES # BLD AUTO: 1.2 10E9/L (ref 0–1.3)
MONOCYTES NFR BLD AUTO: 12.6 %
NEUTROPHILS # BLD AUTO: 6.7 10E9/L (ref 1.6–8.3)
NEUTROPHILS NFR BLD AUTO: 67.8 %
NRBC # BLD AUTO: 0 10*3/UL
NRBC BLD AUTO-RTO: 0 /100
PHOSPHATE SERPL-MCNC: 3.6 MG/DL (ref 2.5–4.5)
PLATELET # BLD AUTO: 267 10E9/L (ref 150–450)
POTASSIUM SERPL-SCNC: 3.9 MMOL/L (ref 3.4–5.3)
RBC # BLD AUTO: 4.81 10E12/L (ref 4.4–5.9)
SODIUM SERPL-SCNC: 135 MMOL/L (ref 133–144)
WBC # BLD AUTO: 9.9 10E9/L (ref 4–11)

## 2021-01-29 PROCEDURE — 250N000011 HC RX IP 250 OP 636: Performed by: STUDENT IN AN ORGANIZED HEALTH CARE EDUCATION/TRAINING PROGRAM

## 2021-01-29 PROCEDURE — 370N000017 HC ANESTHESIA TECHNICAL FEE, PER MIN: Performed by: STUDENT IN AN ORGANIZED HEALTH CARE EDUCATION/TRAINING PROGRAM

## 2021-01-29 PROCEDURE — 250N000011 HC RX IP 250 OP 636

## 2021-01-29 PROCEDURE — 88302 TISSUE EXAM BY PATHOLOGIST: CPT | Mod: 26 | Performed by: PATHOLOGY

## 2021-01-29 PROCEDURE — 85018 HEMOGLOBIN: CPT

## 2021-01-29 PROCEDURE — 999N000157 HC STATISTIC RCP TIME EA 10 MIN

## 2021-01-29 PROCEDURE — 88304 TISSUE EXAM BY PATHOLOGIST: CPT | Mod: TC | Performed by: STUDENT IN AN ORGANIZED HEALTH CARE EDUCATION/TRAINING PROGRAM

## 2021-01-29 PROCEDURE — 0D9640Z DRAINAGE OF STOMACH WITH DRAINAGE DEVICE, PERCUTANEOUS ENDOSCOPIC APPROACH: ICD-10-PCS | Performed by: STUDENT IN AN ORGANIZED HEALTH CARE EDUCATION/TRAINING PROGRAM

## 2021-01-29 PROCEDURE — 85025 COMPLETE CBC W/AUTO DIFF WBC: CPT

## 2021-01-29 PROCEDURE — 32551 INSERTION OF CHEST TUBE: CPT | Mod: GC | Performed by: STUDENT IN AN ORGANIZED HEALTH CARE EDUCATION/TRAINING PROGRAM

## 2021-01-29 PROCEDURE — 250N000011 HC RX IP 250 OP 636: Performed by: THORACIC SURGERY (CARDIOTHORACIC VASCULAR SURGERY)

## 2021-01-29 PROCEDURE — 258N000003 HC RX IP 258 OP 636

## 2021-01-29 PROCEDURE — 710N000011 HC RECOVERY PHASE 1, LEVEL 3, PER MIN: Performed by: STUDENT IN AN ORGANIZED HEALTH CARE EDUCATION/TRAINING PROGRAM

## 2021-01-29 PROCEDURE — 250N000009 HC RX 250

## 2021-01-29 PROCEDURE — 360N000077 HC SURGERY LEVEL 4, PER MIN: Performed by: STUDENT IN AN ORGANIZED HEALTH CARE EDUCATION/TRAINING PROGRAM

## 2021-01-29 PROCEDURE — 120N000002 HC R&B MED SURG/OB UMMC

## 2021-01-29 PROCEDURE — C9113 INJ PANTOPRAZOLE SODIUM, VIA: HCPCS | Performed by: STUDENT IN AN ORGANIZED HEALTH CARE EDUCATION/TRAINING PROGRAM

## 2021-01-29 PROCEDURE — 250N000024 HC ISOFLURANE, PER MIN: Performed by: STUDENT IN AN ORGANIZED HEALTH CARE EDUCATION/TRAINING PROGRAM

## 2021-01-29 PROCEDURE — 250N000013 HC RX MED GY IP 250 OP 250 PS 637

## 2021-01-29 PROCEDURE — 272N000002 HC OR SUPPLY OTHER OPNP: Performed by: STUDENT IN AN ORGANIZED HEALTH CARE EDUCATION/TRAINING PROGRAM

## 2021-01-29 PROCEDURE — 999N001017 HC STATISTIC GLUCOSE BY METER IP

## 2021-01-29 PROCEDURE — 250N000009 HC RX 250: Performed by: STUDENT IN AN ORGANIZED HEALTH CARE EDUCATION/TRAINING PROGRAM

## 2021-01-29 PROCEDURE — 80048 BASIC METABOLIC PNL TOTAL CA: CPT

## 2021-01-29 PROCEDURE — 0BQT4ZZ REPAIR DIAPHRAGM, PERCUTANEOUS ENDOSCOPIC APPROACH: ICD-10-PCS | Performed by: STUDENT IN AN ORGANIZED HEALTH CARE EDUCATION/TRAINING PROGRAM

## 2021-01-29 PROCEDURE — 999N000141 HC STATISTIC PRE-PROCEDURE NURSING ASSESSMENT: Performed by: STUDENT IN AN ORGANIZED HEALTH CARE EDUCATION/TRAINING PROGRAM

## 2021-01-29 PROCEDURE — 36592 COLLECT BLOOD FROM PICC: CPT

## 2021-01-29 PROCEDURE — 43281 LAP PARAESOPHAG HERN REPAIR: CPT | Mod: GC | Performed by: STUDENT IN AN ORGANIZED HEALTH CARE EDUCATION/TRAINING PROGRAM

## 2021-01-29 PROCEDURE — 272N000001 HC OR GENERAL SUPPLY STERILE: Performed by: STUDENT IN AN ORGANIZED HEALTH CARE EDUCATION/TRAINING PROGRAM

## 2021-01-29 PROCEDURE — 71045 X-RAY EXAM CHEST 1 VIEW: CPT | Mod: 26 | Performed by: RADIOLOGY

## 2021-01-29 PROCEDURE — 999N000015 HC STATISTIC ARTERIAL MONITORING DAILY

## 2021-01-29 PROCEDURE — 84100 ASSAY OF PHOSPHORUS: CPT

## 2021-01-29 PROCEDURE — 83735 ASSAY OF MAGNESIUM: CPT

## 2021-01-29 PROCEDURE — 999N000065 XR CHEST PORT 1 VW

## 2021-01-29 RX ORDER — NALOXONE HYDROCHLORIDE 0.4 MG/ML
0.4 INJECTION, SOLUTION INTRAMUSCULAR; INTRAVENOUS; SUBCUTANEOUS
Status: DISCONTINUED | OUTPATIENT
Start: 2021-01-29 | End: 2021-01-29 | Stop reason: HOSPADM

## 2021-01-29 RX ORDER — ONDANSETRON 4 MG/1
4 TABLET, ORALLY DISINTEGRATING ORAL EVERY 30 MIN PRN
Status: DISCONTINUED | OUTPATIENT
Start: 2021-01-29 | End: 2021-01-29 | Stop reason: HOSPADM

## 2021-01-29 RX ORDER — MAGNESIUM SULFATE HEPTAHYDRATE 40 MG/ML
2 INJECTION, SOLUTION INTRAVENOUS ONCE
Status: COMPLETED | OUTPATIENT
Start: 2021-01-29 | End: 2021-01-29

## 2021-01-29 RX ORDER — ACETAMINOPHEN 325 MG/1
650 TABLET ORAL EVERY 6 HOURS PRN
Status: DISCONTINUED | OUTPATIENT
Start: 2021-01-29 | End: 2021-01-30

## 2021-01-29 RX ORDER — SODIUM CHLORIDE, SODIUM LACTATE, POTASSIUM CHLORIDE, CALCIUM CHLORIDE 600; 310; 30; 20 MG/100ML; MG/100ML; MG/100ML; MG/100ML
INJECTION, SOLUTION INTRAVENOUS CONTINUOUS
Status: DISCONTINUED | OUTPATIENT
Start: 2021-01-29 | End: 2021-01-29 | Stop reason: HOSPADM

## 2021-01-29 RX ORDER — CEFAZOLIN SODIUM 1 G/3ML
1 INJECTION, POWDER, FOR SOLUTION INTRAMUSCULAR; INTRAVENOUS SEE ADMIN INSTRUCTIONS
Status: DISCONTINUED | OUTPATIENT
Start: 2021-01-29 | End: 2021-01-29 | Stop reason: HOSPADM

## 2021-01-29 RX ORDER — METOPROLOL TARTRATE 1 MG/ML
INJECTION, SOLUTION INTRAVENOUS PRN
Status: DISCONTINUED | OUTPATIENT
Start: 2021-01-29 | End: 2021-01-29

## 2021-01-29 RX ORDER — DEXAMETHASONE SODIUM PHOSPHATE 4 MG/ML
INJECTION, SOLUTION INTRA-ARTICULAR; INTRALESIONAL; INTRAMUSCULAR; INTRAVENOUS; SOFT TISSUE PRN
Status: DISCONTINUED | OUTPATIENT
Start: 2021-01-29 | End: 2021-01-29

## 2021-01-29 RX ORDER — METOPROLOL TARTRATE 1 MG/ML
1 INJECTION, SOLUTION INTRAVENOUS EVERY 5 MIN PRN
Status: DISCONTINUED | OUTPATIENT
Start: 2021-01-29 | End: 2021-01-29 | Stop reason: HOSPADM

## 2021-01-29 RX ORDER — LIDOCAINE HYDROCHLORIDE 20 MG/ML
INJECTION, SOLUTION INFILTRATION; PERINEURAL PRN
Status: DISCONTINUED | OUTPATIENT
Start: 2021-01-29 | End: 2021-01-29

## 2021-01-29 RX ORDER — SODIUM CHLORIDE, SODIUM LACTATE, POTASSIUM CHLORIDE, CALCIUM CHLORIDE 600; 310; 30; 20 MG/100ML; MG/100ML; MG/100ML; MG/100ML
INJECTION, SOLUTION INTRAVENOUS CONTINUOUS PRN
Status: DISCONTINUED | OUTPATIENT
Start: 2021-01-29 | End: 2021-01-29

## 2021-01-29 RX ORDER — LABETALOL HYDROCHLORIDE 5 MG/ML
10 INJECTION, SOLUTION INTRAVENOUS EVERY 10 MIN PRN
Status: DISCONTINUED | OUTPATIENT
Start: 2021-01-29 | End: 2021-01-29 | Stop reason: HOSPADM

## 2021-01-29 RX ORDER — BUPIVACAINE HYDROCHLORIDE 2.5 MG/ML
INJECTION, SOLUTION INFILTRATION; PERINEURAL PRN
Status: DISCONTINUED | OUTPATIENT
Start: 2021-01-29 | End: 2021-01-29 | Stop reason: HOSPADM

## 2021-01-29 RX ORDER — PHENYLEPHRINE HCL IN 0.9% NACL 50MG/250ML
0.5-6 PLASTIC BAG, INJECTION (ML) INTRAVENOUS CONTINUOUS
Status: DISCONTINUED | OUTPATIENT
Start: 2021-01-29 | End: 2021-01-29 | Stop reason: HOSPADM

## 2021-01-29 RX ORDER — NALOXONE HYDROCHLORIDE 0.4 MG/ML
0.2 INJECTION, SOLUTION INTRAMUSCULAR; INTRAVENOUS; SUBCUTANEOUS
Status: DISCONTINUED | OUTPATIENT
Start: 2021-01-29 | End: 2021-01-29 | Stop reason: HOSPADM

## 2021-01-29 RX ORDER — FENTANYL CITRATE 50 UG/ML
INJECTION, SOLUTION INTRAMUSCULAR; INTRAVENOUS PRN
Status: DISCONTINUED | OUTPATIENT
Start: 2021-01-29 | End: 2021-01-29

## 2021-01-29 RX ORDER — FENTANYL CITRATE 50 UG/ML
25-50 INJECTION, SOLUTION INTRAMUSCULAR; INTRAVENOUS
Status: DISCONTINUED | OUTPATIENT
Start: 2021-01-29 | End: 2021-01-29 | Stop reason: HOSPADM

## 2021-01-29 RX ORDER — ESMOLOL HYDROCHLORIDE 10 MG/ML
INJECTION INTRAVENOUS PRN
Status: DISCONTINUED | OUTPATIENT
Start: 2021-01-29 | End: 2021-01-29

## 2021-01-29 RX ORDER — HYDROMORPHONE HYDROCHLORIDE 1 MG/ML
.3-.5 INJECTION, SOLUTION INTRAMUSCULAR; INTRAVENOUS; SUBCUTANEOUS EVERY 5 MIN PRN
Status: DISCONTINUED | OUTPATIENT
Start: 2021-01-29 | End: 2021-01-29 | Stop reason: HOSPADM

## 2021-01-29 RX ORDER — PROPOFOL 10 MG/ML
INJECTION, EMULSION INTRAVENOUS PRN
Status: DISCONTINUED | OUTPATIENT
Start: 2021-01-29 | End: 2021-01-29

## 2021-01-29 RX ORDER — CEFAZOLIN SODIUM 2 G/100ML
2 INJECTION, SOLUTION INTRAVENOUS
Status: COMPLETED | OUTPATIENT
Start: 2021-01-29 | End: 2021-01-29

## 2021-01-29 RX ORDER — CALCIUM CHLORIDE 100 MG/ML
INJECTION INTRAVENOUS; INTRAVENTRICULAR PRN
Status: DISCONTINUED | OUTPATIENT
Start: 2021-01-29 | End: 2021-01-29

## 2021-01-29 RX ORDER — ONDANSETRON 2 MG/ML
4 INJECTION INTRAMUSCULAR; INTRAVENOUS EVERY 30 MIN PRN
Status: DISCONTINUED | OUTPATIENT
Start: 2021-01-29 | End: 2021-01-29 | Stop reason: HOSPADM

## 2021-01-29 RX ADMIN — PROPOFOL 120 MG: 10 INJECTION, EMULSION INTRAVENOUS at 10:07

## 2021-01-29 RX ADMIN — SUGAMMADEX 200 MG: 100 INJECTION, SOLUTION INTRAVENOUS at 14:41

## 2021-01-29 RX ADMIN — PHENYLEPHRINE HYDROCHLORIDE 100 MCG: 10 INJECTION INTRAVENOUS at 12:29

## 2021-01-29 RX ADMIN — HUMAN INSULIN 2 UNITS/HR: 100 INJECTION, SOLUTION SUBCUTANEOUS at 13:15

## 2021-01-29 RX ADMIN — DEXMEDETOMIDINE HYDROCHLORIDE 8 MCG: 100 INJECTION, SOLUTION INTRAVENOUS at 09:45

## 2021-01-29 RX ADMIN — FENTANYL CITRATE 25 MCG: 50 INJECTION, SOLUTION INTRAMUSCULAR; INTRAVENOUS at 16:48

## 2021-01-29 RX ADMIN — DEXMEDETOMIDINE HYDROCHLORIDE 8 MCG: 100 INJECTION, SOLUTION INTRAVENOUS at 10:55

## 2021-01-29 RX ADMIN — Medication 2 G: at 10:40

## 2021-01-29 RX ADMIN — DEXMEDETOMIDINE HYDROCHLORIDE 8 MCG: 100 INJECTION, SOLUTION INTRAVENOUS at 13:37

## 2021-01-29 RX ADMIN — CALCIUM CHLORIDE 500 MG: 100 INJECTION INTRAVENOUS; INTRAVENTRICULAR at 11:41

## 2021-01-29 RX ADMIN — SODIUM CHLORIDE, POTASSIUM CHLORIDE, SODIUM LACTATE AND CALCIUM CHLORIDE: 600; 310; 30; 20 INJECTION, SOLUTION INTRAVENOUS at 10:07

## 2021-01-29 RX ADMIN — PHENYLEPHRINE HYDROCHLORIDE 100 MCG: 10 INJECTION INTRAVENOUS at 12:37

## 2021-01-29 RX ADMIN — PHENYLEPHRINE HYDROCHLORIDE 100 MCG: 10 INJECTION INTRAVENOUS at 13:42

## 2021-01-29 RX ADMIN — CALCIUM CHLORIDE 500 MG: 100 INJECTION INTRAVENOUS; INTRAVENTRICULAR at 12:29

## 2021-01-29 RX ADMIN — DEXMEDETOMIDINE HYDROCHLORIDE 8 MCG: 100 INJECTION, SOLUTION INTRAVENOUS at 11:15

## 2021-01-29 RX ADMIN — METOPROLOL TARTRATE 1 MG: 5 INJECTION INTRAVENOUS at 12:44

## 2021-01-29 RX ADMIN — DEXMEDETOMIDINE HYDROCHLORIDE 8 MCG: 100 INJECTION, SOLUTION INTRAVENOUS at 10:00

## 2021-01-29 RX ADMIN — METOPROLOL TARTRATE 1 MG: 5 INJECTION INTRAVENOUS at 11:50

## 2021-01-29 RX ADMIN — HEPARIN SODIUM 5000 UNITS: 5000 INJECTION, SOLUTION INTRAVENOUS; SUBCUTANEOUS at 01:18

## 2021-01-29 RX ADMIN — ROCURONIUM BROMIDE 20 MG: 10 INJECTION INTRAVENOUS at 11:49

## 2021-01-29 RX ADMIN — FENTANYL CITRATE 50 MCG: 50 INJECTION, SOLUTION INTRAMUSCULAR; INTRAVENOUS at 17:02

## 2021-01-29 RX ADMIN — ESMOLOL HYDROCHLORIDE 10 MG: 10 INJECTION, SOLUTION INTRAVENOUS at 11:34

## 2021-01-29 RX ADMIN — PHENYLEPHRINE HYDROCHLORIDE 50 MCG: 10 INJECTION INTRAVENOUS at 14:43

## 2021-01-29 RX ADMIN — HYDROMORPHONE HYDROCHLORIDE 0.5 MG: 1 INJECTION, SOLUTION INTRAMUSCULAR; INTRAVENOUS; SUBCUTANEOUS at 13:52

## 2021-01-29 RX ADMIN — Medication 100 MG: at 10:07

## 2021-01-29 RX ADMIN — PANTOPRAZOLE SODIUM 40 MG: 40 INJECTION, POWDER, FOR SOLUTION INTRAVENOUS at 20:23

## 2021-01-29 RX ADMIN — ROCURONIUM BROMIDE 10 MG: 10 INJECTION INTRAVENOUS at 13:15

## 2021-01-29 RX ADMIN — METOPROLOL TARTRATE 1 MG: 5 INJECTION INTRAVENOUS at 14:43

## 2021-01-29 RX ADMIN — FENTANYL CITRATE 100 MCG: 50 INJECTION, SOLUTION INTRAMUSCULAR; INTRAVENOUS at 11:12

## 2021-01-29 RX ADMIN — PHENYLEPHRINE HYDROCHLORIDE 100 MCG: 10 INJECTION INTRAVENOUS at 10:07

## 2021-01-29 RX ADMIN — LIDOCAINE HYDROCHLORIDE 100 MG: 20 INJECTION, SOLUTION INFILTRATION; PERINEURAL at 10:07

## 2021-01-29 RX ADMIN — FENTANYL CITRATE 150 MCG: 50 INJECTION, SOLUTION INTRAMUSCULAR; INTRAVENOUS at 10:49

## 2021-01-29 RX ADMIN — FENTANYL CITRATE 200 MCG: 50 INJECTION, SOLUTION INTRAMUSCULAR; INTRAVENOUS at 10:07

## 2021-01-29 RX ADMIN — SODIUM CHLORIDE, POTASSIUM CHLORIDE, SODIUM LACTATE AND CALCIUM CHLORIDE: 600; 310; 30; 20 INJECTION, SOLUTION INTRAVENOUS at 09:52

## 2021-01-29 RX ADMIN — MAGNESIUM SULFATE IN WATER 2 G: 40 INJECTION, SOLUTION INTRAVENOUS at 11:28

## 2021-01-29 RX ADMIN — ROCURONIUM BROMIDE 50 MG: 10 INJECTION INTRAVENOUS at 10:49

## 2021-01-29 RX ADMIN — Medication 1 MCG/KG/MIN: at 10:07

## 2021-01-29 RX ADMIN — Medication: at 17:50

## 2021-01-29 RX ADMIN — PROPOFOL 30 MG: 10 INJECTION, EMULSION INTRAVENOUS at 11:12

## 2021-01-29 RX ADMIN — DEXAMETHASONE SODIUM PHOSPHATE 8 MG: 4 INJECTION, SOLUTION INTRA-ARTICULAR; INTRALESIONAL; INTRAMUSCULAR; INTRAVENOUS; SOFT TISSUE at 11:21

## 2021-01-29 RX ADMIN — ACETAMINOPHEN 650 MG: 325 TABLET, FILM COATED ORAL at 06:16

## 2021-01-29 RX ADMIN — ROCURONIUM BROMIDE 20 MG: 10 INJECTION INTRAVENOUS at 12:46

## 2021-01-29 RX ADMIN — ROCURONIUM BROMIDE 20 MG: 10 INJECTION INTRAVENOUS at 13:50

## 2021-01-29 RX ADMIN — PHENYLEPHRINE HYDROCHLORIDE 50 MCG: 10 INJECTION INTRAVENOUS at 14:53

## 2021-01-29 RX ADMIN — FENTANYL CITRATE 25 MCG: 50 INJECTION, SOLUTION INTRAMUSCULAR; INTRAVENOUS at 16:38

## 2021-01-29 RX ADMIN — I.V. FAT EMULSION 250 ML: 20 EMULSION INTRAVENOUS at 20:23

## 2021-01-29 RX ADMIN — POTASSIUM CHLORIDE: 2 INJECTION, SOLUTION, CONCENTRATE INTRAVENOUS at 20:24

## 2021-01-29 RX ADMIN — CEFAZOLIN 1 G: 1 INJECTION, POWDER, FOR SOLUTION INTRAMUSCULAR; INTRAVENOUS at 12:40

## 2021-01-29 RX ADMIN — SODIUM CHLORIDE, POTASSIUM CHLORIDE, SODIUM LACTATE AND CALCIUM CHLORIDE: 600; 310; 30; 20 INJECTION, SOLUTION INTRAVENOUS at 16:14

## 2021-01-29 RX ADMIN — HYDROMORPHONE HYDROCHLORIDE 0.5 MG: 1 INJECTION, SOLUTION INTRAMUSCULAR; INTRAVENOUS; SUBCUTANEOUS at 11:12

## 2021-01-29 RX ADMIN — PROPOFOL 20 MG: 10 INJECTION, EMULSION INTRAVENOUS at 14:45

## 2021-01-29 RX ADMIN — HYDROMORPHONE HYDROCHLORIDE 0.5 MG: 1 INJECTION, SOLUTION INTRAMUSCULAR; INTRAVENOUS; SUBCUTANEOUS at 11:17

## 2021-01-29 ASSESSMENT — ACTIVITIES OF DAILY LIVING (ADL)
ADLS_ACUITY_SCORE: 11
ADLS_ACUITY_SCORE: 12
ADLS_ACUITY_SCORE: 11

## 2021-01-29 NOTE — PLAN OF CARE
Neuros: A&Ox4. Able to make needs known.   Activity: SBA  Cardiac: Denies cardiac chest pain. Vitals stable   Respiratory: WNL. Sating well on RA. Denies SOB  Diet: NPO  GI/Gu: Voiding without difficulty.1 episode of diahrea per patient report. Denies N/V  LDA: L DL PICC infusing TPN at 83mL/hr and lipids. NG to LIS  Pain: Denies pain   Skin: intact. Attempted to reposition NG tube to prevent nare breakdown but unable to due NG being sutured in place   PRN: No PRNs this shift   Changes: NG pulling bright red blood, provider aware, hgb stable at 14.6  Plan: Continue POC. Surgery today. Jamaal wipe done

## 2021-01-29 NOTE — ANESTHESIA PROCEDURE NOTES
Arterial Line Procedure Note    Staff -   Anesthesiologist:  Quincy Cabrera MD  Resident/Fellow: Rob Tsang MD  Performed By: resident  Procedure performed by resident/CRNA in presence of a teaching physician.    Location: In OR After Induction  Procedure Start/Stop Times:     patient identified, IV checked, site marked, risks and benefits discussed, informed consent, monitors and equipment checked, pre-op evaluation and at physician/surgeon's request      Correct Patient: Yes      Correct Position: Yes      Correct Site: Yes      Correct Procedure: Yes      Correct Laterality:  N/A    Site Marked:  N/A  Line Placement:     Procedure:  Arterial Line    Insertion Site:  Radial    Insertion laterality:  Right    Skin Prep: Chloraprep      Patient Prep: patient draped, sterile gloves, sterile gown, hat and hand hygiene      Local skin infiltration:  None    Ultrasound Guided?: Yes      Artery evaluated via ultrasound confirming patency.   Using realtime imaging, the artery was punctured and the needle was observed entering the artery.      A permanent image is entered into patient's chart.      Catheter size:  20 gauge, 12 cm    Cath secured with: suture      Dressing:  Tegaderm    Complications:  None obvious    Arterial waveform: Yes      IBP within 10% of NIBP: Yes

## 2021-01-29 NOTE — ANESTHESIA PROCEDURE NOTES
Airway   Date/Time: 1/29/2021 10:09 AM   Patient location during procedure: OR  Staff -   Anesthesiologist:  Quincy Cabrera MD  Performed By: anesthesiologist    Consent for Airway   Urgency: elective    Indications and Patient Condition  Indications for airway management: arjun-procedural  Induction type:RSIMask difficulty assessment: 0 - not attempted    Final Airway Details  Final airway type: endotracheal airway  Successful airway:ETT - single  Endotracheal Airway Details   ETT size (mm): 8.0  Cuffed: yes  Successful intubation technique: direct laryngoscopy  Grade View of Cords: 1  Adjucts: stylet  Secured at (cm): 24  Secured with: commercial tube gentile  Bite block used: None    Post intubation assessment   Placement verified by: capnometry, equal breath sounds and chest rise   Number of attempts at approach: 1  Secured with:commercial tube gentile  Ease of procedure: easy  Dentition: Intact and Unchanged

## 2021-01-29 NOTE — PROGRESS NOTES
CLINICAL NUTRITION SERVICES - REASSESSMENT NOTE     Nutrition Prescription    Malnutrition Status:    Patient does not meet two of the established criteria necessary for diagnosing malnutrition    Recommendations already ordered by Registered Dietitian (RD):  - Continue current TPN and lipids as below.    Future/Additional Recommendations:  - monitor fluid balance--may need MIVF if has increased NG output after surgery.   - Monitor post op diet advance.      EVALUATION OF THE PROGRESS TOWARD GOALS   Diet: NPO  Nutrition Support: TPN continues @ 83 mL/hr (2000 mL total) with 250 gm dextrose, 110 grams AA,  250 mL lipids daily.  Provides 1790 kcals/day (21.6 kcal/kg/day), 1.3 g PRO/kg/day, GIR 2.1 mg/kg/min and 28 % kcals from Fat.   Intake: TPN and lipids should meet 100% of needs        NEW FINDINGS   -General: Noted plan for surgery Friday  -Wt: down 4# from 1/23. No edema noted.   -Labs: noted  -GI: Last BM: 1/25  -PMH/Nutrition Hx: Pt notes that he was eating fine until 1 days prior to his admission to OSH.  He notes that he was tolerating oral intake with no problems until he ate some spicy food.  He notes that about a year ago he had a similar presentation of n/v x 3 days after spicy food that led to hospitalization.  He also notes having intermittent loose stools.  He correlated this with possible lactose intolerance. However he would note loose stools 3 days after having ice cream or other dairy which doesn't fit the typical presentation of lactose intolerance.     MALNUTRITION  % Intake: Decreased intake does not meet criteria  % Weight Loss: None noted  Subcutaneous Fat Loss: None observed  Muscle Loss: Facial & jaw region:  Moderate (however pt notes this is his usual appearence)  Fluid Accumulation/Edema: None noted  Malnutrition Diagnosis: Patient does not meet two of the established criteria necessary for diagnosing malnutrition    Previous Goals   Total avg nutritional intake to meet a minimum of 20  kcal/kg and 1.0 PRO/kg daily (per dosing wt 83 kg dry admit wt at OSH on 1/21).     Evaluation: Met    Previous Nutrition Diagnosis  Inadequate oral intake related to Gastric outlet obstruction associated with hiatal hernia, hindering patients ability to tolerate PO as evidenced by Kept NPO since admit to OSH on 1/21, NG for venting with TPN support to meet nutrition needs.        Evaluation: No change    CURRENT NUTRITION DIAGNOSIS  Inadequate oral intake related to Gastric outlet obstruction associated with hiatal hernia, hindering patients ability to tolerate PO as evidenced by Kept NPO since admit to OSH on 1/21, NG for venting with TPN support to meet nutrition needs.       INTERVENTIONS  Implementation  Education--answered patient's questions re: lactose intolerance.   See recommendations above.     Goals  Total avg nutritional intake to meet a minimum of 20 kcal/kg and 1.2 g PRO/kg daily (per dosing wt 83 kg).    Monitoring/Evaluation  Progress toward goals will be monitored and evaluated per protocol.    Viji Ivy RD, LD   5A (rooms 5201-1 through 5211-2) / 7B Pager: [phone]

## 2021-01-29 NOTE — PLAN OF CARE
Vitals:    01/28/21 0843 01/28/21 1542 01/29/21 0000 01/29/21 0738   BP: (!) 132/93 125/87 128/87 138/85   BP Location: Left arm Left arm  Left arm   Pulse: 85 82 94 89   Resp: 18 18 18 18   Temp: 96.6  F (35.9  C) 95.8  F (35.4  C) 96.4  F (35.8  C) 96.6  F (35.9  C)   TempSrc: Oral Oral Oral Oral   SpO2: 95% 94% 97% 97%   Weight:       Height:       Night RN reported off pt status, 3C called to the station informed that they are sending transport for this pt.   VSS and am assessment done explained pt the plan of care and sent pt  to 3C per plan. Continue POC.

## 2021-01-29 NOTE — PROGRESS NOTES
"Brief surgery cross cover note    Saw Delroy at bedside due to concerns of blood in NGT. Otherwise VSS. No complaints. Says he may have had a mild nose bleed earlier today that he thinks might be contributing. Denies pain.    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 He came into [facility] ED on 1/20/21 d/t N/V. CXR, AXR and small bowel follow through were consistent with hiatal hernia. There are no signs of strangulation at the moment however imaging shows worsening hiatal hernia. He is now s/p EGD and fluoroscopically guided NGT placement.    Doing well, minimal blood in tube, most proximal tubing is purely brown output.     - Continue to monitor NGT output, vitals, and clinical picture overnight  - Hb check  - PO protonix 40 BID ordered, in setting of prolonged QT on last EKG will not convert to IV at this time. Will re-evaluate for conversion to IV dosing in the AM if continued red output.     Ni Alba DO  Surgery PGY1  "

## 2021-01-29 NOTE — BRIEF OP NOTE
[facility]     Brief Operative Note    Pre-operative diagnosis: Hiatal hernia [K44.9]  Post-operative diagnosis Same as pre-operative diagnosis    Procedure: Procedure(s):  HERNIORRHAPHY, HIATAL, LAPAROSCOPIC, bilateral chest tubes, gastropexy with peg tube  Surgeon: Surgeon(s) and Role:     * Nancy Flores MD - Primary     * Jennifer Day MD  Anesthesia: General   Estimated blood loss: Minimal  Drains: 19 Fr Bassem drains x2 (bilateral pleural space), PEG tube  Specimens:   ID Type Source Tests Collected by Time Destination   A : Hiatal Hernia Sac Tissue Hernia Sac SURGICAL PATHOLOGY EXAM Nancy Flores MD 1/29/2021 11:19 AM      Findings:   Large haital hernia with stomach in the chest reduced, hernia sac dissected free, hiatus repaired. Bilateral pleural spaces entered, bilateral chest tubes placed. PEG placed. .  Complications: None.  Implants: * No implants in log *    Jennifer Day MD  Surgery Resident, PGY3

## 2021-01-29 NOTE — ANESTHESIA POSTPROCEDURE EVALUATION
Patient: Delroy Christianson    Procedure(s):  HERNIORRHAPHY, HIATAL, LAPAROSCOPIC, bilateral chest tubes, gastropexy with peg tube    Diagnosis:Hiatal hernia [K44.9]  Diagnosis Additional Information: No value filed.    Anesthesia Type:  General    Note:  Disposition: Admission   Postop Pain Control: Uneventful            Sign Out: Well controlled pain   PONV: No   Neuro/Psych: Uneventful            Sign Out: Acceptable/Baseline neuro status   Airway/Respiratory: Uneventful            Sign Out: Acceptable/Baseline resp. status   CV/Hemodynamics: Uneventful            Sign Out: Acceptable CV status   Other NRE: NONE   DID A NON-ROUTINE EVENT OCCUR? No         Last vitals:  Vitals:    01/29/21 1600 01/29/21 1615 01/29/21 1630   BP: 112/67 99/75 121/77   Pulse: 73 70 70   Resp: 9 12 18   Temp: 36.4  C (97.5  F) 36.4  C (97.5  F) 36.4  C (97.5  F)   SpO2: 95% 96% 97%       Electronically Signed By: Quincy Cabrera MD  January 29, 2021  5:46 PM

## 2021-01-29 NOTE — PROVIDER NOTIFICATION
Provider notification at 0018  Bright red blood being pulled from NG tube. Vitals stable. Can you come assess?    Reponse- provider came to assess, ordered hgb

## 2021-01-29 NOTE — ANESTHESIA CARE TRANSFER NOTE
Patient: Delroy Schofielduser    Procedure(s):  HERNIORRHAPHY, HIATAL, LAPAROSCOPIC, bilateral chest tubes, gastropexy with peg tube    Diagnosis: Hiatal hernia [K44.9]  Diagnosis Additional Information: No value filed.    Anesthesia Type:   General     Note:    Oropharynx: oropharynx clear of all foreign objects  Level of Consciousness: awake  Oxygen Supplementation: face mask    Independent Airway: airway patency satisfactory and stable    Vital Signs Stable: post-procedure vital signs reviewed and stable  Report to RN Given: handoff report given  Patient transferred to: PACU  Comments: Discussed hemodnyamic goals with patient's history of HOCM  Discussed history of prolonged QT   Patient hemodynamically stable on arrival to PACU.     Handoff Report: Identifed the Patient, Identified the Reponsible Provider, Reviewed the pertinent medical history, Discussed the surgical course, Reviewed Intra-OP anesthesia mangement and issues during anesthesia, Set expectations for post-procedure period and Allowed opportunity for questions and acknowledgement of understanding      Vitals: (Last set prior to Anesthesia Care Transfer)  CRNA VITALS  1/29/2021 1435 - 1/29/2021 1521      1/29/2021             Pulse:  80    ART BP:  127/62    ART Mean:  89    SpO2:  99 %    Resp Rate (observed):  10        Electronically Signed By: Rob Tsang MD  January 29, 2021  3:21 PM

## 2021-01-30 ENCOUNTER — APPOINTMENT (OUTPATIENT)
Dept: GENERAL RADIOLOGY | Facility: CLINIC | Age: 61
End: 2021-01-30
Attending: THORACIC SURGERY (CARDIOTHORACIC VASCULAR SURGERY)
Payer: COMMERCIAL

## 2021-01-30 ENCOUNTER — APPOINTMENT (OUTPATIENT)
Dept: OCCUPATIONAL THERAPY | Facility: CLINIC | Age: 61
End: 2021-01-30
Attending: THORACIC SURGERY (CARDIOTHORACIC VASCULAR SURGERY)
Payer: COMMERCIAL

## 2021-01-30 LAB
ANION GAP SERPL CALCULATED.3IONS-SCNC: 5 MMOL/L (ref 3–14)
BASOPHILS # BLD AUTO: 0 10E9/L (ref 0–0.2)
BASOPHILS NFR BLD AUTO: 0.1 %
BUN SERPL-MCNC: 30 MG/DL (ref 7–30)
CALCIUM SERPL-MCNC: 9.2 MG/DL (ref 8.5–10.1)
CHLORIDE SERPL-SCNC: 104 MMOL/L (ref 94–109)
CO2 SERPL-SCNC: 25 MMOL/L (ref 20–32)
CREAT SERPL-MCNC: 1 MG/DL (ref 0.66–1.25)
DIFFERENTIAL METHOD BLD: ABNORMAL
EOSINOPHIL # BLD AUTO: 0 10E9/L (ref 0–0.7)
EOSINOPHIL NFR BLD AUTO: 0.1 %
ERYTHROCYTE [DISTWIDTH] IN BLOOD BY AUTOMATED COUNT: 12.9 % (ref 10–15)
GFR SERPL CREATININE-BSD FRML MDRD: 81 ML/MIN/{1.73_M2}
GLUCOSE BLDC GLUCOMTR-MCNC: 139 MG/DL (ref 70–99)
GLUCOSE SERPL-MCNC: 137 MG/DL (ref 70–99)
HCT VFR BLD AUTO: 39.6 % (ref 40–53)
HGB BLD-MCNC: 13.4 G/DL (ref 13.3–17.7)
IMM GRANULOCYTES # BLD: 0.2 10E9/L (ref 0–0.4)
IMM GRANULOCYTES NFR BLD: 1 %
LYMPHOCYTES # BLD AUTO: 1.2 10E9/L (ref 0.8–5.3)
LYMPHOCYTES NFR BLD AUTO: 8.1 %
MAGNESIUM SERPL-MCNC: 2.1 MG/DL (ref 1.6–2.3)
MCH RBC QN AUTO: 31.8 PG (ref 26.5–33)
MCHC RBC AUTO-ENTMCNC: 33.8 G/DL (ref 31.5–36.5)
MCV RBC AUTO: 94 FL (ref 78–100)
MONOCYTES # BLD AUTO: 1.4 10E9/L (ref 0–1.3)
MONOCYTES NFR BLD AUTO: 9.9 %
NEUTROPHILS # BLD AUTO: 11.6 10E9/L (ref 1.6–8.3)
NEUTROPHILS NFR BLD AUTO: 80.8 %
NRBC # BLD AUTO: 0 10*3/UL
NRBC BLD AUTO-RTO: 0 /100
PHOSPHATE SERPL-MCNC: 3.3 MG/DL (ref 2.5–4.5)
PLATELET # BLD AUTO: 206 10E9/L (ref 150–450)
POTASSIUM SERPL-SCNC: 4.2 MMOL/L (ref 3.4–5.3)
RBC # BLD AUTO: 4.22 10E12/L (ref 4.4–5.9)
SODIUM SERPL-SCNC: 135 MMOL/L (ref 133–144)
WBC # BLD AUTO: 14.4 10E9/L (ref 4–11)

## 2021-01-30 PROCEDURE — 250N000013 HC RX MED GY IP 250 OP 250 PS 637: Performed by: STUDENT IN AN ORGANIZED HEALTH CARE EDUCATION/TRAINING PROGRAM

## 2021-01-30 PROCEDURE — 71045 X-RAY EXAM CHEST 1 VIEW: CPT

## 2021-01-30 PROCEDURE — C9113 INJ PANTOPRAZOLE SODIUM, VIA: HCPCS | Performed by: STUDENT IN AN ORGANIZED HEALTH CARE EDUCATION/TRAINING PROGRAM

## 2021-01-30 PROCEDURE — 84100 ASSAY OF PHOSPHORUS: CPT | Performed by: STUDENT IN AN ORGANIZED HEALTH CARE EDUCATION/TRAINING PROGRAM

## 2021-01-30 PROCEDURE — 250N000011 HC RX IP 250 OP 636: Performed by: STUDENT IN AN ORGANIZED HEALTH CARE EDUCATION/TRAINING PROGRAM

## 2021-01-30 PROCEDURE — 83735 ASSAY OF MAGNESIUM: CPT | Performed by: STUDENT IN AN ORGANIZED HEALTH CARE EDUCATION/TRAINING PROGRAM

## 2021-01-30 PROCEDURE — 71045 X-RAY EXAM CHEST 1 VIEW: CPT | Mod: 76

## 2021-01-30 PROCEDURE — 80048 BASIC METABOLIC PNL TOTAL CA: CPT | Performed by: STUDENT IN AN ORGANIZED HEALTH CARE EDUCATION/TRAINING PROGRAM

## 2021-01-30 PROCEDURE — 71045 X-RAY EXAM CHEST 1 VIEW: CPT | Mod: 26 | Performed by: RADIOLOGY

## 2021-01-30 PROCEDURE — 97535 SELF CARE MNGMENT TRAINING: CPT | Mod: GO

## 2021-01-30 PROCEDURE — 250N000009 HC RX 250: Performed by: THORACIC SURGERY (CARDIOTHORACIC VASCULAR SURGERY)

## 2021-01-30 PROCEDURE — 999N001017 HC STATISTIC GLUCOSE BY METER IP

## 2021-01-30 PROCEDURE — 71045 X-RAY EXAM CHEST 1 VIEW: CPT | Mod: 26

## 2021-01-30 PROCEDURE — 250N000009 HC RX 250: Performed by: STUDENT IN AN ORGANIZED HEALTH CARE EDUCATION/TRAINING PROGRAM

## 2021-01-30 PROCEDURE — 85025 COMPLETE CBC W/AUTO DIFF WBC: CPT | Performed by: STUDENT IN AN ORGANIZED HEALTH CARE EDUCATION/TRAINING PROGRAM

## 2021-01-30 PROCEDURE — 97530 THERAPEUTIC ACTIVITIES: CPT | Mod: GO

## 2021-01-30 PROCEDURE — 36592 COLLECT BLOOD FROM PICC: CPT | Performed by: STUDENT IN AN ORGANIZED HEALTH CARE EDUCATION/TRAINING PROGRAM

## 2021-01-30 PROCEDURE — 120N000002 HC R&B MED SURG/OB UMMC

## 2021-01-30 RX ORDER — ACETAMINOPHEN 325 MG/1
975 TABLET ORAL EVERY 8 HOURS SCHEDULED
Status: DISCONTINUED | OUTPATIENT
Start: 2021-01-30 | End: 2021-02-06 | Stop reason: HOSPADM

## 2021-01-30 RX ORDER — OXYCODONE HYDROCHLORIDE 5 MG/1
5-10 TABLET ORAL EVERY 4 HOURS PRN
Status: DISCONTINUED | OUTPATIENT
Start: 2021-01-30 | End: 2021-02-06 | Stop reason: HOSPADM

## 2021-01-30 RX ORDER — HYDROMORPHONE HCL IN WATER/PF 6 MG/30 ML
0.2 PATIENT CONTROLLED ANALGESIA SYRINGE INTRAVENOUS
Status: DISCONTINUED | OUTPATIENT
Start: 2021-01-30 | End: 2021-02-03

## 2021-01-30 RX ADMIN — ACETAMINOPHEN 975 MG: 325 TABLET, FILM COATED ORAL at 14:21

## 2021-01-30 RX ADMIN — OXYCODONE HYDROCHLORIDE 5 MG: 5 TABLET ORAL at 12:17

## 2021-01-30 RX ADMIN — HEPARIN SODIUM 5000 UNITS: 5000 INJECTION, SOLUTION INTRAVENOUS; SUBCUTANEOUS at 08:32

## 2021-01-30 RX ADMIN — OXYCODONE HYDROCHLORIDE 5 MG: 5 TABLET ORAL at 21:00

## 2021-01-30 RX ADMIN — HEPARIN SODIUM 5000 UNITS: 5000 INJECTION, SOLUTION INTRAVENOUS; SUBCUTANEOUS at 23:59

## 2021-01-30 RX ADMIN — Medication: at 19:57

## 2021-01-30 RX ADMIN — HEPARIN SODIUM 5000 UNITS: 5000 INJECTION, SOLUTION INTRAVENOUS; SUBCUTANEOUS at 16:25

## 2021-01-30 RX ADMIN — ACETAMINOPHEN 975 MG: 325 TABLET, FILM COATED ORAL at 21:00

## 2021-01-30 RX ADMIN — SPIRONOLACTONE 25 MG: 25 TABLET ORAL at 09:58

## 2021-01-30 RX ADMIN — PANTOPRAZOLE SODIUM 40 MG: 40 INJECTION, POWDER, FOR SOLUTION INTRAVENOUS at 08:32

## 2021-01-30 RX ADMIN — I.V. FAT EMULSION 250 ML: 20 EMULSION INTRAVENOUS at 19:57

## 2021-01-30 RX ADMIN — OXYCODONE HYDROCHLORIDE 5 MG: 5 TABLET ORAL at 16:25

## 2021-01-30 ASSESSMENT — ACTIVITIES OF DAILY LIVING (ADL)
ADLS_ACUITY_SCORE: 12
ADLS_ACUITY_SCORE: 11
ADLS_ACUITY_SCORE: 13
ADLS_ACUITY_SCORE: 13

## 2021-01-30 NOTE — PROGRESS NOTES
"Post operative check    Doing well, but feels \"odd\" and is unable to describe what he means. It may be due to the anesthesia/pain meds but he is unsure. He says he isn't having pain but is feeling general discomfort around his epigastrium, says he hasn't been using his PCA much but is aware of it. His breathing limited by pain with deep inspiration and forceful expiration. Practicing IS and pulling about 750ml.    /69 (BP Location: Left arm)   Pulse 73   Temp 95.5  F (35.3  C) (Oral)   Resp 20   Ht 1.803 m (5' 11\")   Wt 82.9 kg (182 lb 12.8 oz)   SpO2 96%   BMI 25.50 kg/m      AOx3, NAD, sitting comfortably in bed  RRR no cyanosis  Nonlabored breathing, shallow breaths, guards with deep breaths, symmetrical chest wall expansion, CTs in place  Abdomen: soft, nondistended, appropriately tender around epigastrium, no rebound or gaurding  Extremities WWP, no edema    A/P  Delroy is a 62 yo Male POD 0 from laparoscopic hiatal hernia repair with bilateral chest tubes and gastropexy with PEG tube placement. Doing well post operatively, is working on pain control and deep breathing.    - Continue plan per primary  - PCA for pain control  - Encourage IS  - NPO    Ni Alba DO  Surgery PGY1  "

## 2021-01-30 NOTE — PROGRESS NOTES
THORACIC & FOREGUT SURGERY    S:  No overnight events. Pt seen at bedside resting comfortably. Does complain of pain with deep inspiration.  Otherwise no acute complaints. No nausea or bloating. Has not passed flatus yet. Minimal output into g-tube. Chest tubes with minimal output, no air leak.      O:  Vitals:    01/30/21 0045 01/30/21 0229 01/30/21 0426 01/30/21 0818   BP:   (!) 141/79 (!) 143/89   BP Location:   Left arm Left arm   Pulse: 77 69 74 66   Resp: 16 18 24 22   Temp: 97.7  F (36.5  C)  97.7  F (36.5  C) 98.3  F (36.8  C)   TempSrc: Oral  Oral Oral   SpO2: 95% 98% 94% 96%   Weight:       Height:           A&Ox3, NAD  Breathing non-labored, weak cough, no increased WOB  Regular rate  Bilateral chest tubes in place, both to water seal, no air leak, scant serosanguinous drainage   Soft, NDNT, G tube in place, to gravity   Distal extremities warm, well perfused.  No calf tenderness.    Chest tube output 5, no air leak  Incisions c/d/i    BMP  Recent Labs   Lab 01/30/21  0646 01/29/21  0644 01/28/21  0646 01/27/21  0653    135 135 137   POTASSIUM 4.2 3.9 4.2 3.8   CHLORIDE 104 106 106 106   MINE 9.2 9.3 9.1 9.3   CO2 25 24 24 23   BUN 30 30 27 26   CR 1.00 1.17 1.11 1.08   * 97 114* 111*     CBC  Recent Labs   Lab 01/30/21  0646 01/29/21  0644 01/29/21  0104 01/28/21  0646 01/27/21  0653   WBC 14.4* 9.9  --  9.4 9.4   RBC 4.22* 4.81  --  4.79 4.75   HGB 13.4 14.8 14.6 14.9 14.8   HCT 39.6* 44.7  --  44.9 43.8   MCV 94 93  --  94 92   MCH 31.8 30.8  --  31.1 31.2   MCHC 33.8 33.1  --  33.2 33.8   RDW 12.9 13.0  --  12.9 12.9    267  --  212 241     INR  Recent Labs   Lab 01/25/21  0609 01/24/21  0722 01/23/21  1718   INR 1.13 1.15* 1.13        Most Recent Imaging Studies Reviewed:  CXR from 1/30 shows subcutaneous emphysema in bilateral supraclavicular soft tissue, improved from yesterday, no appreciable PTX     A/P: Delroy Christianson is a 61 year old male POD# 1 s/p laparoscopic hiatal hernia  repair, G tube placement, and bilateral CT placement. CTs are Y'ed together to water seal, no air leak, G tube with minimal drainage.     -Advance diet to clears, keep G-tube to gravity today, clamp later if tolerating clears  -Continue TPN  -CTs pulled today  -Pain control with nelli tylenol, prn oxycodone, prn dilaudid   -PT assist with ambulation  -Continue spironolactone  -Protonix 40 mg daily  -HSQ, IS, ambulation     Seen with staff, Dr. Flores.    Jennifer Day MD  Surgery Resident, PGY3

## 2021-01-30 NOTE — PROGRESS NOTES
STAFF ADDENDUM:  I saw and evaluated Mr. Christianson and agree with the resident s findings and plan of care     Clears by mouth with G tube open and then clamped if tolerating   remove chest tubes      Nancy Flores MD

## 2021-01-30 NOTE — PLAN OF CARE
7B Defer PT Consult  PT orders acknowledged and appreciated. Per chart review and OT, pt has been being followed by OT since admission and demonstrates no IP PT needs. Pt up SBA with gait and stairs - steady on feet. OT to continue to follow to advance mobility and ADLs. PT to complete orders. Thank you for your referral.

## 2021-01-30 NOTE — PLAN OF CARE
Vitals:    01/29/21 1800 01/29/21 1831 01/29/21 1834 01/29/21 1848   BP:   129/76 126/76   BP Location:   Left arm Left arm   Pulse:   65 70   Resp: 16  18 25   Temp:   95.5  F (35.3  C)    TempSrc:   Oral    SpO2: 96% 96% 97% 95%   Weight:       Height:       Pt has been to the OR all day back to the unit at  6pm alert and oriented times 4.   All three abd lap sites  clean dry and intact with adhesive dressing.  Two chest tube site  intact with old bloody drainage.  Scant amount of serosang drainage in the container. Peg tube to gravity  no output.    PCA Dilaudid for pain pt is using it appropriately.   Owens removed after surgery pt  stated minor discomfort during passing urine  100 cc clear yellow urine .   Left nare scab noted  from NG  MDs aware.  Continue to follow up per plan of care

## 2021-01-30 NOTE — PLAN OF CARE
Vital signs:  Temp: 97.7  F (36.5  C) Temp src: Oral BP: (!) 141/79 Pulse: 74   Resp: 24 SpO2: 94 % O2 Device: Nasal cannula Oxygen Delivery: 3 LPM     Time: 1930-0730  Status: POD #1 s/p laparoscopic hiatal hernia repair with bilateral chest tubes and gastropexy with PEG tube placement.  Neuros: A&Ox4, calls appropriately.   Cardiac: HTN, denies chest pain.   Respiratory: Sats 94-98% on 3L NC. LS diminished, shallow breaths, IS encouraged.   Pain: managed with dilaudid PCA.   Nausea: Denies nausea.   Diet: NPO. TPN & Lipids infusing per order.   GI/: Voiding in urinal adequate amount. Abdomen soft, nondistended, +tender. Hypo BS, -flatus, no BM.   Skin: Abdomen lap sites dermabond, delmar.   Lines: R PICC DL infusing.   Incisions/Drains: Two chest tubes Y-sited with scant serosang drainage. Peg tube to gravity with scant output.   Labs:  and 139.   Activity: Independently repositioning in bed, activity encouraged.   New Changes this Shift: No acute changes.   Plan: Continue POC.

## 2021-01-31 ENCOUNTER — APPOINTMENT (OUTPATIENT)
Dept: GENERAL RADIOLOGY | Facility: CLINIC | Age: 61
End: 2021-01-31
Attending: THORACIC SURGERY (CARDIOTHORACIC VASCULAR SURGERY)
Payer: COMMERCIAL

## 2021-01-31 LAB
ANION GAP SERPL CALCULATED.3IONS-SCNC: 6 MMOL/L (ref 3–14)
BASOPHILS # BLD AUTO: 0 10E9/L (ref 0–0.2)
BASOPHILS NFR BLD AUTO: 0.3 %
BUN SERPL-MCNC: 23 MG/DL (ref 7–30)
CALCIUM SERPL-MCNC: 9.5 MG/DL (ref 8.5–10.1)
CHLORIDE SERPL-SCNC: 101 MMOL/L (ref 94–109)
CO2 SERPL-SCNC: 27 MMOL/L (ref 20–32)
CREAT SERPL-MCNC: 0.94 MG/DL (ref 0.66–1.25)
DIFFERENTIAL METHOD BLD: ABNORMAL
EOSINOPHIL # BLD AUTO: 0 10E9/L (ref 0–0.7)
EOSINOPHIL NFR BLD AUTO: 0.3 %
ERYTHROCYTE [DISTWIDTH] IN BLOOD BY AUTOMATED COUNT: 12.8 % (ref 10–15)
GFR SERPL CREATININE-BSD FRML MDRD: 87 ML/MIN/{1.73_M2}
GLUCOSE SERPL-MCNC: 139 MG/DL (ref 70–99)
HCT VFR BLD AUTO: 43.1 % (ref 40–53)
HGB BLD-MCNC: 14.2 G/DL (ref 13.3–17.7)
IMM GRANULOCYTES # BLD: 0.1 10E9/L (ref 0–0.4)
IMM GRANULOCYTES NFR BLD: 0.9 %
LYMPHOCYTES # BLD AUTO: 0.9 10E9/L (ref 0.8–5.3)
LYMPHOCYTES NFR BLD AUTO: 6.2 %
MAGNESIUM SERPL-MCNC: 1.8 MG/DL (ref 1.6–2.3)
MCH RBC QN AUTO: 30.9 PG (ref 26.5–33)
MCHC RBC AUTO-ENTMCNC: 32.9 G/DL (ref 31.5–36.5)
MCV RBC AUTO: 94 FL (ref 78–100)
MONOCYTES # BLD AUTO: 1.6 10E9/L (ref 0–1.3)
MONOCYTES NFR BLD AUTO: 10.9 %
NEUTROPHILS # BLD AUTO: 12.2 10E9/L (ref 1.6–8.3)
NEUTROPHILS NFR BLD AUTO: 81.4 %
NRBC # BLD AUTO: 0 10*3/UL
NRBC BLD AUTO-RTO: 0 /100
PHOSPHATE SERPL-MCNC: 3.2 MG/DL (ref 2.5–4.5)
PLATELET # BLD AUTO: 224 10E9/L (ref 150–450)
POTASSIUM SERPL-SCNC: 3.9 MMOL/L (ref 3.4–5.3)
RBC # BLD AUTO: 4.6 10E12/L (ref 4.4–5.9)
SODIUM SERPL-SCNC: 134 MMOL/L (ref 133–144)
WBC # BLD AUTO: 14.9 10E9/L (ref 4–11)

## 2021-01-31 PROCEDURE — 36415 COLL VENOUS BLD VENIPUNCTURE: CPT | Performed by: STUDENT IN AN ORGANIZED HEALTH CARE EDUCATION/TRAINING PROGRAM

## 2021-01-31 PROCEDURE — 250N000011 HC RX IP 250 OP 636: Performed by: STUDENT IN AN ORGANIZED HEALTH CARE EDUCATION/TRAINING PROGRAM

## 2021-01-31 PROCEDURE — 250N000013 HC RX MED GY IP 250 OP 250 PS 637: Performed by: STUDENT IN AN ORGANIZED HEALTH CARE EDUCATION/TRAINING PROGRAM

## 2021-01-31 PROCEDURE — C9113 INJ PANTOPRAZOLE SODIUM, VIA: HCPCS | Performed by: STUDENT IN AN ORGANIZED HEALTH CARE EDUCATION/TRAINING PROGRAM

## 2021-01-31 PROCEDURE — 85025 COMPLETE CBC W/AUTO DIFF WBC: CPT | Performed by: STUDENT IN AN ORGANIZED HEALTH CARE EDUCATION/TRAINING PROGRAM

## 2021-01-31 PROCEDURE — 999N000157 HC STATISTIC RCP TIME EA 10 MIN

## 2021-01-31 PROCEDURE — 84100 ASSAY OF PHOSPHORUS: CPT | Performed by: STUDENT IN AN ORGANIZED HEALTH CARE EDUCATION/TRAINING PROGRAM

## 2021-01-31 PROCEDURE — 83735 ASSAY OF MAGNESIUM: CPT | Performed by: STUDENT IN AN ORGANIZED HEALTH CARE EDUCATION/TRAINING PROGRAM

## 2021-01-31 PROCEDURE — 94799 UNLISTED PULMONARY SVC/PX: CPT

## 2021-01-31 PROCEDURE — 120N000002 HC R&B MED SURG/OB UMMC

## 2021-01-31 PROCEDURE — 250N000009 HC RX 250: Performed by: STUDENT IN AN ORGANIZED HEALTH CARE EDUCATION/TRAINING PROGRAM

## 2021-01-31 PROCEDURE — 250N000009 HC RX 250: Performed by: THORACIC SURGERY (CARDIOTHORACIC VASCULAR SURGERY)

## 2021-01-31 PROCEDURE — 71045 X-RAY EXAM CHEST 1 VIEW: CPT | Mod: 26 | Performed by: RADIOLOGY

## 2021-01-31 PROCEDURE — 80048 BASIC METABOLIC PNL TOTAL CA: CPT | Performed by: STUDENT IN AN ORGANIZED HEALTH CARE EDUCATION/TRAINING PROGRAM

## 2021-01-31 PROCEDURE — 71045 X-RAY EXAM CHEST 1 VIEW: CPT

## 2021-01-31 PROCEDURE — 250N000011 HC RX IP 250 OP 636: Performed by: THORACIC SURGERY (CARDIOTHORACIC VASCULAR SURGERY)

## 2021-01-31 RX ORDER — POLYETHYLENE GLYCOL 3350 17 G/17G
17 POWDER, FOR SOLUTION ORAL DAILY
Status: DISCONTINUED | OUTPATIENT
Start: 2021-01-31 | End: 2021-02-03

## 2021-01-31 RX ORDER — AMOXICILLIN 250 MG
1 CAPSULE ORAL 2 TIMES DAILY
Status: DISCONTINUED | OUTPATIENT
Start: 2021-01-31 | End: 2021-02-03

## 2021-01-31 RX ORDER — MAGNESIUM SULFATE HEPTAHYDRATE 40 MG/ML
2 INJECTION, SOLUTION INTRAVENOUS ONCE
Status: COMPLETED | OUTPATIENT
Start: 2021-01-31 | End: 2021-01-31

## 2021-01-31 RX ADMIN — OXYCODONE HYDROCHLORIDE 5 MG: 5 TABLET ORAL at 10:32

## 2021-01-31 RX ADMIN — HEPARIN SODIUM 5000 UNITS: 5000 INJECTION, SOLUTION INTRAVENOUS; SUBCUTANEOUS at 23:50

## 2021-01-31 RX ADMIN — I.V. FAT EMULSION 250 ML: 20 EMULSION INTRAVENOUS at 20:03

## 2021-01-31 RX ADMIN — PANTOPRAZOLE SODIUM 40 MG: 40 INJECTION, POWDER, FOR SOLUTION INTRAVENOUS at 07:57

## 2021-01-31 RX ADMIN — ACETAMINOPHEN 975 MG: 325 TABLET, FILM COATED ORAL at 05:26

## 2021-01-31 RX ADMIN — SPIRONOLACTONE 25 MG: 25 TABLET ORAL at 07:57

## 2021-01-31 RX ADMIN — HEPARIN SODIUM 5000 UNITS: 5000 INJECTION, SOLUTION INTRAVENOUS; SUBCUTANEOUS at 17:21

## 2021-01-31 RX ADMIN — HEPARIN SODIUM 5000 UNITS: 5000 INJECTION, SOLUTION INTRAVENOUS; SUBCUTANEOUS at 07:57

## 2021-01-31 RX ADMIN — DOCUSATE SODIUM 50 MG AND SENNOSIDES 8.6 MG 1 TABLET: 8.6; 5 TABLET, FILM COATED ORAL at 10:32

## 2021-01-31 RX ADMIN — MAGNESIUM SULFATE IN WATER 2 G: 40 INJECTION, SOLUTION INTRAVENOUS at 17:18

## 2021-01-31 RX ADMIN — OXYCODONE HYDROCHLORIDE 5 MG: 5 TABLET ORAL at 05:26

## 2021-01-31 RX ADMIN — ACETAMINOPHEN 975 MG: 325 TABLET, FILM COATED ORAL at 17:16

## 2021-01-31 RX ADMIN — Medication: at 20:03

## 2021-01-31 RX ADMIN — DOCUSATE SODIUM 50 MG AND SENNOSIDES 8.6 MG 1 TABLET: 8.6; 5 TABLET, FILM COATED ORAL at 20:02

## 2021-01-31 ASSESSMENT — ACTIVITIES OF DAILY LIVING (ADL)
ADLS_ACUITY_SCORE: 15

## 2021-01-31 ASSESSMENT — MIFFLIN-ST. JEOR: SCORE: 1641.79

## 2021-01-31 NOTE — PLAN OF CARE
Vitals:    01/30/21 0818 01/30/21 1258 01/30/21 1604 01/30/21 1730   BP: (!) 143/89 (!) 151/85 (!) 148/92    BP Location: Left arm Left arm Left arm    Pulse: 66 90 101    Resp: 22 18 16    Temp: 98.3  F (36.8  C) 98.8  F (37.1  C) 99.3  F (37.4  C) 100.4  F (38  C)   TempSrc: Oral Axillary Oral Oral   SpO2: 96% 94% 95%    Weight:       Height:       Time:  7a-7:30p  Status: POD 1    Activity: up ad polo  ambulated in halls with nursing staff and also worked with OT (see notes)   Neuro: Anxious and forgetful at times.  Respiratory:  95-96 % RA  expressed SOB  and pain with deep breathing, encouraged IS.unable do more than 750 with few repeats. And also low grade temp, ice pack applied als o change in temp. MD notified  stopped by and assessed pt.   Cardiac: WDL  GI/: No Bm no gas.  Gastrostomy in place clamped most of the time unclamped for comfort.  Voiding spontaneously adequate amount.  Diet: TPN continuous and on clear liquid diet. Pt said that he dose not have any appetite he had only one cup of apple juice.   Skin:  Nasal nare  soreness from NG, improved.  Surgical Incisions open to air.  Left  CTube  site  bloody drainage  dressing reinforced. Right side  dressing clean dry and intact  Pain/Nausea: Pain well managed with Oxycodone and Tylenol  New Changes this Shift: Chest tubes removed  by MD's  Gtube clamped.   Plan:  Continue to follow up per plan of care.

## 2021-01-31 NOTE — PLAN OF CARE
Vitals:    01/31/21 0418 01/31/21 0755 01/31/21 1205 01/31/21 1613   BP: (!) 144/87 136/84 136/85 132/78   BP Location: Left arm  Left arm Left arm   Pulse: 107 103 93 95   Resp: 16 20 16 16   Temp: 98.6  F (37  C) 99  F (37.2  C) 97.3  F (36.3  C) 98.9  F (37.2  C)   TempSrc: Oral Oral Oral Oral   SpO2: 93% 94% 97% 94%   Weight:       Height:       Pain well managed with Oxycodone. Poor appetite. TPN infusing. Chest Xray done.   Voiding no BM positive BS  x4.   Pt gets anxious at times needs reassurance. HA seen up ambulating in halls with SBA .   GT to gravity  clamped after oral intakes.   Continue to follow up per plan of care

## 2021-01-31 NOTE — PROGRESS NOTES
THORACIC & FOREGUT SURGERY    S:  No overnight events. Pt seen at bedside resting comfortably. Reports pain with deep inspiration. He states he has been breathing swallow but it seems to be improving somewhat this morning. He has been unable to do more than 250 on IS. He experienced racing heart sensation yesterday during a sponge bath. Symptoms resolved when he returned to his bed. He denies any belly pain, nausea or vomiting. Denies calf pain or swelling. Tolerating clears, but not taking in much. No BM yesterday.    O:  Vitals:    01/30/21 2233 01/31/21 0015 01/31/21 0418 01/31/21 0755   BP: (!) 158/94  (!) 144/87 136/84   BP Location: Left arm  Left arm    Pulse: 112 105 107 103   Resp: 16  16 20   Temp: 99.6  F (37.6  C) 98.5  F (36.9  C) 98.6  F (37  C) 99  F (37.2  C)   TempSrc: Oral Oral Oral Oral   SpO2: 94%  93% 94%   Weight:       Height:           A&Ox3, NAD  Breathing swallow with some increased work of breathing, weak cough  Regular rate  Chest tube sites with dressings in place, clean  Soft, NDNT, G tube in place, clamped, laparoscopic incisions clean and dry with glue  Distal extremities warm, well perfused.  No calf tenderness.      BMP  Recent Labs   Lab 01/31/21  0655 01/30/21  0646 01/29/21  0644 01/28/21  0646    135 135 135   POTASSIUM 3.9 4.2 3.9 4.2   CHLORIDE 101 104 106 106   MINE 9.5 9.2 9.3 9.1   CO2 27 25 24 24   BUN 23 30 30 27   CR 0.94 1.00 1.17 1.11   * 137* 97 114*     CBC  Recent Labs   Lab 01/31/21  0655 01/30/21  0646 01/29/21  0644 01/29/21  0104 01/28/21  0646   WBC 14.9* 14.4* 9.9  --  9.4   RBC 4.60 4.22* 4.81  --  4.79   HGB 14.2 13.4 14.8 14.6 14.9   HCT 43.1 39.6* 44.7  --  44.9   MCV 94 94 93  --  94   MCH 30.9 31.8 30.8  --  31.1   MCHC 32.9 33.8 33.1  --  33.2   RDW 12.8 12.9 13.0  --  12.9    206 267  --  212     INR  Recent Labs   Lab 01/25/21  0609   INR 1.13        Most Recent Imaging Studies Reviewed:  CXR from 1/31 atelectasis, small left  pleural effusion, no PTX, gastric bubble     A/P: Delroy Christianson is a 61 year old male POD#2 s/p laparoscopic hiatal hernia repair, G tube placement, and bilateral CT placement.     -Continue CLD   -G-tube to gravity today given gastric bubble  -Continue TPN  -Pain control with nelli tylenol, prn oxycodone, prn dilaudid   -PT assist with ambulation  -Continue spironolactone  -Protonix 40 mg daily  -Out of bed and ambulation encouraged  -HSQ, IS    Seen with staff, Dr. Flores.    Jennifer Day MD  Surgery Resident, PGY3

## 2021-01-31 NOTE — PROGRESS NOTES
STAFF ADDENDUM:  I saw and evaluated Mr. Christianson and agree with the resident s findings and plan of care as documented in the resident s note and edited by me, as applicable.    Slow progress  Gastric distention on CXR  Continue clears today with tube vented  Encouraged ambulation    Nancy Flores MD

## 2021-01-31 NOTE — PLAN OF CARE
Patient has shallow breathing, O2 sats stable on RA. IS done with encouragement. Tmax 100.9; scheduled Tylenol given, last check 98.6. Pt has been tachy since evening shift; notified MD. Braulio CT sites with dressings, left CT dressing reinforced. Hypo BS, tender abdomen- mostly on left side. Denies nausea. G-tube clamped. Abdominal lap sites cdi. Pain is managed with scheduled Tylenol and PRN Oxycodone 5 mg x2. On continuous TPN at 83 ml/hr with Lipids. Frequent voiding. Intermittent sleep. Continue poc.   Vitals:    01/30/21 2025 01/30/21 2233 01/31/21 0015 01/31/21 0418   BP: (!) 166/85 (!) 158/94  (!) 144/87   BP Location: Left arm Left arm  Left arm   Pulse: 95 112 105 107   Resp: 18 16  16   Temp: 100.9  F (38.3  C) 99.6  F (37.6  C) 98.5  F (36.9  C) 98.6  F (37  C)   TempSrc: Oral Oral Oral Oral   SpO2: 95% 94%  93%   Weight:       Height:

## 2021-02-01 ENCOUNTER — APPOINTMENT (OUTPATIENT)
Dept: GENERAL RADIOLOGY | Facility: CLINIC | Age: 61
End: 2021-02-01
Attending: THORACIC SURGERY (CARDIOTHORACIC VASCULAR SURGERY)
Payer: COMMERCIAL

## 2021-02-01 LAB
ALBUMIN SERPL-MCNC: 2.5 G/DL (ref 3.4–5)
ALP SERPL-CCNC: 113 U/L (ref 40–150)
ALT SERPL W P-5'-P-CCNC: 1036 U/L (ref 0–70)
ANION GAP SERPL CALCULATED.3IONS-SCNC: 5 MMOL/L (ref 3–14)
AST SERPL W P-5'-P-CCNC: 291 U/L (ref 0–45)
BASOPHILS # BLD AUTO: 0.1 10E9/L (ref 0–0.2)
BASOPHILS NFR BLD AUTO: 0.4 %
BILIRUB SERPL-MCNC: 0.6 MG/DL (ref 0.2–1.3)
BUN SERPL-MCNC: 25 MG/DL (ref 7–30)
CALCIUM SERPL-MCNC: 9.3 MG/DL (ref 8.5–10.1)
CHLORIDE SERPL-SCNC: 102 MMOL/L (ref 94–109)
CO2 SERPL-SCNC: 27 MMOL/L (ref 20–32)
CREAT SERPL-MCNC: 1.06 MG/DL (ref 0.66–1.25)
DIFFERENTIAL METHOD BLD: ABNORMAL
EOSINOPHIL # BLD AUTO: 0.1 10E9/L (ref 0–0.7)
EOSINOPHIL NFR BLD AUTO: 0.9 %
ERYTHROCYTE [DISTWIDTH] IN BLOOD BY AUTOMATED COUNT: 12.9 % (ref 10–15)
GFR SERPL CREATININE-BSD FRML MDRD: 75 ML/MIN/{1.73_M2}
GLUCOSE BLDC GLUCOMTR-MCNC: 109 MG/DL (ref 70–99)
GLUCOSE BLDC GLUCOMTR-MCNC: 111 MG/DL (ref 70–99)
GLUCOSE BLDC GLUCOMTR-MCNC: 128 MG/DL (ref 70–99)
GLUCOSE BLDC GLUCOMTR-MCNC: 129 MG/DL (ref 70–99)
GLUCOSE SERPL-MCNC: 125 MG/DL (ref 70–99)
HCT VFR BLD AUTO: 42.3 % (ref 40–53)
HGB BLD-MCNC: 14.2 G/DL (ref 13.3–17.7)
IMM GRANULOCYTES # BLD: 0.2 10E9/L (ref 0–0.4)
IMM GRANULOCYTES NFR BLD: 1.5 %
INR PPP: 1.21 (ref 0.86–1.14)
LACTATE BLD-SCNC: 0.7 MMOL/L (ref 0.7–2)
LYMPHOCYTES # BLD AUTO: 1 10E9/L (ref 0.8–5.3)
LYMPHOCYTES NFR BLD AUTO: 6.8 %
MAGNESIUM SERPL-MCNC: 2.2 MG/DL (ref 1.6–2.3)
MCH RBC QN AUTO: 32.1 PG (ref 26.5–33)
MCHC RBC AUTO-ENTMCNC: 33.6 G/DL (ref 31.5–36.5)
MCV RBC AUTO: 96 FL (ref 78–100)
MONOCYTES # BLD AUTO: 1.5 10E9/L (ref 0–1.3)
MONOCYTES NFR BLD AUTO: 10.1 %
NEUTROPHILS # BLD AUTO: 11.7 10E9/L (ref 1.6–8.3)
NEUTROPHILS NFR BLD AUTO: 80.3 %
NRBC # BLD AUTO: 0 10*3/UL
NRBC BLD AUTO-RTO: 0 /100
PHOSPHATE SERPL-MCNC: 3.1 MG/DL (ref 2.5–4.5)
PLATELET # BLD AUTO: 230 10E9/L (ref 150–450)
POTASSIUM SERPL-SCNC: 4.1 MMOL/L (ref 3.4–5.3)
PREALB SERPL IA-MCNC: 17 MG/DL (ref 15–45)
PROT SERPL-MCNC: 6.7 G/DL (ref 6.8–8.8)
RBC # BLD AUTO: 4.43 10E12/L (ref 4.4–5.9)
SODIUM SERPL-SCNC: 134 MMOL/L (ref 133–144)
WBC # BLD AUTO: 14.5 10E9/L (ref 4–11)

## 2021-02-01 PROCEDURE — 84134 ASSAY OF PREALBUMIN: CPT | Performed by: STUDENT IN AN ORGANIZED HEALTH CARE EDUCATION/TRAINING PROGRAM

## 2021-02-01 PROCEDURE — 250N000013 HC RX MED GY IP 250 OP 250 PS 637: Performed by: STUDENT IN AN ORGANIZED HEALTH CARE EDUCATION/TRAINING PROGRAM

## 2021-02-01 PROCEDURE — 80053 COMPREHEN METABOLIC PANEL: CPT | Performed by: STUDENT IN AN ORGANIZED HEALTH CARE EDUCATION/TRAINING PROGRAM

## 2021-02-01 PROCEDURE — 85610 PROTHROMBIN TIME: CPT | Performed by: STUDENT IN AN ORGANIZED HEALTH CARE EDUCATION/TRAINING PROGRAM

## 2021-02-01 PROCEDURE — 85025 COMPLETE CBC W/AUTO DIFF WBC: CPT | Performed by: STUDENT IN AN ORGANIZED HEALTH CARE EDUCATION/TRAINING PROGRAM

## 2021-02-01 PROCEDURE — 84100 ASSAY OF PHOSPHORUS: CPT | Performed by: STUDENT IN AN ORGANIZED HEALTH CARE EDUCATION/TRAINING PROGRAM

## 2021-02-01 PROCEDURE — 250N000011 HC RX IP 250 OP 636: Performed by: STUDENT IN AN ORGANIZED HEALTH CARE EDUCATION/TRAINING PROGRAM

## 2021-02-01 PROCEDURE — 36592 COLLECT BLOOD FROM PICC: CPT | Performed by: THORACIC SURGERY (CARDIOTHORACIC VASCULAR SURGERY)

## 2021-02-01 PROCEDURE — 83735 ASSAY OF MAGNESIUM: CPT | Performed by: STUDENT IN AN ORGANIZED HEALTH CARE EDUCATION/TRAINING PROGRAM

## 2021-02-01 PROCEDURE — 999N000044 HC STATISTIC CVC DRESSING CHANGE

## 2021-02-01 PROCEDURE — 71045 X-RAY EXAM CHEST 1 VIEW: CPT | Mod: 26 | Performed by: RADIOLOGY

## 2021-02-01 PROCEDURE — 71045 X-RAY EXAM CHEST 1 VIEW: CPT

## 2021-02-01 PROCEDURE — 250N000009 HC RX 250: Performed by: THORACIC SURGERY (CARDIOTHORACIC VASCULAR SURGERY)

## 2021-02-01 PROCEDURE — 83605 ASSAY OF LACTIC ACID: CPT | Performed by: THORACIC SURGERY (CARDIOTHORACIC VASCULAR SURGERY)

## 2021-02-01 PROCEDURE — C9113 INJ PANTOPRAZOLE SODIUM, VIA: HCPCS | Performed by: STUDENT IN AN ORGANIZED HEALTH CARE EDUCATION/TRAINING PROGRAM

## 2021-02-01 PROCEDURE — 120N000002 HC R&B MED SURG/OB UMMC

## 2021-02-01 PROCEDURE — 999N000157 HC STATISTIC RCP TIME EA 10 MIN

## 2021-02-01 PROCEDURE — 36592 COLLECT BLOOD FROM PICC: CPT | Performed by: STUDENT IN AN ORGANIZED HEALTH CARE EDUCATION/TRAINING PROGRAM

## 2021-02-01 PROCEDURE — 999N001017 HC STATISTIC GLUCOSE BY METER IP

## 2021-02-01 RX ORDER — PANTOPRAZOLE SODIUM 40 MG/1
40 TABLET, DELAYED RELEASE ORAL DAILY
Status: DISCONTINUED | OUTPATIENT
Start: 2021-02-02 | End: 2021-02-06 | Stop reason: HOSPADM

## 2021-02-01 RX ADMIN — ACETAMINOPHEN 975 MG: 325 TABLET, FILM COATED ORAL at 22:11

## 2021-02-01 RX ADMIN — OXYCODONE HYDROCHLORIDE 5 MG: 5 TABLET ORAL at 12:44

## 2021-02-01 RX ADMIN — HEPARIN SODIUM 5000 UNITS: 5000 INJECTION, SOLUTION INTRAVENOUS; SUBCUTANEOUS at 17:47

## 2021-02-01 RX ADMIN — SODIUM PHOSPHATE 1 ENEMA: 7; 19 ENEMA RECTAL at 19:12

## 2021-02-01 RX ADMIN — I.V. FAT EMULSION 250 ML: 20 EMULSION INTRAVENOUS at 20:26

## 2021-02-01 RX ADMIN — POLYETHYLENE GLYCOL 3350 17 G: 17 POWDER, FOR SOLUTION ORAL at 10:02

## 2021-02-01 RX ADMIN — OXYCODONE HYDROCHLORIDE 5 MG: 5 TABLET ORAL at 23:57

## 2021-02-01 RX ADMIN — HEPARIN SODIUM 5000 UNITS: 5000 INJECTION, SOLUTION INTRAVENOUS; SUBCUTANEOUS at 23:57

## 2021-02-01 RX ADMIN — OXYCODONE HYDROCHLORIDE 5 MG: 5 TABLET ORAL at 19:11

## 2021-02-01 RX ADMIN — DOCUSATE SODIUM 50 MG AND SENNOSIDES 8.6 MG 1 TABLET: 8.6; 5 TABLET, FILM COATED ORAL at 09:56

## 2021-02-01 RX ADMIN — ACETAMINOPHEN 975 MG: 325 TABLET, FILM COATED ORAL at 05:54

## 2021-02-01 RX ADMIN — DOCUSATE SODIUM 50 MG AND SENNOSIDES 8.6 MG 1 TABLET: 8.6; 5 TABLET, FILM COATED ORAL at 19:11

## 2021-02-01 RX ADMIN — ACETAMINOPHEN 975 MG: 325 TABLET, FILM COATED ORAL at 13:48

## 2021-02-01 RX ADMIN — SPIRONOLACTONE 25 MG: 25 TABLET ORAL at 09:58

## 2021-02-01 RX ADMIN — PANTOPRAZOLE SODIUM 40 MG: 40 INJECTION, POWDER, FOR SOLUTION INTRAVENOUS at 09:56

## 2021-02-01 RX ADMIN — Medication: at 20:26

## 2021-02-01 RX ADMIN — HEPARIN SODIUM 5000 UNITS: 5000 INJECTION, SOLUTION INTRAVENOUS; SUBCUTANEOUS at 09:58

## 2021-02-01 ASSESSMENT — ACTIVITIES OF DAILY LIVING (ADL)
ADLS_ACUITY_SCORE: 15

## 2021-02-01 NOTE — PLAN OF CARE
Patient denies SOB, VSS on RA. Braulio CT sites cdi. +BS, tender abdomen- mostly on left side. Denies nausea. G-tube to gravity. Abdominal lap sites cdi. Denies pain. On continuous TPN at 83 ml/hr with Lipids via PICC line. Voids spont. Declines pain med; takes scheduled tylenol. Continue poc.   Vitals:    01/31/21 1205 01/31/21 1613 01/31/21 2001 01/31/21 2327   BP: 136/85 132/78 132/83 125/84   BP Location: Left arm Left arm Left arm Left arm   Pulse: 93 95 74 84   Resp: 16 16  16   Temp: 97.3  F (36.3  C) 98.9  F (37.2  C) 98.7  F (37.1  C) 98.8  F (37.1  C)   TempSrc: Oral Oral Oral Oral   SpO2: 97% 94% 92% 94%   Weight:   81.5 kg (179 lb 9.6 oz)    Height:

## 2021-02-01 NOTE — OP NOTE
Preoperative diagnosis: Hiatal hernia type III Postoperative diagnosis: Hiatal hernia type III     Procedure:   1) Esophagogastroscopy  2) Laparoscopic repair of hiatal hernia  3) Gastrostomy tube placement (20 Fr)  4) Bilateral  5)  tube thoracostomy (19 Fr Bassem)   Anesthesia: General   Surgeon:Nancy Flores MD(present and participated in the entire procedure)  Resident surgeon: Jennifer Day MD    EBL: 50    Complications: non  Findings:  Hernia:  >50% of stomach  G-tube:PEG    We secured Mr. Christianson in the supine position, with a foot-board and prepared and draped the area. We used a 5-port approach and placed the first port using an open technique with fascial sutures for eventual closure. The remainder of the ports were placed under laparoscopic guidance and the used a Meghan liver retractor.    We then took down the gastrohepatic ligament and short gastric vessels and dissected the hiatus circumferentially, preserving the peritoneal lining on the crura. Next, we dissected the esophagus high up into the mediastinum to the level of the inferior pulmonary veins and taking great care to preserve the vagus nerves. We mobilized the gastroesophageal fat pad and anterior vagus nerve off the GE junction to clearly identify it and closed the hiatus with a total of 4 posterior and one anterior stitches (0 silk with pledgets).  We verified with a 52 dilator in place that there was sufficient room to comfortably pass a laparoscopic grasper alongside the esophagus and that the approximation was sufficient.      Next, we performed an endoscopy to also verify that we had at least 2 cm of intraabdominal esophagus.        We then performed an endoscopy and  placed a G-tube with endoscopic and laparoscopic guidance.    We closed the incisions with absorbable sutures.  We made a small incision in the left and right anterolateral chest wall and placed a small-bore chest tube and secured it during the case.

## 2021-02-01 NOTE — PROGRESS NOTES
CLINICAL NUTRITION SERVICES - BRIEF NOTE     Nutrition Prescription    RECOMMENDATIONS FOR MDs/PROVIDERS TO ORDER:  No recommendations at present.     Recommendations already ordered by Registered Dietitian (RD):  - Adjust TPN--83 ml/hr with 175 gm Dextrose, 110 gm AA, and 250 ml 20% lipids 5x/wk.  Will provide 1392 kcalories (17 kcal/kg), 1.3 gm protein/kg, 26% of kcal from fat.   - Strawberry Boost + 1x/day to start.  - start calorie counts.     Future/Additional Recommendations:  - Follow po intake and wean TPN and lipids as appropriate.      EVALUATION OF THE PROGRESS TOWARD GOALS   Diet: advanced to full liquids earlier today.  Nutrition Support: TPN--83 ml/hr with 250 gm Dextrose, 110 gm AA, 250 ml lipids daily---provides 1790 kcals/day (21.6 kcal/kg/day), 1.3 g PRO/kg/day, 250 g CHO/day, GIR 2.1 mg/kg/min and 28 % kcals from Fat.   Intake: Pt didn't take much clear liquids yesterday and also had G tube open so limited absorption.      NEW FINDINGS   GI: G tube clamped today.  Plan for enema per pt.   Last BM: 1/29     INTERVENTIONS  Education: discussed plan for gradual decrease of TPN and lipids as po intake improved.   See recommendations.     Monitoring/Evaluation  Progress toward goals will be monitored and evaluated per protocol.     Viji Ivy RD, LD   5A (rooms 5201-1 through 5211-2) / 7B Pager: [phone].

## 2021-02-01 NOTE — PLAN OF CARE
Activity: Ambulated in halls x2 this shift with SBA/A1. Independently positioning in bed.    Neuro: A&Ox4, quiet.    GI/: Voiding into urinal at bedside, no BM this shift.    Diet: Clear liquids, TPN continuous.    Incisions/Drains: G tube to gravity drainage (clamped for meals and meds).    IV Access: PICC infusing TPN continuous and lipids    Labs: Magnesium 1.8, replacement infused, recheck tomorrow AM.    Vitals: Vitally stable on room air.    Pain: Medicated with scheduled tylenol per MAR. Moderate effect.    New changes this shift: Improving mobility, improving mood. Mag replaced.    Plan: Continue POC.

## 2021-02-01 NOTE — PROGRESS NOTES
THORACIC & FOREGUT SURGERY    S:  No overnight events.  No postop BM.    O:  Vitals:    01/31/21 1613 01/31/21 2001 01/31/21 2327 02/01/21 0806   BP: 132/78 132/83 125/84 117/84   BP Location: Left arm Left arm Left arm Left arm   Pulse: 95 74 84 95   Resp: 16  16 18   Temp: 98.9  F (37.2  C) 98.7  F (37.1  C) 98.8  F (37.1  C) 98.4  F (36.9  C)   TempSrc: Oral Oral Oral Oral   SpO2: 94% 92% 94% 93%   Weight:  81.5 kg (179 lb 9.6 oz)     Height:           A&Ox3, NAD  NLB  Soft, NDNT, G to gravity, laparoscopic incisions clean and dry with glue  Distal extremities warm, well perfused.  No calf tenderness.      BMP  Recent Labs   Lab 02/01/21  0635 01/31/21  0655 01/30/21  0646 01/29/21  0644    134 135 135   POTASSIUM 4.1 3.9 4.2 3.9   CHLORIDE 102 101 104 106   MINE 9.3 9.5 9.2 9.3   CO2 27 27 25 24   BUN 25 23 30 30   CR 1.06 0.94 1.00 1.17   * 139* 137* 97     CBC  Recent Labs   Lab 02/01/21  0635 01/31/21  0655 01/30/21  0646 01/29/21  0644   WBC 14.5* 14.9* 14.4* 9.9   RBC 4.43 4.60 4.22* 4.81   HGB 14.2 14.2 13.4 14.8   HCT 42.3 43.1 39.6* 44.7   MCV 96 94 94 93   MCH 32.1 30.9 31.8 30.8   MCHC 33.6 32.9 33.8 33.1   RDW 12.9 12.8 12.9 13.0    224 206 267     INR  Recent Labs   Lab 02/01/21  0635   INR 1.21*        Most Recent Imaging Studies Reviewed:  CXR from 1/31 atelectasis, small left pleural effusion, no PTX, gastric bubble     A/P: Delroy ESPERANZA Christianson is a 61 year old male POD#3 s/p laparoscopic hiatal hernia repair, G tube placement, and bilateral CT placement.     -Advanced diet today  -G-tube clamped, vent PRN for bloating or distention  -Let TPN run  -Pain control with nelli tylenol, prn oxycodone, prn dilaudid   -PT assist with ambulation  -Continue spironolactone  -Protonix 40 mg daily  -Out of bed and ambulation encouraged  -HSQ, IS    Seen with staff, Dr. Augusto Raines, PA-C  Thoracic & Foregut Surgery

## 2021-02-01 NOTE — PLAN OF CARE
Afebrile,vitals stable,vitals are stable,GTube clamped,denied  nausea,no emesis,decined po intake.,.complained of abdominal fullness, pt not passing gas,fleet enema ordered but pt declined x2 despite encouragement.,pm nurse would offer pt again this evening..Up in chair and ambulated  in vela with standby assist x 2. .Abdominal lap site pain controlled with prn oxycodone and scheduled tylenol.Continue per plan of care.

## 2021-02-02 ENCOUNTER — APPOINTMENT (OUTPATIENT)
Dept: OCCUPATIONAL THERAPY | Facility: CLINIC | Age: 61
End: 2021-02-02
Attending: THORACIC SURGERY (CARDIOTHORACIC VASCULAR SURGERY)
Payer: COMMERCIAL

## 2021-02-02 ENCOUNTER — APPOINTMENT (OUTPATIENT)
Dept: GENERAL RADIOLOGY | Facility: CLINIC | Age: 61
End: 2021-02-02
Attending: THORACIC SURGERY (CARDIOTHORACIC VASCULAR SURGERY)
Payer: COMMERCIAL

## 2021-02-02 LAB
ANION GAP SERPL CALCULATED.3IONS-SCNC: 5 MMOL/L (ref 3–14)
BASOPHILS # BLD AUTO: 0.1 10E9/L (ref 0–0.2)
BASOPHILS NFR BLD AUTO: 0.4 %
BUN SERPL-MCNC: 28 MG/DL (ref 7–30)
CALCIUM SERPL-MCNC: 9.5 MG/DL (ref 8.5–10.1)
CHLORIDE SERPL-SCNC: 103 MMOL/L (ref 94–109)
CO2 SERPL-SCNC: 26 MMOL/L (ref 20–32)
CREAT SERPL-MCNC: 1.07 MG/DL (ref 0.66–1.25)
DIFFERENTIAL METHOD BLD: ABNORMAL
EOSINOPHIL # BLD AUTO: 0.3 10E9/L (ref 0–0.7)
EOSINOPHIL NFR BLD AUTO: 2.1 %
ERYTHROCYTE [DISTWIDTH] IN BLOOD BY AUTOMATED COUNT: 12.9 % (ref 10–15)
GFR SERPL CREATININE-BSD FRML MDRD: 75 ML/MIN/{1.73_M2}
GLUCOSE BLDC GLUCOMTR-MCNC: 101 MG/DL (ref 70–99)
GLUCOSE BLDC GLUCOMTR-MCNC: 116 MG/DL (ref 70–99)
GLUCOSE BLDC GLUCOMTR-MCNC: 88 MG/DL (ref 70–99)
GLUCOSE SERPL-MCNC: 108 MG/DL (ref 70–99)
HCT VFR BLD AUTO: 41.7 % (ref 40–53)
HGB BLD-MCNC: 13.9 G/DL (ref 13.3–17.7)
IMM GRANULOCYTES # BLD: 0.3 10E9/L (ref 0–0.4)
IMM GRANULOCYTES NFR BLD: 1.8 %
LACTATE BLD-SCNC: 0.9 MMOL/L (ref 0.7–2)
LYMPHOCYTES # BLD AUTO: 1.2 10E9/L (ref 0.8–5.3)
LYMPHOCYTES NFR BLD AUTO: 8.7 %
MAGNESIUM SERPL-MCNC: 2 MG/DL (ref 1.6–2.3)
MCH RBC QN AUTO: 32 PG (ref 26.5–33)
MCHC RBC AUTO-ENTMCNC: 33.3 G/DL (ref 31.5–36.5)
MCV RBC AUTO: 96 FL (ref 78–100)
MONOCYTES # BLD AUTO: 1.4 10E9/L (ref 0–1.3)
MONOCYTES NFR BLD AUTO: 9.8 %
NEUTROPHILS # BLD AUTO: 11 10E9/L (ref 1.6–8.3)
NEUTROPHILS NFR BLD AUTO: 77.2 %
NRBC # BLD AUTO: 0 10*3/UL
NRBC BLD AUTO-RTO: 0 /100
PHOSPHATE SERPL-MCNC: 3.8 MG/DL (ref 2.5–4.5)
PLATELET # BLD AUTO: 277 10E9/L (ref 150–450)
POTASSIUM SERPL-SCNC: 4.3 MMOL/L (ref 3.4–5.3)
RBC # BLD AUTO: 4.35 10E12/L (ref 4.4–5.9)
SODIUM SERPL-SCNC: 135 MMOL/L (ref 133–144)
WBC # BLD AUTO: 14.2 10E9/L (ref 4–11)

## 2021-02-02 PROCEDURE — 250N000013 HC RX MED GY IP 250 OP 250 PS 637: Performed by: STUDENT IN AN ORGANIZED HEALTH CARE EDUCATION/TRAINING PROGRAM

## 2021-02-02 PROCEDURE — 36592 COLLECT BLOOD FROM PICC: CPT | Performed by: THORACIC SURGERY (CARDIOTHORACIC VASCULAR SURGERY)

## 2021-02-02 PROCEDURE — 83735 ASSAY OF MAGNESIUM: CPT | Performed by: STUDENT IN AN ORGANIZED HEALTH CARE EDUCATION/TRAINING PROGRAM

## 2021-02-02 PROCEDURE — 80048 BASIC METABOLIC PNL TOTAL CA: CPT | Performed by: STUDENT IN AN ORGANIZED HEALTH CARE EDUCATION/TRAINING PROGRAM

## 2021-02-02 PROCEDURE — 83605 ASSAY OF LACTIC ACID: CPT | Performed by: THORACIC SURGERY (CARDIOTHORACIC VASCULAR SURGERY)

## 2021-02-02 PROCEDURE — 250N000013 HC RX MED GY IP 250 OP 250 PS 637: Performed by: PHYSICIAN ASSISTANT

## 2021-02-02 PROCEDURE — 97168 OT RE-EVAL EST PLAN CARE: CPT | Mod: GO | Performed by: OCCUPATIONAL THERAPIST

## 2021-02-02 PROCEDURE — 71045 X-RAY EXAM CHEST 1 VIEW: CPT

## 2021-02-02 PROCEDURE — 71045 X-RAY EXAM CHEST 1 VIEW: CPT | Mod: 26 | Performed by: RADIOLOGY

## 2021-02-02 PROCEDURE — 36592 COLLECT BLOOD FROM PICC: CPT | Performed by: STUDENT IN AN ORGANIZED HEALTH CARE EDUCATION/TRAINING PROGRAM

## 2021-02-02 PROCEDURE — 250N000011 HC RX IP 250 OP 636: Performed by: STUDENT IN AN ORGANIZED HEALTH CARE EDUCATION/TRAINING PROGRAM

## 2021-02-02 PROCEDURE — 85025 COMPLETE CBC W/AUTO DIFF WBC: CPT | Performed by: STUDENT IN AN ORGANIZED HEALTH CARE EDUCATION/TRAINING PROGRAM

## 2021-02-02 PROCEDURE — 120N000002 HC R&B MED SURG/OB UMMC

## 2021-02-02 PROCEDURE — 250N000013 HC RX MED GY IP 250 OP 250 PS 637: Performed by: THORACIC SURGERY (CARDIOTHORACIC VASCULAR SURGERY)

## 2021-02-02 PROCEDURE — 97535 SELF CARE MNGMENT TRAINING: CPT | Mod: GO | Performed by: OCCUPATIONAL THERAPIST

## 2021-02-02 PROCEDURE — 84100 ASSAY OF PHOSPHORUS: CPT | Performed by: STUDENT IN AN ORGANIZED HEALTH CARE EDUCATION/TRAINING PROGRAM

## 2021-02-02 PROCEDURE — 999N001017 HC STATISTIC GLUCOSE BY METER IP

## 2021-02-02 PROCEDURE — 97110 THERAPEUTIC EXERCISES: CPT | Mod: GO | Performed by: OCCUPATIONAL THERAPIST

## 2021-02-02 PROCEDURE — 97530 THERAPEUTIC ACTIVITIES: CPT | Mod: GO | Performed by: OCCUPATIONAL THERAPIST

## 2021-02-02 RX ORDER — AMOXICILLIN 250 MG
1 CAPSULE ORAL 2 TIMES DAILY
Qty: 60 TABLET | Refills: 0 | Status: SHIPPED | OUTPATIENT
Start: 2021-02-02 | End: 2021-03-04

## 2021-02-02 RX ORDER — PANTOPRAZOLE SODIUM 40 MG/1
40 TABLET, DELAYED RELEASE ORAL DAILY
Qty: 30 TABLET | Refills: 0 | Status: SHIPPED | OUTPATIENT
Start: 2021-02-03 | End: 2021-03-05

## 2021-02-02 RX ORDER — ACETAMINOPHEN 325 MG/1
975 TABLET ORAL EVERY 8 HOURS
COMMUNITY
Start: 2021-02-02 | End: 2022-04-20

## 2021-02-02 RX ORDER — OXYCODONE HYDROCHLORIDE 5 MG/1
5-10 TABLET ORAL EVERY 4 HOURS PRN
Qty: 30 TABLET | Refills: 0 | Status: SHIPPED | OUTPATIENT
Start: 2021-02-02 | End: 2022-04-20

## 2021-02-02 RX ORDER — POLYETHYLENE GLYCOL 3350 17 G/17G
17 POWDER, FOR SOLUTION ORAL DAILY
Qty: 255 G | Refills: 0 | Status: SHIPPED | OUTPATIENT
Start: 2021-02-02 | End: 2021-02-17

## 2021-02-02 RX ORDER — MAGNESIUM OXIDE 400 MG/1
400 TABLET ORAL 2 TIMES DAILY
Status: COMPLETED | OUTPATIENT
Start: 2021-02-02 | End: 2021-02-03

## 2021-02-02 RX ADMIN — ACETAMINOPHEN 975 MG: 325 TABLET, FILM COATED ORAL at 05:02

## 2021-02-02 RX ADMIN — ACETAMINOPHEN 975 MG: 325 TABLET, FILM COATED ORAL at 21:06

## 2021-02-02 RX ADMIN — POLYETHYLENE GLYCOL 3350 17 G: 17 POWDER, FOR SOLUTION ORAL at 09:06

## 2021-02-02 RX ADMIN — Medication 400 MG: at 09:05

## 2021-02-02 RX ADMIN — PANTOPRAZOLE SODIUM 40 MG: 40 TABLET, DELAYED RELEASE ORAL at 09:05

## 2021-02-02 RX ADMIN — OXYCODONE HYDROCHLORIDE 5 MG: 5 TABLET ORAL at 09:13

## 2021-02-02 RX ADMIN — OXYCODONE HYDROCHLORIDE 5 MG: 5 TABLET ORAL at 05:02

## 2021-02-02 RX ADMIN — ACETAMINOPHEN 975 MG: 325 TABLET, FILM COATED ORAL at 15:12

## 2021-02-02 RX ADMIN — HEPARIN SODIUM 5000 UNITS: 5000 INJECTION, SOLUTION INTRAVENOUS; SUBCUTANEOUS at 16:28

## 2021-02-02 RX ADMIN — DOCUSATE SODIUM 50 MG AND SENNOSIDES 8.6 MG 1 TABLET: 8.6; 5 TABLET, FILM COATED ORAL at 21:07

## 2021-02-02 RX ADMIN — HEPARIN SODIUM 5000 UNITS: 5000 INJECTION, SOLUTION INTRAVENOUS; SUBCUTANEOUS at 08:37

## 2021-02-02 RX ADMIN — SPIRONOLACTONE 25 MG: 25 TABLET ORAL at 09:05

## 2021-02-02 RX ADMIN — MAGNESIUM HYDROXIDE 30 ML: 400 SUSPENSION ORAL at 16:28

## 2021-02-02 RX ADMIN — DOCUSATE SODIUM 286 ML: 50 LIQUID ORAL at 12:23

## 2021-02-02 RX ADMIN — DOCUSATE SODIUM 50 MG AND SENNOSIDES 8.6 MG 1 TABLET: 8.6; 5 TABLET, FILM COATED ORAL at 09:05

## 2021-02-02 RX ADMIN — Medication 400 MG: at 21:07

## 2021-02-02 ASSESSMENT — ACTIVITIES OF DAILY LIVING (ADL)
ADLS_ACUITY_SCORE: 15
PREVIOUS_RESPONSIBILITIES: MEAL PREP;HOUSEKEEPING;LAUNDRY;SHOPPING;DRIVING;MEDICATION MANAGEMENT;FINANCES
ADLS_ACUITY_SCORE: 15

## 2021-02-02 ASSESSMENT — MIFFLIN-ST. JEOR: SCORE: 1669.46

## 2021-02-02 NOTE — DISCHARGE SUMMARY
NAME: Delroy Christianson   MRN: [id number]   : 1960     DATE OF ADMISSION: 2021     PRE/POSTOPERATIVE DIAGNOSES: Giant paraesophageal hernia with incarceration.     PROCEDURES PERFORMED :   1.  Urgent EGD and endoscopic decompression of stomach.   2.  Nasogastric tube placement under fluoroscopic guidance.   3.  Supervision and interpretation of intraoperative imaging.     ADDITIONAL PROCEDURES PERFORMED :    1. Esophagogastroscopy  2. Laparoscopic repair of hiatal hernia, over 50% of his stomach was herniated   3. Gastrostomy tube placement (20 Fr)  4. Bilateral  5. Tube thoracostomy (19 Fr Bassem)     PATHOLOGY RESULTS: None    CULTURE RESULTS: None     INTRAOPERATIVE COMPLICATIONS: None     POSTOPERATIVE COMPLICATIONS: None     DRAINS/TUBES PRESENT AT DISCHARGE: PEG tube for gastropexy and PRN decompression     DATE OF DISCHARGE:  2021    HOSPITAL COURSE: Delroy Christianson is a 61 year old male who on 2021 underwent the above-named procedures.  He tolerated the operation well and postoperatively was transferred to the general post-surgical unit. Post operatively his diet was very slowly progressed to avoid nausea and vomiting. With time he was able to tolerate a mechanical soft diet without venting his g-tube. He had slow return of bowel function and presumed slowed gastric emptying which we aggressively treated. He has baseline CKD and had a gradual increase in his creatinine during his stay. We encouraged PO nutrition and hydration. The remainder of his course was essentially uncomplicated.  Prior to discharge, his pain was controlled well, he was able to perform ADLs and ambulate independently without difficulty, and had full return of bowel and bladder function.  His creatinine was trending down. On 2021, he was discharged to home in stable condition.     DISCHARGE EXAM:   A&O, NAD  Resp non-labored  Distal extremities warm    Incisions healing well. G-tube site c/d/i     DISCHARGE  INSTRUCTIONS:  Discharge Procedure Orders   XR Upper GI without KUB   Standing Status: Future Standing Exp. Date: 05/02/21     Order Specific Question Answer Comments   Priority Routine      Reason for your hospital stay   Order Comments: Incarcerated hiatal hernia     Adult [facility] Follow-up and recommended labs and tests   Order Comments: 1.) Follow up with primary care physician, Chuy Holliday, in 1-2 weeks.  2.) Follow up with a thoracic surgery Clinical Nurse Specialist in Thoracic Surgery clinic in 1 month, prior to which an upper GI should be performed.     Appointments on [place] and/or [facility] (with [facility] or [facility] provider or service). Call [phone] if you haven't heard regarding these appointments within 7 days of discharge.     Discharge Instructions   Order Comments: THORACIC SURGERY DISCHARGE INSTRUCTIONS    DIET: Soft mechanical diet    If your plans upon discharge include prolonged periods of sitting (i.e a lengthy car or plane ride), it is highly beneficial to get up and walk at least once per hour to help prevent swelling and blood clots.     You may remove chest tube dressing 48 hours after tube removal and bandage the site at your own discretion thereafter.  Small amounts of leakage are normal for 2-3 days after removal.  Feel free to call with questions.    You may get incision wet 2 days after operation. Do not submerge, soak, or scrub incision or swim until seen in follow-up.    Take incentive spirometer home for continued frequent use    Activity as tolerated, no strenous activity until seen in follow-up, no lifting greater than 20 pounds for the next 8-10 weeks.    Stay hydrated. Take over the counter fiber (metamucil or benefiber) and stool softeners (Miralax, docusate or senna) if becoming constipated.     Call for fever greater than 101.5, chills, increased size of incision, red skin around incision, vision changes, muscle strength changes, sensation changes, shortness  "of breath, or other concerns.    No driving while taking narcotic pain medication.    Transition to ibuprofen or tylenol/acetaminophen for pain control. Do not take tylenol/acetaminophen and acetaminophen containing narcotic (e.g., percocet or vicodin) at the same time. If you have known ulcer problems, or kidney trouble (elevated creatinine) do not take the ibuprofen.    In emergencies, call 911    For other Questions or Concerns;   A.) During weekday working hours (Monday through Friday 8am to 4:30pm)   call [phone] and ask to speak to a clinical nurse specialist.     B.) At nights (after 4:30pm), on weekends, or if urgent call [phone] and   tell the  \"I would like to page job code [phone], the thoracic surgery   fellow on call, please.\"     Tubes   Order Comments: PEG TUBE INSTRUCTIONS:    Venting:  -You should vent or temporarily put your tube to gravity bag (or basin) drainage if you experience nausea or bloating.  This is important as it will help to protect the hernia repair.   If you are uncertain how to do this, please ask your nurse for instruction.    Skin care:  -Change the gauze dressing around your PEG tube daily.  -Check for redness and swelling in the area where the tube goes into your skin.   -Keep the skin around your tube dry and with a split gauze under the flange. If the hole where the tube enters your body is draining fluid, you may need to put barrier cream or antibiotic cream on the skin around your tube after you are done cleaning it. Cover the area with bandages, and call your caregiver.   -If there are no stitches holding your PEG tube in place on your skin, gently turn the tube. This may decrease pressure on your skin, and help prevent an infection. Ask your caregiver if you should turn your PEG tube, and how to do it correctly.     Flushes:  -Flush your feeding tube with 30cc of water twice daily to ensure it does not become plugged  -If administering medications or " tube feedings via your tube, make sure to flush with water immediately after use to prevent the tube from plugging       DISCHARGE MEDICATIONS:   Current Discharge Medication List      START taking these medications    Details   acetaminophen (TYLENOL) 325 MG tablet Take 3 tablets (975 mg) by mouth every 8 hours  Qty:      Associated Diagnoses: Hiatal hernia      oxyCODONE (ROXICODONE) 5 MG tablet Take 1-2 tablets (5-10 mg) by mouth every 4 hours as needed for moderate to severe pain  Qty: 30 tablet, Refills: 0    Associated Diagnoses: Hiatal hernia      pantoprazole (PROTONIX) 40 MG EC tablet Take 1 tablet (40 mg) by mouth daily  Qty: 30 tablet, Refills: 0    Associated Diagnoses: Hiatal hernia      polyethylene glycol (MIRALAX) 17 GM/Dose powder Take 17 g by mouth daily for 15 days  Qty: 255 g, Refills: 0    Associated Diagnoses: Hiatal hernia      senna-docusate (SENOKOT-S/PERICOLACE) 8.6-50 MG tablet Take 1 tablet by mouth 2 times daily  Qty: 60 tablet, Refills: 0    Associated Diagnoses: Hiatal hernia         CONTINUE these medications which have NOT CHANGED    Details   aspirin (ASA) 325 MG EC tablet Take 325 mg by mouth as needed for moderate pain      calcium carbonate (TUMS) 500 MG chewable tablet Take 1 chew tab by mouth as needed for heartburn      spironolactone (ALDACTONE) 25 MG tablet Take 1 tablet (25 mg) by mouth daily  Qty: 90 tablet, Refills: 3    Associated Diagnoses: Benign essential hypertension

## 2021-02-02 NOTE — PLAN OF CARE
NEURO: A&O x4, anxious at times. Calls appropriately   RESPIRATORY: LS diminished, dyspnea on exertion. Satting well on RA. Intermittently tachypneic this shift  CARDIAC: Tachycardic, BP normal. Denies cardiac chest pain   GI/: Voiding in urinal. Hypoactive BS, not passing gas. Fleet enema given this evening-only enema liquid without stool was passed into toilet.   DIET: Full liquid diet, tolerating well   PAIN/NAUSEA: C/o incisional pain in abdomen, PRN Oxycodone given x1. Denies nausea this shift.   SKIN/INCISION/DRAINS: Abdominal incisions intact with liquid bandage. G-tube clamped.   IV ACCESS: R DL PICC infusing continuous TPN @ 83 mL/hr + lipids @ 20.8 mL/hr, TKO on other lumen.   ACTIVITY: Up with assist of one   LAB: ALT 1,036, . Triggered sepsis protocol, lactate 0.7  PLAN: Monitor VS, bowel meds, continue with POC

## 2021-02-02 NOTE — PLAN OF CARE
Patient denies SOB, VSS on RA. Braulio CT sites cdi. +BS, soft slightly tender abdomen. Denies nausea. G-tube clamped. Abdominal lap sites cdi, Oxycodone 5 mg given 2x. On continuous TPN at 83 ml/hr with Lipids via PICC line. Voids spont. Up on chair this morning. Continue poc.   Vitals:    02/01/21 1620 02/01/21 1630 02/01/21 1642 02/01/21 2348   BP: 132/85 (!) 135/90 133/84 137/82   BP Location: Left arm Left arm Left arm Left arm   Pulse: 101 107 104 89   Resp: 20 20 28 20   Temp: 99  F (37.2  C) 98.4  F (36.9  C) 97.8  F (36.6  C) 97.5  F (36.4  C)   TempSrc: Oral Oral Oral Oral   SpO2: 91% 92% 93% 93%   Weight:       Height:

## 2021-02-02 NOTE — PLAN OF CARE
"/88 (BP Location: Left arm)   Pulse 90   Temp 98.6  F (37  C) (Oral)   Resp 18   Ht 1.803 m (5' 11\")   Wt 81.5 kg (179 lb 9.6 oz)   SpO2 94%   BMI 25.05 kg/m    Status: VSS on RA  Pain/Nausea: oxy x1, scheduled tylenol  Mobility: Ax1 OOB, otherwise SBA  Diet: FLD, dominique counts. Poor appetite.  Labs: Reviewed, Lactic   Lines: R DL PICC SL, TPN discontinued today  Drains: G tube clamped until 1730, to gravity at this time - 625mL green output, per team clamp G tube again at 2000 and vent prn  Skin/Incisions: no new deficits noted  Neuro: A&Ox4  Respiratory: Crackles LLL on assessment, otherwise clear. IS encouraged. Denies cough. Dyspnea on exertion noted.  Cardiac: WDL  GI: Pink lady x1, minimal result. MOMx1, no result thus far. BS hypo. Denies passing gas.  : AUOP using urinal  New Changes: gtube vented, TPN discontinued  Plan: continue POC, possible discharge tomorrow    "

## 2021-02-02 NOTE — PROGRESS NOTES
"   02/02/21 0800   Quick Adds   Type of Visit Occupational Therapy Re-evaluation       Present no   Language Englsih   Living Environment   People in home alone   Current Living Arrangements other (see comments)  ([facility])   Home Accessibility stairs within home   Number of Stairs, Within Home, Primary 7   Stair Railings, Within Home, Primary railings safe and in good condition   Transportation Anticipated family or friend will provide;car, drives self   Living Environment Comments Pt lives alone with cat in split level home, has tub shower w/ no shower chair/grab bars   Self-Care   Usual Activity Tolerance good   Current Activity Tolerance good   Equipment Currently Used at Home walker, standard  (Does not currently use)   Activity/Exercise/Self-Care Comment Pt was IND with ADL PTA. Pt reports walking around neighborhood in summer for exercise but has not done this so far this winter   Disability/Function   Hearing Difficulty or Deaf no   Wear Glasses or Blind yes   Concentrating, Remembering or Making Decisions Difficulty no   Difficulty Communicating no   Difficulty Eating/Swallowing no   Walking or Climbing Stairs Difficulty no   Dressing/Bathing Difficulty no   Toileting issues no   Doing Errands Independently Difficulty (such as shopping) no   Fall history within last six months no   Change in Functional Status Since Onset of Current Illness/Injury yes   General Information   Onset of Illness/Injury or Date of Surgery 01/23/21   Referring Physician Deejay Lee MD   Additional Occupational Profile Info/Pertinent History of Current Problem Per chart review, \"Delroy Christianson is a 61 year old male POD#2 s/p laparoscopic hiatal hernia repair, G tube placement, and bilateral CT placement.\"   Existing Precautions/Restrictions fall;abdominal   Left Upper Extremity (Weight-bearing Status) partial weight-bearing (PWB)  (10# lifting restriction)   Right Upper Extremity (Weight-bearing " Status) partial weight-bearing (PWB)  (10# lifting restriction)   Left Lower Extremity (Weight-bearing Status) full weight-bearing (FWB)   Right Lower Extremity (Weight-bearing Status) full weight-bearing (FWB)   Heart Disease Risk Factors Medical history;Gender;Age   Cognitive Status Examination   Orientation Status orientation to person, place and time   Cognitive Status Comments Appears WNL, will continue to monitor   Visual Perception   Visual Impairment/Limitations corrective lenses for distance   Integumentary/Edema   Integumentary/Edema no deficits were identifed   Posture   Posture not impaired   Range of Motion Comprehensive   Comment, General Range of Motion OT: BUE WFL   Strength Comprehensive (MMT)   Comment, General Manual Muscle Testing (MMT) Assessment OT: BUE WFL   Muscle Tone Assessment   Muscle Tone Comments OT: BUE WFL   Coordination   Upper Extremity Coordination No deficits were identified   Coordination Comments Mild hand weakness/tremor noted    Bed Mobility   Comment (Bed Mobility) OT: SBA    Transfers   Transfer Comments OT: SBA STS   Activities of Daily Living   BADL Assessment/Intervention grooming   Grooming Assessment/Training   Emery Level (Grooming) modified independence;other (see comments)  (SBA after set up)   Position (Grooming) edge of bed sitting   Comment (Grooming) OT: Pt SBA after set up to wash face, brush teeth, and comb hair while sitting EOB   Instrumental Activities of Daily Living (IADL)   Previous Responsibilities meal prep;housekeeping;laundry;shopping;driving;medication management;finances   Clinical Impression   Criteria for Skilled Therapeutic Interventions Met (OT) yes;skilled treatment is necessary   OT Diagnosis OT: decreased ind I/ADLs    OT Problem List-Impairments impacting ADL problems related to;activity tolerance impaired;fear & anxiety;strength;other (see comments);post-surgical precautions  (post-surgical precautions if surgery is performed )   ADL  comments/analysis OT: Pt with recent surgery, appropriate to educate/reducate regarding I/ADLs with abd precautions   Assessment of Occupational Performance 1-3 Performance Deficits   Identified Performance Deficits home mgmt, dressing, g/h, I/ADLs with abd precautions   Planned Therapy Interventions (OT) ADL retraining;strengthening;home program guidelines;progressive activity/exercise;risk factor education   Clinical Decision Making Complexity (OT) low complexity   Therapy Frequency (OT) Daily   Predicted Duration of Therapy 2/9/21   Anticipated Equipment Needs Upon Discharge (OT) shower chair;reacher   Risk & Benefits of therapy have been explained evaluation/treatment results reviewed;care plan/treatment goals reviewed;risks/benefits reviewed;participants included;patient   OT Discharge Planning    OT Discharge Recommendation (DC Rec) Home with assist   OT Rationale for DC Rec Pending progress/status after surgery, anticipate pt will be appropriate to discharge home w/ A. Pt is currently mobilizing w/ SBA and performing most ADL w/ SBA.    Total Evaluation Time (Minutes)   Total Evaluation Time (Minutes) 3

## 2021-02-02 NOTE — PROGRESS NOTES
THORACIC & FOREGUT SURGERY    S:  No overnight events. Patient denies any BM post enema yesterday. Tolerating full liquid diet. No nausea/vomitting. He ambulated halls 4x yesterday. Continuing to work on IS. Xray with gastric bubble.     O:  Vitals:    02/01/21 1630 02/01/21 1642 02/01/21 2348 02/02/21 0823   BP: (!) 135/90 133/84 137/82 121/72   BP Location: Left arm Left arm Left arm Left arm   Pulse: 107 104 89 87   Resp: 20 28 20 16   Temp: 98.4  F (36.9  C) 97.8  F (36.6  C) 97.5  F (36.4  C) 95.6  F (35.3  C)   TempSrc: Oral Oral Oral Axillary   SpO2: 92% 93% 93% 95%   Weight:       Height:           A&Ox3, NAD  NLB  Soft, NDNT, G tube clamped, laparoscopic incisions clean and dry with glue  Distal extremities warm, well perfused.  No calf tenderness.      BMP  Recent Labs   Lab 02/02/21  0706 02/01/21  0635 01/31/21  0655 01/30/21  0646    134 134 135   POTASSIUM 4.3 4.1 3.9 4.2   CHLORIDE 103 102 101 104   MINE 9.5 9.3 9.5 9.2   CO2 26 27 27 25   BUN 28 25 23 30   CR 1.07 1.06 0.94 1.00   * 125* 139* 137*     CBC  Recent Labs   Lab 02/02/21  0706 02/01/21  0635 01/31/21  0655 01/30/21  0646   WBC 14.2* 14.5* 14.9* 14.4*   RBC 4.35* 4.43 4.60 4.22*   HGB 13.9 14.2 14.2 13.4   HCT 41.7 42.3 43.1 39.6*   MCV 96 96 94 94   MCH 32.0 32.1 30.9 31.8   MCHC 33.3 33.6 32.9 33.8   RDW 12.9 12.9 12.8 12.9    230 224 206     INR  Recent Labs   Lab 02/01/21  0635   INR 1.21*      CXR reviewed     A/P: Delroy Christianson is a 61 year old male POD#4 s/p laparoscopic hiatal hernia repair, G tube placement, and bilateral CT placement.     -Full liquid diet  -Vent G-tube for gastric bubble, will clamp again after  -Discontinue TPN  -Milk of mag, enema today   -Pain control with nelli tylenol, prn oxycodone, prn dilaudid   -Continue spironolactone  -Protonix 40 mg daily  -Out of bed and ambulation encouraged  -HSQ, IS  -Discharge likely tomorrow     Patient seen with staff, Dr. Casas.    Topher Junior, MS3    I was  present with the medical student who participated in the service and in the documentation of the note. I have verified the history and personally performed the physical exam and medical decision making. I agree with the assessment and plan of care as documented in the note.    Jennifer Day MD  Surgery Resident, PGY3

## 2021-02-03 ENCOUNTER — APPOINTMENT (OUTPATIENT)
Dept: OCCUPATIONAL THERAPY | Facility: CLINIC | Age: 61
End: 2021-02-03
Attending: THORACIC SURGERY (CARDIOTHORACIC VASCULAR SURGERY)
Payer: COMMERCIAL

## 2021-02-03 ENCOUNTER — APPOINTMENT (OUTPATIENT)
Dept: GENERAL RADIOLOGY | Facility: CLINIC | Age: 61
End: 2021-02-03
Attending: THORACIC SURGERY (CARDIOTHORACIC VASCULAR SURGERY)
Payer: COMMERCIAL

## 2021-02-03 LAB
ANION GAP SERPL CALCULATED.3IONS-SCNC: 7 MMOL/L (ref 3–14)
BUN SERPL-MCNC: 26 MG/DL (ref 7–30)
CALCIUM SERPL-MCNC: 10 MG/DL (ref 8.5–10.1)
CHLORIDE SERPL-SCNC: 100 MMOL/L (ref 94–109)
CO2 SERPL-SCNC: 25 MMOL/L (ref 20–32)
CREAT SERPL-MCNC: 1.23 MG/DL (ref 0.66–1.25)
ERYTHROCYTE [DISTWIDTH] IN BLOOD BY AUTOMATED COUNT: 12.9 % (ref 10–15)
GFR SERPL CREATININE-BSD FRML MDRD: 63 ML/MIN/{1.73_M2}
GLUCOSE BLDC GLUCOMTR-MCNC: 134 MG/DL (ref 70–99)
GLUCOSE BLDC GLUCOMTR-MCNC: 69 MG/DL (ref 70–99)
GLUCOSE BLDC GLUCOMTR-MCNC: 84 MG/DL (ref 70–99)
GLUCOSE BLDC GLUCOMTR-MCNC: 92 MG/DL (ref 70–99)
GLUCOSE BLDC GLUCOMTR-MCNC: 92 MG/DL (ref 70–99)
GLUCOSE SERPL-MCNC: 87 MG/DL (ref 70–99)
HCT VFR BLD AUTO: 43.1 % (ref 40–53)
HGB BLD-MCNC: 14.4 G/DL (ref 13.3–17.7)
LACTATE BLD-SCNC: 1.1 MMOL/L (ref 0.7–2)
MAGNESIUM SERPL-MCNC: 2.2 MG/DL (ref 1.6–2.3)
MCH RBC QN AUTO: 30.6 PG (ref 26.5–33)
MCHC RBC AUTO-ENTMCNC: 33.4 G/DL (ref 31.5–36.5)
MCV RBC AUTO: 92 FL (ref 78–100)
PHOSPHATE SERPL-MCNC: 4.7 MG/DL (ref 2.5–4.5)
PLATELET # BLD AUTO: 350 10E9/L (ref 150–450)
POTASSIUM SERPL-SCNC: 4.9 MMOL/L (ref 3.4–5.3)
RBC # BLD AUTO: 4.7 10E12/L (ref 4.4–5.9)
SODIUM SERPL-SCNC: 132 MMOL/L (ref 133–144)
WBC # BLD AUTO: 14.8 10E9/L (ref 4–11)

## 2021-02-03 PROCEDURE — 97530 THERAPEUTIC ACTIVITIES: CPT | Mod: GO | Performed by: OCCUPATIONAL THERAPIST

## 2021-02-03 PROCEDURE — 97535 SELF CARE MNGMENT TRAINING: CPT | Mod: GO | Performed by: OCCUPATIONAL THERAPIST

## 2021-02-03 PROCEDURE — 71045 X-RAY EXAM CHEST 1 VIEW: CPT

## 2021-02-03 PROCEDURE — 250N000013 HC RX MED GY IP 250 OP 250 PS 637: Performed by: STUDENT IN AN ORGANIZED HEALTH CARE EDUCATION/TRAINING PROGRAM

## 2021-02-03 PROCEDURE — 36592 COLLECT BLOOD FROM PICC: CPT | Performed by: STUDENT IN AN ORGANIZED HEALTH CARE EDUCATION/TRAINING PROGRAM

## 2021-02-03 PROCEDURE — 120N000002 HC R&B MED SURG/OB UMMC

## 2021-02-03 PROCEDURE — 999N001017 HC STATISTIC GLUCOSE BY METER IP

## 2021-02-03 PROCEDURE — 250N000013 HC RX MED GY IP 250 OP 250 PS 637

## 2021-02-03 PROCEDURE — 71045 X-RAY EXAM CHEST 1 VIEW: CPT | Mod: 26 | Performed by: RADIOLOGY

## 2021-02-03 PROCEDURE — 83735 ASSAY OF MAGNESIUM: CPT | Performed by: STUDENT IN AN ORGANIZED HEALTH CARE EDUCATION/TRAINING PROGRAM

## 2021-02-03 PROCEDURE — 83735 ASSAY OF MAGNESIUM: CPT

## 2021-02-03 PROCEDURE — 80048 BASIC METABOLIC PNL TOTAL CA: CPT

## 2021-02-03 PROCEDURE — 97110 THERAPEUTIC EXERCISES: CPT | Mod: GO | Performed by: OCCUPATIONAL THERAPIST

## 2021-02-03 PROCEDURE — 84100 ASSAY OF PHOSPHORUS: CPT | Performed by: STUDENT IN AN ORGANIZED HEALTH CARE EDUCATION/TRAINING PROGRAM

## 2021-02-03 PROCEDURE — 83605 ASSAY OF LACTIC ACID: CPT | Performed by: THORACIC SURGERY (CARDIOTHORACIC VASCULAR SURGERY)

## 2021-02-03 PROCEDURE — 84100 ASSAY OF PHOSPHORUS: CPT

## 2021-02-03 PROCEDURE — 85027 COMPLETE CBC AUTOMATED: CPT | Performed by: STUDENT IN AN ORGANIZED HEALTH CARE EDUCATION/TRAINING PROGRAM

## 2021-02-03 PROCEDURE — 80048 BASIC METABOLIC PNL TOTAL CA: CPT | Performed by: STUDENT IN AN ORGANIZED HEALTH CARE EDUCATION/TRAINING PROGRAM

## 2021-02-03 PROCEDURE — 250N000011 HC RX IP 250 OP 636: Performed by: STUDENT IN AN ORGANIZED HEALTH CARE EDUCATION/TRAINING PROGRAM

## 2021-02-03 PROCEDURE — 250N000013 HC RX MED GY IP 250 OP 250 PS 637: Performed by: PHYSICIAN ASSISTANT

## 2021-02-03 PROCEDURE — 250N000013 HC RX MED GY IP 250 OP 250 PS 637: Performed by: THORACIC SURGERY (CARDIOTHORACIC VASCULAR SURGERY)

## 2021-02-03 PROCEDURE — 36592 COLLECT BLOOD FROM PICC: CPT | Performed by: THORACIC SURGERY (CARDIOTHORACIC VASCULAR SURGERY)

## 2021-02-03 PROCEDURE — 36592 COLLECT BLOOD FROM PICC: CPT

## 2021-02-03 RX ORDER — HYDROXYZINE HYDROCHLORIDE 25 MG/1
25 TABLET, FILM COATED ORAL EVERY 4 HOURS PRN
Status: DISCONTINUED | OUTPATIENT
Start: 2021-02-03 | End: 2021-02-06 | Stop reason: HOSPADM

## 2021-02-03 RX ORDER — POLYETHYLENE GLYCOL 3350 17 G/17G
17 POWDER, FOR SOLUTION ORAL 2 TIMES DAILY
Status: DISCONTINUED | OUTPATIENT
Start: 2021-02-03 | End: 2021-02-06 | Stop reason: HOSPADM

## 2021-02-03 RX ORDER — MAGNESIUM CARB/ALUMINUM HYDROX 105-160MG
296 TABLET,CHEWABLE ORAL ONCE
Status: COMPLETED | OUTPATIENT
Start: 2021-02-03 | End: 2021-02-03

## 2021-02-03 RX ORDER — AMOXICILLIN 250 MG
2 CAPSULE ORAL 2 TIMES DAILY
Status: DISCONTINUED | OUTPATIENT
Start: 2021-02-03 | End: 2021-02-06 | Stop reason: HOSPADM

## 2021-02-03 RX ADMIN — DOCUSATE SODIUM 50 MG AND SENNOSIDES 8.6 MG 2 TABLET: 8.6; 5 TABLET, FILM COATED ORAL at 08:49

## 2021-02-03 RX ADMIN — MAGNESIUM HYDROXIDE 30 ML: 400 SUSPENSION ORAL at 08:49

## 2021-02-03 RX ADMIN — HEPARIN SODIUM 5000 UNITS: 5000 INJECTION, SOLUTION INTRAVENOUS; SUBCUTANEOUS at 08:49

## 2021-02-03 RX ADMIN — SPIRONOLACTONE 25 MG: 25 TABLET ORAL at 08:49

## 2021-02-03 RX ADMIN — DOCUSATE SODIUM 50 MG AND SENNOSIDES 8.6 MG 2 TABLET: 8.6; 5 TABLET, FILM COATED ORAL at 19:59

## 2021-02-03 RX ADMIN — DOCUSATE SODIUM 286 ML: 50 LIQUID ORAL at 16:43

## 2021-02-03 RX ADMIN — HEPARIN SODIUM 5000 UNITS: 5000 INJECTION, SOLUTION INTRAVENOUS; SUBCUTANEOUS at 01:04

## 2021-02-03 RX ADMIN — HYDROXYZINE HYDROCHLORIDE 25 MG: 25 TABLET, FILM COATED ORAL at 02:11

## 2021-02-03 RX ADMIN — ACETAMINOPHEN 975 MG: 325 TABLET, FILM COATED ORAL at 22:15

## 2021-02-03 RX ADMIN — PANTOPRAZOLE SODIUM 40 MG: 40 TABLET, DELAYED RELEASE ORAL at 08:49

## 2021-02-03 RX ADMIN — HEPARIN SODIUM 5000 UNITS: 5000 INJECTION, SOLUTION INTRAVENOUS; SUBCUTANEOUS at 16:40

## 2021-02-03 RX ADMIN — Medication 400 MG: at 19:59

## 2021-02-03 RX ADMIN — MAGNESIUM CITRATE 296 ML: 1.75 LIQUID ORAL at 20:04

## 2021-02-03 RX ADMIN — Medication 400 MG: at 08:48

## 2021-02-03 RX ADMIN — ACETAMINOPHEN 975 MG: 325 TABLET, FILM COATED ORAL at 14:40

## 2021-02-03 ASSESSMENT — ACTIVITIES OF DAILY LIVING (ADL)
ADLS_ACUITY_SCORE: 15

## 2021-02-03 NOTE — PROGRESS NOTES
Calorie Count  Intake recorded for: 2/2  Total Kcals: 338 Total Protein: 4g  Kcals from Hospital Food: 338  Protein: 4g  Kcals from Outside Food (average):0 Protein: 0g  # Meals Ordered from Kitchen: 1 meal   # Meals Recorded: 1 meal (First - 100% cream of wheat w/ brown sugar, pudding, apple juice)  # Supplements Recorded: 0

## 2021-02-03 NOTE — PROGRESS NOTES
THORACIC & FOREGUT SURGERY    S:  No overnight events. Patient had a small BM this AM. No nausea/vomitting. He is ambulating. Continuing to work on IS. Xray with continued gastric bubble.     O:  Vitals:    02/02/21 1757 02/02/21 1945 02/02/21 2210 02/03/21 0813   BP: 132/89  128/84 117/80   BP Location: Left arm  Left arm Left arm   Pulse: 85  86 95   Resp: 18  18 18   Temp: 98.5  F (36.9  C)  99.4  F (37.4  C) 97.2  F (36.2  C)   TempSrc: Oral  Oral Oral   SpO2: 95%  93% 94%   Weight:  84.2 kg (185 lb 11.2 oz)     Height:           A&Ox3, NAD  NLB  Soft, NDNT, G tube clamped, laparoscopic incisions clean and dry with glue  Distal extremities warm, well perfused.  No calf tenderness.      BMP  Recent Labs   Lab 02/03/21  0717 02/02/21  0706 02/01/21  0635 01/31/21  0655   * 135 134 134   POTASSIUM 4.9 4.3 4.1 3.9   CHLORIDE 100 103 102 101   MINE 10.0 9.5 9.3 9.5   CO2 25 26 27 27   BUN 26 28 25 23   CR 1.23 1.07 1.06 0.94   GLC 87 108* 125* 139*     CBC  Recent Labs   Lab 02/03/21  0717 02/02/21  0706 02/01/21  0635 01/31/21  0655   WBC 14.8* 14.2* 14.5* 14.9*   RBC 4.70 4.35* 4.43 4.60   HGB 14.4 13.9 14.2 14.2   HCT 43.1 41.7 42.3 43.1   MCV 92 96 96 94   MCH 30.6 32.0 32.1 30.9   MCHC 33.4 33.3 33.6 32.9   RDW 12.9 12.9 12.9 12.8    277 230 224     INR  Recent Labs   Lab 02/01/21  0635   INR 1.21*      CXR reviewed     A/P: Delroy Christianson is a 61 year old male POD#5 s/p laparoscopic hiatal hernia repair, G tube placement, and bilateral CT placement.     He was having burping and belching and a sense of reflux after his AM boost. He felt better after his G-tube was vented. 200 drained. This was hours after he drank the boost. If he does not have a BM we will transition back to clears and keep him the night and discharge in the AM.     -Ambulate more than 4 times  -MOM, Mag citrate, enema  -Keep on FLD, advancing slowly   -Pain control with nelli tylenol, prn oxycodone, prn dilaudid   -Continue  spironolactone  -Protonix 40 mg daily  -Out of bed and ambulation encouraged  -HSQ, IS  -Discharge likely tomorrow now     Patient seen with staff, Dr. Casas.    Izabel Carrera MD  General Surgery, PGY-1

## 2021-02-03 NOTE — PLAN OF CARE
"/84 (BP Location: Left arm)   Pulse 86   Temp 99.4  F (37.4  C) (Oral)   Resp 18   Ht 1.803 m (5' 11\")   Wt 84.2 kg (185 lb 11.2 oz)   SpO2 93%   BMI 25.90 kg/m      Status:  POD#5 s/p laparoscopic hiatal hernia repair, G tube placement, and bilateral CT   Pain/Nausea: scheduled tylenol given w/ PRN atarax given x1   Mobility: SBA OOB, otherwise SBA  Diet: FLD, dominique counts. Poor appetite.  Labs: Reviewed, Lactic 0.9   Lines: R DL PICC SL, TPN discontinued yesterday   Drains: G tube clamped until ~0230 at which point had more abdominal discomfort r/t intake of apple juice, Vented to gravity at this time ~675mL green output  Skin/Incisions: no new deficits noted  Neuro: A&Ox4  Respiratory: Fine crackles/diminished on assessment, otherwise clear. IS encouraged. Denies cough. Dyspnea on exertion noted  Cardiac: WDL  GI: Bowel meds given. Up ~0700 w/ BM resulted  : AUOP using urinal  New Changes: gtube vented PRN   Plan: continue POC, possible discharge today      "

## 2021-02-03 NOTE — PROGRESS NOTES
STAFF ADDENDUM:  I saw and evaluated Mr. Christianson and agree with the resident s findings and plan of care as documented in the resident s note and edited by me, as applicable.      In summary, Mr. Christianson still has not had a BM. We will discharge him as soon as he responds to one of our interventions.  The patient had all questions answered and was in agreement with the plan.  Darryl Casas MD

## 2021-02-03 NOTE — PLAN OF CARE
Vitals:    02/02/21 1757 02/02/21 1945 02/02/21 2210 02/03/21 0813   BP: 132/89  128/84 117/80   BP Location: Left arm  Left arm Left arm   Pulse: 85  86 95   Resp: 18  18 18   Temp: 98.5  F (36.9  C)  99.4  F (37.4  C) 97.2  F (36.2  C)   TempSrc: Oral  Oral Oral   SpO2: 95%  93% 94%   Weight:  84.2 kg (185 lb 11.2 oz)     Height:       Pt has been anxious and sad. Abdomen distended discomfort. Up ambulated  in halls with SBA did fair.  Poor appetite  had one can of Boost.   GT clamped with meds and meals  vented for comfort.  Pt has the urge to have BM passing gas had small amount of watery stool once.   Plan to give  [place] Lady enema. Pt agreed. Continue to follow up per plan of care.

## 2021-02-03 NOTE — PROGRESS NOTES
STAFF ADDENDUM:  I saw and evaluated Mr. Christianson and agree with the resident s findings and plan of care as documented in the resident s note and edited by me, as applicable.      In summary, Mr. Christianson has not had a good bowel movement yet despite enema and a few oral agents. He received milk of magnesia earlier this morning.  We'll discharge him as soon as he has one, maybe later today.  The patient had all questions answered and was in agreement with the plan.  Darryl Casas MD

## 2021-02-03 NOTE — PLAN OF CARE
Vitals:    02/02/21 1945 02/02/21 2210 02/03/21 0813 02/03/21 1636   BP:  128/84 117/80 (!) 142/99   BP Location:  Left arm Left arm Left arm   Pulse:  86 95 100   Resp:  18 18 18   Temp:  99.4  F (37.4  C) 97.2  F (36.2  C) 98.8  F (37.1  C)   TempSrc:  Oral Oral Oral   SpO2:  93% 94% 92%   Weight: 84.2 kg (185 lb 11.2 oz)      Height:       [place] enema given. Pt tolerated well able to hold it for about 45min.  Dark brown watery stool about  250cc (times two total of 500 .)  And  GT to gravity 550 brown drainage.  Pt feels still not comfortable.  Thoracic team notified.  Continue POC.

## 2021-02-04 ENCOUNTER — APPOINTMENT (OUTPATIENT)
Dept: GENERAL RADIOLOGY | Facility: CLINIC | Age: 61
End: 2021-02-04
Attending: STUDENT IN AN ORGANIZED HEALTH CARE EDUCATION/TRAINING PROGRAM
Payer: COMMERCIAL

## 2021-02-04 ENCOUNTER — APPOINTMENT (OUTPATIENT)
Dept: GENERAL RADIOLOGY | Facility: CLINIC | Age: 61
End: 2021-02-04
Attending: THORACIC SURGERY (CARDIOTHORACIC VASCULAR SURGERY)
Payer: COMMERCIAL

## 2021-02-04 ENCOUNTER — APPOINTMENT (OUTPATIENT)
Dept: OCCUPATIONAL THERAPY | Facility: CLINIC | Age: 61
End: 2021-02-04
Attending: THORACIC SURGERY (CARDIOTHORACIC VASCULAR SURGERY)
Payer: COMMERCIAL

## 2021-02-04 LAB
ANION GAP SERPL CALCULATED.3IONS-SCNC: 13 MMOL/L (ref 3–14)
ANION GAP SERPL CALCULATED.3IONS-SCNC: 6 MMOL/L (ref 3–14)
BUN SERPL-MCNC: 32 MG/DL (ref 7–30)
BUN SERPL-MCNC: 33 MG/DL (ref 7–30)
CALCIUM SERPL-MCNC: 10 MG/DL (ref 8.5–10.1)
CALCIUM SERPL-MCNC: 10.2 MG/DL (ref 8.5–10.1)
CHLORIDE SERPL-SCNC: 97 MMOL/L (ref 94–109)
CHLORIDE SERPL-SCNC: 98 MMOL/L (ref 94–109)
CO2 SERPL-SCNC: 22 MMOL/L (ref 20–32)
CO2 SERPL-SCNC: 29 MMOL/L (ref 20–32)
COPATH REPORT: NORMAL
CREAT SERPL-MCNC: 1.44 MG/DL (ref 0.66–1.25)
CREAT SERPL-MCNC: 1.45 MG/DL (ref 0.66–1.25)
ERYTHROCYTE [DISTWIDTH] IN BLOOD BY AUTOMATED COUNT: 12.6 % (ref 10–15)
GFR SERPL CREATININE-BSD FRML MDRD: 52 ML/MIN/{1.73_M2}
GFR SERPL CREATININE-BSD FRML MDRD: 52 ML/MIN/{1.73_M2}
GLUCOSE BLDC GLUCOMTR-MCNC: 114 MG/DL (ref 70–99)
GLUCOSE BLDC GLUCOMTR-MCNC: 119 MG/DL (ref 70–99)
GLUCOSE BLDC GLUCOMTR-MCNC: 124 MG/DL (ref 70–99)
GLUCOSE SERPL-MCNC: 109 MG/DL (ref 70–99)
GLUCOSE SERPL-MCNC: 113 MG/DL (ref 70–99)
HCT VFR BLD AUTO: 45.6 % (ref 40–53)
HGB BLD-MCNC: 15 G/DL (ref 13.3–17.7)
LACTATE BLD-SCNC: 1.2 MMOL/L (ref 0.7–2)
MAGNESIUM SERPL-MCNC: 2.5 MG/DL (ref 1.6–2.3)
MAGNESIUM SERPL-MCNC: 2.8 MG/DL (ref 1.6–2.3)
MCH RBC QN AUTO: 31.3 PG (ref 26.5–33)
MCHC RBC AUTO-ENTMCNC: 32.9 G/DL (ref 31.5–36.5)
MCV RBC AUTO: 95 FL (ref 78–100)
PHOSPHATE SERPL-MCNC: 5.5 MG/DL (ref 2.5–4.5)
PHOSPHATE SERPL-MCNC: 5.8 MG/DL (ref 2.5–4.5)
PLATELET # BLD AUTO: 331 10E9/L (ref 150–450)
POTASSIUM SERPL-SCNC: 4.3 MMOL/L (ref 3.4–5.3)
POTASSIUM SERPL-SCNC: 5.1 MMOL/L (ref 3.4–5.3)
RBC # BLD AUTO: 4.79 10E12/L (ref 4.4–5.9)
SODIUM SERPL-SCNC: 132 MMOL/L (ref 133–144)
SODIUM SERPL-SCNC: 132 MMOL/L (ref 133–144)
WBC # BLD AUTO: 12.2 10E9/L (ref 4–11)

## 2021-02-04 PROCEDURE — 93010 ELECTROCARDIOGRAM REPORT: CPT | Performed by: INTERNAL MEDICINE

## 2021-02-04 PROCEDURE — 84100 ASSAY OF PHOSPHORUS: CPT | Performed by: STUDENT IN AN ORGANIZED HEALTH CARE EDUCATION/TRAINING PROGRAM

## 2021-02-04 PROCEDURE — 97535 SELF CARE MNGMENT TRAINING: CPT | Mod: GO | Performed by: OCCUPATIONAL THERAPIST

## 2021-02-04 PROCEDURE — 120N000002 HC R&B MED SURG/OB UMMC

## 2021-02-04 PROCEDURE — 71045 X-RAY EXAM CHEST 1 VIEW: CPT | Mod: 26 | Performed by: RADIOLOGY

## 2021-02-04 PROCEDURE — 999N001017 HC STATISTIC GLUCOSE BY METER IP

## 2021-02-04 PROCEDURE — 97530 THERAPEUTIC ACTIVITIES: CPT | Mod: GO | Performed by: OCCUPATIONAL THERAPIST

## 2021-02-04 PROCEDURE — 97110 THERAPEUTIC EXERCISES: CPT | Mod: GO | Performed by: OCCUPATIONAL THERAPIST

## 2021-02-04 PROCEDURE — 250N000013 HC RX MED GY IP 250 OP 250 PS 637: Performed by: THORACIC SURGERY (CARDIOTHORACIC VASCULAR SURGERY)

## 2021-02-04 PROCEDURE — 250N000011 HC RX IP 250 OP 636: Performed by: STUDENT IN AN ORGANIZED HEALTH CARE EDUCATION/TRAINING PROGRAM

## 2021-02-04 PROCEDURE — 83735 ASSAY OF MAGNESIUM: CPT | Performed by: STUDENT IN AN ORGANIZED HEALTH CARE EDUCATION/TRAINING PROGRAM

## 2021-02-04 PROCEDURE — 85027 COMPLETE CBC AUTOMATED: CPT | Performed by: STUDENT IN AN ORGANIZED HEALTH CARE EDUCATION/TRAINING PROGRAM

## 2021-02-04 PROCEDURE — 71045 X-RAY EXAM CHEST 1 VIEW: CPT

## 2021-02-04 PROCEDURE — 258N000003 HC RX IP 258 OP 636

## 2021-02-04 PROCEDURE — 74018 RADEX ABDOMEN 1 VIEW: CPT | Mod: 26 | Performed by: RADIOLOGY

## 2021-02-04 PROCEDURE — 74018 RADEX ABDOMEN 1 VIEW: CPT

## 2021-02-04 PROCEDURE — 250N000013 HC RX MED GY IP 250 OP 250 PS 637: Performed by: STUDENT IN AN ORGANIZED HEALTH CARE EDUCATION/TRAINING PROGRAM

## 2021-02-04 PROCEDURE — 83605 ASSAY OF LACTIC ACID: CPT | Performed by: THORACIC SURGERY (CARDIOTHORACIC VASCULAR SURGERY)

## 2021-02-04 PROCEDURE — 36592 COLLECT BLOOD FROM PICC: CPT | Performed by: THORACIC SURGERY (CARDIOTHORACIC VASCULAR SURGERY)

## 2021-02-04 PROCEDURE — 74018 RADEX ABDOMEN 1 VIEW: CPT | Mod: 77

## 2021-02-04 PROCEDURE — 36592 COLLECT BLOOD FROM PICC: CPT | Performed by: STUDENT IN AN ORGANIZED HEALTH CARE EDUCATION/TRAINING PROGRAM

## 2021-02-04 PROCEDURE — 93005 ELECTROCARDIOGRAM TRACING: CPT

## 2021-02-04 PROCEDURE — 80048 BASIC METABOLIC PNL TOTAL CA: CPT | Performed by: STUDENT IN AN ORGANIZED HEALTH CARE EDUCATION/TRAINING PROGRAM

## 2021-02-04 RX ORDER — SODIUM CHLORIDE 9 MG/ML
INJECTION, SOLUTION INTRAVENOUS CONTINUOUS
Status: DISCONTINUED | OUTPATIENT
Start: 2021-02-04 | End: 2021-02-04

## 2021-02-04 RX ORDER — MAGNESIUM CARB/ALUMINUM HYDROX 105-160MG
296 TABLET,CHEWABLE ORAL 2 TIMES DAILY
Status: DISCONTINUED | OUTPATIENT
Start: 2021-02-04 | End: 2021-02-06 | Stop reason: HOSPADM

## 2021-02-04 RX ORDER — ERYTHROMYCIN ETHYLSUCCINATE 400 MG/5ML
250 SUSPENSION ORAL EVERY 6 HOURS
Status: DISCONTINUED | OUTPATIENT
Start: 2021-02-04 | End: 2021-02-04

## 2021-02-04 RX ORDER — METOCLOPRAMIDE 10 MG/1
10 TABLET ORAL 3 TIMES DAILY
Status: DISCONTINUED | OUTPATIENT
Start: 2021-02-04 | End: 2021-02-06 | Stop reason: HOSPADM

## 2021-02-04 RX ADMIN — PANTOPRAZOLE SODIUM 40 MG: 40 TABLET, DELAYED RELEASE ORAL at 07:31

## 2021-02-04 RX ADMIN — SPIRONOLACTONE 25 MG: 25 TABLET ORAL at 07:32

## 2021-02-04 RX ADMIN — DOCUSATE SODIUM 50 MG AND SENNOSIDES 8.6 MG 2 TABLET: 8.6; 5 TABLET, FILM COATED ORAL at 07:31

## 2021-02-04 RX ADMIN — MAGNESIUM HYDROXIDE 30 ML: 400 SUSPENSION ORAL at 07:31

## 2021-02-04 RX ADMIN — DIATRIZOATE MEGLUMINE AND DIATRIZOATE SODIUM 100 ML: 660; 100 SOLUTION ORAL; RECTAL at 14:27

## 2021-02-04 RX ADMIN — METOCLOPRAMIDE 10 MG: 10 TABLET ORAL at 21:13

## 2021-02-04 RX ADMIN — POLYETHYLENE GLYCOL 3350 17 G: 17 POWDER, FOR SOLUTION ORAL at 07:31

## 2021-02-04 RX ADMIN — HEPARIN SODIUM 5000 UNITS: 5000 INJECTION, SOLUTION INTRAVENOUS; SUBCUTANEOUS at 00:39

## 2021-02-04 RX ADMIN — METOCLOPRAMIDE 10 MG: 10 TABLET ORAL at 14:38

## 2021-02-04 RX ADMIN — HEPARIN SODIUM 5000 UNITS: 5000 INJECTION, SOLUTION INTRAVENOUS; SUBCUTANEOUS at 17:20

## 2021-02-04 RX ADMIN — ACETAMINOPHEN 975 MG: 325 TABLET, FILM COATED ORAL at 07:31

## 2021-02-04 RX ADMIN — ACETAMINOPHEN 975 MG: 325 TABLET, FILM COATED ORAL at 17:20

## 2021-02-04 RX ADMIN — HEPARIN SODIUM 5000 UNITS: 5000 INJECTION, SOLUTION INTRAVENOUS; SUBCUTANEOUS at 07:38

## 2021-02-04 RX ADMIN — DEXTROSE MONOHYDRATE AND SODIUM CHLORIDE: 5; .9 INJECTION, SOLUTION INTRAVENOUS at 02:30

## 2021-02-04 ASSESSMENT — ACTIVITIES OF DAILY LIVING (ADL)
ADLS_ACUITY_SCORE: 13
ADLS_ACUITY_SCORE: 15

## 2021-02-04 ASSESSMENT — MIFFLIN-ST. JEOR: SCORE: 1638.62

## 2021-02-04 NOTE — PROVIDER NOTIFICATION
MD cross cover notified 2666 of pt feeling weak tonight and has not been drinking much.He was able to take his mag citrate and VSS.Blood glucose was 92.Voided 250cc this evening.MD will look into it.

## 2021-02-04 NOTE — PROGRESS NOTES
THORACIC & FOREGUT SURGERY    S:  No overnight events. He is ambulating. Continuing to work on IS. Still no significant BM.     O:  Vitals:    02/03/21 2008 02/03/21 2315 02/04/21 0300 02/04/21 0741   BP: (!) 132/96 (!) 125/90 124/88 122/83   BP Location: Left arm Right arm Right arm Left arm   Pulse: 102 86 88 100   Resp: 18 28 22 16   Temp: 98  F (36.7  C) 97.1  F (36.2  C) 97.2  F (36.2  C) 98.2  F (36.8  C)   TempSrc: Axillary Oral  Oral   SpO2: 93% 96% 95% 96%   Weight:       Height:           A&Ox3, NAD  NLB  Soft, NDNT, G tube currently, laparoscopic incisions clean and dry with glue  Distal extremities warm, well perfused.  No calf tenderness.      BMP  Recent Labs   Lab 02/04/21  0625 02/03/21  2345 02/03/21  0717 02/02/21  0706   * 132* 132* 135   POTASSIUM 4.3 5.1 4.9 4.3   CHLORIDE 97 98 100 103   MINE 10.0 10.2* 10.0 9.5   CO2 29 22 25 26   BUN 33* 32* 26 28   CR 1.45* 1.44* 1.23 1.07   * 113* 87 108*     CBC  Recent Labs   Lab 02/04/21  0625 02/03/21  0717 02/02/21  0706 02/01/21  0635   WBC 12.2* 14.8* 14.2* 14.5*   RBC 4.79 4.70 4.35* 4.43   HGB 15.0 14.4 13.9 14.2   HCT 45.6 43.1 41.7 42.3   MCV 95 92 96 96   MCH 31.3 30.6 32.0 32.1   MCHC 32.9 33.4 33.3 33.6   RDW 12.6 12.9 12.9 12.9    350 277 230     INR  Recent Labs   Lab 02/01/21  0635   INR 1.21*      CXR reviewed     A/P: Delroy Christianson is a 61 year old male POD#6 s/p laparoscopic hiatal hernia repair, G tube placement, and bilateral CT placement. His abdomen is soft and non-tender but he continues to have delayed gastric function and ROBF    -Start Reglan 10q8   -BID mag citrate   -Ambulate more than 4 times  -MOM, enema  -Keep on FLD, advancing slowly  -Patient and nursing to vent G-tube PRN   -Pain control with nelli tylenol  -No opiates   -Continue spironolactone  -Protonix 40 mg daily  -HSQ, IS    Patient seen with the fellow and discussed with staff.     Izabel Carrera MD  General Surgery, PGY-1

## 2021-02-04 NOTE — PROGRESS NOTES
Calorie Count  Intake recorded for: 2/3  Total Kcals: 0 Total Protein: 0g  Kcals from Hospital Food: 0   Protein: 0g  Kcals from Outside Food (average):0 Protein: 0g  # Meals Recorded: no meals ordered from kitchen, no intake recorded.   # Supplements Recorded: no intake recorded.

## 2021-02-04 NOTE — PROGRESS NOTES
CLINICAL NUTRITION SERVICES - REASSESSMENT NOTE     Nutrition Prescription      Malnutrition Status:    Non-severe malnutrition in the context of acute illness    Recommendations already ordered by Registered Dietitian (RD):  - Continue current plan--Boost + BID and calorie counts.     Future/Additional Recommendations:  - follow GI function and po intake.   Met with pt briefly this morning before xray techs came to do abd xray.    EVALUATION OF THE PROGRESS TOWARD GOALS   Diet: Full Liquid with Boost + BID between meals.   Nutrition Support: TPN and lipids discontinued 2/2.  Intake:  Per Calorie counts:   2/2: 338 kcal, 4 gm protein--only ordered 1 meal  2/3: 0 kcal, 0 gm protein.     Pt had 3 Boost + at bedside.  Noted he probably didn't feels well enough to take them yesterday.    Noted pt felt distended and full and had G tube open 2/3 and had 1L out earlier this morning.     This morning he was feeling much better than yesterday and even earlier in the morning.     NEW FINDINGS   -General:   -Wt: weight fluctuating during stay. No weight past 2 days.   -Labs: Na 132, Phos 2.8, Mg 2.8, K+ 4.3, BUN 33, Cr 1.45 trending up  -GI: 3 small BMs 2/3.  Xray notes still some contrast in the ascending colon. Noted plan for gastrograffin w/ Mag citrate.   -Renal: BUN/Cr trending up, Phos 2.8  -Meds: reglan 3x/d, miralax 2x/d, pericolace 2x/day     MALNUTRITION  % Intake: Decreased intake does not meet criteria-TPN was meeting needs up until 1/31.  % Weight Loss: Weight loss does not meet criteria  Subcutaneous Fat Loss: Facial region:  mild and Upper arm:  mild  Muscle Loss: Temporal:  mild clavicle region mild depletion  Fluid Accumulation/Edema: None noted  Malnutrition Diagnosis: Non-severe malnutrition in the context of acute illness    Previous Goals   Total avg nutritional intake to meet a minimum of 20 kcal/kg and 1.0 PRO/kg daily (per dosing wt 83 kg dry admit wt at OSH on 1/21).  Evaluation: Not met for the past 2  days w/ stopping of TPN.    Previous Nutrition Diagnosis  Inadequate oral intake related to Gastric outlet obstruction associated with hiatal hernia, hindering patients ability to tolerate PO as evidenced by Kept NPO since admit to OSH on 1/21, NG for venting with TPN support to meet nutrition needs.  Evaluation: No longer applicable, nutrition diagnosis changed below    CURRENT NUTRITION DIAGNOSIS  Inadequate oral intake related to altered GI function as evidenced by post op constipation.      INTERVENTIONS  Implementation  See recommendations above.     Goals  Patient to consume % of nutritionally adequate meal trays TID, or the equivalent with supplements/snacks.    Monitoring/Evaluation  Progress toward goals will be monitored and evaluated per protocol.    Viji Ivy RD, LD   5A (rooms 5201-1 through 5211-2) / 7B Pager: [phone]

## 2021-02-04 NOTE — PLAN OF CARE
Status:  Post ops/p laparoscopic hiatal hernia repair, G tube placement, and bilateral CT   Pain/Nausea: scheduled tylenol given   Mobility: SBA OOB, otherwise SBA  Diet: FLD, dominique counts. Poor appetite.  Lines: R DL PICC SL, TPN Dcd D5NS at 75 hr  Drains: G tube clamped until ~0230 at which point had more abdominal discomfort r/t intake of apple juice, Vented to gravity at this time ~700 green output  Skin/Incisions: no new deficits noted  Neuro: A&Ox4  Respiratory: Lungs clear diminished on assessment, otherwise clear. IS encouraged. Dyspnea on exertion noted  Cardiac: WDL  GI: Bowel meds given.Mag citrate   :using urinal  New Changes: gtube vented during the night ,Green output  Plan: continue POC, possible discharge today

## 2021-02-05 ENCOUNTER — APPOINTMENT (OUTPATIENT)
Dept: GENERAL RADIOLOGY | Facility: CLINIC | Age: 61
End: 2021-02-05
Attending: THORACIC SURGERY (CARDIOTHORACIC VASCULAR SURGERY)
Payer: COMMERCIAL

## 2021-02-05 ENCOUNTER — APPOINTMENT (OUTPATIENT)
Dept: OCCUPATIONAL THERAPY | Facility: CLINIC | Age: 61
End: 2021-02-05
Attending: THORACIC SURGERY (CARDIOTHORACIC VASCULAR SURGERY)
Payer: COMMERCIAL

## 2021-02-05 LAB
ANION GAP SERPL CALCULATED.3IONS-SCNC: 10 MMOL/L (ref 3–14)
BUN SERPL-MCNC: 42 MG/DL (ref 7–30)
CALCIUM SERPL-MCNC: 10.2 MG/DL (ref 8.5–10.1)
CHLORIDE SERPL-SCNC: 99 MMOL/L (ref 94–109)
CO2 SERPL-SCNC: 26 MMOL/L (ref 20–32)
CREAT SERPL-MCNC: 1.6 MG/DL (ref 0.66–1.25)
ERYTHROCYTE [DISTWIDTH] IN BLOOD BY AUTOMATED COUNT: 12.8 % (ref 10–15)
GFR SERPL CREATININE-BSD FRML MDRD: 46 ML/MIN/{1.73_M2}
GLUCOSE BLDC GLUCOMTR-MCNC: 125 MG/DL (ref 70–99)
GLUCOSE SERPL-MCNC: 136 MG/DL (ref 70–99)
HCT VFR BLD AUTO: 45 % (ref 40–53)
HGB BLD-MCNC: 15 G/DL (ref 13.3–17.7)
INTERPRETATION ECG - MUSE: NORMAL
MAGNESIUM SERPL-MCNC: 2.9 MG/DL (ref 1.6–2.3)
MCH RBC QN AUTO: 31.5 PG (ref 26.5–33)
MCHC RBC AUTO-ENTMCNC: 33.3 G/DL (ref 31.5–36.5)
MCV RBC AUTO: 95 FL (ref 78–100)
PHOSPHATE SERPL-MCNC: 5.1 MG/DL (ref 2.5–4.5)
PLATELET # BLD AUTO: 406 10E9/L (ref 150–450)
POTASSIUM SERPL-SCNC: 3.9 MMOL/L (ref 3.4–5.3)
RBC # BLD AUTO: 4.76 10E12/L (ref 4.4–5.9)
SODIUM SERPL-SCNC: 135 MMOL/L (ref 133–144)
WBC # BLD AUTO: 11.4 10E9/L (ref 4–11)

## 2021-02-05 PROCEDURE — 250N000011 HC RX IP 250 OP 636: Performed by: STUDENT IN AN ORGANIZED HEALTH CARE EDUCATION/TRAINING PROGRAM

## 2021-02-05 PROCEDURE — 120N000002 HC R&B MED SURG/OB UMMC

## 2021-02-05 PROCEDURE — 71045 X-RAY EXAM CHEST 1 VIEW: CPT | Mod: 26 | Performed by: RADIOLOGY

## 2021-02-05 PROCEDURE — 250N000013 HC RX MED GY IP 250 OP 250 PS 637

## 2021-02-05 PROCEDURE — 36592 COLLECT BLOOD FROM PICC: CPT | Performed by: STUDENT IN AN ORGANIZED HEALTH CARE EDUCATION/TRAINING PROGRAM

## 2021-02-05 PROCEDURE — 250N000013 HC RX MED GY IP 250 OP 250 PS 637: Performed by: STUDENT IN AN ORGANIZED HEALTH CARE EDUCATION/TRAINING PROGRAM

## 2021-02-05 PROCEDURE — 84100 ASSAY OF PHOSPHORUS: CPT | Performed by: STUDENT IN AN ORGANIZED HEALTH CARE EDUCATION/TRAINING PROGRAM

## 2021-02-05 PROCEDURE — 85027 COMPLETE CBC AUTOMATED: CPT | Performed by: STUDENT IN AN ORGANIZED HEALTH CARE EDUCATION/TRAINING PROGRAM

## 2021-02-05 PROCEDURE — 250N000013 HC RX MED GY IP 250 OP 250 PS 637: Performed by: THORACIC SURGERY (CARDIOTHORACIC VASCULAR SURGERY)

## 2021-02-05 PROCEDURE — 97535 SELF CARE MNGMENT TRAINING: CPT | Mod: GO | Performed by: OCCUPATIONAL THERAPIST

## 2021-02-05 PROCEDURE — 71045 X-RAY EXAM CHEST 1 VIEW: CPT

## 2021-02-05 PROCEDURE — 999N001017 HC STATISTIC GLUCOSE BY METER IP

## 2021-02-05 PROCEDURE — 83735 ASSAY OF MAGNESIUM: CPT | Performed by: STUDENT IN AN ORGANIZED HEALTH CARE EDUCATION/TRAINING PROGRAM

## 2021-02-05 PROCEDURE — 97110 THERAPEUTIC EXERCISES: CPT | Mod: GO | Performed by: OCCUPATIONAL THERAPIST

## 2021-02-05 PROCEDURE — 80048 BASIC METABOLIC PNL TOTAL CA: CPT | Performed by: STUDENT IN AN ORGANIZED HEALTH CARE EDUCATION/TRAINING PROGRAM

## 2021-02-05 RX ADMIN — ACETAMINOPHEN 975 MG: 325 TABLET, FILM COATED ORAL at 01:21

## 2021-02-05 RX ADMIN — HEPARIN SODIUM 5000 UNITS: 5000 INJECTION, SOLUTION INTRAVENOUS; SUBCUTANEOUS at 00:11

## 2021-02-05 RX ADMIN — HEPARIN SODIUM 5000 UNITS: 5000 INJECTION, SOLUTION INTRAVENOUS; SUBCUTANEOUS at 16:53

## 2021-02-05 RX ADMIN — METOCLOPRAMIDE 10 MG: 10 TABLET ORAL at 19:35

## 2021-02-05 RX ADMIN — HYDROXYZINE HYDROCHLORIDE 25 MG: 25 TABLET, FILM COATED ORAL at 01:20

## 2021-02-05 RX ADMIN — METOCLOPRAMIDE 10 MG: 10 TABLET ORAL at 09:52

## 2021-02-05 RX ADMIN — ACETAMINOPHEN 975 MG: 325 TABLET, FILM COATED ORAL at 16:53

## 2021-02-05 RX ADMIN — METOCLOPRAMIDE 10 MG: 10 TABLET ORAL at 14:30

## 2021-02-05 RX ADMIN — PANTOPRAZOLE SODIUM 40 MG: 40 TABLET, DELAYED RELEASE ORAL at 08:08

## 2021-02-05 RX ADMIN — DOCUSATE SODIUM 50 MG AND SENNOSIDES 8.6 MG 2 TABLET: 8.6; 5 TABLET, FILM COATED ORAL at 08:08

## 2021-02-05 RX ADMIN — ACETAMINOPHEN 975 MG: 325 TABLET, FILM COATED ORAL at 08:09

## 2021-02-05 RX ADMIN — HEPARIN SODIUM 5000 UNITS: 5000 INJECTION, SOLUTION INTRAVENOUS; SUBCUTANEOUS at 08:09

## 2021-02-05 RX ADMIN — SPIRONOLACTONE 25 MG: 25 TABLET ORAL at 08:08

## 2021-02-05 ASSESSMENT — ACTIVITIES OF DAILY LIVING (ADL)
ADLS_ACUITY_SCORE: 12
ADLS_ACUITY_SCORE: 13
ADLS_ACUITY_SCORE: 13
ADLS_ACUITY_SCORE: 12

## 2021-02-05 NOTE — PLAN OF CARE
Activity: SBA, worked with therapies this morning.  Neuros: A&O x4, neuros intact. Denies numbness and tingling.  Cardiac: WDL, denies cardiac chest pain.  Respiratory: WDL, stable on RA. Some dyspnea with exertion   GI/: Voiding in urinal, loose BM x2, pt requesting to hold stool softeners.  Diet: Advanced to mechanical soft diet, appears to be tolerating. Fair appetite.  Lines/Drains: R dbl PICC SL, G tube clamped. Pt allowed to vent if having n/v  Skin/Incisions: Abdominal lap sites w/ liquid bandage, CDI, some bruising around incisions.  Labs: Elevated creatinine, 1.60. Encourage pt to drink fluids  Pain/nausea:  Denies pain and nausea, pt taking scheduled tylenol  New changes this shift:  No acute changes.  Plan: Possible discharge tomorrow pending creatinine. Continue with POC.

## 2021-02-05 NOTE — PROGRESS NOTES
STAFF ADDENDUM:  I saw and evaluated Mr. Christianson and agree with the resident s findings and plan of care as documented in the resident s note and edited by me, as applicable.      In summary, Mr. Christianson is making good progress, but has acute renal insufficiency with a gradual increase in creatinine. We have emphasized hydration and we'll keep him until we see a trend to correction in his creatinine  The patient had all questions answered and was in agreement with the plan.  Darryl Casas MD

## 2021-02-05 NOTE — PROGRESS NOTES
Calorie Count  Intake recorded for: 2/4  Total Kcals: 227 Total Protein: 2g  Kcals from Hospital Food: 227  Protein: 2g  Kcals from Outside Food (average):0 Protein: 0g  # Meals Ordered from Kitchen: 1 small meal   # Meals Recorded: 1 meal (First - 100% orange juice, 50% cream of wheat w/ brown sugar, fruit ice)  # Supplements Recorded: 0

## 2021-02-05 NOTE — PLAN OF CARE
Vitals:    02/03/21 2315 02/04/21 0300 02/04/21 0741 02/04/21 1530   BP: (!) 125/90 124/88 122/83 132/86   BP Location: Right arm Right arm Left arm Left arm   Pulse: 86 88 100 87   Resp: 28 22 16 20   Temp: 97.1  F (36.2  C) 97.2  F (36.2  C) 98.2  F (36.8  C) 98.6  F (37  C)   TempSrc: Oral  Oral Oral   SpO2: 96% 95% 96% 94%   Weight:       Height:       Time: 7a-7:30p  Activity: Up  in the room independently. Ambulated in halls did well.   Neuro: forgetful and anxious   Respiratory:  WDL  Cardiac: Tachycardic   GI/: Abdomen distended. Had small bm early today.Voiding spontaneously adequate amount.   Diet: Full liquid diet fair appetite. NPO  after 2pm   Skin: Skin intact except for abd incision sites c.d.i  Lines: Rt  upper arm double lumen PIC saline locked  Incisions/Drains: Abd lap sites c.d.i.  GT clamped and vented prn   Labs: reviewed   Pain/Nausea: PAin well managed with scheduled TYlenol;. No nausea   New Changes this Shift: Gastografin given via G tube pt tolerated well had BM   Plan:  Gastografin challenge to be done  to night . Continue to follow up per plan of care.

## 2021-02-05 NOTE — PLAN OF CARE
"/82 (BP Location: Left arm)   Pulse 105   Temp 96  F (35.6  C) (Oral)   Resp 20   Ht 1.803 m (5' 11\")   Wt 81.1 kg (178 lb 14.4 oz)   SpO2 94%   BMI 24.95 kg/m      Status:  POD#7 s/p laparoscopic hiatal hernia repair, G tube placement, and bilateral CT placement (removed)  Pain/Nausea: scheduled tylenol given w/ PRN atarax given x1   Mobility: SBA OOB, otherwise SBA  Diet: FLD, dominique counts. Poor appetite.  Labs: Reviewed, LA 1.2 during the day   Lines: R DL PICC SL, TPN discontinued yesterday   Drains: G tube clamped, Vented only w/ N/V  Skin/Incisions: no new deficits noted  Neuro: A&Ox4, CMS intact   Respiratory: Diminished LS. IS encouraged. Denies cough. Dyspnea on exertion noted  Cardiac: WDL  GI: 2 BMs overnight    : Marginal UOP using urinal  New Changes: gtube vented PRN   Plan: continue POC   "

## 2021-02-05 NOTE — PROGRESS NOTES
THORACIC & FOREGUT SURGERY    S:  No overnight events. Patient had more BMs this AM. No nausea/vomitting. He is ambulating. Continuing to work on IS. Xray with continued gastric bubble.   O:  Vitals:    02/04/21 2133 02/04/21 2147 02/04/21 2307 02/05/21 0803   BP: (!) 134/95  129/82 129/87   BP Location: Left arm  Left arm Right arm   Pulse: 89  105 89   Resp: 12  20 18   Temp: 95.3  F (35.2  C)  96  F (35.6  C) 95.8  F (35.4  C)   TempSrc: Axillary  Oral Oral   SpO2: 94%  94% 94%   Weight:  81.1 kg (178 lb 14.4 oz)     Height:           A&Ox3, NAD  NLB  Soft, NDNT, G tube clamped, laparoscopic incisions clean and dry with glue  Distal extremities warm, well perfused.  No calf tenderness.      BMP  Recent Labs   Lab 02/05/21  0727 02/04/21  0625 02/03/21  2345 02/03/21  0717    132* 132* 132*   POTASSIUM 3.9 4.3 5.1 4.9   CHLORIDE 99 97 98 100   MINE 10.2* 10.0 10.2* 10.0   CO2 26 29 22 25   BUN 42* 33* 32* 26   CR 1.60* 1.45* 1.44* 1.23   * 109* 113* 87     CBC  Recent Labs   Lab 02/05/21  0727 02/04/21  0625 02/03/21  0717 02/02/21  0706   WBC 11.4* 12.2* 14.8* 14.2*   RBC 4.76 4.79 4.70 4.35*   HGB 15.0 15.0 14.4 13.9   HCT 45.0 45.6 43.1 41.7   MCV 95 95 92 96   MCH 31.5 31.3 30.6 32.0   MCHC 33.3 32.9 33.4 33.3   RDW 12.8 12.6 12.9 12.9    331 350 277     INR  Recent Labs   Lab 02/01/21  0635   INR 1.21*      CXR reviewed     A/P: Delroy ESPERANZA Christianson is a 61 year old male POD#5 s/p laparoscopic hiatal hernia repair, G tube placement, and bilateral CT placement. Cr continues to increase. UOP good.     -Ambulate more than 4 times  -MOM, Mag citrate, enema  -Mechanical soft diet   -Keep g tube clamped   -Pain control with nelli tylenol, prn oxycodone  -Continue spironolactone  -Protonix 40 mg daily  -HSQ, IS  -Encouraging PO fluid intake  -Discharge likely tomorrow now as long as Cr is down trending     Patient seen with staff, Dr. Casas.    Izabel Carrera MD  General Surgery, PGY-1

## 2021-02-06 VITALS
RESPIRATION RATE: 18 BRPM | TEMPERATURE: 97.8 F | DIASTOLIC BLOOD PRESSURE: 79 MMHG | HEIGHT: 71 IN | BODY MASS INDEX: 25.05 KG/M2 | WEIGHT: 178.9 LBS | SYSTOLIC BLOOD PRESSURE: 115 MMHG | OXYGEN SATURATION: 96 % | HEART RATE: 86 BPM

## 2021-02-06 LAB
ANION GAP SERPL CALCULATED.3IONS-SCNC: 11 MMOL/L (ref 3–14)
BUN SERPL-MCNC: 40 MG/DL (ref 7–30)
CALCIUM SERPL-MCNC: 9.5 MG/DL (ref 8.5–10.1)
CHLORIDE SERPL-SCNC: 96 MMOL/L (ref 94–109)
CO2 SERPL-SCNC: 24 MMOL/L (ref 20–32)
CREAT SERPL-MCNC: 1.34 MG/DL (ref 0.66–1.25)
ERYTHROCYTE [DISTWIDTH] IN BLOOD BY AUTOMATED COUNT: 12.5 % (ref 10–15)
GFR SERPL CREATININE-BSD FRML MDRD: 57 ML/MIN/{1.73_M2}
GLUCOSE SERPL-MCNC: 86 MG/DL (ref 70–99)
HCT VFR BLD AUTO: 42.9 % (ref 40–53)
HGB BLD-MCNC: 14.2 G/DL (ref 13.3–17.7)
MAGNESIUM SERPL-MCNC: 2.3 MG/DL (ref 1.6–2.3)
MCH RBC QN AUTO: 30.8 PG (ref 26.5–33)
MCHC RBC AUTO-ENTMCNC: 33.1 G/DL (ref 31.5–36.5)
MCV RBC AUTO: 93 FL (ref 78–100)
PHOSPHATE SERPL-MCNC: 4 MG/DL (ref 2.5–4.5)
PLATELET # BLD AUTO: 403 10E9/L (ref 150–450)
POTASSIUM SERPL-SCNC: 3.8 MMOL/L (ref 3.4–5.3)
RBC # BLD AUTO: 4.61 10E12/L (ref 4.4–5.9)
SODIUM SERPL-SCNC: 131 MMOL/L (ref 133–144)
WBC # BLD AUTO: 12.5 10E9/L (ref 4–11)

## 2021-02-06 PROCEDURE — 250N000013 HC RX MED GY IP 250 OP 250 PS 637: Performed by: THORACIC SURGERY (CARDIOTHORACIC VASCULAR SURGERY)

## 2021-02-06 PROCEDURE — 250N000011 HC RX IP 250 OP 636: Performed by: STUDENT IN AN ORGANIZED HEALTH CARE EDUCATION/TRAINING PROGRAM

## 2021-02-06 PROCEDURE — 85027 COMPLETE CBC AUTOMATED: CPT | Performed by: STUDENT IN AN ORGANIZED HEALTH CARE EDUCATION/TRAINING PROGRAM

## 2021-02-06 PROCEDURE — 36592 COLLECT BLOOD FROM PICC: CPT | Performed by: STUDENT IN AN ORGANIZED HEALTH CARE EDUCATION/TRAINING PROGRAM

## 2021-02-06 PROCEDURE — 84100 ASSAY OF PHOSPHORUS: CPT | Performed by: STUDENT IN AN ORGANIZED HEALTH CARE EDUCATION/TRAINING PROGRAM

## 2021-02-06 PROCEDURE — 83735 ASSAY OF MAGNESIUM: CPT | Performed by: STUDENT IN AN ORGANIZED HEALTH CARE EDUCATION/TRAINING PROGRAM

## 2021-02-06 PROCEDURE — 80048 BASIC METABOLIC PNL TOTAL CA: CPT | Performed by: STUDENT IN AN ORGANIZED HEALTH CARE EDUCATION/TRAINING PROGRAM

## 2021-02-06 PROCEDURE — 250N000013 HC RX MED GY IP 250 OP 250 PS 637: Performed by: STUDENT IN AN ORGANIZED HEALTH CARE EDUCATION/TRAINING PROGRAM

## 2021-02-06 RX ADMIN — ACETAMINOPHEN 975 MG: 325 TABLET, FILM COATED ORAL at 00:15

## 2021-02-06 RX ADMIN — METOCLOPRAMIDE 10 MG: 10 TABLET ORAL at 08:18

## 2021-02-06 RX ADMIN — PANTOPRAZOLE SODIUM 40 MG: 40 TABLET, DELAYED RELEASE ORAL at 08:18

## 2021-02-06 RX ADMIN — SPIRONOLACTONE 25 MG: 25 TABLET ORAL at 08:18

## 2021-02-06 RX ADMIN — ACETAMINOPHEN 975 MG: 325 TABLET, FILM COATED ORAL at 08:18

## 2021-02-06 RX ADMIN — HEPARIN SODIUM 5000 UNITS: 5000 INJECTION, SOLUTION INTRAVENOUS; SUBCUTANEOUS at 00:15

## 2021-02-06 RX ADMIN — HEPARIN SODIUM 5000 UNITS: 5000 INJECTION, SOLUTION INTRAVENOUS; SUBCUTANEOUS at 08:18

## 2021-02-06 ASSESSMENT — ACTIVITIES OF DAILY LIVING (ADL)
ADLS_ACUITY_SCORE: 12

## 2021-02-06 NOTE — PLAN OF CARE
Vitals:    02/05/21 0803 02/05/21 1654 02/05/21 1937 02/06/21 0730   BP: 129/87 122/83 115/84 115/79   BP Location: Right arm Left arm Left arm Left arm   Pulse: 89 95 94 86   Resp: 18 18 18 18   Temp: 95.8  F (35.4  C) 97.8  F (36.6  C) 98.5  F (36.9  C) 97.8  F (36.6  C)   TempSrc: Oral Oral Oral Oral   SpO2: 94% 94% 95% 96%   Weight:       Height:        Afebrile vital stable, sating 96% on room air, non labor breathing,  Belly soft, G-tube clamped, +BS loose stool, voiding adequate, up ambulated,   Denies pian or nausea, tolerating soft mechanical diet, discharge script written, discharge med is ready at discharge pharmacy,   Teaching has been done, dressing supply provided, pt is waiting for his ride to be discharge home.

## 2021-02-06 NOTE — PLAN OF CARE
"/84 (BP Location: Left arm)   Pulse 94   Temp 98.5  F (36.9  C) (Oral)   Resp 18   Ht 1.803 m (5' 11\")   Wt 81.1 kg (178 lb 14.4 oz)   SpO2 95%   BMI 24.95 kg/m      Neuros: A&O x4, neuros intact. Denies numbness and tingling.  Cardiac: WDL, denies cardiac chest pain.  Respiratory: WDL, stable on RA.   GI/: Voiding in urinal, loose BM x2, pt requesting to hold stool softeners.  Diet: mechanical soft diet,  tolerating diet.   Lines/Drains: R dbl PICC SL, G tube clamped. Pt allowed to vent if having n/v  Skin/Incisions: Abdominal lap sites w/ liquid bandage, CDI, some bruising around incisions.  Labs: labs reviewed.  Pain/nausea: Denies pain and nausea, pt taking scheduled tylenol  New changes this shift:  No acute changes.  Plan: Patient appears to rest between cares. Continue with POC.     "

## 2021-02-07 ENCOUNTER — PATIENT OUTREACH (OUTPATIENT)
Dept: CARE COORDINATION | Facility: CLINIC | Age: 61
End: 2021-02-07

## 2021-02-08 DIAGNOSIS — Z71.89 OTHER SPECIFIED COUNSELING: ICD-10-CM

## 2021-02-08 NOTE — PROGRESS NOTES
[facility]: Post-Discharge Note  SITUATION                                                      Admission:    Admission Date: 01/23/21   Reason for Admission: Laparoscopic repair of hiatal hernia, over 50% of his stomach was herniated  Discharge:   Discharge Date: 02/06/21  Discharge Diagnosis: Laparoscopic repair of hiatal hernia, over 50% of his stomach was herniated    BACKGROUND                                                      Delroy Christianson is a 61 year old male who on 1/23/2021 underwent the above-named procedures.  He tolerated the operation well and postoperatively was transferred to the general post-surgical unit. Post operatively his diet was very slowly progressed to avoid nausea and vomiting. With time he was able to tolerate a mechanical soft diet without venting his g-tube. He had slow return of bowel function and presumed slowed gastric emptying which we aggressively treated. He has baseline CKD and had a gradual increase in his creatinine during his stay. We encouraged PO nutrition and hydration. The remainder of his course was essentially uncomplicated.  Prior to discharge, his pain was controlled well, he was able to perform ADLs and ambulate independently without difficulty, and had full return of bowel and bladder function.  His creatinine was trending down. On 2/6/2021, he was discharged to home in stable condition.     ASSESSMENT      Discharge Assessment  Patient reports symptoms are: Improved  Does the patient have all of their medications?: Yes  Does patient know what their new medications are for?: Yes  Does patient have a follow-up appointment scheduled?: Yes  Does patient have any other questions or concerns?: Yes(Phone number given - has questions about g tube and follow up xray- Not urgent)    Post-op  Did the patient have surgery or a procedure: Yes  Fever: No  Chills: No  Eating & Drinking: eating and drinking without complaints/concerns  Bowel Function: normal  Urinary  Status: voiding without complaint/concerns      PLAN                                                      Outpatient Plan:     1.) Follow up with primary care physician, Chuy Holliday, in 1-2 weeks.  2.) Follow up with a thoracic surgery Clinical Nurse Specialist in Thoracic Surgery clinic in 1 month, prior to which an upper GI should be performed.          Future Appointments   Date Time Provider Department Center   3/4/2021  9:30 AM [id number]   3/4/2021 10:15 AM Felicita Paz APRN [facility]           [facility]is [facility]age

## 2021-02-09 ENCOUNTER — PATIENT OUTREACH (OUTPATIENT)
Dept: NURSING | Facility: CLINIC | Age: 61
End: 2021-02-09
Payer: COMMERCIAL

## 2021-02-09 NOTE — PROGRESS NOTES
Clinic Care Coordination Contact  Community Health Worker Initial Outreach  Spoke with patient     Chart Review: Referral from discharge report. In ED to hospital on 1/20- 2/6 for hiatal hernia. DIscharged to home wiht PEG tube, follow up with PCP in 1-2 weeks and thoracic surgery clinic in 1 month.     CHW introduced self and role with CC  Patient states he is doing well, feeling groggy now since he hasn't been up for too long.   Patient has a questions regarding his g tube, wondering when this is going to be removed. He remembers someone saying a number of weeks but he is unsure.   CHW provided [facility] number [phone] to call and schedule, can ask them about this timeline. PCP can also assist with this however patient states that he cannot manage scheduling any appointments right now. He will call in a couple of days.   CHW inquired if patient is in need of any additional support or resources however patient states that he is doing okay.   Patient wrote down CHWs contact information to call with any future questions or concerns.     Patient accepts CC: No, patient declines need for CC support or resources at this time. Patient will be sent Care Coordination introduction letter for future reference.     ENRIQUE Morrow, B.S. [facility] Care Coordination  [facility]:  [place], [place] and [place]  Phone: [phone]

## 2021-02-09 NOTE — LETTER
[facility] CARE COORDINATION  [facility]  February 9, 2021    Delroy Christianson  [address] [place] [address]      Dear Delroy,    I am a clinic community health worker who works with Chuy Holliday MD, MD at the [facility]. I wanted to thank you for spending the time to talk with me.  Below is a description of clinic care coordination and how I can further assist you.      The clinic care coordination team is made up of a registered nurse,  and community health worker who understand the health care system. The goal of clinic care coordination is to help you manage your health and improve access to the health care system in the most efficient manner. The team can assist you in meeting your health care goals by providing education, coordinating services, strengthening the communication among your providers and supporting you with any resource needs.    Please feel free to contact me at [phone] with any questions or concerns. We are focused on providing you with the highest-quality healthcare experience possible and that all starts with you.     Sincerely,     ENRIQUE Morrow, B.S. [facility]  Clinic Care Coordination  [facility]:  [place], [place] and [place]  Phone: [phone]

## 2021-02-18 ENCOUNTER — PATIENT OUTREACH (OUTPATIENT)
Dept: NURSING | Facility: CLINIC | Age: 61
End: 2021-02-18
Payer: COMMERCIAL

## 2021-02-18 NOTE — PROGRESS NOTES
Clinic Care Coordination Contact  Community Health Worker Initial Outreach  Spoke with patient     Patient is calling with multiple questions and concerns. He is wondering why his appointments on 3/4 are in [place], wondering if this can be done at [facility] or [facility].   Patient is also wondering when removal of the G tube will be. Was told something like 6 weeks and that PCP can do it? Reports he is feeling okay, no complaints, not urgent, feeling it will be weeks yet. Not causing pain.   Patient states that he is unsure that they sent enough supplies to change the dressing on the tube. He has been rationing the pads they sent home with. No idea where to obtian more pads. States they are the Exalon drain sponges 4x4 covideon ref 7086, has 5 left. Told to change everyday, enough for ten days.   Patient states he logged onto "[employer] and has over 100 messages on "[employer] very overwhelming.    CHW offered CC follow up for ongoing support to assist with concerns stated however patient declines.   CHW will send message to care team to address.     Patient accepts CC: No, patient declines CC support at this time. Patient will be sent Care Coordination introduction letter for future reference.     ENRIQUE Morrow, B.S. [facility] Care Coordination  [facility]:  [place], [place] and [place]  Phone: [phone]

## 2021-02-19 ENCOUNTER — TELEPHONE (OUTPATIENT)
Dept: CARE COORDINATION | Facility: CLINIC | Age: 61
End: 2021-02-19

## 2021-02-19 NOTE — TELEPHONE ENCOUNTER
CHW received incoming call from patient. He is wondering how long his G tube will be in place and believes he was told this could be removed by PCP. Patient also has concerns about going to [place] for appointments on 3/4, unsure if this can be done elsewhere, assuming it cannot be.   Patient declines CC support for the second time. Please assist with scheduling follow up appointment and explaining G tube removal if possible.     Thank you!     ENRIQUE Morrow, B.S. [facility] Care Coordination  [facility]:  [place], [place] and [place]  Phone: [phone]

## 2021-02-22 NOTE — TELEPHONE ENCOUNTER
Patient called back.   Informed of below.   He agrees with plan and will keep the 3/4/2021 appointments.     Patient also reports scant amount of slough drainage at the g-tube site.   Denies redness or warmth.     Advised to cleanse area with mild soap and water before changing fenestrated 4x4 daily.   Watch for signs and symptoms of infection.     Patient agrees with plan.

## 2021-02-22 NOTE — TELEPHONE ENCOUNTER
Left voicemail for patient to call me back directly. Will update on below.     Felicita called me back directly.   As long as patient is eating well, the G-Tube will be pulled by her at the 3/4/2021 visit.   Unfortunately, she doesn't have anyone who could pull the G-Tube at [facility] or [facility].   Recommended for patient to keep this visit to address the G-Tube on 3/4/2021.

## 2021-02-22 NOTE — TELEPHONE ENCOUNTER
Called Thoracic Surgery office for Felicita Paz, APRN - [phone].     Left a voicemail for the Team to see if they can help answer patient's questions below.     Will await response before following up with that patient.   He also needs a Hosp F/U visit with Dr. Holliday.     Mary Lou Lyons, Clinic Lead RN, LEO for Dr. Tenisha Akins & Dr. Chuy Holliday  [facility]  Phone: [phone] Fax: [phone]  [email or web address]

## 2021-03-01 ASSESSMENT — ENCOUNTER SYMPTOMS
NERVOUS/ANXIOUS: 0
ABDOMINAL PAIN: 1
NAUSEA: 0
BLOATING: 0
CONSTIPATION: 1
DECREASED CONCENTRATION: 1
HEARTBURN: 0
VOMITING: 0
DIARRHEA: 1
RECTAL PAIN: 0
DEPRESSION: 1
BLOOD IN STOOL: 0
JAUNDICE: 0
BOWEL INCONTINENCE: 0
INSOMNIA: 1

## 2021-03-04 ENCOUNTER — ANCILLARY PROCEDURE (OUTPATIENT)
Dept: GENERAL RADIOLOGY | Facility: CLINIC | Age: 61
End: 2021-03-04
Attending: PHYSICIAN ASSISTANT
Payer: COMMERCIAL

## 2021-03-04 ENCOUNTER — OFFICE VISIT (OUTPATIENT)
Dept: SURGERY | Facility: CLINIC | Age: 61
End: 2021-03-04
Attending: CLINICAL NURSE SPECIALIST
Payer: COMMERCIAL

## 2021-03-04 ENCOUNTER — MYC MEDICAL ADVICE (OUTPATIENT)
Dept: PEDIATRICS | Facility: CLINIC | Age: 61
End: 2021-03-04

## 2021-03-04 VITALS
OXYGEN SATURATION: 96 % | DIASTOLIC BLOOD PRESSURE: 84 MMHG | WEIGHT: 177.1 LBS | BODY MASS INDEX: 24.7 KG/M2 | HEART RATE: 98 BPM | SYSTOLIC BLOOD PRESSURE: 113 MMHG | TEMPERATURE: 97 F

## 2021-03-04 DIAGNOSIS — K44.9 HIATAL HERNIA: Primary | ICD-10-CM

## 2021-03-04 DIAGNOSIS — K44.9 HIATAL HERNIA: ICD-10-CM

## 2021-03-04 PROCEDURE — G0463 HOSPITAL OUTPT CLINIC VISIT: HCPCS

## 2021-03-04 PROCEDURE — 99024 POSTOP FOLLOW-UP VISIT: CPT | Performed by: CLINICAL NURSE SPECIALIST

## 2021-03-04 PROCEDURE — 74240 X-RAY XM UPR GI TRC 1CNTRST: CPT | Mod: GC | Performed by: RADIOLOGY

## 2021-03-04 ASSESSMENT — PAIN SCALES - GENERAL: PAINLEVEL: NO PAIN (0)

## 2021-03-04 NOTE — NURSING NOTE
"Oncology Rooming Note    March 4, 2021 10:03 AM   Delroy Christianson is a 61 year old male who presents for:    Chief Complaint   Patient presents with     Oncology Clinic Visit     Hiatal hernia     Initial Vitals: /84   Pulse 98   Temp 97  F (36.1  C) (Tympanic)   Wt 80.3 kg (177 lb 1.6 oz)   SpO2 96%   BMI 24.70 kg/m   Estimated body mass index is 24.7 kg/m  as calculated from the following:    Height as of 1/23/21: 1.803 m (5' 11\").    Weight as of this encounter: 80.3 kg (177 lb 1.6 oz). Body surface area is 2.01 meters squared.  No Pain (0) Comment: Data Unavailable   No LMP for male patient.  Allergies reviewed: Yes  Medications reviewed: Yes    Medications: Medication refills not needed today.  Pharmacy name entered into [employer]: [id number] - [place], [place] - [address]  AT [address]    Clinical concerns: tube removal?       Flor Chinchilla CMA            "

## 2021-03-04 NOTE — PROGRESS NOTES
"    Assessment & Plan   Problem List Items Addressed This Visit     None             20 minutes spent on the date of the encounter doing chart review, review of test results and documentation        BMI:   Estimated body mass index is 24.7 kg/m  as calculated from the following:    Height as of 1/23/21: 1.803 m (5' 11\").    Weight as of this encounter: 80.3 kg (177 lb 1.6 oz).       FUTURE APPOINTMENTS:       - Follow-up visit in one year with esophagram prior.    No follow-ups on file.    WHITNEY Hill [facility]    Subjective   Delroy is a 61 year old who presents for the following health issues: post operative follow up and removal of feeding tube.    Delroy has been eating well. He has not needed to use his PEG for decompression. He has been flushing it as instructed. He wants the tube out but is very anxious about this.      Answers for HPI/ROS submitted by the patient on 3/1/2021   General Symptoms: No  Skin Symptoms: No  HENT Symptoms: No  EYE SYMPTOMS: No  HEART SYMPTOMS: No  LUNG SYMPTOMS: No  INTESTINAL SYMPTOMS: Yes  URINARY SYMPTOMS: No  REPRODUCTIVE SYMPTOMS: No  SKELETAL SYMPTOMS: No  BLOOD SYMPTOMS: No  NERVOUS SYSTEM SYMPTOMS: No  MENTAL HEALTH SYMPTOMS: Yes  Bloating: No  Blood in stool: No  Black stools: No  Fecal incontinence: No  Yellowing of skin or eyes: No  Vomit with blood: No  Change in stools: No  Depression: Yes  Trouble sleeping: Yes            Objective    /84   Pulse 98   Temp 97  F (36.1  C) (Tympanic)   Wt 80.3 kg (177 lb 1.6 oz)   SpO2 96%   BMI 24.70 kg/m    Body mass index is 24.7 kg/m .     Results for orders placed or performed in visit on 03/04/21 (from the past 24 hour(s))   XR Upper GI without KUB    Narrative    Examination:  XR UPPER GI WITHOUT KUB 3/4/2021 9:55 AM     Comparison: Radiograph 2/4/2021, upper GI 1/21/2020    History: Hiatal hernia    Fluoroscopy time: 0.3 minutes.    Technique: Water-soluble contrast " upper GI     Findings: On the  film the bowel gas pattern is unremarkable.  Percutaneous gastrostomy tube in the stomach. Moderate colonic stool  burden. Visualized lung bases are clear.    Esophageal motility is intact. The esophagus is unremarkable in  appearance. No evidence of recurrent hiatal hernia. No evidence of  postoperative leak. Gastrostomy tube within the stomach, otherwise the  stomach is unremarkable in appearance.       Impression    Impression: Postoperative changes of hiatal hernia repair, without  evidence of recurrent hiatal hernia. No evidence of postoperative  leak. Gastrostomy tube remains in place.    I have personally reviewed the examination and initial interpretation  and I agree with the findings.    JOHN MAGANA MD     I explained how PEG removal is done and discussed the potential for retained bumper, pain and bleeding. I also explained to him that until the site closes, he will have some drainage from this site. He can shower but should not submerge the site in water until the hole is completely closed over.    Delroy verbally consented to having the tube removed in clinic. I removed the tube with bumper intact. There was a small amount of bleeding that stopped with compression. I applied a clean dressing to this site and gave Delroy an ice pack to help ease some of the throbbing. Delroy tolerated this well.    We will see him in one year with an esophagram prior. He can get the esophagram at [facility] and we can do a virtual follow up visit so he does not have to drive here.    I spent a total of 25 minutes face-to-face with Delroy Christianson during today's office visit.  Over 50% of this time was spent counseling the patient and/or coordinating care regarding site care.  See note for details.

## 2021-03-04 NOTE — LETTER
"    3/4/2021         RE: Delroy Christianson  [address]  [place] [address]        Dear Colleague,    Thank you for referring your patient, Delroy Christianson, to the [facility]. Please see a copy of my visit note below.        Assessment & Plan   Problem List Items Addressed This Visit     None             20 minutes spent on the date of the encounter doing chart review, review of test results and documentation        BMI:   Estimated body mass index is 24.7 kg/m  as calculated from the following:    Height as of 1/23/21: 1.803 m (5' 11\").    Weight as of this encounter: 80.3 kg (177 lb 1.6 oz).       FUTURE APPOINTMENTS:       - Follow-up visit in one year with esophagram prior.    No follow-ups on file.    WHITNEY Hill  [facility]    Subjective   Delroy is a 61 year old who presents for the following health issues: post operative follow up and removal of feeding tube.    Delroy has been eating well. He has not needed to use his PEG for decompression. He has been flushing it as instructed. He wants the tube out but is very anxious about this.        Objective    /84   Pulse 98   Temp 97  F (36.1  C) (Tympanic)   Wt 80.3 kg (177 lb 1.6 oz)   SpO2 96%   BMI 24.70 kg/m    Body mass index is 24.7 kg/m .     Results for orders placed or performed in visit on 03/04/21 (from the past 24 hour(s))   XR Upper GI without KUB    Narrative    Examination:  XR UPPER GI WITHOUT KUB 3/4/2021 9:55 AM     Comparison: Radiograph 2/4/2021, upper GI 1/21/2020    History: Hiatal hernia    Fluoroscopy time: 0.3 minutes.    Technique: Water-soluble contrast upper GI     Findings: On the  film the bowel gas pattern is unremarkable.  Percutaneous gastrostomy tube in the stomach. Moderate colonic stool  burden. Visualized lung bases are clear.    Esophageal motility is intact. The esophagus is unremarkable in  appearance. No evidence of recurrent " hiatal hernia. No evidence of  postoperative leak. Gastrostomy tube within the stomach, otherwise the  stomach is unremarkable in appearance.       Impression    Impression: Postoperative changes of hiatal hernia repair, without  evidence of recurrent hiatal hernia. No evidence of postoperative  leak. Gastrostomy tube remains in place.    I have personally reviewed the examination and initial interpretation  and I agree with the findings.    JOHN MAGANA MD     I explained how PEG removal is done and discussed the potential for retained bumper, pain and bleeding. I also explained to him that until the site closes, he will have some drainage from this site. He can shower but should not submerge the site in water until the hole is completely closed over.    Delroy verbally consented to having the tube removed in clinic. I removed the tube with bumper intact. There was a small amount of bleeding that stopped with compression. I applied a clean dressing to this site and gave Delroy an ice pack to help ease some of the throbbing. Delroy tolerated this well.    We will see him in one year with an esophagram prior. He can get the esophagram at [facility] and we can do a virtual follow up visit so he does not have to drive here.    I spent a total of 25 minutes face-to-face with Delroy Christianson during today's office visit.  Over 50% of this time was spent counseling the patient and/or coordinating care regarding site care.  See note for details.    Again, thank you for allowing me to participate in the care of your patient.        Sincerely,        WHITNEY Hill CNS

## 2021-03-19 ENCOUNTER — MYC MEDICAL ADVICE (OUTPATIENT)
Dept: PEDIATRICS | Facility: CLINIC | Age: 61
End: 2021-03-19

## 2021-03-19 DIAGNOSIS — I10 BENIGN ESSENTIAL HYPERTENSION: ICD-10-CM

## 2021-03-19 RX ORDER — SPIRONOLACTONE 25 MG/1
25 TABLET ORAL DAILY
Qty: 90 TABLET | Refills: 3 | Status: SHIPPED | OUTPATIENT
Start: 2021-03-19 | End: 2022-04-07

## 2021-03-24 ENCOUNTER — IMMUNIZATION (OUTPATIENT)
Dept: NURSING | Facility: CLINIC | Age: 61
End: 2021-03-24
Payer: COMMERCIAL

## 2021-03-24 PROCEDURE — 91300 PR COVID VAC PFIZER DIL RECON 30 MCG/0.3 ML IM: CPT

## 2021-03-24 PROCEDURE — 0001A PR COVID VAC PFIZER DIL RECON 30 MCG/0.3 ML IM: CPT

## 2021-04-14 ENCOUNTER — OFFICE VISIT (OUTPATIENT)
Dept: NURSING | Facility: CLINIC | Age: 61
End: 2021-04-14
Attending: INTERNAL MEDICINE
Payer: COMMERCIAL

## 2021-04-14 PROCEDURE — 91300 PR COVID VAC PFIZER DIL RECON 30 MCG/0.3 ML IM: CPT

## 2021-04-14 PROCEDURE — 0002A PR COVID VAC PFIZER DIL RECON 30 MCG/0.3 ML IM: CPT

## 2021-09-19 ENCOUNTER — HEALTH MAINTENANCE LETTER (OUTPATIENT)
Age: 61
End: 2021-09-19

## 2022-01-09 ENCOUNTER — HEALTH MAINTENANCE LETTER (OUTPATIENT)
Age: 62
End: 2022-01-09

## 2022-02-10 DIAGNOSIS — K44.9 HIATAL HERNIA: Primary | ICD-10-CM

## 2022-04-06 DIAGNOSIS — I10 BENIGN ESSENTIAL HYPERTENSION: ICD-10-CM

## 2022-04-07 ENCOUNTER — MYC REFILL (OUTPATIENT)
Dept: PEDIATRICS | Facility: CLINIC | Age: 62
End: 2022-04-07
Payer: COMMERCIAL

## 2022-04-07 DIAGNOSIS — I10 BENIGN ESSENTIAL HYPERTENSION: ICD-10-CM

## 2022-04-07 RX ORDER — SPIRONOLACTONE 25 MG/1
25 TABLET ORAL DAILY
Qty: 90 TABLET | Refills: 0 | Status: SHIPPED | OUTPATIENT
Start: 2022-04-07 | End: 2022-04-20

## 2022-04-07 NOTE — TELEPHONE ENCOUNTER
Routing refill request to provider for review/approval because:  Labs not current:  BMP  Patient needs to be seen because it has been more than 1 year since last office visit.  Failing bp    Jen Torres RN

## 2022-04-08 RX ORDER — SPIRONOLACTONE 25 MG/1
25 TABLET ORAL DAILY
Qty: 90 TABLET | Refills: 0 | OUTPATIENT
Start: 2022-04-08

## 2022-04-11 NOTE — TELEPHONE ENCOUNTER
Dr. Holliday-   He needs appt. Can he still do virtual? He would rather not come in.     Needs booster info and mask info once we get response form Dr. Holliday. Farzana Rhodes RN on 4/11/2022 at 1:07 PM

## 2022-04-11 NOTE — TELEPHONE ENCOUNTER
Called patient and spoke with him - came to agreement that it would be best for him to come in for annual visit as he is due for labs and wants his booster shot anyway. I told him we can provide him with some N95 masks and he was content with that.     Closing encounter.     Lubna Mathur, CMA

## 2022-04-20 ENCOUNTER — OFFICE VISIT (OUTPATIENT)
Dept: PEDIATRICS | Facility: CLINIC | Age: 62
End: 2022-04-20
Payer: COMMERCIAL

## 2022-04-20 VITALS
HEIGHT: 68 IN | TEMPERATURE: 97.1 F | OXYGEN SATURATION: 97 % | HEART RATE: 125 BPM | SYSTOLIC BLOOD PRESSURE: 138 MMHG | BODY MASS INDEX: 27.74 KG/M2 | WEIGHT: 183 LBS | DIASTOLIC BLOOD PRESSURE: 88 MMHG

## 2022-04-20 DIAGNOSIS — Z13.220 SCREENING FOR HYPERLIPIDEMIA: ICD-10-CM

## 2022-04-20 DIAGNOSIS — R94.31 PROLONGED QT INTERVAL: ICD-10-CM

## 2022-04-20 DIAGNOSIS — K44.9 HIATAL HERNIA: ICD-10-CM

## 2022-04-20 DIAGNOSIS — Z00.00 ROUTINE GENERAL MEDICAL EXAMINATION AT A HEALTH CARE FACILITY: Primary | ICD-10-CM

## 2022-04-20 DIAGNOSIS — N18.30 STAGE 3 CHRONIC KIDNEY DISEASE, UNSPECIFIED WHETHER STAGE 3A OR 3B CKD (H): ICD-10-CM

## 2022-04-20 DIAGNOSIS — I10 BENIGN ESSENTIAL HYPERTENSION: ICD-10-CM

## 2022-04-20 DIAGNOSIS — Z23 NEED FOR COVID-19 VACCINE: ICD-10-CM

## 2022-04-20 DIAGNOSIS — F41.9 ANXIETY: ICD-10-CM

## 2022-04-20 DIAGNOSIS — I42.2 HYPERTROPHIC CARDIOMYOPATHY (H): ICD-10-CM

## 2022-04-20 PROCEDURE — 36415 COLL VENOUS BLD VENIPUNCTURE: CPT | Performed by: INTERNAL MEDICINE

## 2022-04-20 PROCEDURE — 0054A COVID-19,PF,PFIZER (12+ YRS): CPT | Performed by: INTERNAL MEDICINE

## 2022-04-20 PROCEDURE — 99396 PREV VISIT EST AGE 40-64: CPT | Mod: 25 | Performed by: INTERNAL MEDICINE

## 2022-04-20 PROCEDURE — 80048 BASIC METABOLIC PNL TOTAL CA: CPT | Performed by: INTERNAL MEDICINE

## 2022-04-20 PROCEDURE — 91305 COVID-19,PF,PFIZER (12+ YRS): CPT | Performed by: INTERNAL MEDICINE

## 2022-04-20 PROCEDURE — 80061 LIPID PANEL: CPT | Performed by: INTERNAL MEDICINE

## 2022-04-20 PROCEDURE — 99213 OFFICE O/P EST LOW 20 MIN: CPT | Mod: 25 | Performed by: INTERNAL MEDICINE

## 2022-04-20 RX ORDER — SPIRONOLACTONE 25 MG/1
25 TABLET ORAL DAILY
Qty: 90 TABLET | Refills: 3 | Status: SHIPPED | OUTPATIENT
Start: 2022-04-20 | End: 2023-06-21

## 2022-04-20 SDOH — HEALTH STABILITY: PHYSICAL HEALTH: ON AVERAGE, HOW MANY DAYS PER WEEK DO YOU ENGAGE IN MODERATE TO STRENUOUS EXERCISE (LIKE A BRISK WALK)?: 0 DAYS

## 2022-04-20 SDOH — ECONOMIC STABILITY: FOOD INSECURITY: WITHIN THE PAST 12 MONTHS, YOU WORRIED THAT YOUR FOOD WOULD RUN OUT BEFORE YOU GOT MONEY TO BUY MORE.: NEVER TRUE

## 2022-04-20 SDOH — ECONOMIC STABILITY: TRANSPORTATION INSECURITY
IN THE PAST 12 MONTHS, HAS LACK OF TRANSPORTATION KEPT YOU FROM MEETINGS, WORK, OR FROM GETTING THINGS NEEDED FOR DAILY LIVING?: NO

## 2022-04-20 SDOH — ECONOMIC STABILITY: FOOD INSECURITY: WITHIN THE PAST 12 MONTHS, THE FOOD YOU BOUGHT JUST DIDN'T LAST AND YOU DIDN'T HAVE MONEY TO GET MORE.: NEVER TRUE

## 2022-04-20 SDOH — ECONOMIC STABILITY: INCOME INSECURITY: IN THE LAST 12 MONTHS, WAS THERE A TIME WHEN YOU WERE NOT ABLE TO PAY THE MORTGAGE OR RENT ON TIME?: PATIENT REFUSED

## 2022-04-20 SDOH — ECONOMIC STABILITY: INCOME INSECURITY: HOW HARD IS IT FOR YOU TO PAY FOR THE VERY BASICS LIKE FOOD, HOUSING, MEDICAL CARE, AND HEATING?: SOMEWHAT HARD

## 2022-04-20 SDOH — ECONOMIC STABILITY: TRANSPORTATION INSECURITY
IN THE PAST 12 MONTHS, HAS THE LACK OF TRANSPORTATION KEPT YOU FROM MEDICAL APPOINTMENTS OR FROM GETTING MEDICATIONS?: NO

## 2022-04-20 SDOH — HEALTH STABILITY: PHYSICAL HEALTH: ON AVERAGE, HOW MANY MINUTES DO YOU ENGAGE IN EXERCISE AT THIS LEVEL?: 0 MIN

## 2022-04-20 ASSESSMENT — ENCOUNTER SYMPTOMS
CONSTIPATION: 0
SHORTNESS OF BREATH: 0
SORE THROAT: 0
HEMATURIA: 0
DIZZINESS: 0
EYE PAIN: 0
JOINT SWELLING: 0
PALPITATIONS: 0
ARTHRALGIAS: 0
WEAKNESS: 0
PARESTHESIAS: 0
ABDOMINAL PAIN: 0
MYALGIAS: 0
COUGH: 0
HEADACHES: 0
DYSURIA: 0
HEARTBURN: 0
FEVER: 0
DIARRHEA: 0
FREQUENCY: 0
HEMATOCHEZIA: 0
CHILLS: 0
NERVOUS/ANXIOUS: 1
NAUSEA: 0

## 2022-04-20 ASSESSMENT — SOCIAL DETERMINANTS OF HEALTH (SDOH)
ARE YOU MARRIED, WIDOWED, DIVORCED, SEPARATED, NEVER MARRIED, OR LIVING WITH A PARTNER?: NEVER MARRIED
IN A TYPICAL WEEK, HOW MANY TIMES DO YOU TALK ON THE PHONE WITH FAMILY, FRIENDS, OR NEIGHBORS?: NEVER
DO YOU BELONG TO ANY CLUBS OR ORGANIZATIONS SUCH AS CHURCH GROUPS UNIONS, FRATERNAL OR ATHLETIC GROUPS, OR SCHOOL GROUPS?: NO
HOW OFTEN DO YOU ATTEND CHURCH OR RELIGIOUS SERVICES?: NEVER
HOW OFTEN DO YOU GET TOGETHER WITH FRIENDS OR RELATIVES?: NEVER

## 2022-04-20 ASSESSMENT — PATIENT HEALTH QUESTIONNAIRE - PHQ9
SUM OF ALL RESPONSES TO PHQ QUESTIONS 1-9: 9
10. IF YOU CHECKED OFF ANY PROBLEMS, HOW DIFFICULT HAVE THESE PROBLEMS MADE IT FOR YOU TO DO YOUR WORK, TAKE CARE OF THINGS AT HOME, OR GET ALONG WITH OTHER PEOPLE: SOMEWHAT DIFFICULT
SUM OF ALL RESPONSES TO PHQ QUESTIONS 1-9: 9

## 2022-04-20 ASSESSMENT — LIFESTYLE VARIABLES
HOW MANY STANDARD DRINKS CONTAINING ALCOHOL DO YOU HAVE ON A TYPICAL DAY: PATIENT DOES NOT DRINK
HOW OFTEN DO YOU HAVE A DRINK CONTAINING ALCOHOL: NEVER
SKIP TO QUESTIONS 9-10: 1
HOW OFTEN DO YOU HAVE SIX OR MORE DRINKS ON ONE OCCASION: NEVER
AUDIT-C TOTAL SCORE: 0

## 2022-04-20 NOTE — PROGRESS NOTES
SUBJECTIVE:   CC: Delroy Christianson is an 62 year old male who presents for preventative health visit.     Patient has been advised of split billing requirements and indicates understanding: Yes     Healthy Habits:     Getting at least 3 servings of Calcium per day:  NO    Bi-annual eye exam:  Yes    Dental care twice a year:  NO    Sleep apnea or symptoms of sleep apnea:  None    Diet:  Regular (no restrictions)    Duration of exercise:  Other    Taking medications regularly:  Yes    Medication side effects:  None    PHQ-2 Total Score: 4    Additional concerns today:  No      Today's PHQ-2 Score:   PHQ-2 ( 1999 Pfizer) 4/20/2022   Q1: Little interest or pleasure in doing things 2   Q2: Feeling down, depressed or hopeless 2   PHQ-2 Score 4   Q1: Little interest or pleasure in doing things More than half the days   Q2: Feeling down, depressed or hopeless More than half the days   PHQ-2 Score 4       Abuse: Current or Past(Physical, Sexual or Emotional)- No  Do you feel safe in your environment? Yes    Have you ever done Advance Care Planning? (For example, a Health Directive, POLST, or a discussion with a medical provider or your loved ones about your wishes): No, advance care planning information given to patient to review.  Patient declined advance care planning discussion at this time.    Social History     Tobacco Use     Smoking status: Never Smoker     Smokeless tobacco: Never Used   Substance Use Topics     Alcohol use: Never         Alcohol Use 4/20/2022   Prescreen: >3 drinks/day or >7 drinks/week? Not Applicable       Last PSA: No results found for: PSA    Reviewed orders with patient. Reviewed health maintenance and updated orders accordingly - Yes  Patient Active Problem List   Diagnosis     Hypertrophic cardiomyopathy (H)     Prolonged QT interval     Hiatal hernia     CKD (chronic kidney disease) stage 3, GFR 30-59 ml/min (H)     Benign essential hypertension     Past Surgical History:   Procedure  Laterality Date     ABDOMEN SURGERY  2021     ESOPHAGOSCOPY, GASTROSCOPY, DUODENOSCOPY (EGD), COMBINED N/A 01/24/2021    Procedure: ESOPHAGOGASTRODUODENOSCOPY (EGD) WITH FLUOROSCOPIC GUIDED PLACEMENT OF NASOGASTRIC TUBE;  Surgeon: Deejay Donovan MD;  Location: [facility] OR     LAPAROSCOPIC HERNIORRHAPHY HIATAL N/A 01/29/2021    Procedure: HERNIORRHAPHY, HIATAL, LAPAROSCOPIC, bilateral chest tubes, gastropexy with peg tube;  Surgeon: Nancy Flores MD;  Location: [facility] OR       Social History     Tobacco Use     Smoking status: Never Smoker     Smokeless tobacco: Never Used   Substance Use Topics     Alcohol use: Never     Family History   Problem Relation Age of Onset     Diabetes Mother      Coronary Artery Disease Mother      Hypertension Father      Other Cancer Brother          Current Outpatient Medications   Medication Sig Dispense Refill     calcium carbonate (TUMS) 500 MG chewable tablet Take 1 chew tab by mouth as needed for heartburn       spironolactone (ALDACTONE) 25 MG tablet Take 1 tablet (25 mg) by mouth daily 90 tablet 3       Reviewed and updated as needed this visit by clinical staff    Allergies  Meds   Med Hx  Surg Hx  Fam Hx         Freida Swan on 4/20/2022 at 1:31 PM      Reviewed and updated as needed this visit by Provider                     Review of Systems   Constitutional: Negative for chills and fever.   HENT: Negative for congestion, ear pain, hearing loss and sore throat.    Eyes: Negative for pain and visual disturbance.   Respiratory: Negative for cough and shortness of breath.    Cardiovascular: Negative for chest pain, palpitations and peripheral edema.   Gastrointestinal: Negative for abdominal pain, constipation, diarrhea, heartburn, hematochezia and nausea.   Genitourinary: Positive for impotence. Negative for dysuria, frequency, genital sores, hematuria, penile discharge and urgency.   Musculoskeletal: Negative for arthralgias, joint swelling and myalgias.   Skin:  "Negative for rash.   Neurological: Negative for dizziness, weakness, headaches and paresthesias.   Psychiatric/Behavioral: Positive for mood changes. The patient is nervous/anxious.        OBJECTIVE:   /88 (BP Location: Right arm, Patient Position: Sitting, Cuff Size: Adult Regular)   Pulse (!) 125   Temp 97.1  F (36.2  C) (Tympanic)   Ht 1.715 m (5' 7.5\")   Wt 83 kg (183 lb)   SpO2 97%   BMI 28.24 kg/m      Physical Exam  GENERAL: appears anxious and restless, tearful intermittently  EYES: Eyes grossly normal to inspection, PERRL and conjunctivae and sclerae normal  HENT: ear canals and TM's normal, nose and mouth without ulcers or lesions  NECK: no adenopathy, no asymmetry, masses, or scars and thyroid normal to palpation  RESP: lungs clear to auscultation - no rales, rhonchi or wheezes  CV: regular rate and rhythm, normal S1 S2, no S3 or S4, no murmur, click or rub, no peripheral edema and peripheral pulses strong  ABDOMEN: soft, nontender, no hepatosplenomegaly, no masses and bowel sounds normal  MS: no gross musculoskeletal defects noted, no edema  SKIN: no suspicious lesions or rashes  NEURO: Normal strength and tone, mentation intact and speech normal      ASSESSMENT/PLAN:   (Z00.00) Routine general medical examination at a health care facility  (primary encounter diagnosis)    (F41.9) Anxiety  Comment: Significant anxiety on presentation today.  [place].  He has extreme anxiety with regard to leaving his home and remains socially isolated/significant anxiety associated with physician visits.  He is tearful intermittently with regard to his younger brother who passed away in the past few years.  He does have an older brother living locally who he is not close with.  Parents are both .  Delroy had significant difficulty coming into clinic today secondary to anxiety and was very reluctant regarding any " "additional medications or follow-up care.  We did agree to a follow-up virtual visit in the next few months which he feels may work better for him.  He also agreed to start meeting with a counselor with regard to anxiety.  Plan: Adult Mental Health  Referral          (I42.2) Hypertrophic cardiomyopathy (H)  (R94.31) Prolonged QT interval  Comment:   Plan: HOCM diagnosed in 2021 during hospitalization.  Also with history of prolonged QT.  Does not appear to have had any follow-up care since diagnosis--unable to address today secondary to the degree of anxiety.  Would recommend cardiology follow-up although patient was very resistant to any referrals or medication changes today.    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  Underwent laparoscopic repair in 2021    (I10) Benign essential hypertension  Comment:   Plan: BASIC METABOLIC PANEL, spironolactone         (ALDACTONE) 25 MG tablet, Lipid panel reflex to        direct LDL Fasting          (N18.30) Stage 3 chronic kidney disease, unspecified whether stage 3a or 3b CKD (H)  Comment:   Plan: stable  Lab Results   Component Value Date    CR 1.35 04/20/2022    CR 1.34 02/06/2021       (Z13.220) Screening for hyperlipidemia  Comment:   Plan: Family history of hyperlipidemia, repeat lab work today.    (Z23) Need for COVID-19 vaccine  Comment: covid booster  Plan: COVID-19,PF,PFIZER (12+ Yrs GRAY LABEL)              COUNSELING:   Reviewed preventive health counseling, as reflected in patient instructions       Healthy diet/nutrition       Colorectal cancer screening    Estimated body mass index is 28.24 kg/m  as calculated from the following:    Height as of this encounter: 1.715 m (5' 7.5\").    Weight as of this encounter: 83 kg (183 lb).         He reports that he has never smoked. He has never used smokeless tobacco.      Counseling " Resources:  ATP IV Guidelines  Pooled Cohorts Equation Calculator  FRAX Risk Assessment  ICSI Preventive Guidelines  Dietary Guidelines for Americans, 2010  Status Overload's MyPlate  ASA Prophylaxis  Lung CA Screening    Chuy Holliday MD  [facility]

## 2022-04-20 NOTE — PATIENT INSTRUCTIONS
Schedule visit with counseling    Preventive Health Recommendations  Male Ages 50 - 64    Yearly exam:             See your health care provider every year in order to  o   Review health changes.   o   Discuss preventive care.    o   Review your medicines if your doctor has prescribed any.   Have a cholesterol test every 5 years, or more frequently if you are at risk for high cholesterol/heart disease.   Have a diabetes test (fasting glucose) every three years. If you are at risk for diabetes, you should have this test more often.   Have a colonoscopy at age 50, or have a yearly FIT test (stool test). These exams will check for colon cancer.    Talk with your health care provider about whether or not a prostate cancer screening test (PSA) is right for you.  You should be tested each year for STDs (sexually transmitted diseases), if you re at risk.     Shots: Get a flu shot each year. Get a tetanus shot every 10 years.     Nutrition:  Eat at least 5 servings of fruits and vegetables daily.   Eat whole-grain bread, whole-wheat pasta and brown rice instead of white grains and rice.   Get adequate Calcium and Vitamin D.     Lifestyle  Exercise for at least 150 minutes a week (30 minutes a day, 5 days a week). This will help you control your weight and prevent disease.   Limit alcohol to one drink per day.   No smoking.   Wear sunscreen to prevent skin cancer.   See your dentist every six months for an exam and cleaning.   See your eye doctor every 1 to 2 years.

## 2022-04-21 LAB
ANION GAP SERPL CALCULATED.3IONS-SCNC: 7 MMOL/L (ref 3–14)
BUN SERPL-MCNC: 16 MG/DL (ref 7–30)
CALCIUM SERPL-MCNC: 9.8 MG/DL (ref 8.5–10.1)
CHLORIDE BLD-SCNC: 106 MMOL/L (ref 94–109)
CHOLEST SERPL-MCNC: 249 MG/DL
CO2 SERPL-SCNC: 23 MMOL/L (ref 20–32)
CREAT SERPL-MCNC: 1.35 MG/DL (ref 0.66–1.25)
FASTING STATUS PATIENT QL REPORTED: YES
GFR SERPL CREATININE-BSD FRML MDRD: 59 ML/MIN/1.73M2
GLUCOSE BLD-MCNC: 81 MG/DL (ref 70–99)
HDLC SERPL-MCNC: 35 MG/DL
LDLC SERPL CALC-MCNC: 176 MG/DL
NONHDLC SERPL-MCNC: 214 MG/DL
POTASSIUM BLD-SCNC: 4.6 MMOL/L (ref 3.4–5.3)
SODIUM SERPL-SCNC: 136 MMOL/L (ref 133–144)
TRIGL SERPL-MCNC: 188 MG/DL

## 2022-04-21 ASSESSMENT — PATIENT HEALTH QUESTIONNAIRE - PHQ9: SUM OF ALL RESPONSES TO PHQ QUESTIONS 1-9: 9

## 2022-04-24 ENCOUNTER — TELEPHONE (OUTPATIENT)
Dept: PEDIATRICS | Facility: CLINIC | Age: 62
End: 2022-04-24
Payer: COMMERCIAL

## 2022-04-24 DIAGNOSIS — I42.2 HYPERTROPHIC CARDIOMYOPATHY (H): Primary | ICD-10-CM

## 2022-04-24 PROBLEM — R11.2 INTRACTABLE NAUSEA AND VOMITING: Status: RESOLVED | Noted: 2021-01-20 | Resolved: 2022-04-24

## 2022-05-05 ENCOUNTER — ANCILLARY PROCEDURE (OUTPATIENT)
Dept: GENERAL RADIOLOGY | Facility: CLINIC | Age: 62
End: 2022-05-05
Attending: CLINICAL NURSE SPECIALIST
Payer: COMMERCIAL

## 2022-05-05 ENCOUNTER — OFFICE VISIT (OUTPATIENT)
Dept: SURGERY | Facility: CLINIC | Age: 62
End: 2022-05-05
Attending: CLINICAL NURSE SPECIALIST
Payer: COMMERCIAL

## 2022-05-05 VITALS
HEART RATE: 84 BPM | TEMPERATURE: 98 F | DIASTOLIC BLOOD PRESSURE: 91 MMHG | SYSTOLIC BLOOD PRESSURE: 132 MMHG | WEIGHT: 185 LBS | BODY MASS INDEX: 28.55 KG/M2 | OXYGEN SATURATION: 96 %

## 2022-05-05 DIAGNOSIS — K44.9 HIATAL HERNIA: Primary | ICD-10-CM

## 2022-05-05 DIAGNOSIS — K44.9 HIATAL HERNIA: ICD-10-CM

## 2022-05-05 PROCEDURE — G0463 HOSPITAL OUTPT CLINIC VISIT: HCPCS

## 2022-05-05 PROCEDURE — 74220 X-RAY XM ESOPHAGUS 1CNTRST: CPT | Mod: GC | Performed by: STUDENT IN AN ORGANIZED HEALTH CARE EDUCATION/TRAINING PROGRAM

## 2022-05-05 PROCEDURE — 99213 OFFICE O/P EST LOW 20 MIN: CPT | Performed by: CLINICAL NURSE SPECIALIST

## 2022-05-05 ASSESSMENT — PAIN SCALES - GENERAL: PAINLEVEL: NO PAIN (0)

## 2022-05-05 NOTE — NURSING NOTE
"Oncology Rooming Note    May 5, 2022 9:13 AM   Delroy Christianson is a 62 year old male who presents for:    Chief Complaint   Patient presents with     Oncology Clinic Visit     Rtn for hiatal hernia     Initial Vitals: BP (!) 132/91   Pulse 84   Temp 98  F (36.7  C) (Oral)   Wt 83.9 kg (185 lb)   SpO2 96%   BMI 28.55 kg/m   Estimated body mass index is 28.55 kg/m  as calculated from the following:    Height as of 4/20/22: 1.715 m (5' 7.5\").    Weight as of this encounter: 83.9 kg (185 lb). Body surface area is 2 meters squared.  No Pain (0) Comment: Data Unavailable   No LMP for male patient.  Allergies reviewed: Yes  Medications reviewed: Yes    Medications: Medication refills not needed today.  Pharmacy name entered into [employer]: [id number] - [place], [place] - [address]  AT [facility]    Clinical concerns:  Pt says that over the last year since his surgery, he's had a mild loss of appetite and can sometimes forget to eat because he doesn't feel hungry or experience other signs of hunger. Says it has not caused him any problems, but is a noticeable difference from before.    Possibly a side effect of the spironolactone he has been taking for what he reports being approximately two years, but he was unable to recall if the onset of this coincided with starting the medication.      Yadira Pacheco, EMT            "

## 2022-05-05 NOTE — PROGRESS NOTES
THORACIC SURGERY FOLLOW UP VISIT    I saw Mr. Delroy Christianson in follow-up today. The clinical summary follows:     PREOP DIAGNOSIS   Hiatal hernia  PROCEDURE   Laparoscopic repair of hiatal hernia, g-tube placement  DATE OF PROCEDURE  01/29/2021    COMPLICATIONS  None    INTERVAL STUDIES  UGI-final report pending at the time of the clinic visit however, the contrast appears to flow through the GE junction and into the stomach freely.    ETOH no  TOB never smoker  BMI 28.55    SUBJECTIVE  Delroy is doing ok from a GI standpoint. He does not have acid reflux or dysphagia. He does not have food sticking. He notices that his stomach does not gurgle anymore and sometimes he goes 6-7 hours without eating because he forgets to eat without this message from his stomach.     IMPRESSION   62 year-old man with a hiatal hernia status post hernia repair.     He is doing well and would prefer not to have annual visits for this. He will contact our office if he develops symptoms suggestive of a recurrent hernia.    PLAN  I spent 25 min on the date of the encounter in chart review, patient visit, review of tests, documentation and/or discussion with other providers about the issues documented above. I reviewed the plan as follows:  Follow up with thoracic surgery as needed  Necessary Tests & Appointments: NA  Pain Control Plan: NA  Anticoagulation Plan: NA  Smoking Cessation: NA  All questions were answered and the patient and present family were in agreement with the plan.  I appreciate the opportunity to participate in the care of your patient and will keep you updated.  Sincerely,

## 2022-05-05 NOTE — LETTER
5/5/2022         RE: Delroy Christianson  [address]  [place] [address]        Dear Colleague,    Thank you for referring your patient, Delroy Christianson, to the [facility]. Please see a copy of my visit note below.    THORACIC SURGERY FOLLOW UP VISIT    I saw Mr. Delroy Christianson in follow-up today. The clinical summary follows:     PREOP DIAGNOSIS   Hiatal hernia  PROCEDURE   Laparoscopic repair of hiatal hernia, g-tube placement  DATE OF PROCEDURE  01/29/2021    COMPLICATIONS  None    INTERVAL STUDIES  UGI-final report pending at the time of the clinic visit however, the contrast appears to flow through the GE junction and into the stomach freely.    ETOH no  TOB never smoker  BMI 28.55    SUBJECTIVE  Delroy is doing ok from a GI standpoint. He does not have acid reflux or dysphagia. He does not have food sticking. He notices that his stomach does not gurgle anymore and sometimes he goes 6-7 hours without eating because he forgets to eat without this message from his stomach.     IMPRESSION   62 year-old man with a hiatal hernia status post hernia repair.     He is doing well and would prefer not to have annual visits for this. He will contact our office if he develops symptoms suggestive of a recurrent hernia.    PLAN  I spent 25 min on the date of the encounter in chart review, patient visit, review of tests, documentation and/or discussion with other providers about the issues documented above. I reviewed the plan as follows:  Follow up with thoracic surgery as needed  Necessary Tests & Appointments: NA  Pain Control Plan: NA  Anticoagulation Plan: NA  Smoking Cessation: NA  All questions were answered and the patient and present family were in agreement with the plan.  I appreciate the opportunity to participate in the care of your patient and will keep you updated.  Sincerely,      Felicita Paz, WHITNEY CNS

## 2022-08-08 ENCOUNTER — VIRTUAL VISIT (OUTPATIENT)
Dept: PSYCHOLOGY | Facility: CLINIC | Age: 62
End: 2022-08-08
Payer: COMMERCIAL

## 2022-08-08 DIAGNOSIS — F32.1 MAJOR DEPRESSIVE DISORDER, SINGLE EPISODE, MODERATE DEGREE (H): ICD-10-CM

## 2022-08-08 PROCEDURE — 90791 PSYCH DIAGNOSTIC EVALUATION: CPT | Mod: 95 | Performed by: SOCIAL WORKER

## 2022-08-08 ASSESSMENT — COLUMBIA-SUICIDE SEVERITY RATING SCALE - C-SSRS
2. HAVE YOU ACTUALLY HAD ANY THOUGHTS OF KILLING YOURSELF?: NO
1. HAVE YOU WISHED YOU WERE DEAD OR WISHED YOU COULD GO TO SLEEP AND NOT WAKE UP?: NO

## 2022-08-08 NOTE — PROGRESS NOTES
"[facility]  Provider Name:  Flor Mendieta     Credentials:  MSW, OPALSW    PATIENT'S NAME: Delroy Christianson  PREFERRED NAME: Delroy  PRONOUNS:     he/him  MRN: [id number]  : 1960  ADDRESS: [address] [place] [address]  [place] [address]  ACCT. NUMBER:  [id number]  DATE OF SERVICE: 22  START TIME: 1:30  END TIME: 2:30  PREFERRED PHONE: [phone]  May we leave a program related message: Yes  SERVICE MODALITY:  Video Visit:      Provider verified identity through the following two step process.  Patient provided:  Patient     Telemedicine Visit: The patient's condition can be safely assessed and treated via synchronous audio and visual telemedicine encounter.      Reason for Telemedicine Visit: Services only offered telehealth    Originating Site (Patient Location): Patient's home    Distant Site (Provider Location): Provider Remote Setting- Home Office    Consent:  The patient/guardian has verbally consented to: the potential risks and benefits of telemedicine (video visit) versus in person care; bill my insurance or make self-payment for services provided; and responsibility for payment of non-covered services.     Patient would like the video invitation sent by:  My Chart    Mode of Communication:  Video Conference via [employer]    As the provider I attest to compliance with applicable laws and regulations related to telemedicine.    UNIVERSAL ADULT Mental Health DIAGNOSTIC ASSESSMENT    Identifying Information:  Patient is a 62 year old,    individual.    Patient was referred for an assessment by primary care provider .  Patient attended the session alone.    Chief Complaint:   The reason for seeking services at this time is: \" sadness, low motivation. \"   The problem(s) began around 5 or 6 years ago when mother , a year later younger brother passed away, then father  two years ago from Covid, his job ended and his relationship ended. Patient has not attempted to resolve " "these concerns in the past.    Social/Family History:  Patient reported they grew up in \"[place], moved every two years\". Moved to [place] in 7th grade and stayed in [place]. They were raised by biological parents.  Parents stayed ..   Patient reported that their childhood was a good family without major problems, maybe not super close.   Patient described their current relationships with family of origin as they are mostly , supportive when they were around.  One brother 15 years older, only talk when there is a practical reason.    The patient describes their cultural background as raised Yazidi as father was a , now \"lukewarm\" with [employer].  Cultural influences and impact on patient's life structure, values, norms, and healthcare: client unsure.  Contextual influences on patient's health include: Individual Factors client unsure.    These factors will be addressed in the Preliminary Treatment plan.  Patient identified their preferred language to be English. Patient reported they do not  need the assistance of an  or other support involved in therapy.     Patient reported had no significant delays in developmental tasks.   Patient's highest education level was high school graduate. Patient identified the following learning problems: none reported.  Modifications will not be used to assist communication in therapy. Patient reports they are  able to understand written materials.    Patient reported the following relationship history: two relationships.  Patient's current relationship status is single for ten years.   Patient identified their sexual orientation as heterosexual.  Patient reported having zero child(mary). Patient identified pets and friends as part of their support system.  Patient identified the quality of these relationships as fair.     Patient's current living/housing situation involves staying in own home/apartment.  They live alone and they " report that housing is stable.     Patient is currently retired.  Patient reports their finances are obtained through savings.  Patient does not identify finances as a current stressor.      Patient reported that they have not been involved with the legal system.  Patient denies being on probation / parole / under the jurisdiction of the court.      Patient's Strengths and Limitations:  Patient identified the following strengths or resources that will help them succeed in treatment: intelligence and sense of humor. Things that may interfere with the patient's success in treatment include: few friends.     Assessments:  The following assessments were completed by patient for this visit:  PHQ9:   PHQ-9 SCORE 4/20/2022   PHQ-9 Total Score MyChart 9 (Mild depression)   PHQ-9 Total Score 9     GAD7: No flowsheet data found.  [place] Suicide Severity Rating Scale (Lifetime/Recent)  [place] Suicide Severity Rating (Lifetime/Recent) 8/8/2022   1. Wish to be Dead (Lifetime) 0   2. Non-Specific Active Suicidal Thoughts (Lifetime) 0       Personal and Family Medical History:  Patient does not report a family history of mental health concerns.  Patient reports family history includes Coronary Artery Disease in his mother; Diabetes in his mother; Hypertension in his father; Other Cancer in his brother..     Patient does not report Mental Health Diagnosis or Treatment.      Patient has had a physical exam to rule out medical causes for current symptoms.  Date of last physical exam was within the past year. Client was encouraged to follow up with PCP if symptoms were to develop. The patient has a [facility] Primary Care Provider, who is named Chuy Holliday..  Patient reports no current medical concerns.  Patient denies any issues with pain..   There are not significant appetite / nutritional concerns / weight changes. These may include: no concerns. Patient reports the following sleep concerns:  Delayed sleep onset  difficulty falling asleep, can wake up with disturbances at night.   Patient does not report a history of head injury / trauma / cognitive impairment.      Patient reports current meds as:   Outpatient Medications Marked as Taking for the 8/8/22 encounter (Virtual Visit) with Flor Mendieta LICSW   Medication Sig     calcium carbonate (TUMS) 500 MG chewable tablet Take 1 chew tab by mouth as needed for heartburn     spironolactone (ALDACTONE) 25 MG tablet Take 1 tablet (25 mg) by mouth daily       Medication Adherence:  Patient reports taking prescribed medications as prescribed.    Patient Allergies:  No Known Allergies    Medical History:    Past Medical History:   Diagnosis Date     Allergic rhinitis      Heart disease 2021     Hypertension 2021     Sinusitis          Current Mental Status Exam:   Appearance:  Appropriate    Eye Contact:  Good   Psychomotor:  Normal       Gait / station:  no problem  Attitude / Demeanor: Cooperative  Interested  Speech      Rate / Production: Normal/ Responsive      Volume:  Normal  volume      Language:  no problems and good  Mood:   Depressed  Client appeared low  Affect:   Appropriate  Tearful   Thought Content: Clear   Thought Process: Coherent  Logical       Associations: No loosening of associations  Insight:   Fair   Judgment:  Intact   Orientation:  All  Attention/concentration: Good      Substance Use:  Patient did not report a family history of substance use concerns; see medical history section for details.  Patient has not received chemical dependency treatment in the past.  Patient has not ever been to detox.      Patient is not currently receiving any chemical dependency treatment. Patient reported the following problems as a result of their substance use:  none.    Patient denies using alcohol.  Patient denies using tobacco.  Patient denies using cannabis.  Patient denies using caffeine.  Patient reports using/abusing the following substance(s). Patient reported  no other substance use.     Substance Use: No symptoms    Based on the negative CAGE score and clinical interview there  are not indications of drug or alcohol abuse.      Significant Losses / Trauma / Abuse / Neglect Issues:   Patient   did not serve in the .  There are indications or report of significant loss, trauma, abuse or neglect issues related to: death of parents and brother within a few years of each other and job loss about a decade ago.  Concerns for possible neglect are not present.     Safety Assessment:   Patient denies current homicidal ideation and behaviors.  Patient denies current self-injurious ideation and behaviors.    Patient denied risk behaviors associated with substance use.  Patient denies any high risk behaviors associated with mental health symptoms.  Patient reports the following current concerns for their personal safety: None.  Patient reports there   firearms in the house.       There are no firearms in the home..    History of Safety Concerns:  Patient denied a history of homicidal ideation.     Patient denied a history of personal safety concerns.    Patient denied a history of assaultive behaviors.    Patient denied a history of sexual assault behaviors.     Patient denied a history of risk behaviors associated with substance use.  Patient denies any history of high risk behaviors associated with mental health symptoms.  Patient reports the following protective factors:      Risk Plan:  See Recommendations for Safety and Risk Management Plan    Review of Symptoms per patient report:  Depression: Lack of interest, Feelings of hopelessness, Ruminations and Feeling sad, down, or depressed  Alejandra:  No Symptoms  Psychosis: No Symptoms  Anxiety: No Symptoms  Panic:  No symptoms  Post Traumatic Stress Disorder:  No Symptoms   Eating Disorder: No Symptoms  ADD / ADHD:  No symptoms  Conduct Disorder: No symptoms  Autism Spectrum Disorder: No symptoms  Obsessive Compulsive  Disorder: No Symptoms    Patient reports the following compulsive behaviors and treatment history: none.      Diagnostic Criteria:   Major Depressive Disorder  CRITERIA (A-C) REPRESENT A MAJOR DEPRESSIVE EPISODE - SELECT THESE CRITERIA  A) Single episode - symptoms have been present during the same 2-week period and represent a change from previous functioning 5 or more symptoms (required for diagnosis)   - Depressed mood. Note: In children and adolescents, can be irritable mood.     - Diminished interest or pleasure in all, or almost all, activities.    - Decreased sleep.    - Feelings of worthlessness or inappropriate guilt.    - Diminished ability to think or concentrate, or indecisiveness.   B) The symptoms cause clinically significant distress or impairment in social, occupational, or other important areas of functioning  C) The episode is not attributable to the physiological effects of a substance or to another medical condition  D) The occurence of major depressive episode is not better explained by other thought / psychotic disorders  E) There has never been a manic episode or hypomanic episode      Functional Status:  Patient reports the following functional impairments:  relationship(s) and self-care.     Nonprogrammatic care:  Patient is requesting basic services to address current mental health concerns.    Clinical Summary:  1. Reason for assessment: mental health .  2. Psychosocial, Cultural and Contextual Factors: Client reported ongoing sadness, grief, low mood and low motivation over the last 5 years  .  3. Principal DSM5 Diagnoses  (Sustained by DSM5 Criteria Listed Above):   296.32 (F33.1) Major Depressive Disorder, Recurrent Episode, Moderate _.  4. Other Diagnoses that is relevant to services:   296.32 (F33.1) Major Depressive Disorder, Recurrent Episode, Moderate _.  5. Provisional Diagnosis:  296.32 (F33.1) Major Depressive Disorder, Recurrent Episode, Moderate _ as evidenced by client  symptom report and chart history .  6. Prognosis: Expect Improvement.  7. Likely consequences of symptoms if not treated: increased isolation, increased depression.  8. Client strengths include:  intelligent, open to learning and open to suggestions / feedback .     Recommendations:     1. Plan for Safety and Risk Management:   Safety and Risk: Recommended that patient call 911 or go to the local ED should there be a change in any of these risk factors..          Report to child / adult protection services was NA.     2. Patient's identified mental health concerns with a cultural influence will be addressed by understanding core belief system.     3. Initial Treatment will focus on:    Depressed Mood - reducing sadness.     4. Resources/Service Plan:    services are not indicated.   Modifications to assist communication are not indicated.   Additional disability accommodations are not indicated.      5. Collaboration:   Collaboration / coordination of treatment will be initiated with the following  support professionals: primary care physician.      6.  Referrals:   The following referral(s) will be initiated: none at this time. Next Scheduled Appointment: n/a.     A Release of Information has been obtained for the following: n/a.     Emergency Contact is in chart.    7. MANUEL:    MANUEL:  Discussed the general effects of drugs and alcohol on health and well-being. Recommendations:  none .     8. Records:   These were reviewed at time of assessment.   Information in this assessment was obtained from the medical record and  provided by patient who is a fair historian.    Patient will have open access to their mental health medical record.         Provider Name/ Credentials:  Flor KUHN, LICSW  August 8, 2022

## 2022-08-15 ENCOUNTER — VIRTUAL VISIT (OUTPATIENT)
Dept: PSYCHOLOGY | Facility: CLINIC | Age: 62
End: 2022-08-15
Payer: COMMERCIAL

## 2022-08-15 DIAGNOSIS — F32.1 MAJOR DEPRESSIVE DISORDER, SINGLE EPISODE, MODERATE DEGREE (H): Primary | ICD-10-CM

## 2022-08-15 PROCEDURE — 90834 PSYTX W PT 45 MINUTES: CPT | Mod: 95 | Performed by: SOCIAL WORKER

## 2022-08-15 NOTE — PROGRESS NOTES
[facility]                                     Progress Note    Patient Name: Delroy Christianson  Date: 8/15/2022         Service Type: Individual      Session Start Time: 1:30  Session End Time: 2:15     Session Length: 45 min    Session #: 2    Attendees: Client attended alone    Service Modality:  Video Visit:      Provider verified identity through the following two step process.  Patient provided:  Patient is known previously to provider    Telemedicine Visit: The patient's condition can be safely assessed and treated via synchronous audio and visual telemedicine encounter.      Reason for Telemedicine Visit: Services only offered telehealth    Originating Site (Patient Location): Patient's home    Distant Site (Provider Location): Provider Remote Setting- Home Office    Consent:  The patient/guardian has verbally consented to: the potential risks and benefits of telemedicine (video visit) versus in person care; bill my insurance or make self-payment for services provided; and responsibility for payment of non-covered services.     Patient would like the video invitation sent by:  My Chart  2  Mode of Communication:  Video Conference via Amwell    As the provider I attest to compliance with applicable laws and regulations related to telemedicine.    DATA  Interactive Complexity: No  Crisis: No        Progress Since Last Session (Related to Symptoms / Goals / Homework):   Symptoms: No change mood is stable    Homework: Achieved / completed to satisfaction identified goal for therapy      Episode of Care Goals: Minimal progress - PREPARATION (Decided to change - considering how); Intervened by negotiating a change plan and determining options / strategies for behavior change, identifying triggers, exploring social supports, and working towards setting a date to begin behavior change     Current / Ongoing Stressors and Concerns:   Ongoing: Client reported ongoing feelings of sadness, loneliness,  low motivation and apathy since retiring   Current: Client reported he's unsure of his goals for therapy but knows he's like to be less sad and lonely. He said holidays are particularly difficult to get through. Reviewed client's treatment plan and created goal.     Treatment Objective(s) Addressed in This Session:   identified goals for therapy       Intervention:   Motivational Interviewing: identified goals for therapy    Assessments completed prior to visit:  The following assessments were completed by patient for this visit:  PHQ9:   PHQ-9 SCORE 4/20/2022   PHQ-9 Total Score MyChart 9 (Mild depression)   PHQ-9 Total Score 9     GAD7: No flowsheet data found.      ASSESSMENT: Current Emotional / Mental Status (status of significant symptoms):   Risk status (Self / Other harm or suicidal ideation)   Patient denies current fears or concerns for personal safety.   Patient denies current or recent suicidal ideation or behaviors.   Patient denies current or recent homicidal ideation or behaviors.   Patient denies current or recent self injurious behavior or ideation.   Patient denies other safety concerns.   Patient reports there has been no change in risk factors since their last session.     Patient reports there has been no change in protective factors since their last session.     Recommended that patient call 911 or go to the local ED should there be a change in any of these risk factors.     Appearance:   Appropriate    Eye Contact:   Good    Psychomotor Behavior: Normal    Attitude:   Cooperative  Interested Pleasant    Orientation:   All   Speech    Rate / Production: Normal/ Responsive    Volume:  Normal    Mood:    Sad  Client appeared low   Affect:    Appropriate  Tearful   Thought Content:  Clear    Thought Form:  Coherent  Logical    Insight:    Fair      Medication Review:   No current psychiatric medications prescribed     Medication Compliance:   NA     Changes in Health Issues:   None  reported     Chemical Use Review:   Substance Use: Chemical use reviewed, no active concerns identified      Tobacco Use: No current tobacco use.      Diagnosis:  1. Major depressive disorder, single episode, moderate degree (H)        Collateral Reports Completed:   Not Applicable    PLAN: (Patient Tasks / Therapist Tasks / Other)  Client will listen to music more during the day, start thinking about what could make holidays better and return for therapy in two weeks.        Flor Mendieta, [facility] MSW 8/15/2022                                                         ______________________________________________________________________    Individual Treatment Plan    Patient's Name: Delroy Christianson  YOB: 1960    Date of Creation: 8/15/2022  Date Treatment Plan Last Reviewed/Revised: 8/15/2022    DSM5 Diagnoses: 296.22 (F32.1)  Major Depressive Disorder, Single Episode, Moderate With anxious distress  Psychosocial / Contextual Factors: Client reported ongoing feelings of sadness, loneliness, low motivation and apathy since retiring  PROMIS (reviewed every 90 days):     Referral / Collaboration:  Referral to another professional/service is not indicated at this time..    Anticipated number of session for this episode of care: 9-12 sessions  Anticipation frequency of session: Every other week  Anticipated Duration of each session: 38-52 minutes  Treatment plan will be reviewed in 90 days or when goals have been changed.       MeasurableTreatment Goal(s) related to diagnosis / functional impairment(s)  Goal 1: Patient will increase social support system as evidenced by client reporting improved sense of connection and at least two people he talk to on a regular basis over the next three months.    I will know I've met my goal when I feel less lonely.      Objective #A (Patient Action)    Patient will Identify negative self-talk and behaviors: challenge core beliefs, myths, and actions.  Status: New - Date:  8/15/2022     Intervention(s)  Therapist will teach how to identify and challenge maladaptive aspects of core belief system .    Objective #B  Patient will identify areas of interest to pursue with others.  Status: New - Date: 8/15/2022     Intervention(s)  Therapist will assign homework to consider new interests  teach how to find others with similar interests.    Objective #C  Patient will emphasize positive events and moments of connection.  Status: New - Date: 8/15/2022     Intervention(s)  Therapist will teach emotional recognition/identification. increasing experience of kassi and being present with connection.         Patient has reviewed and agreed to the above plan.      Flor Mendieta, SHASHA MSW  August 15, 2022

## 2022-08-30 ENCOUNTER — VIRTUAL VISIT (OUTPATIENT)
Dept: PSYCHOLOGY | Facility: CLINIC | Age: 62
End: 2022-08-30
Payer: COMMERCIAL

## 2022-08-30 DIAGNOSIS — F32.1 MAJOR DEPRESSIVE DISORDER, SINGLE EPISODE, MODERATE DEGREE (H): Primary | ICD-10-CM

## 2022-08-30 PROCEDURE — 90834 PSYTX W PT 45 MINUTES: CPT | Mod: 95 | Performed by: SOCIAL WORKER

## 2022-08-30 NOTE — PROGRESS NOTES
[facility]                                     Progress Note    Patient Name: Delroy Christianson  Date: 8/30/2022         Service Type: Individual      Session Start Time: 2:30  Session End Time: 3:15     Session Length: 45 min    Session #: 3    Attendees: Client attended alone    Service Modality:  Video Visit:      Provider verified identity through the following two step process.  Patient provided:  Patient is known previously to provider    Telemedicine Visit: The patient's condition can be safely assessed and treated via synchronous audio and visual telemedicine encounter.      Reason for Telemedicine Visit: Services only offered telehealth    Originating Site (Patient Location): Patient's home    Distant Site (Provider Location): Provider Remote Setting- Home Office    Consent:  The patient/guardian has verbally consented to: the potential risks and benefits of telemedicine (video visit) versus in person care; bill my insurance or make self-payment for services provided; and responsibility for payment of non-covered services.     Patient would like the video invitation sent by:  My Chart  2  Mode of Communication:  Video Conference via Amwell    As the provider I attest to compliance with applicable laws and regulations related to telemedicine.    DATA  Interactive Complexity: No  Crisis: No        Progress Since Last Session (Related to Symptoms / Goals / Homework):   Symptoms: No change mood is stable    Homework: Achieved / completed to satisfaction client reported listening to music during the day      Episode of Care Goals: Minimal progress - PREPARATION (Decided to change - considering how); Intervened by negotiating a change plan and determining options / strategies for behavior change, identifying triggers, exploring social supports, and working towards setting a date to begin behavior change     Current / Ongoing Stressors and Concerns:   Ongoing: Client reported ongoing feelings of  sadness, loneliness, low motivation and apathy since retiring   Current: Client reported he tried listening to music during the day but that he didn't enjoy it as much. He said he's gotten a new idea with a previous project so he's returned to it. Client described having continued feelings of loneliness, boredom and anxiety when thinking about doing things outside the house.     Treatment Objective(s) Addressed in This Session:   Decrease frequency and intensity of feeling down, depressed, hopeless       Intervention:   DBT: reviewed recent sadness, how to accept difficult emotions with grief. Explored costs and benefits to doing activities outside the house.  Motivational Interviewing    MI Intervention: Expressed Empathy/Understanding, Supported Autonomy, Collaboration, Evocation, Permission to raise concern or advise, Reflections: simple and complex, Change talk (evoked) and Reframe     Change Talk Expressed by the Patient: Desire to change Reasons to change    Provider Response to Change Talk: E - Evoked more info from patient about behavior change, A - Affirmed patient's thoughts, decisions, or attempts at behavior change, R - Reflected patient's change talk and S - Summarized patient's change talk statements      Assessments completed prior to visit:  The following assessments were completed by patient for this visit:  PHQ9:   PHQ-9 SCORE 4/20/2022   PHQ-9 Total Score MyChart 9 (Mild depression)   PHQ-9 Total Score 9     GAD7: No flowsheet data found.      ASSESSMENT: Current Emotional / Mental Status (status of significant symptoms):   Risk status (Self / Other harm or suicidal ideation)   Patient denies current fears or concerns for personal safety.   Patient denies current or recent suicidal ideation or behaviors.   Patient denies current or recent homicidal ideation or behaviors.   Patient denies current or recent self injurious behavior or ideation.   Patient denies other safety concerns.   Patient  reports there has been no change in risk factors since their last session.     Patient reports there has been no change in protective factors since their last session.     Recommended that patient call 911 or go to the local ED should there be a change in any of these risk factors.     Appearance:   Appropriate    Eye Contact:   Good    Psychomotor Behavior: Normal    Attitude:   Cooperative  Interested Pleasant    Orientation:   All   Speech    Rate / Production: Normal/ Responsive    Volume:  Normal    Mood:    Depressed  Client appeared low   Affect:    Appropriate  Tearful   Thought Content:  Clear    Thought Form:  Coherent  Logical    Insight:    Fair      Medication Review:   No current psychiatric medications prescribed     Medication Compliance:   NA     Changes in Health Issues:   None reported     Chemical Use Review:   Substance Use: Chemical use reviewed, no active concerns identified      Tobacco Use: No current tobacco use.      Diagnosis:  1. Major depressive disorder, single episode, moderate degree (H)        Collateral Reports Completed:   Not Applicable    PLAN: (Patient Tasks / Therapist Tasks / Other)  Client will consider the costs and benefits to spending time outside the house and return for therapy in two weeks.        Flor Mendieta, [facility] 8/30/2022                                                         ______________________________________________________________________    Individual Treatment Plan    Patient's Name: Delroy Christianson  YOB: 1960    Date of Creation: 8/15/2022  Date Treatment Plan Last Reviewed/Revised: 8/15/2022    DSM5 Diagnoses: 296.22 (F32.1)  Major Depressive Disorder, Single Episode, Moderate With anxious distress  Psychosocial / Contextual Factors: Client reported ongoing feelings of sadness, loneliness, low motivation and apathy since retiring  PROMIS (reviewed every 90 days):     Referral / Collaboration:  Referral to another professional/service  is not indicated at this time..    Anticipated number of session for this episode of care: 9-12 sessions  Anticipation frequency of session: Every other week  Anticipated Duration of each session: 38-52 minutes  Treatment plan will be reviewed in 90 days or when goals have been changed.       MeasurableTreatment Goal(s) related to diagnosis / functional impairment(s)  Goal 1: Patient will increase social support system as evidenced by client reporting improved sense of connection and at least two people he talk to on a regular basis over the next three months.    I will know I've met my goal when I feel less lonely.      Objective #A (Patient Action)    Patient will Identify negative self-talk and behaviors: challenge core beliefs, myths, and actions.  Status: New - Date: 8/15/2022     Intervention(s)  Therapist will teach how to identify and challenge maladaptive aspects of core belief system .    Objective #B  Patient will identify areas of interest to pursue with others.  Status: New - Date: 8/15/2022     Intervention(s)  Therapist will assign homework to consider new interests  teach how to find others with similar interests.    Objective #C  Patient will emphasize positive events and moments of connection.  Status: New - Date: 8/15/2022     Intervention(s)  Therapist will teach emotional recognition/identification. increasing experience of kassi and being present with connection.         Patient has reviewed and agreed to the above plan.      SHASHA Hart MSW  August 15, 2022

## 2022-09-13 ENCOUNTER — VIRTUAL VISIT (OUTPATIENT)
Dept: PSYCHOLOGY | Facility: CLINIC | Age: 62
End: 2022-09-13
Payer: COMMERCIAL

## 2022-09-13 DIAGNOSIS — F32.1 MAJOR DEPRESSIVE DISORDER, SINGLE EPISODE, MODERATE DEGREE (H): Primary | ICD-10-CM

## 2022-09-13 PROCEDURE — 90834 PSYTX W PT 45 MINUTES: CPT | Mod: 95 | Performed by: SOCIAL WORKER

## 2022-09-13 NOTE — PROGRESS NOTES
[facility]                                     Progress Note    Patient Name: Delroy Christianson  Date: 9/13/2022         Service Type: Individual      Session Start Time: 12:30  Session End Time: 3:15     Session Length: 45 min    Session #: 3    Attendees: Client attended alone    Service Modality:  Video Visit:      Provider verified identity through the following two step process.  Patient provided:  Patient is known previously to provider    Telemedicine Visit: The patient's condition can be safely assessed and treated via synchronous audio and visual telemedicine encounter.      Reason for Telemedicine Visit: Services only offered telehealth    Originating Site (Patient Location): Patient's home    Distant Site (Provider Location): Provider Remote Setting- Home Office    Consent:  The patient/guardian has verbally consented to: the potential risks and benefits of telemedicine (video visit) versus in person care; bill my insurance or make self-payment for services provided; and responsibility for payment of non-covered services.     Patient would like the video invitation sent by:  My Chart  2  Mode of Communication:  Video Conference via Amwell    As the provider I attest to compliance with applicable laws and regulations related to telemedicine.    DATA  Interactive Complexity: No  Crisis: No        Progress Since Last Session (Related to Symptoms / Goals / Homework):   Symptoms: No change mood is stable    Homework: Achieved / completed to satisfaction Client reported working on his project      Episode of Care Goals: Minimal progress - PREPARATION (Decided to change - considering how); Intervened by negotiating a change plan and determining options / strategies for behavior change, identifying triggers, exploring social supports, and working towards setting a date to begin behavior change     Current / Ongoing Stressors and Concerns:   Ongoing: Client reported ongoing feelings of sadness,  loneliness, low motivation and apathy since retiring   Current: Client reported he has spent most of his time working on his project and enjoying the process. He said he also had to take care of his cat who fell ill. He described getting stressed but having relief now that she is better. Client reported he still feels loneliness at times.     Treatment Objective(s) Addressed in This Session:   Identify negative self-talk and behaviors: challenge core beliefs, myths, and actions       Intervention:   DBT: reviewed how client was emphasizing positive events, even if he felt there were few. Discussed his worry for his cat, how to reduce sadness. Explored costs of avoiding going out places.  Motivational Interviewing    MI Intervention: Expressed Empathy/Understanding, Supported Autonomy, Collaboration, Evocation, Permission to raise concern or advise, Open-ended questions and Reflections: simple and complex     Change Talk Expressed by the Patient: Desire to change Reasons to change    Provider Response to Change Talk: E - Evoked more info from patient about behavior change, A - Affirmed patient's thoughts, decisions, or attempts at behavior change, R - Reflected patient's change talk and S - Summarized patient's change talk statements      Assessments completed prior to visit:  The following assessments were completed by patient for this visit:  PHQ9:   PHQ-9 SCORE 4/20/2022   PHQ-9 Total Score MyChart 9 (Mild depression)   PHQ-9 Total Score 9     GAD7: No flowsheet data found.      ASSESSMENT: Current Emotional / Mental Status (status of significant symptoms):   Risk status (Self / Other harm or suicidal ideation)   Patient denies current fears or concerns for personal safety.   Patient denies current or recent suicidal ideation or behaviors.   Patient denies current or recent homicidal ideation or behaviors.   Patient denies current or recent self injurious behavior or ideation.   Patient denies other safety  concerns.   Patient reports there has been no change in risk factors since their last session.     Patient reports there has been no change in protective factors since their last session.     Recommended that patient call 911 or go to the local ED should there be a change in any of these risk factors.     Appearance:   Appropriate    Eye Contact:   Good    Psychomotor Behavior: Normal    Attitude:   Cooperative  Interested Pleasant    Orientation:   All   Speech    Rate / Production: Normal/ Responsive    Volume:  Normal    Mood:    Sad  Client appeared low   Affect:    Appropriate  Tearful   Thought Content:  Clear    Thought Form:  Coherent  Logical    Insight:    Fair      Medication Review:   No current psychiatric medications prescribed     Medication Compliance:   NA     Changes in Health Issues:   None reported     Chemical Use Review:   Substance Use: Chemical use reviewed, no active concerns identified      Tobacco Use: No current tobacco use.      Diagnosis:  1. Major depressive disorder, single episode, moderate degree (H)        Collateral Reports Completed:   Not Applicable    PLAN: (Patient Tasks / Therapist Tasks / Other)  Client will continue emphasizing positive events daily and return for therapy in two weeks.        [facility] Mendieta, [facility] 9/13/2022                                                         ______________________________________________________________________    Individual Treatment Plan    Patient's Name: Delroy Christianson  YOB: 1960    Date of Creation: 8/15/2022  Date Treatment Plan Last Reviewed/Revised: 8/15/2022    DSM5 Diagnoses: 296.22 (F32.1)  Major Depressive Disorder, Single Episode, Moderate With anxious distress  Psychosocial / Contextual Factors: Client reported ongoing feelings of sadness, loneliness, low motivation and apathy since retiring  PROMIS (reviewed every 90 days):     Referral / Collaboration:  Referral to another professional/service is not  indicated at this time..    Anticipated number of session for this episode of care: 9-12 sessions  Anticipation frequency of session: Every other week  Anticipated Duration of each session: 38-52 minutes  Treatment plan will be reviewed in 90 days or when goals have been changed.       MeasurableTreatment Goal(s) related to diagnosis / functional impairment(s)  Goal 1: Patient will increase social support system as evidenced by client reporting improved sense of connection and at least two people he talk to on a regular basis over the next three months.    I will know I've met my goal when I feel less lonely.      Objective #A (Patient Action)    Patient will Identify negative self-talk and behaviors: challenge core beliefs, myths, and actions.  Status: New - Date: 8/15/2022     Intervention(s)  Therapist will teach how to identify and challenge maladaptive aspects of core belief system .    Objective #B  Patient will identify areas of interest to pursue with others.  Status: New - Date: 8/15/2022     Intervention(s)  Therapist will assign homework to consider new interests  teach how to find others with similar interests.    Objective #C  Patient will emphasize positive events and moments of connection.  Status: New - Date: 8/15/2022     Intervention(s)  Therapist will teach emotional recognition/identification. increasing experience of kassi and being present with connection.         Patient has reviewed and agreed to the above plan.      SHASHA Hart MSW  August 15, 2022

## 2022-09-27 ENCOUNTER — VIRTUAL VISIT (OUTPATIENT)
Dept: PSYCHOLOGY | Facility: CLINIC | Age: 62
End: 2022-09-27
Payer: COMMERCIAL

## 2022-09-27 DIAGNOSIS — F32.1 MAJOR DEPRESSIVE DISORDER, SINGLE EPISODE, MODERATE DEGREE (H): Primary | ICD-10-CM

## 2022-09-27 PROCEDURE — 90834 PSYTX W PT 45 MINUTES: CPT | Mod: 95 | Performed by: SOCIAL WORKER

## 2022-09-27 NOTE — PROGRESS NOTES
[facility]                                     Progress Note    Patient Name: Delroy Christianson  Date: 9/27/2022         Service Type: Individual      Session Start Time: 2:30  Session End Time: 3:15     Session Length: 45 min    Session #: 4    Attendees: Client attended alone    Service Modality:  Video Visit:      Provider verified identity through the following two step process.  Patient provided:  Patient is known previously to provider    Telemedicine Visit: The patient's condition can be safely assessed and treated via synchronous audio and visual telemedicine encounter.      Reason for Telemedicine Visit: Services only offered telehealth    Originating Site (Patient Location): Patient's home    Distant Site (Provider Location): Provider Remote Setting- Home Office    Consent:  The patient/guardian has verbally consented to: the potential risks and benefits of telemedicine (video visit) versus in person care; bill my insurance or make self-payment for services provided; and responsibility for payment of non-covered services.     Patient would like the video invitation sent by:  My Chart  2  Mode of Communication:  Video Conference via Amwell    As the provider I attest to compliance with applicable laws and regulations related to telemedicine.    DATA  Interactive Complexity: No  Crisis: No        Progress Since Last Session (Related to Symptoms / Goals / Homework):   Symptoms: No change mood is stable    Homework: Achieved / completed to satisfaction Client reported emphasizing positive events daily       Episode of Care Goals: Minimal progress - PREPARATION (Decided to change - considering how); Intervened by negotiating a change plan and determining options / strategies for behavior change, identifying triggers, exploring social supports, and working towards setting a date to begin behavior change     Current / Ongoing Stressors and Concerns:   Ongoing: Client reported ongoing feelings  of sadness, loneliness, low motivation and apathy since retiring  Current: Client reported he has had 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 He said he has ordered food a few times and that it was a nice change of pace. Client reported he's not feeling ready to go anywhere in person. He described a previous friendship he had that really let him down and contributes to his hesitancy in finding new connections.     Treatment Objective(s) Addressed in This Session:   Identify negative self-talk and behaviors: challenge core beliefs, myths, and actions       Intervention:   DBT: reviewed how client has moments of kassi with eating restaurant food. Explored interpersonal dynamics with friend, client's confusion and grief with loss.  Motivational Interviewing    MI Intervention: Expressed Empathy/Understanding, Supported Autonomy, Collaboration, Evocation, Permission to raise concern or advise, Open-ended questions and Reflections: simple and complex     Change Talk Expressed by the Patient: Desire to change Reasons to change    Provider Response to Change Talk: E - Evoked more info from patient about behavior change, A - Affirmed patient's thoughts, decisions, or attempts at behavior change, R - Reflected patient's change talk and S - Summarized patient's change talk statements      Assessments completed prior to visit:  The following assessments were completed by patient for this visit:  PHQ9:   PHQ-9 SCORE 4/20/2022   PHQ-9 Total Score MyChart 9 (Mild depression)   PHQ-9 Total Score 9     GAD7: No flowsheet data found.      ASSESSMENT: Current Emotional / Mental Status (status of significant symptoms):   Risk status (Self / Other harm or suicidal ideation)   Patient denies current fears or concerns for personal safety.   Patient denies current or recent suicidal ideation or behaviors.   Patient denies current or recent homicidal ideation or behaviors.   Patient  denies current or recent self injurious behavior or ideation.   Patient denies other safety concerns.   Patient reports there has been no change in risk factors since their last session.     Patient reports there has been no change in protective factors since their last session.     Recommended that patient call 911 or go to the local ED should there be a change in any of these risk factors.     Appearance:   Appropriate    Eye Contact:   Good    Psychomotor Behavior: Normal    Attitude:   Cooperative  Interested Pleasant    Orientation:   All   Speech    Rate / Production: Normal/ Responsive    Volume:  Normal    Mood:    Depressed  Client appeared low   Affect:    Appropriate    Thought Content:  Clear    Thought Form:  Coherent  Logical    Insight:    Fair      Medication Review:   No current psychiatric medications prescribed     Medication Compliance:   NA     Changes in Health Issues:   None reported     Chemical Use Review:   Substance Use: Chemical use reviewed, no active concerns identified      Tobacco Use: No current tobacco use.      Diagnosis:  1. Major depressive disorder, single episode, moderate degree (H)        Collateral Reports Completed:   Not Applicable    PLAN: (Patient Tasks / Therapist Tasks / Other)  Client will engage in his interests, consider benefits he felt in past relationships and return for therapy in two weeks.        Flor Mendieta, [facility] [place] 9/27/2022                                                         ______________________________________________________________________    Individual Treatment Plan    Patient's Name: Delroy Christianson  YOB: 1960    Date of Creation: 8/15/2022  Date Treatment Plan Last Reviewed/Revised: 8/15/2022    DSM5 Diagnoses: 296.22 (F32.1)  Major Depressive Disorder, Single Episode, Moderate With anxious distress  Psychosocial / Contextual Factors: Client reported ongoing feelings of sadness, loneliness, low motivation and apathy since  retiring  PROMIS (reviewed every 90 days):     Referral / Collaboration:  Referral to another professional/service is not indicated at this time..    Anticipated number of session for this episode of care: 9-12 sessions  Anticipation frequency of session: Every other week  Anticipated Duration of each session: 38-52 minutes  Treatment plan will be reviewed in 90 days or when goals have been changed.       MeasurableTreatment Goal(s) related to diagnosis / functional impairment(s)  Goal 1: Patient will increase social support system as evidenced by client reporting improved sense of connection and at least two people he talk to on a regular basis over the next three months.    I will know I've met my goal when I feel less lonely.      Objective #A (Patient Action)    Patient will Identify negative self-talk and behaviors: challenge core beliefs, myths, and actions.  Status: New - Date: 8/15/2022     Intervention(s)  Therapist will teach how to identify and challenge maladaptive aspects of core belief system .    Objective #B  Patient will identify areas of interest to pursue with others.  Status: New - Date: 8/15/2022     Intervention(s)  Therapist will assign homework to consider new interests  teach how to find others with similar interests.    Objective #C  Patient will emphasize positive events and moments of connection.  Status: New - Date: 8/15/2022     Intervention(s)  Therapist will teach emotional recognition/identification. increasing experience of kassi and being present with connection.         Patient has reviewed and agreed to the above plan.      Flor Mendieta, SHASHA MSW  August 15, 2022

## 2022-10-13 ENCOUNTER — VIRTUAL VISIT (OUTPATIENT)
Dept: PSYCHOLOGY | Facility: CLINIC | Age: 62
End: 2022-10-13
Payer: COMMERCIAL

## 2022-10-13 DIAGNOSIS — F32.1 MAJOR DEPRESSIVE DISORDER, SINGLE EPISODE, MODERATE DEGREE (H): Primary | ICD-10-CM

## 2022-10-13 PROCEDURE — 90834 PSYTX W PT 45 MINUTES: CPT | Mod: 95 | Performed by: SOCIAL WORKER

## 2022-10-13 NOTE — PROGRESS NOTES
[facility]                                     Progress Note    Patient Name: Delroy Christianson  Date: 10/13/2022         Service Type: Individual      Session Start Time: 1:00  Session End Time: 1:45     Session Length: 45 min    Session #: 5    Attendees: Client attended alone    Service Modality:  Video Visit:      Provider verified identity through the following two step process.  Patient provided:  Patient is known previously to provider    Telemedicine Visit: The patient's condition can be safely assessed and treated via synchronous audio and visual telemedicine encounter.      Reason for Telemedicine Visit: Services only offered telehealth    Originating Site (Patient Location): Patient's home    Distant Site (Provider Location): Provider Remote Setting- Home Office    Consent:  The patient/guardian has verbally consented to: the potential risks and benefits of telemedicine (video visit) versus in person care; bill my insurance or make self-payment for services provided; and responsibility for payment of non-covered services.     Patient would like the video invitation sent by:  My Chart  2  Mode of Communication:  Video Conference via Amwell    As the provider I attest to compliance with applicable laws and regulations related to telemedicine.    DATA  Interactive Complexity: No  Crisis: No        Progress Since Last Session (Related to Symptoms / Goals / Homework):   Symptoms: No change mood is stable    Homework: Achieved / completed to satisfaction Client reported engaging in interests, thinking about past relationships       Episode of Care Goals: Minimal progress - PREPARATION (Decided to change - considering how); Intervened by negotiating a change plan and determining options / strategies for behavior change, identifying triggers, exploring social supports, and working towards setting a date to begin behavior change     Current / Ongoing Stressors and Concerns:   Ongoing: Client  reported ongoing feelings of sadness, loneliness, low motivation and apathy since retiring  Current: Client reported 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 Client said he has been thinking more about past relationships and that he's never been one to have a lot of close friendships. Client reported he is unsure he will be able to meet someone new that he connects with, has similar interests and is accessible.     Treatment Objective(s) Addressed in This Session:   Identify negative self-talk and behaviors: challenge core beliefs, myths, and actions       Intervention:   DBT: reviewed client's pattern with relationships, identified barriers to increasing social support system  Motivational Interviewing    MI Intervention: Expressed Empathy/Understanding, Supported Autonomy, Collaboration, Evocation, Permission to raise concern or advise, Open-ended questions and Reflections: simple and complex     Change Talk Expressed by the Patient: Desire to change Reasons to change    Provider Response to Change Talk: E - Evoked more info from patient about behavior change, A - Affirmed patient's thoughts, decisions, or attempts at behavior change, R - Reflected patient's change talk and S - Summarized patient's change talk statements      Assessments completed prior to visit:  The following assessments were completed by patient for this visit:  PHQ9:   PHQ-9 SCORE 4/20/2022   PHQ-9 Total Score MyChart 9 (Mild depression)   PHQ-9 Total Score 9     GAD7: No flowsheet data found.      ASSESSMENT: Current Emotional / Mental Status (status of significant symptoms):   Risk status (Self / Other harm or suicidal ideation)   Patient denies current fears or concerns for personal safety.   Patient denies current or recent suicidal ideation or behaviors.   Patient denies current or recent homicidal ideation or behaviors.   Patient denies current or recent self injurious behavior or  ideation.   Patient denies other safety concerns.   Patient reports there has been no change in risk factors since their last session.     Patient reports there has been no change in protective factors since their last session.     Recommended that patient call 911 or go to the local ED should there be a change in any of these risk factors.     Appearance:   Appropriate    Eye Contact:   Good    Psychomotor Behavior: Normal    Attitude:   Cooperative  Interested Pleasant    Orientation:   All   Speech    Rate / Production: Normal/ Responsive    Volume:  Normal    Mood:    Depressed  Client appeared down   Affect:    Appropriate    Thought Content:  Clear    Thought Form:  Coherent  Logical    Insight:    Fair      Medication Review:   No current psychiatric medications prescribed     Medication Compliance:   NA     Changes in Health Issues:   None reported     Chemical Use Review:   Substance Use: Chemical use reviewed, no active concerns identified      Tobacco Use: No current tobacco use.      Diagnosis:  1. Major depressive disorder, single episode, moderate degree (H)        Collateral Reports Completed:   Not Applicable    PLAN: (Patient Tasks / Therapist Tasks / Other)  Client will consider if there are benefits to increasing social support and return for therapy in two weeks.        [facility], [facility] 10/13/2022                                                         ______________________________________________________________________    Individual Treatment Plan    Patient's Name: Delroy Christianson  YOB: 1960    Date of Creation: 8/15/2022  Date Treatment Plan Last Reviewed/Revised: 8/15/2022    DSM5 Diagnoses: 296.22 (F32.1)  Major Depressive Disorder, Single Episode, Moderate With anxious distress  Psychosocial / Contextual Factors: Client reported ongoing feelings of sadness, loneliness, low motivation and apathy since retiring  PROMIS (reviewed every 90 days):     Referral /  Collaboration:  Referral to another professional/service is not indicated at this time..    Anticipated number of session for this episode of care: 9-12 sessions  Anticipation frequency of session: Every other week  Anticipated Duration of each session: 38-52 minutes  Treatment plan will be reviewed in 90 days or when goals have been changed.       MeasurableTreatment Goal(s) related to diagnosis / functional impairment(s)  Goal 1: Patient will increase social support system as evidenced by client reporting improved sense of connection and at least two people he talk to on a regular basis over the next three months.    I will know I've met my goal when I feel less lonely.      Objective #A (Patient Action)    Patient will Identify negative self-talk and behaviors: challenge core beliefs, myths, and actions.  Status: New - Date: 8/15/2022     Intervention(s)  Therapist will teach how to identify and challenge maladaptive aspects of core belief system .    Objective #B  Patient will identify areas of interest to pursue with others.  Status: New - Date: 8/15/2022     Intervention(s)  Therapist will assign homework to consider new interests  teach how to find others with similar interests.    Objective #C  Patient will emphasize positive events and moments of connection.  Status: New - Date: 8/15/2022     Intervention(s)  Therapist will teach emotional recognition/identification. increasing experience of kassi and being present with connection.         Patient has reviewed and agreed to the above plan.      SHASHA Hart MSW  August 15, 2022

## 2022-10-25 ENCOUNTER — VIRTUAL VISIT (OUTPATIENT)
Dept: PSYCHOLOGY | Facility: CLINIC | Age: 62
End: 2022-10-25
Payer: COMMERCIAL

## 2022-10-25 DIAGNOSIS — F32.1 MAJOR DEPRESSIVE DISORDER, SINGLE EPISODE, MODERATE DEGREE (H): Primary | ICD-10-CM

## 2022-10-25 PROCEDURE — 90834 PSYTX W PT 45 MINUTES: CPT | Mod: 95 | Performed by: SOCIAL WORKER

## 2022-10-25 NOTE — PROGRESS NOTES
[facility]                                     Progress Note    Patient Name: Delroy Christianson  Date: 10/25/2022         Service Type: Individual      Session Start Time: 12:30  Session End Time: 1:15     Session Length: 45 min    Session #: 6    Attendees: Client attended alone    Service Modality:  Video Visit:      Provider verified identity through the following two step process.  Patient provided:  Patient is known previously to provider    Telemedicine Visit: The patient's condition can be safely assessed and treated via synchronous audio and visual telemedicine encounter.      Reason for Telemedicine Visit: Services only offered telehealth    Originating Site (Patient Location): Patient's home    Distant Site (Provider Location): Provider Remote Setting- Home Office    Consent:  The patient/guardian has verbally consented to: the potential risks and benefits of telemedicine (video visit) versus in person care; bill my insurance or make self-payment for services provided; and responsibility for payment of non-covered services.     Patient would like the video invitation sent by:  My Chart  2  Mode of Communication:  Video Conference via Amwell    As the provider I attest to compliance with applicable laws and regulations related to telemedicine.    DATA  Interactive Complexity: No  Crisis: No        Progress Since Last Session (Related to Symptoms / Goals / Homework):   Symptoms: No change mood is stable    Homework: Achieved / completed to satisfaction Client reported considering benefits of social connections      Episode of Care Goals: Minimal progress - PREPARATION (Decided to change - considering how); Intervened by negotiating a change plan and determining options / strategies for behavior change, identifying triggers, exploring social supports, and working towards setting a date to begin behavior change     Current / Ongoing Stressors and Concerns:   Ongoing: Client reported ongoing  feelings of sadness, loneliness, low motivation and apathy since retiring  Current: Client reported he has been spending his time as usual and that not much has changed. He said he had a concerning interaction with an old family friend in an email. Client reported he was glad his friend was honest but it was distressing to hear of his hardships. He described how he doesn't think being vulnerable with others would lead to deepened friendships as it hasn't in the past.     Treatment Objective(s) Addressed in This Session:   Identify negative self-talk and behaviors: challenge core beliefs, myths, and actions       Intervention:   DBT: reviewed empathy client felt for friend, how to effectively provide support. Explored ways to connect to others, whether it's worth trying  Motivational Interviewing    MI Intervention: Expressed Empathy/Understanding, Supported Autonomy, Collaboration, Evocation, Permission to raise concern or advise, Open-ended questions and Reflections: simple and complex     Change Talk Expressed by the Patient: Desire to change Reasons to change    Provider Response to Change Talk: E - Evoked more info from patient about behavior change, A - Affirmed patient's thoughts, decisions, or attempts at behavior change, R - Reflected patient's change talk and S - Summarized patient's change talk statements      Assessments completed prior to visit:  The following assessments were completed by patient for this visit:  PHQ9:   PHQ-9 SCORE 4/20/2022   PHQ-9 Total Score MyChart 9 (Mild depression)   PHQ-9 Total Score 9     GAD7: No flowsheet data found.      ASSESSMENT: Current Emotional / Mental Status (status of significant symptoms):   Risk status (Self / Other harm or suicidal ideation)   Patient denies current fears or concerns for personal safety.   Patient denies current or recent suicidal ideation or behaviors.   Patient denies current or recent homicidal ideation or behaviors.   Patient denies current  or recent self injurious behavior or ideation.   Patient denies other safety concerns.   Patient reports there has been no change in risk factors since their last session.     Patient reports there has been no change in protective factors since their last session.     Recommended that patient call 911 or go to the local ED should there be a change in any of these risk factors.     Appearance:   Appropriate    Eye Contact:   Good    Psychomotor Behavior: Normal    Attitude:   Cooperative  Interested Pleasant    Orientation:   All   Speech    Rate / Production: Normal/ Responsive    Volume:  Normal    Mood:    Depressed  Client appeared concerned   Affect:    Appropriate    Thought Content:  Clear    Thought Form:  Coherent  Logical    Insight:    Fair      Medication Review:   No current psychiatric medications prescribed     Medication Compliance:   NA     Changes in Health Issues:   None reported     Chemical Use Review:   Substance Use: Chemical use reviewed, no active concerns identified      Tobacco Use: No current tobacco use.      Diagnosis:  1. Major depressive disorder, single episode, moderate degree (H)        Collateral Reports Completed:   Not Applicable    PLAN: (Patient Tasks / Therapist Tasks / Other)  Client will prepare to discuss therapy goals, consider whether doing things differently would lead to decreasing loneliness and return for therapy in two weeks.        Flor Mendieta, [facility] 10/25/2022                                                         ______________________________________________________________________    Individual Treatment Plan    Patient's Name: Delroy Christianson  YOB: 1960    Date of Creation: 8/15/2022  Date Treatment Plan Last Reviewed/Revised: 8/15/2022    DSM5 Diagnoses: 296.22 (F32.1)  Major Depressive Disorder, Single Episode, Moderate With anxious distress  Psychosocial / Contextual Factors: Client reported ongoing feelings of sadness, loneliness, low  motivation and apathy since retiring  PROMIS (reviewed every 90 days):     Referral / Collaboration:  Referral to another professional/service is not indicated at this time..    Anticipated number of session for this episode of care: 9-12 sessions  Anticipation frequency of session: Every other week  Anticipated Duration of each session: 38-52 minutes  Treatment plan will be reviewed in 90 days or when goals have been changed.       MeasurableTreatment Goal(s) related to diagnosis / functional impairment(s)  Goal 1: Patient will increase social support system as evidenced by client reporting improved sense of connection and at least two people he talk to on a regular basis over the next three months.    I will know I've met my goal when I feel less lonely.      Objective #A (Patient Action)    Patient will Identify negative self-talk and behaviors: challenge core beliefs, myths, and actions.  Status: New - Date: 8/15/2022     Intervention(s)  Therapist will teach how to identify and challenge maladaptive aspects of core belief system .    Objective #B  Patient will identify areas of interest to pursue with others.  Status: New - Date: 8/15/2022     Intervention(s)  Therapist will assign homework to consider new interests  teach how to find others with similar interests.    Objective #C  Patient will emphasize positive events and moments of connection.  Status: New - Date: 8/15/2022     Intervention(s)  Therapist will teach emotional recognition/identification. increasing experience of kassi and being present with connection.         Patient has reviewed and agreed to the above plan.      SHASHA Hart MSW  August 15, 2022

## 2022-10-28 ENCOUNTER — VIRTUAL VISIT (OUTPATIENT)
Dept: PEDIATRICS | Facility: CLINIC | Age: 62
End: 2022-10-28
Payer: COMMERCIAL

## 2022-10-28 ENCOUNTER — NURSE TRIAGE (OUTPATIENT)
Dept: NURSING | Facility: CLINIC | Age: 62
End: 2022-10-28

## 2022-10-28 DIAGNOSIS — J30.89 ENVIRONMENTAL AND SEASONAL ALLERGIES: ICD-10-CM

## 2022-10-28 DIAGNOSIS — H93.8X1 PRESSURE SENSATION IN RIGHT EAR: Primary | ICD-10-CM

## 2022-10-28 DIAGNOSIS — J34.89 SINUS PRESSURE: ICD-10-CM

## 2022-10-28 PROCEDURE — 99213 OFFICE O/P EST LOW 20 MIN: CPT | Mod: 95 | Performed by: NURSE PRACTITIONER

## 2022-10-28 RX ORDER — FLUTICASONE PROPIONATE 50 MCG
1 SPRAY, SUSPENSION (ML) NASAL DAILY
Qty: 9.9 ML | Refills: 0 | Status: SHIPPED | OUTPATIENT
Start: 2022-10-28 | End: 2022-12-27

## 2022-10-28 ASSESSMENT — PATIENT HEALTH QUESTIONNAIRE - PHQ9
SUM OF ALL RESPONSES TO PHQ QUESTIONS 1-9: 4
10. IF YOU CHECKED OFF ANY PROBLEMS, HOW DIFFICULT HAVE THESE PROBLEMS MADE IT FOR YOU TO DO YOUR WORK, TAKE CARE OF THINGS AT HOME, OR GET ALONG WITH OTHER PEOPLE: NOT DIFFICULT AT ALL
SUM OF ALL RESPONSES TO PHQ QUESTIONS 1-9: 4

## 2022-10-28 NOTE — PROGRESS NOTES
Delroy is a 62 year old who is being evaluated via a billable video visit.      How would you like to obtain your AVS? MyChart  If the video visit is dropped, the invitation should be resent by: Text to cell phone: [phone]  Will anyone else be joining your video visit? No        Assessment & Plan     Pressure sensation in right ear  Sinus pressure  Environmental and seasonal allergies  Likley due to allergies.  Patient to start Flonase and follow-up next week if still symptomatic.  Message sent to [facility] to follow-up with patient next week.  - fluticasone (FLONASE) 50 MCG/ACT nasal spray; Spray 1 spray into both nostrils daily    Return in about 1 week (around 11/4/2022) for Follow up: is still symptomatic.    Micaela Lomax NP  [facility] TESSAIRENE Potts is a 62 year old presenting for the following health issues:    No chief complaint on file.      HPI     Pressure in head for the last 5 days.  Right sided of face and right ear pain.  Right side sinus pressure.  Feels pressure sensation in ear.  Thinks he may have gotten water in his ears while showering several days ago.  Thinks it may be due to allergies.  Does have seasonal allergies.  Does not take antihistamine.  Does not take anything for the pain.  No sick symptoms.      Review of Systems   Constitutional, HEENT, cardiovascular, pulmonary, gi and gu systems are negative, except as otherwise noted.      Objective        Vitals:  No vitals were obtained today due to virtual visit.    Physical Exam   GENERAL: Healthy, alert and no distress  EYES: Eyes grossly normal to inspection.  No discharge or erythema, or obvious scleral/conjunctival abnormalities.  RESP: No audible wheeze, cough, or visible cyanosis.  No visible retractions or increased work of breathing.    SKIN: Visible skin clear. No significant rash, abnormal pigmentation or lesions.  NEURO: Cranial nerves grossly intact.  Mentation and speech appropriate for age.  PSYCH:  Mentation appears normal, affect normal/bright, judgement and insight intact, normal speech and appearance well-groomed.            Video-Visit Details    Video Start Time: 2:18 PM    Type of service:  Video Visit    Video End Time:2:29 PM    Originating Location (pt. Location): Home        Distant Location (provider location):  Off-site    Platform used for Video Visit: [employer]      Answers for HPI/ROS submitted by the patient on 10/28/2022  If you checked off any problems, how difficult have these problems made it for you to do your work, take care of things at home, or get along with other people?: Not difficult at all  PHQ9 TOTAL SCORE: 4  On average, how many sweetened beverages do you drink each day (Examples: soda, juice, sweet tea, etc.  Do NOT count diet or artificially sweetened beverages)?: 1  How many minutes a day do you exercise enough to make your heart beat faster?: 9 or less  How many days a week do you exercise enough to make your heart beat faster?: 3 or less  How many days per week do you miss taking your medication?: 0  What is the reason for your visit today?: Head pressure/pain centralized near right ear  When did your symptoms begin?: 3-7 days ago  What are your symptoms?: Head pressure/pain centralized near right ear  How would you describe these symptoms?: Moderate  Are your symptoms:: Staying the same  Have you had these symptoms before?: No  Is there anything that makes you feel worse?: Head movement  Is there anything that makes you feel better?: No

## 2022-10-28 NOTE — PATIENT INSTRUCTIONS
It was nice seeing you today.    Please let me know if you have any questions regarding today's visit!    Take care,    BLAS Lomax DNP  Family Medicine

## 2022-10-28 NOTE — TELEPHONE ENCOUNTER
"Pt calls to report R-sided headache x four days.  \"Like pressure in R side of head and pain near R ear.\"  Onset Oct 24th (four days).  \"Got water in my ear while showering just before symptoms started.\"  Pt therefore wonders if Eustachian tube has congestion and/or infection.    Persisting daily.  No improvement.  \"Feels internal.\"  \"Like a dull ache.\"  Pt reports \"tendency toward allergies.\"  Was outdoors on very windy day.\"  Uncertain if sinus symptoms may be occurring.  \"Feels tight on R side of head/face/ear.    \"If I shake my head, there's a pain spot in front of my R ear.\"  \"Located right at the jaw hinge in front of ear.\"  Occasional spontaneous throbs.  \"Just one or two hits (throbs), no repeated pulsating.\"  \"If touching top of head lightly on R side, painful.\"    Pt rates current pain 1/10.  \"If I shake my head, pain goes up to 2-to-3/10.  No fevers.  No nasal drainage.  \"No trouble functioning.\"    Pt has same-day video visit scheduled.  Discussed in-person clinical eval might be preferred, however no appt slots at any [facility] anywhere for many days (per checking again with a  now).  Pt therefore will keep video visit.  Discussed self-care ideas to rule out simple ear congestion, such as Afrin (per package directions) and warm fluids.  Pt agrees to plan.  Looks forward to more advice from provider during video visit.    [facility] Nurse Advisor     Reason for Disposition    Unexplained headache that is present > 24 hours    Additional Information    Negative: Difficult to awaken or acting confused (e.g., disoriented, slurred speech)    Negative: Weakness of the face, arm or leg on one side of the body and new-onset    Negative: Numbness of the face, arm or leg on one side of the body and new-onset    Negative: Loss of speech or garbled speech and new-onset    Negative: Passed out (i.e., fainted, collapsed and was not responding)    Negative: Sounds like a life-threatening " emergency to the triager    Negative: Followed a head injury within last 3 days    Negative: Traumatic Brain Injury (TBI) is suspected    Negative: Sinus pain of forehead and yellow or green nasal discharge    Negative: Pregnant    Negative: Unable to walk without falling    Negative: Stiff neck (can't touch chin to chest)    Negative: Possibility of carbon monoxide exposure    Negative: SEVERE headache, states 'worst headache' of life    Negative: SEVERE headache, sudden-onset (i.e., reaching maximum intensity within seconds to 1 hour)    Negative: Severe pain in one eye    Negative: Loss of vision or double vision  (Exception: Same as prior migraines.)    Negative: Patient sounds very sick or weak to the triager    Negative: Fever > 103 F (39.4 C)    Negative: Fever > 100.0 F (37.8 C) and has diabetes mellitus or a weak immune system (e.g., HIV positive, cancer chemotherapy, organ transplant, splenectomy, chronic steroids)    Negative: SEVERE headache (e.g., excruciating) and has had severe headaches before    Negative: SEVERE headache and not relieved by pain meds    Negative: SEVERE headache and vomiting    Negative: SEVERE headache and fever    Negative: New headache and weak immune system (e.g., HIV positive, cancer chemo, splenectomy, organ transplant, chronic steroids)    Negative: Fever present > 3 days (72 hours)    Negative: Patient wants to be seen    Protocols used: HEADACHE-A-OH

## 2022-11-02 ENCOUNTER — TELEPHONE (OUTPATIENT)
Dept: PEDIATRICS | Facility: CLINIC | Age: 62
End: 2022-11-02

## 2022-11-02 NOTE — TELEPHONE ENCOUNTER
He can continue the Flonase if he thinks it is helping with his allergy symptoms (since they clear up in the morning this could be why).  Otherwise he can discontinue if he'd like.

## 2022-11-02 NOTE — TELEPHONE ENCOUNTER
Called patient per Micaela Lomax's to check on symptoms, no answer. M requesting a call back directly to [facility] extension.     Salud Wisdom, [facility]  [phone]

## 2022-11-02 NOTE — TELEPHONE ENCOUNTER
Patient returned call, stated he is overall feeling better. Head and ear pain have resolved. Still having congestion in the morning and when he goes outside. Clears up in about an hour. Feels like normal allergies at this point.     Patient was wondering if he should continue to take the Flonase for 3-4 weeks or just until his symptoms completely resolve since they have gotten so much better?    Salud Wisdom, [facility]  [phone]

## 2022-11-03 NOTE — TELEPHONE ENCOUNTER
Called patient to discuss provider's recommendations, no answer. Left a detailed voicemail with the providers recommendations below.     Salud Wisdom, [facility]  [phone]

## 2022-11-08 ENCOUNTER — VIRTUAL VISIT (OUTPATIENT)
Dept: PSYCHOLOGY | Facility: CLINIC | Age: 62
End: 2022-11-08
Payer: COMMERCIAL

## 2022-11-08 DIAGNOSIS — F32.1 MAJOR DEPRESSIVE DISORDER, SINGLE EPISODE, MODERATE DEGREE (H): Primary | ICD-10-CM

## 2022-11-08 PROCEDURE — 90834 PSYTX W PT 45 MINUTES: CPT | Mod: 95 | Performed by: SOCIAL WORKER

## 2022-11-08 NOTE — PROGRESS NOTES
[facility]                                     Progress Note    Patient Name: Delroy Christianson  Date: 11/8/2022         Service Type: Individual      Session Start Time: 12:30  Session End Time: 1:15     Session Length: 45 min    Session #: 7    Attendees: Client attended alone    Service Modality:  Video Visit:      Provider verified identity through the following two step process.  Patient provided:  Patient is known previously to provider    Telemedicine Visit: The patient's condition can be safely assessed and treated via synchronous audio and visual telemedicine encounter.      Reason for Telemedicine Visit: Services only offered telehealth    Originating Site (Patient Location): Patient's home    Distant Site (Provider Location): Provider Remote Setting- Home Office    Consent:  The patient/guardian has verbally consented to: the potential risks and benefits of telemedicine (video visit) versus in person care; bill my insurance or make self-payment for services provided; and responsibility for payment of non-covered services.     Patient would like the video invitation sent by:  My Chart  2  Mode of Communication:  Video Conference via Amwell    As the provider I attest to compliance with applicable laws and regulations related to telemedicine.    DATA  Interactive Complexity: No  Crisis: No        Progress Since Last Session (Related to Symptoms / Goals / Homework):   Symptoms: No change mood is stable    Homework: Achieved / completed to satisfaction Client reported considering therapy goals      Episode of Care Goals: Minimal progress - PREPARATION (Decided to change - considering how); Intervened by negotiating a change plan and determining options / strategies for behavior change, identifying triggers, exploring social supports, and working towards setting a date to begin behavior change     Current / Ongoing Stressors and Concerns:   Ongoing: Client reported ongoing feelings of  sadness, loneliness, low motivation and apathy since retiring  Current: Client reported he has been thinking more about his relationships and how to improve them. Client reported talking more with his family friend that reached out who has stopped responding. He said he has always had a difficult time getting out of his comfort zone and since the pandemic it's been even harder.     Treatment Objective(s) Addressed in This Session:   Identify negative self-talk and behaviors: challenge core beliefs, myths, and actions       Intervention:   DBT: discussed how client can cope with sadness over losses, go outside comfort zone a little at a time  Motivational Interviewing    MI Intervention: Expressed Empathy/Understanding, Supported Autonomy, Collaboration, Evocation, Permission to raise concern or advise, Open-ended questions and Reflections: simple and complex     Change Talk Expressed by the Patient: Desire to change Reasons to change    Provider Response to Change Talk: E - Evoked more info from patient about behavior change, A - Affirmed patient's thoughts, decisions, or attempts at behavior change, R - Reflected patient's change talk and S - Summarized patient's change talk statements      Assessments completed prior to visit:  The following assessments were completed by patient for this visit:  PHQ9:   PHQ-9 SCORE 4/20/2022 10/28/2022   PHQ-9 Total Score MyChart 9 (Mild depression) 4 (Minimal depression)   PHQ-9 Total Score 9 4     GAD7: No flowsheet data found.      ASSESSMENT: Current Emotional / Mental Status (status of significant symptoms):   Risk status (Self / Other harm or suicidal ideation)   Patient denies current fears or concerns for personal safety.   Patient denies current or recent suicidal ideation or behaviors.   Patient denies current or recent homicidal ideation or behaviors.   Patient denies current or recent self injurious behavior or ideation.   Patient denies other safety  concerns.   Patient reports there has been no change in risk factors since their last session.     Patient reports there has been no change in protective factors since their last session.     Recommended that patient call 911 or go to the local ED should there be a change in any of these risk factors.     Appearance:   Appropriate    Eye Contact:   Good    Psychomotor Behavior: Normal    Attitude:   Cooperative  Interested Pleasant    Orientation:   All   Speech    Rate / Production: Normal/ Responsive    Volume:  Normal    Mood:    Depressed  Client appeared down   Affect:    Appropriate    Thought Content:  Clear    Thought Form:  Coherent  Logical    Insight:    Fair      Medication Review:   No current psychiatric medications prescribed     Medication Compliance:   NA     Changes in Health Issues:   None reported     Chemical Use Review:   Substance Use: Chemical use reviewed, no active concerns identified      Tobacco Use: No current tobacco use.      Diagnosis:  1. Major depressive disorder, single episode, moderate degree (H)        Collateral Reports Completed:   Not Applicable    PLAN: (Patient Tasks / Therapist Tasks / Other)  Client will challenge his comfort zone a little at a time and return for therapy in two weeks.        Flor Mendieta, [facility] 11/8/2022                                                         ______________________________________________________________________    Individual Treatment Plan    Patient's Name: Delroy Christianson  YOB: 1960    Date of Creation: 8/15/2022  Date Treatment Plan Last Reviewed/Revised: 8/15/2022    DSM5 Diagnoses: 296.22 (F32.1)  Major Depressive Disorder, Single Episode, Moderate With anxious distress  Psychosocial / Contextual Factors: Client reported ongoing feelings of sadness, loneliness, low motivation and apathy since retiring  PROMIS (reviewed every 90 days):     Referral / Collaboration:  Referral to another professional/service is not  indicated at this time..    Anticipated number of session for this episode of care: 9-12 sessions  Anticipation frequency of session: Every other week  Anticipated Duration of each session: 38-52 minutes  Treatment plan will be reviewed in 90 days or when goals have been changed.       MeasurableTreatment Goal(s) related to diagnosis / functional impairment(s)  Goal 1: Patient will increase social support system as evidenced by client reporting improved sense of connection and at least two people he talk to on a regular basis over the next three months.    I will know I've met my goal when I feel less lonely.      Objective #A (Patient Action)    Patient will Identify negative self-talk and behaviors: challenge core beliefs, myths, and actions.  Status: New - Date: 8/15/2022     Intervention(s)  Therapist will teach how to identify and challenge maladaptive aspects of core belief system .    Objective #B  Patient will identify areas of interest to pursue with others.  Status: New - Date: 8/15/2022     Intervention(s)  Therapist will assign homework to consider new interests  teach how to find others with similar interests.    Objective #C  Patient will emphasize positive events and moments of connection.  Status: New - Date: 8/15/2022     Intervention(s)  Therapist will teach emotional recognition/identification. increasing experience of kassi and being present with connection.         Patient has reviewed and agreed to the above plan.      SHASHA Hart MSW  August 15, 2022

## 2022-11-20 ENCOUNTER — HEALTH MAINTENANCE LETTER (OUTPATIENT)
Age: 62
End: 2022-11-20

## 2022-12-02 ENCOUNTER — VIRTUAL VISIT (OUTPATIENT)
Dept: PSYCHOLOGY | Facility: CLINIC | Age: 62
End: 2022-12-02
Payer: COMMERCIAL

## 2022-12-02 DIAGNOSIS — F32.1 MAJOR DEPRESSIVE DISORDER, SINGLE EPISODE, MODERATE DEGREE (H): Primary | ICD-10-CM

## 2022-12-02 PROCEDURE — 90834 PSYTX W PT 45 MINUTES: CPT | Mod: 95 | Performed by: SOCIAL WORKER

## 2022-12-02 NOTE — PROGRESS NOTES
[facility]                                     Progress Note    Patient Name: Delroy Christianson  Date: 12/2/2022         Service Type: Individual      Session Start Time: 12:00  Session End Time: 12:45     Session Length: 45 min    Session #: 8    Attendees: Client attended alone    Service Modality:  Video Visit:      Provider verified identity through the following two step process.  Patient provided:  Patient is known previously to provider    Telemedicine Visit: The patient's condition can be safely assessed and treated via synchronous audio and visual telemedicine encounter.      Reason for Telemedicine Visit: Services only offered telehealth    Originating Site (Patient Location): Patient's home    Distant Site (Provider Location): Provider Remote Setting- Home Office    Consent:  The patient/guardian has verbally consented to: the potential risks and benefits of telemedicine (video visit) versus in person care; bill my insurance or make self-payment for services provided; and responsibility for payment of non-covered services.     Patient would like the video invitation sent by:  My Chart  2  Mode of Communication:  Video Conference via Amwell    As the provider I attest to compliance with applicable laws and regulations related to telemedicine.    DATA  Interactive Complexity: No  Crisis: No        Progress Since Last Session (Related to Symptoms / Goals / Homework):   Symptoms: No change mood is stable    Homework: Achieved / completed to satisfaction Client reported challenging his comfort zone a little       Episode of Care Goals: Minimal progress - PREPARATION (Decided to change - considering how); Intervened by negotiating a change plan and determining options / strategies for behavior change, identifying triggers, exploring social supports, and working towards setting a date to begin behavior change     Current / Ongoing Stressors and Concerns:   Ongoing: Client reported ongoing  feelings of sadness, loneliness, low motivation and apathy since retiring  Current: Client reported he got through the holiday okay though this time of year is hard for him. He said he has talked with an old coworker which was nice. Client reported still feeling stuck between wanting more connection with others and not believing it will happen. Reviewed and updated treatment plan.     Treatment Objective(s) Addressed in This Session:   Identify negative self-talk and behaviors: challenge core beliefs, myths, and actions       Intervention:   DBT: reviewed whether client was willing to try different things to see if it would help. Explored benefits he felt talking with others.  Motivational Interviewing  Reviewed and updated treatment plan.  MI Intervention: Expressed Empathy/Understanding, Supported Autonomy, Collaboration, Evocation, Permission to raise concern or advise, Open-ended questions and Reflections: simple and complex     Change Talk Expressed by the Patient: Desire to change Reasons to change    Provider Response to Change Talk: E - Evoked more info from patient about behavior change, A - Affirmed patient's thoughts, decisions, or attempts at behavior change, R - Reflected patient's change talk and S - Summarized patient's change talk statements      Assessments completed prior to visit:  The following assessments were completed by patient for this visit:  PHQ9:   PHQ-9 SCORE 4/20/2022 10/28/2022   PHQ-9 Total Score MyChart 9 (Mild depression) 4 (Minimal depression)   PHQ-9 Total Score 9 4     GAD7: No flowsheet data found.      ASSESSMENT: Current Emotional / Mental Status (status of significant symptoms):   Risk status (Self / Other harm or suicidal ideation)   Patient denies current fears or concerns for personal safety.   Patient denies current or recent suicidal ideation or behaviors.   Patient denies current or recent homicidal ideation or behaviors.   Patient denies current or recent self  injurious behavior or ideation.   Patient denies other safety concerns.   Patient reports there has been no change in risk factors since their last session.     Patient reports there has been no change in protective factors since their last session.     Recommended that patient call 911 or go to the local ED should there be a change in any of these risk factors.     Appearance:   Appropriate    Eye Contact:   Good    Psychomotor Behavior: Normal    Attitude:   Cooperative  Interested Pleasant    Orientation:   All   Speech    Rate / Production: Normal/ Responsive    Volume:  Normal    Mood:    Depressed  Client appeared down   Affect:    Appropriate    Thought Content:  Clear    Thought Form:  Coherent  Logical    Insight:    Fair      Medication Review:   No current psychiatric medications prescribed     Medication Compliance:   NA     Changes in Health Issues:   None reported     Chemical Use Review:   Substance Use: Chemical use reviewed, no active concerns identified      Tobacco Use: No current tobacco use.      Diagnosis:  1. Major depressive disorder, single episode, moderate degree (H)        Collateral Reports Completed:   Not Applicable    PLAN: (Patient Tasks / Therapist Tasks / Other)  Client will seek opportuniities to talk with others and be vulnerable and return for therapy in two weeks.        Flor Mendieta [facility] MSW 12/2/2022                                                         ______________________________________________________________________    Individual Treatment Plan    Patient's Name: Delroy Christianson  YOB: 1960    Date of Creation: 8/15/2022  Date Treatment Plan Last Reviewed/Revised: 12/2/2022    DSM5 Diagnoses: 296.22 (F32.1)  Major Depressive Disorder, Single Episode, Moderate With anxious distress  Psychosocial / Contextual Factors: Client reported ongoing feelings of sadness, loneliness, low motivation and apathy since retiring  PROMIS (reviewed every 90 days):      Referral / Collaboration:  Referral to another professional/service is not indicated at this time..    Anticipated number of session for this episode of care: 9-12 sessions  Anticipation frequency of session: Every other week  Anticipated Duration of each session: 38-52 minutes  Treatment plan will be reviewed in 90 days or when goals have been changed.       MeasurableTreatment Goal(s) related to diagnosis / functional impairment(s)  Goal 1: Patient will increase social support system as evidenced by client reporting improved sense of connection and at least two people he talk to on a regular basis over the next three months.    I will know I've met my goal when I feel less lonely.      Objective #A (Patient Action)    Patient will Identify negative self-talk and behaviors: challenge core beliefs, myths, and actions.  Status: Continued - Date(s): 12/2/2022     Intervention(s)  Therapist will teach how to identify and challenge maladaptive aspects of core belief system .    Objective #B  Patient will identify areas of interest to pursue with others.  Status: Continued - Date(s): 12/2/2022     Intervention(s)  Therapist will assign homework to consider new interests  teach how to find others with similar interests.    Objective #C  Patient will emphasize positive events and moments of connection.  Status: Continued - Date(s): 12/2/2022     Intervention(s)  Therapist will teach emotional recognition/identification. increasing experience of kassi and being present with connection.         Patient has reviewed and agreed to the above plan.      SHASHA Hart MSW  December 2, 2022

## 2022-12-26 DIAGNOSIS — H93.8X1 PRESSURE SENSATION IN RIGHT EAR: ICD-10-CM

## 2022-12-27 RX ORDER — FLUTICASONE PROPIONATE 50 MCG
1 SPRAY, SUSPENSION (ML) NASAL DAILY
Qty: 9.9 ML | Refills: 3 | Status: SHIPPED | OUTPATIENT
Start: 2022-12-27 | End: 2024-08-08

## 2022-12-27 NOTE — TELEPHONE ENCOUNTER
Routing refill request to provider for review/approval because:  Acute issue, please advise on continued use.     Wilfredo Mansfield RN on 12/27/2022 at 2:35 PM

## 2023-01-03 ENCOUNTER — FCC EXTENDED DOCUMENTATION (OUTPATIENT)
Dept: PSYCHOLOGY | Facility: CLINIC | Age: 63
End: 2023-01-03

## 2023-01-03 ENCOUNTER — VIRTUAL VISIT (OUTPATIENT)
Dept: PSYCHOLOGY | Facility: CLINIC | Age: 63
End: 2023-01-03
Payer: COMMERCIAL

## 2023-01-03 DIAGNOSIS — F32.1 MAJOR DEPRESSIVE DISORDER, SINGLE EPISODE, MODERATE DEGREE (H): Primary | ICD-10-CM

## 2023-01-03 PROCEDURE — 90834 PSYTX W PT 45 MINUTES: CPT | Mod: 95 | Performed by: SOCIAL WORKER

## 2023-01-03 NOTE — PROGRESS NOTES
Referral/Transfer of an [facility] Client to Another [facility] Therapist    CLIENT'S NAME: Delroy Christianson   DATE of Referral/Transfer Request:  January 3, 2023    Referral/Transfer Request Information    Transfer for the same service: Therapist Initiated    Client Phone #:  [phone] (home)        Telephone Information:   Mobile [phone]         Facilitating Referral/Transfer: Intake Pool     Current Therapist: SHASHA Hart      Therapist Receiving Referral/Transfer: Federico Ortiz    Referral/Transfer is for: Individual    Rationale for Referral/Transfer: (to be completed by [facility] staff person initiating the referral)  Situation: (What is going on with the client?)  Client has experienced a lot of grief with the death of his brother and both parents. He has been isolated since retiring from work.      Background: (What is the clinical background or context?)  Client reported becoming very isolated and lonely during the pandemic.       Assessment: (What do you think the problem is?)   Client struggles to find connection and similar interests with others.       Recommendation: (What would you do to correct it?)  Motivational interviewing around increasing social support system      Referral/Transfer Status:    Therapist Initiated Referral:  Therapist contacted  to review insurance implications of referral.  Insurance benefits information was communicated to the client.   Therapist receiving referral has been informed of and has accepted the referral/Was routed this form in an inbasket message  Referral/Transfer Completion Date: 1/3/2022.  Completed by: SHASHA Chavez MSW  January 3, 2023

## 2023-01-03 NOTE — PROGRESS NOTES
Discharge Summary  Multiple Sessions    Client Name: Delroy Christianson MRN#: [id number] YOB: 1960    Discharge Date:   January 3, 2023    Service Modality: Video Visit:      Provider verified identity through the following two step process.  Patient provided:  Patient is known previously to provider    Telemedicine Visit: The patient's condition can be safely assessed and treated via synchronous audio and visual telemedicine encounter.      Reason for Telemedicine Visit: Services only offered telehealth    Originating Site (Patient Location): Patient's home    Distant Site (Provider Location): Provider Remote Setting- Home Office    Consent:  The patient/guardian has verbally consented to: the potential risks and benefits of telemedicine (video visit) versus in person care; bill my insurance or make self-payment for services provided; and responsibility for payment of non-covered services.     Patient would like the video invitation sent by:  My Chart    Mode of Communication:  Video Conference via [employer]    Distant Location (Provider):  Off-site    As the provider I attest to compliance with applicable laws and regulations related to telemedicine.    Service Type: Individual      Session Start Time: 2:30  Session End Time:       Session Length: 45 - 50     Session #: 9     Attendees: Client attended alone      Focus of Treatment Objective(s):  Client's presenting concerns included: Depressed Mood - low mood, sadness, isolation  Stage of Change at time of Discharge: PREPARATION (Decided to change - considering how)      Medication Adherence:  NA    Chemical Use:  No    Assessment: Current Emotional / Mental Status (status of significant symptoms):    Risk status (Self / Other harm or suicidal ideation)  Client denies current fears or concerns for personal safety.  Client denies current or recent suicidal ideation or behaviors.  Client denies current or recent homicidal ideation or  behaviors.  Client denies current or recent self injurious behavior or ideation.  Client denies other safety concerns.  A safety and risk management plan has not been developed at this time, however client was given the after-hours number should there be a change in any of these risk factors.    Appearance:   Appropriate   Eye Contact:   Good   Psychomotor Behavior: Normal   Attitude:   Cooperative  Interested Pleasant  Orientation:   All  Speech   Rate / Production: Normal    Volume:  Normal   Mood:    Anxious  Client appeared nervous  Affect:    Appropriate   Thought Content:  Clear   Thought Form:  Coherent  Logical   Insight:   Good     DSM5 Diagnoses: (Sustained by DSM5 Criteria Listed Above)  Diagnoses: 296.22 (F32.1)  Major Depressive Disorder, Single Episode, Moderate _  Psychosocial & Contextual Factors: Client reported having continued isolation, sadness over the holidays    Reason for Discharge:  Referred to Federico Ortiz, therapist leaving system      Aftercare Plan:  Client may resume counseling services at any time in the future by calling the [facility] Intake Office, [phone].       Flor Mendieta, SHASHA MSW January 3, 2023

## 2023-06-02 ENCOUNTER — HEALTH MAINTENANCE LETTER (OUTPATIENT)
Age: 63
End: 2023-06-02

## 2023-06-14 ENCOUNTER — ANCILLARY PROCEDURE (OUTPATIENT)
Dept: GENERAL RADIOLOGY | Facility: CLINIC | Age: 63
End: 2023-06-14
Attending: PHYSICIAN ASSISTANT
Payer: COMMERCIAL

## 2023-06-14 ENCOUNTER — OFFICE VISIT (OUTPATIENT)
Dept: URGENT CARE | Facility: URGENT CARE | Age: 63
End: 2023-06-14
Payer: COMMERCIAL

## 2023-06-14 VITALS
DIASTOLIC BLOOD PRESSURE: 80 MMHG | SYSTOLIC BLOOD PRESSURE: 164 MMHG | TEMPERATURE: 98 F | RESPIRATION RATE: 20 BRPM | OXYGEN SATURATION: 96 % | HEART RATE: 80 BPM

## 2023-06-14 DIAGNOSIS — R06.00 DYSPNEA, UNSPECIFIED TYPE: ICD-10-CM

## 2023-06-14 DIAGNOSIS — T59.811A SMOKE INHALATION: Primary | ICD-10-CM

## 2023-06-14 DIAGNOSIS — F41.9 ANXIETY: ICD-10-CM

## 2023-06-14 PROCEDURE — 99214 OFFICE O/P EST MOD 30 MIN: CPT | Performed by: PHYSICIAN ASSISTANT

## 2023-06-14 PROCEDURE — 71046 X-RAY EXAM CHEST 2 VIEWS: CPT | Mod: TC | Performed by: RADIOLOGY
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
## 2023-06-17 ENCOUNTER — HOSPITAL ENCOUNTER (EMERGENCY)
Facility: CLINIC | Age: 63
Discharge: HOME | End: 2023-06-17
Attending: STUDENT IN AN ORGANIZED HEALTH CARE EDUCATION/TRAINING PROGRAM | Admitting: STUDENT IN AN ORGANIZED HEALTH CARE EDUCATION/TRAINING PROGRAM
Payer: COMMERCIAL

## 2023-06-17 ENCOUNTER — APPOINTMENT (OUTPATIENT)
Dept: GENERAL RADIOLOGY | Facility: CLINIC | Age: 63
End: 2023-06-17
Attending: STUDENT IN AN ORGANIZED HEALTH CARE EDUCATION/TRAINING PROGRAM
Payer: COMMERCIAL

## 2023-06-17 VITALS
BODY MASS INDEX: 27.09 KG/M2 | TEMPERATURE: 98 F | OXYGEN SATURATION: 97 % | HEART RATE: 72 BPM | DIASTOLIC BLOOD PRESSURE: 94 MMHG | HEIGHT: 72 IN | WEIGHT: 200 LBS | RESPIRATION RATE: 18 BRPM | SYSTOLIC BLOOD PRESSURE: 139 MMHG

## 2023-06-17 DIAGNOSIS — Z77.22 PASSIVE SMOKE EXPOSURE: ICD-10-CM

## 2023-06-17 DIAGNOSIS — R07.9 CHEST PAIN, UNSPECIFIED TYPE: ICD-10-CM

## 2023-06-17 DIAGNOSIS — R06.02 SHORTNESS OF BREATH: ICD-10-CM

## 2023-06-17 LAB
ANION GAP SERPL CALCULATED.3IONS-SCNC: 14 MMOL/L (ref 7–15)
BASOPHILS # BLD AUTO: 0 10E3/UL (ref 0–0.2)
BASOPHILS NFR BLD AUTO: 1 %
BUN SERPL-MCNC: 16.4 MG/DL (ref 8–23)
CALCIUM SERPL-MCNC: 9.4 MG/DL (ref 8.8–10.2)
CHLORIDE SERPL-SCNC: 105 MMOL/L (ref 98–107)
CREAT SERPL-MCNC: 1.29 MG/DL (ref 0.67–1.17)
DEPRECATED HCO3 PLAS-SCNC: 18 MMOL/L (ref 22–29)
EOSINOPHIL # BLD AUTO: 0.2 10E3/UL (ref 0–0.7)
EOSINOPHIL NFR BLD AUTO: 3 %
ERYTHROCYTE [DISTWIDTH] IN BLOOD BY AUTOMATED COUNT: 13.2 % (ref 10–15)
GFR SERPL CREATININE-BSD FRML MDRD: 62 ML/MIN/1.73M2
GLUCOSE SERPL-MCNC: 89 MG/DL (ref 70–99)
HCT VFR BLD AUTO: 51.2 % (ref 40–53)
HGB BLD-MCNC: 17.3 G/DL (ref 13.3–17.7)
IMM GRANULOCYTES # BLD: 0 10E3/UL
IMM GRANULOCYTES NFR BLD: 0 %
LYMPHOCYTES # BLD AUTO: 1 10E3/UL (ref 0.8–5.3)
LYMPHOCYTES NFR BLD AUTO: 18 %
MCH RBC QN AUTO: 31.5 PG (ref 26.5–33)
MCHC RBC AUTO-ENTMCNC: 33.8 G/DL (ref 31.5–36.5)
MCV RBC AUTO: 93 FL (ref 78–100)
MONOCYTES # BLD AUTO: 0.6 10E3/UL (ref 0–1.3)
MONOCYTES NFR BLD AUTO: 11 %
NEUTROPHILS # BLD AUTO: 3.9 10E3/UL (ref 1.6–8.3)
NEUTROPHILS NFR BLD AUTO: 67 %
NRBC # BLD AUTO: 0 10E3/UL
NRBC BLD AUTO-RTO: 0 /100
PLATELET # BLD AUTO: 227 10E3/UL (ref 150–450)
POTASSIUM SERPL-SCNC: 4.1 MMOL/L (ref 3.4–5.3)
RBC # BLD AUTO: 5.49 10E6/UL (ref 4.4–5.9)
SODIUM SERPL-SCNC: 137 MMOL/L (ref 136–145)
TROPONIN T SERPL HS-MCNC: 9 NG/L
TROPONIN T SERPL HS-MCNC: 9 NG/L
WBC # BLD AUTO: 5.8 10E3/UL (ref 4–11)

## 2023-06-17 PROCEDURE — 85025 COMPLETE CBC W/AUTO DIFF WBC: CPT | Performed by: STUDENT IN AN ORGANIZED HEALTH CARE EDUCATION/TRAINING PROGRAM

## 2023-06-17 PROCEDURE — 80048 BASIC METABOLIC PNL TOTAL CA: CPT | Performed by: STUDENT IN AN ORGANIZED HEALTH CARE EDUCATION/TRAINING PROGRAM

## 2023-06-17 PROCEDURE — 84484 ASSAY OF TROPONIN QUANT: CPT | Performed by: STUDENT IN AN ORGANIZED HEALTH CARE EDUCATION/TRAINING PROGRAM

## 2023-06-17 PROCEDURE — 71046 X-RAY EXAM CHEST 2 VIEWS: CPT

## 2023-06-17 PROCEDURE — 99285 EMERGENCY DEPT VISIT HI MDM: CPT | Mod: 25

## 2023-06-17 PROCEDURE — 93005 ELECTROCARDIOGRAM TRACING: CPT

## 2023-06-17 PROCEDURE — 36415 COLL VENOUS BLD VENIPUNCTURE: CPT | Performed by: STUDENT IN AN ORGANIZED HEALTH CARE EDUCATION/TRAINING PROGRAM

## 2023-06-17 RX ORDER — IPRATROPIUM BROMIDE AND ALBUTEROL SULFATE 2.5; .5 MG/3ML; MG/3ML
3 SOLUTION RESPIRATORY (INHALATION)
Status: DISCONTINUED | OUTPATIENT
Start: 2023-06-17 | End: 2023-06-17

## 2023-06-17 ASSESSMENT — ACTIVITIES OF DAILY LIVING (ADL)
ADLS_ACUITY_SCORE: 33
ADLS_ACUITY_SCORE: 35
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
## 2023-06-19 LAB
ATRIAL RATE - MUSE: 79 BPM
DIASTOLIC BLOOD PRESSURE - MUSE: NORMAL MMHG
INTERPRETATION ECG - MUSE: NORMAL
P AXIS - MUSE: 33 DEGREES
PR INTERVAL - MUSE: 134 MS
QRS DURATION - MUSE: 100 MS
QT - MUSE: 406 MS
QTC - MUSE: 465 MS
R AXIS - MUSE: -7 DEGREES
SYSTOLIC BLOOD PRESSURE - MUSE: NORMAL MMHG
T AXIS - MUSE: 78 DEGREES
VENTRICULAR RATE- MUSE: 79 BPM

## 2023-06-19 SDOH — HEALTH STABILITY: PHYSICAL HEALTH: ON AVERAGE, HOW MANY MINUTES DO YOU ENGAGE IN EXERCISE AT THIS LEVEL?: 0 MIN

## 2023-06-19 SDOH — ECONOMIC STABILITY: FOOD INSECURITY: WITHIN THE PAST 12 MONTHS, YOU WORRIED THAT YOUR FOOD WOULD RUN OUT BEFORE YOU GOT MONEY TO BUY MORE.: NEVER TRUE

## 2023-06-19 SDOH — ECONOMIC STABILITY: INCOME INSECURITY: HOW HARD IS IT FOR YOU TO PAY FOR THE VERY BASICS LIKE FOOD, HOUSING, MEDICAL CARE, AND HEATING?: NOT VERY HARD

## 2023-06-19 SDOH — ECONOMIC STABILITY: FOOD INSECURITY: WITHIN THE PAST 12 MONTHS, THE FOOD YOU BOUGHT JUST DIDN'T LAST AND YOU DIDN'T HAVE MONEY TO GET MORE.: NEVER TRUE

## 2023-06-19 SDOH — HEALTH STABILITY: PHYSICAL HEALTH: ON AVERAGE, HOW MANY DAYS PER WEEK DO YOU ENGAGE IN MODERATE TO STRENUOUS EXERCISE (LIKE A BRISK WALK)?: 0 DAYS

## 2023-06-19 SDOH — ECONOMIC STABILITY: INCOME INSECURITY: IN THE LAST 12 MONTHS, WAS THERE A TIME WHEN YOU WERE NOT ABLE TO PAY THE MORTGAGE OR RENT ON TIME?: NO

## 2023-06-19 ASSESSMENT — SOCIAL DETERMINANTS OF HEALTH (SDOH)
DO YOU BELONG TO ANY CLUBS OR ORGANIZATIONS SUCH AS CHURCH GROUPS UNIONS, FRATERNAL OR ATHLETIC GROUPS, OR SCHOOL GROUPS?: NO
ARE YOU MARRIED, WIDOWED, DIVORCED, SEPARATED, NEVER MARRIED, OR LIVING WITH A PARTNER?: NEVER MARRIED
IN A TYPICAL WEEK, HOW MANY TIMES DO YOU TALK ON THE PHONE WITH FAMILY, FRIENDS, OR NEIGHBORS?: NEVER
HOW OFTEN DO YOU ATTEND CHURCH OR RELIGIOUS SERVICES?: NEVER
HOW OFTEN DO YOU GET TOGETHER WITH FRIENDS OR RELATIVES?: NEVER

## 2023-06-19 ASSESSMENT — ENCOUNTER SYMPTOMS
SORE THROAT: 1
DIZZINESS: 0
DIARRHEA: 0
ABDOMINAL PAIN: 0
NERVOUS/ANXIOUS: 1
PALPITATIONS: 0
PARESTHESIAS: 0
FREQUENCY: 0
CHILLS: 0
ARTHRALGIAS: 0
HEARTBURN: 0
EYE PAIN: 0
NAUSEA: 0
HEADACHES: 0
FEVER: 0
CONSTIPATION: 0
COUGH: 1
JOINT SWELLING: 0
HEMATURIA: 0
HEMATOCHEZIA: 0
MYALGIAS: 0
WEAKNESS: 1
SHORTNESS OF BREATH: 0
DYSURIA: 0

## 2023-06-19 ASSESSMENT — LIFESTYLE VARIABLES
HOW OFTEN DO YOU HAVE A DRINK CONTAINING ALCOHOL: NEVER
HOW MANY STANDARD DRINKS CONTAINING ALCOHOL DO YOU HAVE ON A TYPICAL DAY: PATIENT DOES NOT DRINK
AUDIT-C TOTAL SCORE: 0
HOW OFTEN DO YOU HAVE SIX OR MORE DRINKS ON ONE OCCASION: NEVER
SKIP TO QUESTIONS 9-10: 1

## 2023-06-21 ENCOUNTER — OFFICE VISIT (OUTPATIENT)
Dept: PEDIATRICS | Facility: CLINIC | Age: 63
End: 2023-06-21
Payer: COMMERCIAL

## 2023-06-21 VITALS
WEIGHT: 180.8 LBS | HEIGHT: 69 IN | HEART RATE: 80 BPM | SYSTOLIC BLOOD PRESSURE: 132 MMHG | RESPIRATION RATE: 16 BRPM | DIASTOLIC BLOOD PRESSURE: 81 MMHG | TEMPERATURE: 97.2 F | BODY MASS INDEX: 26.78 KG/M2 | OXYGEN SATURATION: 96 %

## 2023-06-21 DIAGNOSIS — N18.30 STAGE 3 CHRONIC KIDNEY DISEASE, UNSPECIFIED WHETHER STAGE 3A OR 3B CKD (H): ICD-10-CM

## 2023-06-21 DIAGNOSIS — Z12.5 SCREENING FOR PROSTATE CANCER: ICD-10-CM

## 2023-06-21 DIAGNOSIS — Z00.00 ROUTINE GENERAL MEDICAL EXAMINATION AT A HEALTH CARE FACILITY: Primary | ICD-10-CM

## 2023-06-21 DIAGNOSIS — F33.1 MAJOR DEPRESSIVE DISORDER, RECURRENT EPISODE, MODERATE (H): ICD-10-CM

## 2023-06-21 DIAGNOSIS — I42.2 HYPERTROPHIC CARDIOMYOPATHY (H): ICD-10-CM

## 2023-06-21 DIAGNOSIS — I10 BENIGN ESSENTIAL HYPERTENSION: ICD-10-CM

## 2023-06-21 DIAGNOSIS — Z12.11 SCREEN FOR COLON CANCER: ICD-10-CM

## 2023-06-21 DIAGNOSIS — E78.5 HYPERLIPIDEMIA LDL GOAL <100: ICD-10-CM

## 2023-06-21 PROCEDURE — 99396 PREV VISIT EST AGE 40-64: CPT | Performed by: INTERNAL MEDICINE

## 2023-06-21 PROCEDURE — 99213 OFFICE O/P EST LOW 20 MIN: CPT | Mod: 25 | Performed by: INTERNAL MEDICINE

## 2023-06-21 RX ORDER — ROSUVASTATIN CALCIUM 10 MG/1
10 TABLET, COATED ORAL DAILY
Qty: 60 TABLET | Refills: 2 | Status: SHIPPED | OUTPATIENT
Start: 2023-06-21 | End: 2023-08-15

## 2023-06-21 RX ORDER — SPIRONOLACTONE 25 MG/1
25 TABLET ORAL DAILY
Qty: 90 TABLET | Refills: 3 | Status: SHIPPED | OUTPATIENT
Start: 2023-06-21 | End: 2024-06-24

## 2023-06-21 ASSESSMENT — ENCOUNTER SYMPTOMS
EYE PAIN: 0
HEMATOCHEZIA: 0
SHORTNESS OF BREATH: 0
DIZZINESS: 0
ABDOMINAL PAIN: 0
ARTHRALGIAS: 0
HEMATURIA: 0
CHILLS: 0
DYSURIA: 0
FEVER: 0
PARESTHESIAS: 0
HEARTBURN: 0
NAUSEA: 0
HEADACHES: 0
COUGH: 1
FREQUENCY: 0
JOINT SWELLING: 0
DIARRHEA: 0
WEAKNESS: 1
CONSTIPATION: 0
MYALGIAS: 0
NERVOUS/ANXIOUS: 1
SORE THROAT: 1
PALPITATIONS: 0

## 2023-06-21 ASSESSMENT — PAIN SCALES - GENERAL: PAINLEVEL: NO PAIN (0)

## 2023-06-21 ASSESSMENT — PATIENT HEALTH QUESTIONNAIRE - PHQ9
SUM OF ALL RESPONSES TO PHQ QUESTIONS 1-9: 12
SUM OF ALL RESPONSES TO PHQ QUESTIONS 1-9: 12
10. IF YOU CHECKED OFF ANY PROBLEMS, HOW DIFFICULT HAVE THESE PROBLEMS MADE IT FOR YOU TO DO YOUR WORK, TAKE CARE OF THINGS AT HOME, OR GET ALONG WITH OTHER PEOPLE: VERY DIFFICULT

## 2023-06-21 NOTE — PATIENT INSTRUCTIONS
Care Coordination will contact you.  Start rosuvastatin for cholesterrol  Return for repeat fasting labwork in 2 months    Preventive Health Recommendations  Male Ages 50 - 64    Yearly exam:             See your health care provider every year in order to  o   Review health changes.   o   Discuss preventive care.    o   Review your medicines if your doctor has prescribed any.   Have a cholesterol test every 5 years, or more frequently if you are at risk for high cholesterol/heart disease.   Have a diabetes test (fasting glucose) every three years. If you are at risk for diabetes, you should have this test more often.   Have a colonoscopy at age 50, or have a yearly FIT test (stool test). These exams will check for colon cancer.    Talk with your health care provider about whether or not a prostate cancer screening test (PSA) is right for you.  You should be tested each year for STDs (sexually transmitted diseases), if you re at risk.     Shots: Get a flu shot each year. Get a tetanus shot every 10 years.     Nutrition:  Eat at least 5 servings of fruits and vegetables daily.   Eat whole-grain bread, whole-wheat pasta and brown rice instead of white grains and rice.   Get adequate Calcium and Vitamin D.     Lifestyle  Exercise for at least 150 minutes a week (30 minutes a day, 5 days a week). This will help you control your weight and prevent disease.   Limit alcohol to one drink per day.   No smoking.   Wear sunscreen to prevent skin cancer.   See your dentist every six months for an exam and cleaning.   See your eye doctor every 1 to 2 years.
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
## 2023-06-22 ENCOUNTER — PATIENT OUTREACH (OUTPATIENT)
Dept: CARE COORDINATION | Facility: CLINIC | Age: 63
End: 2023-06-22
Payer: COMMERCIAL

## 2023-06-22 ENCOUNTER — MYC MEDICAL ADVICE (OUTPATIENT)
Dept: PEDIATRICS | Facility: CLINIC | Age: 63
End: 2023-06-22
Payer: COMMERCIAL

## 2023-06-22 NOTE — PROGRESS NOTES
Clinic Care Coordination Contact  Community Health Worker Initial Outreach    CHW Initial Information Gathering:  Referral Source: PCP  Preferred Hospital: [facility]  [phone]  Preferred Urgent Care: [facility] [place], [phone]  Current living arrangement:: I live alone  Type of residence:: Apartment  Community Resources: Financial/Insurance  No PCP office visit in Past Year: No  Transportation means:: Regular car  CHW Additional Questions  If ED/Hospital discharge, follow-up appointment scheduled as recommended?: N/A  Medication changes made following ED/Hospital discharge?: N/A  MyChart active?: Yes  Patient sent Social Determinants of Health questionnaire?: No    CHW introduced self and role with CC  Patient shares that he is going through a lot right now. He doesn't know exactly what he needs and thought PCP has something specific in mind.   He shares that late last year he was meeting with a therapist through  virtually but they moved, might think about restarting?  Wondering what specifically PCP was thinking of. Moving from 1 hotel to another tomorrow and will be busy.   He asks for SW/CC team to chat with PCP to find out more details.    Patient accepts CC: Yes. Patient scheduled for assessment with CC on 6/26 at 2:00pm. Patient noted desire to discuss possible resources for support.     ENRIQUE Morrow, B.S. [facility]  Clinic Care Coordination  [facility] Clinics:  [place], [place] and [place]  [phone]  [email or web address]

## 2023-06-23 ENCOUNTER — TELEPHONE (OUTPATIENT)
Dept: PEDIATRICS | Facility: CLINIC | Age: 63
End: 2023-06-23
Payer: COMMERCIAL

## 2023-06-23 NOTE — TELEPHONE ENCOUNTER
Joshua,    Pt called   He is unable to complete check in thru MyChart  He wants to make sure his appt does not get cancelled if he cannot complete check in    Advised to pt that I would send message to the  and that his appt will not be cancelled    Angelica Quintero RN on 6/23/2023 at 11:44 AM

## 2023-06-23 NOTE — TELEPHONE ENCOUNTER
He thought he had to check in. [employer] sent him a message    Angelica Quintero RN on 6/23/2023 at 3:38 PM

## 2023-06-23 NOTE — TELEPHONE ENCOUNTER
"I'm not sure I'm understanding. I have a phone assessment scheduled with him 6/26/23. These \"appointments\" do not request check in by the patient. There was no intention to cancel any appointment scheduled with him unless he is needing to reschedule or no longer needs it.     Thanks!     Joshua Duran, hospitals  Clinic Care Coordinator  [facility]  [phone]  [email or web address]      "

## 2023-06-26 ENCOUNTER — PATIENT OUTREACH (OUTPATIENT)
Dept: NURSING | Facility: CLINIC | Age: 63
End: 2023-06-26
Payer: COMMERCIAL

## 2023-06-26 DIAGNOSIS — F33.1 MAJOR DEPRESSIVE DISORDER, RECURRENT EPISODE, MODERATE (H): Primary | ICD-10-CM

## 2023-06-26 ASSESSMENT — ACTIVITIES OF DAILY LIVING (ADL): DEPENDENT_IADLS:: INDEPENDENT
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
## 2023-06-26 NOTE — LETTER
[facility] CARE COORDINATION  [address] [place] [address]    July 6, 2023    Delroy Christianson  [address]  [place] [address]      Dear Delroy,        I am a  clinic care coordinator who works with Chuy Holliday MD with the [facility]. I wanted to thank you for spending the time to talk with me.  Below is a description of clinic care coordination and how I can further assist you.       The clinic care coordination team is made up of a registered nurse, , financial resource worker and community health worker who understand the health care system. The goal of clinic care coordination is to help you manage your health and improve access to the health care system. Our team works alongside your provider to assist you in determining your health and social needs. We can help you obtain health care and community resources, providing you with necessary information and education. We can work with you through any barriers and develop a care plan that helps coordinate and strengthen the communication between you and your care team.  Our services are voluntary and are offered without charge to you personally.    Please feel free to contact me with any questions or concerns regarding care coordination and what we can offer.      We are focused on providing you with the highest-quality healthcare experience possible.    Sincerely,     Joshua Duran [facility]  Clinic Care Coordinator  [facility]  [phone]  [email or web address]    Enclosed: I have enclosed a copy of the Patient Centered Plan of Care. This has helpful information and goals that we have talked about. Please keep this in an easy to access place to use as needed.

## 2023-06-29 NOTE — TELEPHONE ENCOUNTER
Called patient, scheduled virtual follow-up appointment.     Salud Wisdom FRANCHESCA  [phone]       Schizoaffective disorder, bipolar type Deferred condition on axis II Cantharidin Pregnancy And Lactation Text: This medication has not been proven safe during pregnancy. It is unknown if this medication is excreted in breast milk.

## 2023-07-06 ENCOUNTER — PATIENT OUTREACH (OUTPATIENT)
Dept: CARE COORDINATION | Facility: CLINIC | Age: 63
End: 2023-07-06
Payer: COMMERCIAL

## 2023-07-06 ASSESSMENT — ACTIVITIES OF DAILY LIVING (ADL): DEPENDENT_IADLS:: INDEPENDENT

## 2023-07-06 NOTE — PROGRESS NOTES
"Clinic Care Coordination Contact  Follow Up Progress Note     Enrollment Date: 6/21/2023     Assessment: Pt called [facility] seemingly confused r/t Adult MH Referral.     [facility] explained reason for referral and offered reminder of plan from last outreach. [facility] explained pt was first going to call his previous provider to determine if she had capacity to provide therapy for pt again. Pt expressed understanding. [facility] explained Adult MH referral was going to be a \"plan B\" in the event pt couldn't schedule with previous provider. Pt expressed understanding.     Pt states he will call his previous provider and schedule initial intake and update [facility] with findings.      Care Gaps:    Health Maintenance Due   Topic Date Due     MICROALBUMIN  Never done     DEPRESSION ACTION PLAN  Never done     Pneumococcal Vaccine: Pediatrics (0 to 5 Years) and At-Risk Patients (6 to 64 Years) (1 - PCV) Never done     COLORECTAL CANCER SCREENING  Never done     DTAP/TDAP/TD IMMUNIZATION (1 - Tdap) Never done     ZOSTER IMMUNIZATION (1 of 2) Never done     COVID-19 Vaccine (4 - Pfizer series) 06/15/2022     LIPID  04/20/2023           Care Plan: Mental Health     Problem: Mental Health Symptoms Need Improvement     Goal: Improve management of mental health symptoms and establish with mental health/psychosocial supports     Start Date: 6/26/2023 Expected End Date: 12/1/2023    Note:     Goal Statement: I will continue to take steps to futher support my overall mental health and emotional wellbeing over the next 6 month(s).  Barriers: Numerous life stressors  Strengths: Wanting to connect with previous therapist  Patient expressed understanding of goal: yes    Action steps to achieve this goal:  1. I will call to scheduling an initial appointment with a counselor.  2. I will follow up with scheduled appointments as recommended  3. I will take medications as prescribed.   4. I will contact my care team with questions, concerns, support needs. "   5. I will use the clinic as a resource and I understand I can contact my clinic with 24/7 after hours services available.   6. I will discuss, review, schedule and complete any recommended overdue health maintenance with my provider/care team.  7. I will continue to outreach to care coordination as needed for additional resources or supports.                              Patient is actively working to accomplish goal and patient's goal remains appropriate and relevant at this time.     Intervention/Education provided during outreach: Adult MH referral process      Complex Care Plan:  Patient is not due for Complex Care Plan to be updated.  Care Plan updated and mailed to patient: No    Annual Assessment due before next Outreach: No  Scheduled: Not Applicable    Plan: Pt to call to schedule apt with previous therapist.  If no update from pt, Care Coordinator will follow up in 4 weeks.     Joshua Duran [facility]  Clinic Care Coordinator  [facility]  [phone]  [email or web address]

## 2023-07-10 NOTE — LETTER
[facility]  Patient Centered Plan of Care  About Me:        Patient Name:  Delroy Christianson    YOB: 1960  Age:         63 year old   Pablo MRN:    [id number] Telephone Information:  Home Phone [phone]   Mobile [phone]       Address:  [address] [place] [address] [place] [address] Email address:  [email or web address]      Emergency Contact(s)    Name Relationship Lgl Grd Work Phone Home Phone Mobile Phone   Anton. ALEJANDRINA CHRISTIANSON Brother    [phone]           Primary language:  English     needed? No   [employer]:  [phone] op. 1  Other communication barriers:No data recorded  Preferred Method of Communication:  Mail  Current living arrangement: I live alone    Mobility Status/ Medical Equipment: Independent        Health Maintenance  Health Maintenance Reviewed:   Health Maintenance Due   Topic Date Due    MICROALBUMIN  Never done    DEPRESSION ACTION PLAN  Never done    Pneumococcal Vaccine: Pediatrics (0 to 5 Years) and At-Risk Patients (6 to 64 Years) (1 - PCV) Never done    COLORECTAL CANCER SCREENING  Never done    DTAP/TDAP/TD IMMUNIZATION (1 - Tdap) Never done    ZOSTER IMMUNIZATION (1 of 2) Never done    COVID-19 Vaccine (4 - Pfizer series) 06/15/2022    LIPID  04/20/2023         My Access Plan  Medical Emergency 911   Primary Clinic Line [facility] - [phone]   24 Hour Appointment Line [phone] or  [phone]) (toll-free)   24 Hour Nurse Line [phone] (toll-free)   Preferred Urgent Care [facility], [phone]     Preferred Hospital [facility]  [phone]     [id number] - [place] - [address]  AT [address]     Behavioral Health Crisis Line The National Suicide Prevention Lifeline at 1-936.420.2801 or Text/Call 438             My Care Team Members  Patient Care Team          Yes, that would be fine.  Also make sure he has it rechecked in about 1-2 weeks.  Thanks.   Relationship Specialty Notifications Start End    Chuy Holliday MD PCP - General Internal Medicine  4/28/20     Phone: [phone] Fax: [phone] [address] [place] [address]    Chuy Holliday MD Assigned PCP   5/3/20     Phone: [phone] Fax: [phone] [address] [place] [address]    Salud Wisdom Personal Advocate & Liaison (PAL)  Admissions 4/25/22     Flor Mendieta LICSW   - Clinical  8/8/22     Phone: [phone]          [address], [facility] [address] [place] [address]    Joshua Duran LSW Lead Care Coordinator Primary Care - CC Admissions 6/22/23     Phone: [phone]                   My Care Plans  Self Management and Treatment Plan  Care Plan  Care Plan: Mental Health       Problem: Mental Health Symptoms Need Improvement       Goal: Improve management of mental health symptoms and establish with mental health/psychosocial supports       Start Date: 6/26/2023 Expected End Date: 12/1/2023    Note:     Goal Statement: I will continue to take steps to futher support my overall mental health and emotional wellbeing over the next 6 month(s).  Barriers: Numerous life stressors  Strengths: Wanting to connect with previous therapist  Patient expressed understanding of goal: yes    Action steps to achieve this goal:  I will call to scheduling an initial appointment with a counselor.  I will follow up with scheduled appointments as recommended  I will take medications as prescribed.   I will contact my care team with questions, concerns, support needs.   I will use the clinic as a resource and I understand I can contact my clinic with 24/7 after hours services available.   I will discuss, review, schedule and complete any recommended overdue health maintenance with my provider/care team.  I will continue to outreach to care coordination as needed for additional resources or supports.                                             My  Medical and Care Information  Problem List   Patient Active Problem List   Diagnosis    Hypertrophic cardiomyopathy (H)    Prolonged QT interval    Hiatal hernia    CKD (chronic kidney disease) stage 3, GFR 30-59 ml/min (H)    Benign essential hypertension    Major depressive disorder, recurrent episode, moderate (H)      Current Medications and Allergies:     Allergies   Allergen Reactions    Seasonal Allergies      Current Outpatient Medications   Medication    calcium carbonate (TUMS) 500 MG chewable tablet    fluticasone (FLONASE) 50 MCG/ACT nasal spray    rosuvastatin (CRESTOR) 10 MG tablet    spironolactone (ALDACTONE) 25 MG tablet     No current facility-administered medications for this visit.         Care Coordination Start Date: 6/21/2023   Frequency of Care Coordination: monthly     Form Last Updated: 07/06/2023

## 2023-08-15 ENCOUNTER — PATIENT OUTREACH (OUTPATIENT)
Dept: CARE COORDINATION | Facility: CLINIC | Age: 63
End: 2023-08-15
Payer: COMMERCIAL

## 2023-08-15 ASSESSMENT — ACTIVITIES OF DAILY LIVING (ADL): DEPENDENT_IADLS:: INDEPENDENT

## 2023-08-15 NOTE — PROGRESS NOTES
Clinic Care Coordination Contact  [facility]/Voicemail       Clinical Data: Care Coordinator Outreach  Outreach attempted x 1.  Left message on patient's voicemail with call back information and requested return call.  Plan:  Care Coordinator will try to reach patient again in 10 business days.    Joshua Duran [facility]  Clinic Care Coordinator  [facility]  [phone]  [email or web address]

## 2023-08-31 ASSESSMENT — ACTIVITIES OF DAILY LIVING (ADL): DEPENDENT_IADLS:: INDEPENDENT

## 2023-08-31 NOTE — PROGRESS NOTES
Clinic Care Coordination Contact  Follow Up Progress Note     Enrollment Date: 6/21/2023     Assessment: [facility] outreached to pt on this date. Pt shares she has been able to move back into his townhouse which has been a good shift and stress reliever. Pt is uncertain if he wants to do anything more about his MH and states he is on the waitlist for his previous therapist still. Pt would like to shift to Maintenance status to see if his MH improves being home and if he comes up on the waitlist for his previous therapist. If at the 2 month check in he is still needing to establish with MH and feels he wants to we can explore this further at that time.     No new needs. Enrollment changed to Maintenance.     Care Gaps:    Health Maintenance Due   Topic Date Due    MICROALBUMIN  Never done    DEPRESSION ACTION PLAN  Never done    Pneumococcal Vaccine: Pediatrics (0 to 5 Years) and At-Risk Patients (6 to 64 Years) (1 - PCV) Never done    COLORECTAL CANCER SCREENING  Never done    DTAP/TDAP/TD IMMUNIZATION (1 - Tdap) Never done    ZOSTER IMMUNIZATION (1 of 2) Never done    COVID-19 Vaccine (4 - Pfizer series) 06/15/2022    LIPID  04/20/2023         Care Plan: Mental Health       Problem: Mental Health Symptoms Need Improvement       Goal: Improve management of mental health symptoms and establish with mental health/psychosocial supports  Completed 8/31/2023      Start Date: 6/26/2023 Expected End Date: 12/1/2023    This Visit's Progress: 100%    Note:     Goal Statement: I will continue to take steps to futher support my overall mental health and emotional wellbeing over the next 6 month(s).  Barriers: Numerous life stressors  Strengths: Wanting to connect with previous therapist  Patient expressed understanding of goal: yes    Action steps to achieve this goal:  I will call to scheduling an initial appointment with a counselor.  I will follow up with scheduled appointments as recommended  I will take medications as  prescribed.   I will contact my care team with questions, concerns, support needs.   I will use the clinic as a resource and I understand I can contact my clinic with 24/7 after hours services available.   I will discuss, review, schedule and complete any recommended overdue health maintenance with my provider/care team.  I will continue to outreach to care coordination as needed for additional resources or supports.                                  Intervention/Education provided during outreach: Care Coordination role/availabiility.      Outreach Frequency: 2 months    Complex Care Plan:  Patient is not due for Complex Care Plan to be updated.  Care Plan updated and mailed to patient: No    Annual Assessment due before next Outreach: No  Scheduled: Not Applicable    Plan:  Care Coordinator will follow up in 2 months.     Joshua Duran [facility]  Clinic Care Coordinator  [facility]  [phone]  [email or web address]

## 2023-09-07 ENCOUNTER — OFFICE VISIT (OUTPATIENT)
Dept: PEDIATRICS | Facility: CLINIC | Age: 63
End: 2023-09-07
Payer: COMMERCIAL

## 2023-09-07 VITALS
TEMPERATURE: 96.9 F | DIASTOLIC BLOOD PRESSURE: 80 MMHG | OXYGEN SATURATION: 96 % | BODY MASS INDEX: 26.48 KG/M2 | SYSTOLIC BLOOD PRESSURE: 118 MMHG | WEIGHT: 178.8 LBS | HEART RATE: 76 BPM | RESPIRATION RATE: 20 BRPM | HEIGHT: 69 IN

## 2023-09-07 DIAGNOSIS — Z12.11 SCREEN FOR COLON CANCER: ICD-10-CM

## 2023-09-07 DIAGNOSIS — I42.2 HYPERTROPHIC CARDIOMYOPATHY (H): Primary | ICD-10-CM

## 2023-09-07 DIAGNOSIS — E78.5 HYPERLIPIDEMIA LDL GOAL <100: ICD-10-CM

## 2023-09-07 PROCEDURE — 84460 ALANINE AMINO (ALT) (SGPT): CPT | Performed by: INTERNAL MEDICINE

## 2023-09-07 PROCEDURE — 90715 TDAP VACCINE 7 YRS/> IM: CPT | Performed by: INTERNAL MEDICINE

## 2023-09-07 PROCEDURE — 80061 LIPID PANEL: CPT | Performed by: INTERNAL MEDICINE

## 2023-09-07 PROCEDURE — 99214 OFFICE O/P EST MOD 30 MIN: CPT | Mod: 25 | Performed by: INTERNAL MEDICINE

## 2023-09-07 PROCEDURE — 90471 IMMUNIZATION ADMIN: CPT | Performed by: INTERNAL MEDICINE

## 2023-09-07 PROCEDURE — 36415 COLL VENOUS BLD VENIPUNCTURE: CPT | Performed by: INTERNAL MEDICINE

## 2023-09-07 ASSESSMENT — ANXIETY QUESTIONNAIRES
6. BECOMING EASILY ANNOYED OR IRRITABLE: SEVERAL DAYS
IF YOU CHECKED OFF ANY PROBLEMS ON THIS QUESTIONNAIRE, HOW DIFFICULT HAVE THESE PROBLEMS MADE IT FOR YOU TO DO YOUR WORK, TAKE CARE OF THINGS AT HOME, OR GET ALONG WITH OTHER PEOPLE: SOMEWHAT DIFFICULT
1. FEELING NERVOUS, ANXIOUS, OR ON EDGE: SEVERAL DAYS
5. BEING SO RESTLESS THAT IT IS HARD TO SIT STILL: NOT AT ALL
4. TROUBLE RELAXING: NOT AT ALL
2. NOT BEING ABLE TO STOP OR CONTROL WORRYING: SEVERAL DAYS
7. FEELING AFRAID AS IF SOMETHING AWFUL MIGHT HAPPEN: SEVERAL DAYS
3. WORRYING TOO MUCH ABOUT DIFFERENT THINGS: NOT AT ALL
GAD7 TOTAL SCORE: 4
GAD7 TOTAL SCORE: 4

## 2023-09-07 ASSESSMENT — PATIENT HEALTH QUESTIONNAIRE - PHQ9
SUM OF ALL RESPONSES TO PHQ QUESTIONS 1-9: 4
10. IF YOU CHECKED OFF ANY PROBLEMS, HOW DIFFICULT HAVE THESE PROBLEMS MADE IT FOR YOU TO DO YOUR WORK, TAKE CARE OF THINGS AT HOME, OR GET ALONG WITH OTHER PEOPLE: SOMEWHAT DIFFICULT
SUM OF ALL RESPONSES TO PHQ QUESTIONS 1-9: 4

## 2023-09-07 ASSESSMENT — PAIN SCALES - GENERAL: PAINLEVEL: NO PAIN (0)

## 2023-09-07 NOTE — PROGRESS NOTES
Assessment & Plan     (I42.2) Hypertrophic cardiomyopathy (H)  (primary encounter diagnosis)  Comment: Recommended cardiology follow-up   Plan: Adult Cardiology Eval  Referral          (E78.5) Hyperlipidemia LDL goal <100  Comment: Tolerating rosuvastatin, lipid panel improved.  Plan: Lipid panel reflex to direct LDL Fasting, ALT          (Z12.11) Screen for colon cancer  Comment: Due for colonoscopy.  Plan: Colonoscopy Screening  Referral            Chuy Holliday MD  [facility] [place] is a 63 year old, presenting for the following health issues:  <9>High Cholesterol (F/U)      9/7/2023     3:10 PM   Additional Questions   Roomed by Sky Bell   Accompanied by N/A         9/7/2023     3:10 PM   Patient Reported Additional Medications   Patient reports taking the following new medications No       History of Present Illness       Hyperlipidemia:  He presents for follow up of hyperlipidemia.   He is taking medication to lower cholesterol. He is not having myalgia or other side effects to statin medications.    He eats 2-3 servings of fruits and vegetables daily.He consumes 0 sweetened beverage(s) daily.He exercises with enough effort to increase his heart rate 9 or less minutes per day.  He exercises with enough effort to increase his heart rate 3 or less days per week.   He is taking medications regularly.     Recent Labs   Lab Test 09/07/23  1552 04/20/22  1428   CHOL 149 249*   HDL 39* 35*   LDL 84 176*   TRIG 129 188*        History of hypertrophic cardiomyopathy.  Has previously declined further follow-up with cardiology.  Recent cardiac MRI reviewed.  Asymptomatic.    Patient Active Problem List   Diagnosis    Hypertrophic cardiomyopathy (H)    Prolonged QT interval    Hiatal hernia    CKD (chronic kidney disease) stage 3, GFR 30-59 ml/min (H)    Benign essential hypertension    Major depressive disorder, recurrent episode, moderate (H)     Current  "Outpatient Medications   Medication Sig Dispense Refill    calcium carbonate (TUMS) 500 MG chewable tablet Take 1 chew tab by mouth as needed for heartburn      fluticasone (FLONASE) 50 MCG/ACT nasal spray Spray 1 spray into both nostrils daily 9.9 mL 3    rosuvastatin (CRESTOR) 10 MG tablet Take 1 tablet (10 mg) by mouth daily 90 tablet 3    spironolactone (ALDACTONE) 25 MG tablet Take 1 tablet (25 mg) by mouth daily 90 tablet 3        Review of Systems         Objective    /80 (BP Location: Right arm, Patient Position: Sitting, Cuff Size: Adult Regular)   Pulse 76   Temp 96.9  F (36.1  C) (Tympanic)   Resp 20   Ht 1.753 m (5' 9\")   Wt 81.1 kg (178 lb 12.8 oz)   SpO2 96%   BMI 26.40 kg/m    Body mass index is 26.4 kg/m .  Physical Exam   GENERAL: alert and no distress  NECK: no adenopathy, no asymmetry, masses, or scars and thyroid normal to palpation  RESP: lungs clear to auscultation - no rales, rhonchi or wheezes  CV: regular rate and rhythm, normal S1 S2, no S3 or S4, no murmur, click or rub, no peripheral edema and peripheral pulses strong  MS: no gross musculoskeletal defects noted, no edema  PSYCH: mentation appears normal, affect normal/bright                "

## 2023-09-08 LAB
ALT SERPL W P-5'-P-CCNC: 32 U/L (ref 0–70)
CHOLEST SERPL-MCNC: 149 MG/DL
HDLC SERPL-MCNC: 39 MG/DL
LDLC SERPL CALC-MCNC: 84 MG/DL
NONHDLC SERPL-MCNC: 110 MG/DL
TRIGL SERPL-MCNC: 129 MG/DL

## 2023-09-10 ENCOUNTER — TELEPHONE (OUTPATIENT)
Dept: PEDIATRICS | Facility: CLINIC | Age: 63
End: 2023-09-10
Payer: COMMERCIAL

## 2023-09-10 DIAGNOSIS — I42.2 HYPERTROPHIC CARDIOMYOPATHY (H): Primary | ICD-10-CM

## 2023-09-10 NOTE — TELEPHONE ENCOUNTER
Please call Delroy.  History of hypertrophic cardiomyopathy.  We have previously discussed reestablishing care with cardiology-referral recently placed /he will be contacted to schedule a visit with cardiology

## 2023-09-11 ENCOUNTER — MYC MEDICAL ADVICE (OUTPATIENT)
Dept: PEDIATRICS | Facility: CLINIC | Age: 63
End: 2023-09-11
Payer: COMMERCIAL

## 2023-09-11 NOTE — TELEPHONE ENCOUNTER
Called and informed patient of referral placement and number to contact for scheduling.     Solomon PRABHAKAR RN 9/11/2023 at 1:13 PM

## 2023-09-11 NOTE — TELEPHONE ENCOUNTER
We discussed this referral at prior visits , pt was reluctant at the time.  I recommend meeting with cardiology regarding his history of hypertrophic cardiomyopathy.  Please give referral information

## 2023-09-11 NOTE — TELEPHONE ENCOUNTER
Attempted to reach patient, [facility].     Solomon PRABHAKAR RN 9/11/2023 at 9:48 AM

## 2023-09-11 NOTE — TELEPHONE ENCOUNTER
Patient calling back.  Advised of below provider message.  States he does not know what this is about.  States this is out of the blue and was not discussed at 9/7/23 visit.  Reviewed visit notes.  He states this was not discussed at visit.  He states the only thing discussed was a colonoscopy and that he would be called to be scheduled.  Please advise.  Jen Torres RN    9/7/2023  [facility] [place]    History of hypertrophic cardiomyopathy.  Has previously declined further follow-up with cardiology.  Recent cardiac MRI reviewed.  Asymptomatic.     Assessment & Plan   (I42.2) Hypertrophic cardiomyopathy (H)  (primary encounter diagnosis)  Comment: Recommended cardiology follow-up   Plan: Adult Cardiology Eval  Referral          Chuy Holliday MD  [facility] [place]

## 2023-09-12 ENCOUNTER — TELEPHONE (OUTPATIENT)
Dept: CARDIOLOGY | Facility: CLINIC | Age: 63
End: 2023-09-12
Payer: COMMERCIAL

## 2023-09-12 DIAGNOSIS — I42.2 HYPERTROPHIC CARDIOMYOPATHY (H): Primary | ICD-10-CM

## 2023-09-12 DIAGNOSIS — R94.31 PROLONGED QT INTERVAL: ICD-10-CM

## 2023-09-12 DIAGNOSIS — I10 BENIGN ESSENTIAL HYPERTENSION: ICD-10-CM

## 2023-09-12 NOTE — TELEPHONE ENCOUNTER
[facility] Call Center    Phone Message    May a detailed message be left on voicemail: yes     Reason for Call: Appointment Intake    Referring Provider Name: Chuy Holliday MD    Diagnosis and/or Symptoms:     Hypertrophic cardiomyopathy (H) [I42.2]   Please assist patient with scheduling and prefers [facility].     Action Taken: Other: Cardio    Travel Screening: Not Applicable

## 2023-09-13 ENCOUNTER — TELEPHONE (OUTPATIENT)
Dept: GASTROENTEROLOGY | Facility: CLINIC | Age: 63
End: 2023-09-13
Payer: COMMERCIAL

## 2023-09-13 NOTE — TELEPHONE ENCOUNTER
"Endoscopy Scheduling Screen    Have you had a positive Covid test in the last 14 days?  No    Are you active on MyChart?   Yes    What insurance is in the chart?  Other:  BCBS    Ordering/Referring Provider:     SHERYL RUBIO      (If ordering provider performs procedure, schedule with ordering provider unless otherwise instructed. )    BMI: Estimated body mass index is 26.4 kg/m  as calculated from the following:    Height as of 9/7/23: 1.753 m (5' 9\").    Weight as of 9/7/23: 81.1 kg (178 lb 12.8 oz).     Sedation Ordered  moderate sedation.   If patient BMI > 50 do not schedule in [facility].    If patient BMI > 45 do not schedule at [facility].    Are you taking methadone or Suboxone?  No    Are you taking any prescription medications for pain 3 or more times per week?   No    Do you have a history of malignant hyperthermia or adverse reaction to anesthesia?  No    (Females) Are you currently pregnant?        Have you been diagnosed or told you have pulmonary hypertension?   No    Do you have an LVAD?  No    Have you been told you have moderate to severe sleep apnea?  No    Have you been told you have COPD, asthma, or any other lung disease?  No    Do you have any heart conditions?  Yes     In the past 6 months, have you had any hospitalizations for heart related issues including cardiomyopathy, heart attack, or stent placement?  No    Do you have any implantable devices in your body (pacemaker, ICD)?  No    Do you take nitroglycerine?  No    Have you ever had an organ transplant?   No    Have you ever had or are you awaiting a heart or lung transplant?   No    Have you had a stroke or transient ischemic attack (TIA aka \"mini stroke\" in the last 6 months?   No    Have you been diagnosed with or been told you have cirrhosis of the liver?   No    Are you currently on dialysis?   No    Do you need assistance transferring?   No    BMI: Estimated body mass index is 26.4 kg/m  as calculated from the following:    Height as " "of 9/7/23: 1.753 m (5' 9\").    Weight as of 9/7/23: 81.1 kg (178 lb 12.8 oz).     Is patients BMI > 40 and scheduling location [facility]?  No    Do you take an injectable medication for weight loss or diabetes (excluding insulin)?  No    Do you take the medication Naltrexone?  No    Do you take blood thinners?  No       Prep   Are you currently on dialysis or do you have chronic kidney disease?  Yes (Golytely Prep)    Do you have a diagnosis of diabetes?  No    Do you have a diagnosis of cystic fibrosis (CF)?  No    On a regular basis do you go 3 -5 days between bowel movements?  Yes (Extended Prep)    BMI > 40?  No    Preferred Pharmacy:    [id number] - [place] - [address]  [place] [address] [facility]  [address] [place] [address]  Phone: [phone] Fax: [phone]      Final Scheduling Details   Colonoscopy prep sent?  Golytely Extended Prep    Procedure scheduled  Colonoscopy    Surgeon:       Date of procedure: DOES NOT HAVE A PERSON TO STAY WITH HIM WILL THINK ABOUT IT AND CALL BACK IF HE CAN FIND SOMEONE OR DO NO SEDATION        Pre-OP / PAC:   No - Not required for this site.    Location  [facility] - Per order.    Sedation   Moderate Sedation - Per order.      Patient Reminders:   You will receive a call from a Nurse to review instructions and health history.  This assessment must be completed prior to your procedure.  Failure to complete the Nurse assessment may result in the procedure being cancelled.      On the day of your procedure, please designate an adult(s) who can drive you home stay with you for the next 24 hours. The medicines used in the exam will make you sleepy. You will not be able to drive.      You cannot take public transportation, ride share services, or non-medical taxi service without a responsible caregiver.  Medical transport services are allowed with the requirement that a responsible caregiver will receive you at your destination.  We require that " drivers and caregivers are confirmed prior to your procedure.

## 2023-09-14 NOTE — TELEPHONE ENCOUNTER
Spoke with patient, went over testing, Dr Haddad reviewed CMRI and recommended an echo prior. Patient is not ready to schedule at this time but possibly open to consult and discussing HCM with Dr Haddad. Gave direct line, pt will call back if and when he is ready to schedule.

## 2023-09-15 NOTE — TELEPHONE ENCOUNTER
Date: 9/15/2023    Time of Call: 3:01 PM     Diagnosis:  HCM     [ TORB ] Ordering provider: Juan Luis Haddad MD  Order: appt in CV genetics     Order received by: Susan Higginbotham RN      Follow-up/additional notes: pt would only like to do consult with no testing at this time. Would like to speak with Dr Haddad for more information first

## 2023-11-01 ENCOUNTER — PATIENT OUTREACH (OUTPATIENT)
Dept: CARE COORDINATION | Facility: CLINIC | Age: 63
End: 2023-11-01
Payer: COMMERCIAL

## 2023-11-01 ASSESSMENT — ACTIVITIES OF DAILY LIVING (ADL): DEPENDENT_IADLS:: INDEPENDENT

## 2023-11-01 NOTE — PROGRESS NOTES
Clinic Care Coordination Contact  [facility]/Voicemail    Clinical Data: Care Coordinator Outreach    Outreach Documentation Number of Outreach Attempt   11/1/2023  10:56 AM 1       Left message on patient's voicemail with call back information and requested return call.    Plan: Care Coordinator will try to reach patient again in 10 business days.    Joshua Duran [facility]  Clinic Care Coordinator  [facility]  [phone]  [email or web address]

## 2023-11-14 ENCOUNTER — PATIENT OUTREACH (OUTPATIENT)
Dept: CARE COORDINATION | Facility: CLINIC | Age: 63
End: 2023-11-14
Payer: COMMERCIAL

## 2023-11-14 NOTE — PROGRESS NOTES
Clinic Care Coordination Contact  [facility]/Voicemail    Clinical Data: Care Coordinator Outreach    Outreach Documentation Number of Outreach Attempt   11/1/2023  10:56 AM 1   11/14/2023  10:31 AM 2       Left message on patient's voicemail with call back information and requested return call.    Plan: Care Coordinator will send unable to contact letter with care coordinator contact information via [employer]. Care Coordinator will try to reach patient again in 1 month.      CHATO Garcia / CHATO Aiken  [facility] Primary Care   Care Coordination  [facility]  11/14/2023 10:35 AM

## 2023-11-14 NOTE — LETTER
[facility] CARE COORDINATION  [address] [place] [address]     November 14, 2023    Delroy Christianson  [address]  [place] [address]      Dear Delroy,    I am a clinic care coordinator who works with Chuy Holliday MD with the [facility]. We have been trying to reach you recently to  review your goals and see if you have any further needs for Care Coordination.  Below is a description of clinic care coordination and how I can further assist you.       The clinic care coordination team is made up of a registered nurse, , financial resource worker and community health worker who understand the health care system. The goal of clinic care coordination is to help you manage your health and improve access to the health care system. Our team works alongside your provider to assist you in determining your health and social needs. We can help you obtain health care and community resources, providing you with necessary information and education. We can work with you through any barriers and develop a care plan that helps coordinate and strengthen the communication between you and your care team.  Our services are voluntary and are offered without charge to you personally.    Please feel free to contact CHATO Joyner at [phone], with any questions or concerns regarding care coordination and what we can offer.      We are focused on providing you with the highest-quality healthcare experience possible.    Sincerely,       CHATO Garcia / CHATO Aiken  [facility] Primary Care   Care Coordination  [facility]  11/14/2023 10:34 AM

## 2023-12-04 ENCOUNTER — OFFICE VISIT (OUTPATIENT)
Dept: URGENT CARE | Facility: URGENT CARE | Age: 63
End: 2023-12-04
Payer: COMMERCIAL

## 2023-12-04 ENCOUNTER — TELEPHONE (OUTPATIENT)
Dept: PEDIATRICS | Facility: CLINIC | Age: 63
End: 2023-12-04

## 2023-12-04 VITALS
BODY MASS INDEX: 28.07 KG/M2 | OXYGEN SATURATION: 100 % | DIASTOLIC BLOOD PRESSURE: 92 MMHG | TEMPERATURE: 97.8 F | SYSTOLIC BLOOD PRESSURE: 133 MMHG | WEIGHT: 190.1 LBS | RESPIRATION RATE: 16 BRPM | HEART RATE: 103 BPM

## 2023-12-04 DIAGNOSIS — F41.9 ANXIETY: Primary | ICD-10-CM

## 2023-12-04 DIAGNOSIS — R00.0 RACING HEART BEAT: ICD-10-CM

## 2023-12-04 PROCEDURE — 93000 ELECTROCARDIOGRAM COMPLETE: CPT | Mod: 76 | Performed by: STUDENT IN AN ORGANIZED HEALTH CARE EDUCATION/TRAINING PROGRAM

## 2023-12-04 PROCEDURE — 99215 OFFICE O/P EST HI 40 MIN: CPT | Mod: 25 | Performed by: STUDENT IN AN ORGANIZED HEALTH CARE EDUCATION/TRAINING PROGRAM

## 2023-12-04 RX ORDER — DIPHENHYDRAMINE HCL 12.5MG/5ML
25 LIQUID (ML) ORAL ONCE
Status: COMPLETED | OUTPATIENT
Start: 2023-12-04 | End: 2023-12-04

## 2023-12-04 RX ORDER — DIPHENHYDRAMINE HCL 25 MG
25 CAPSULE ORAL EVERY 6 HOURS PRN
Qty: 30 CAPSULE | Refills: 0 | Status: SHIPPED | OUTPATIENT
Start: 2023-12-04 | End: 2023-12-07

## 2023-12-04 RX ADMIN — Medication 25 MG: at 17:53

## 2023-12-04 NOTE — PROGRESS NOTES
ASSESSMENT & PLAN:   Diagnoses and all orders for this visit:  Anxiety  -     diphenhydrAMINE (BENADRYL) solution 25 mg  -     PRIMARY CARE FOLLOW-UP SCHEDULING; Future  -     diphenhydrAMINE (BENADRYL) 25 MG capsule; Take 1 capsule (25 mg) by mouth every 6 hours as needed for other (anxiety)  Racing heart beat  -     EKG 12-lead complete w/read - Clinics  -     EKG 12-lead complete w/read - Clinics    Anxiety that began this morning. Initially patient appears very anxious with pressured speech and fidgeting. Benadryl given in clinic which did improve symptoms. EKG with no acute changes. Spent extensive time in room speaking with patient about his anxiety. Patient was greatly calmed down by end of visit and reassured by EKG. Advised that he should get a ride home from a friend or taxi. Patient declines. Recommend follow-up appointment with PCP to discuss anxiety management. Discussed that he may use Benadryl as needed. Also given resources to National suicide hotline and [facility] phone numbers.    At the end of the encounter, I discussed results, diagnosis, medications. Discussed red flags for immediate return to clinic/ER, as well as indications for follow up if no improvement. Patient and/or caregiver understood and agreed to plan. Patient was stable for discharge.    45 minutes spent with the patient doing chart review, review of outside records, review of test results, interpretation of tests, patient visit and documentation.       Patient Instructions   [phone]     [facility] Resources:  24/7 Crisis Response, [phone]  Adult Mental Health, [phone]    ------------------------------------------------------------------------  SUBJECTIVE  History was obtained from patient.    Patient presents with:  Anxiety: Start this morning construction issues at home sx heart is racing, can't calm down, BP is high tx none     HPI  Delroy Christianson is a(n) 63 year old male presenting to urgent  care for anxiety. Today was woken up by sandblasting across the street. Since then his heart has been racing. Today while he was eating dinner felt like he could not calm down. This is happened to him in the past or something triggers anxiety and has unable to calm down. Does not take any medications for anxiety. Was previously seeing a therapist and things were going well so he canceled his appointment that he had scheduled last week. Denies chest pain, shortness of breath, dizziness.     Review of Systems    Current Outpatient Medications   Medication Sig Dispense Refill    diphenhydrAMINE (BENADRYL) 25 MG capsule Take 1 capsule (25 mg) by mouth every 6 hours as needed for other (anxiety) 30 capsule 0    rosuvastatin (CRESTOR) 10 MG tablet Take 1 tablet (10 mg) by mouth daily 90 tablet 3    spironolactone (ALDACTONE) 25 MG tablet Take 1 tablet (25 mg) by mouth daily 90 tablet 3    calcium carbonate (TUMS) 500 MG chewable tablet Take 1 chew tab by mouth as needed for heartburn (Patient not taking: Reported on 12/4/2023)      fluticasone (FLONASE) 50 MCG/ACT nasal spray Spray 1 spray into both nostrils daily (Patient not taking: Reported on 12/4/2023) 9.9 mL 3     Problem List:  2023-06: Major depressive disorder, recurrent episode, moderate (H)  2021-01: Intractable nausea and vomiting  2020-05: Benign essential hypertension  2020-04: Hypertrophic cardiomyopathy (H)  2020-04: Prolonged QT interval  2020-04: Hiatal hernia  2020-04: CKD (chronic kidney disease) stage 3, GFR 30-59 ml/min (H)  2020-04: Hypokalemia    Allergies   Allergen Reactions    Seasonal Allergies          OBJECTIVE  Vitals:    12/04/23 1736   BP: (!) 133/92   Pulse: 103   Resp: 16   Temp: 97.8  F (36.6  C)   SpO2: 100%   Weight: 86.2 kg (190 lb 1.6 oz)     Physical Exam   GENERAL: healthy, alert. Anxious.  PSYCH: pressured speech.  HEAD: normocephalic, atraumatic.  LUNGS: no increased work of breathing. Clear lung sounds bilaterally. No wheezing,  rhonchi, or rales.   CV: regular rate and rhythm. No clicks, murmurs, or rubs.    EKG: NSR, VR 81. No ST elevation or T wave inversion.    No results found for any visits on 12/04/23.

## 2023-12-04 NOTE — TELEPHONE ENCOUNTER
Dr. Holliday,    Pt called to let us know that today he was exposed to a fog in his basement that come from a [employer] development next to his where they were doing construction. The fog got into his mouth a formed a paste.    The name of the product that was being used is classic cut 80 garnet red sand- it is used for sanding.  The construction workers were wearing respirators.  Pt is very upset and concerned that something will happen to him  He is anxious but not having any breathing problems    Writer advised pt to call [facility] to see if the stuff he inhaled is toxic.  Pt saved a little baggie of it    Advised to pt that he should seek ER care if he develops and SOB symptoms  Pt verbalized understanding and agrees to the plan    Routing to PCP as FYI per pt request    Thank you  Angelica Quintero RN on 12/4/2023 at 12:40 PM

## 2023-12-05 NOTE — PATIENT INSTRUCTIONS
[phone]     [facility] Resources:  24/7 Crisis Response, [phone]  Adult Mental Health, [phone]

## 2023-12-07 ENCOUNTER — OFFICE VISIT (OUTPATIENT)
Dept: PEDIATRICS | Facility: CLINIC | Age: 63
End: 2023-12-07
Payer: COMMERCIAL

## 2023-12-07 VITALS
SYSTOLIC BLOOD PRESSURE: 138 MMHG | RESPIRATION RATE: 14 BRPM | BODY MASS INDEX: 28.32 KG/M2 | TEMPERATURE: 97.5 F | OXYGEN SATURATION: 98 % | HEART RATE: 74 BPM | DIASTOLIC BLOOD PRESSURE: 80 MMHG | WEIGHT: 191.8 LBS

## 2023-12-07 DIAGNOSIS — F41.9 ANXIETY: Primary | ICD-10-CM

## 2023-12-07 DIAGNOSIS — Z12.11 SCREEN FOR COLON CANCER: ICD-10-CM

## 2023-12-07 PROCEDURE — 99214 OFFICE O/P EST MOD 30 MIN: CPT | Performed by: INTERNAL MEDICINE

## 2023-12-07 RX ORDER — HYDROXYZINE HYDROCHLORIDE 25 MG/1
25 TABLET, FILM COATED ORAL 3 TIMES DAILY PRN
Qty: 60 TABLET | Refills: 3 | Status: SHIPPED | OUTPATIENT
Start: 2023-12-07 | End: 2024-08-08

## 2023-12-07 ASSESSMENT — PATIENT HEALTH QUESTIONNAIRE - PHQ9
10. IF YOU CHECKED OFF ANY PROBLEMS, HOW DIFFICULT HAVE THESE PROBLEMS MADE IT FOR YOU TO DO YOUR WORK, TAKE CARE OF THINGS AT HOME, OR GET ALONG WITH OTHER PEOPLE: SOMEWHAT DIFFICULT
SUM OF ALL RESPONSES TO PHQ QUESTIONS 1-9: 3
SUM OF ALL RESPONSES TO PHQ QUESTIONS 1-9: 3

## 2023-12-07 ASSESSMENT — PAIN SCALES - GENERAL: PAINLEVEL: NO PAIN (0)

## 2023-12-07 NOTE — PROGRESS NOTES
Assessment & Plan     (F41.9) Anxiety  (primary encounter diagnosis)  Comment: Anxiety follow-up.  Recommended discontinue diphenhydramine, start trial of hydroxyzine as needed.  Delroy has been reluctant to start a daily maintenance medication in the past.  Does agree to trying hydroxyzine.  Also interested in meeting with a therapist again-referral.  Plan: Adult Mental Health  Referral,         hydrOXYzine HCl (ATARAX) 25 MG tablet            Chuy Holliday MD  [facility] TESSA Potts is a 63 year old, presenting for the following health issues:  Follow Up        12/7/2023     3:03 PM   Additional Questions   Roomed by Lubna Mathur CMA   Accompanied by D[place]       HPI     63-year-old male with a history of depression and anxiety presents today for follow-up of recent urgent care visit.  Presented to urgent care with anxiety secondary to acute stressors at home.  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  This has been distressing for him.  Had previously met with a therapist however prior therapist moved.  Has been looking to get into therapy again.  He was discharged from urgent care with diphenhydramine to use as needed for anxiety.  Today states he feels less anxious.    Patient Active Problem List   Diagnosis    Hypertrophic cardiomyopathy (H)    Prolonged QT interval    Hiatal hernia    CKD (chronic kidney disease) stage 3, GFR 30-59 ml/min (H)    Benign essential hypertension    Major depressive disorder, recurrent episode, moderate (H)     Review of Systems         Objective    /80 (BP Location: Right arm, Patient Position: Sitting, Cuff Size: Adult Regular)   Pulse 74   Temp 97.5  F (36.4  C) (Tympanic)   Resp 14   Wt 87 kg (191 lb 12.8 oz)   SpO2 98%   BMI 28.32 kg/m    Body mass index is 28.32 kg/m .  Physical Exam   GENERAL: healthy, alert and  no distress  PSYCH: mentation appears normal, affect normal/bright                      Answers submitted by the patient for this visit:  Patient Health Questionnaire (Submitted on 12/7/2023)  If you checked off any problems, how difficult have these problems made it for you to do your work, take care of things at home, or get along with other people?: Somewhat difficult  PHQ9 TOTAL SCORE: 3

## 2023-12-12 ENCOUNTER — TELEPHONE (OUTPATIENT)
Dept: BEHAVIORAL HEALTH | Facility: CLINIC | Age: 63
End: 2023-12-12
Payer: COMMERCIAL

## 2023-12-12 ENCOUNTER — MYC MEDICAL ADVICE (OUTPATIENT)
Dept: PEDIATRICS | Facility: CLINIC | Age: 63
End: 2023-12-12
Payer: COMMERCIAL

## 2023-12-12 NOTE — TELEPHONE ENCOUNTER
Left  for Delroy inviting him to schedule an individual psychotherapy session with me.    HATTIE Madrigal, [facility]  Behavioral Health Clinician  [facility]  [phone] direct  [phone] main number  [phone] Behavioral Health Access main phone line     No action was needed

## 2023-12-14 ENCOUNTER — PATIENT OUTREACH (OUTPATIENT)
Dept: CARE COORDINATION | Facility: CLINIC | Age: 63
End: 2023-12-14
Payer: COMMERCIAL

## 2023-12-14 ASSESSMENT — ACTIVITIES OF DAILY LIVING (ADL): DEPENDENT_IADLS:: INDEPENDENT

## 2023-12-14 NOTE — PROGRESS NOTES
Clinic Care Coordination Contact  Follow Up Progress Note      Assessment: [facility] outreached to pt on this date to follow up and assess for graduation. Pt states he has had some changes since last outreach and is needing some more support.     Pt states his mood has struggled and he says he tried to get in to see his previous therapist but she is no longer accepting new patients and is not taking a wait list. Pt spoke with PCP who referred pt to new [facility]. [facility] commended pt for seeking help and explained [facility] role. [facility] reactivated MH Goal to establish with therapist and will be in contact with [facility] for long term supports and pt progress.     Pt agreed to transitioning back to enrollment until established with a long term therapist.     Care Gaps:    Health Maintenance Due   Topic Date Due    DEPRESSION ACTION PLAN  Never done    Pneumococcal Vaccine: Pediatrics (0 to 5 Years) and At-Risk Patients (6 to 64 Years) (1 - PCV) Never done    COLORECTAL CANCER SCREENING  Never done    ZOSTER IMMUNIZATION (1 of 2) Never done    RSV VACCINE (Pregnancy & 60+) (1 - 1-dose 60+ series) Never done    INFLUENZA VACCINE (1) Never done    COVID-19 Vaccine (4 - 2023-24 season) 09/01/2023         Care Plans  Care Plan: Mental Health       Problem: Mental Health Symptoms Need Improvement       Goal: Improve management of mental health symptoms and establish with mental health/psychosocial supports       Start Date: 6/26/2023 Expected End Date: 3/1/2024    This Visit's Progress: 50% Recent Progress: 100%    Note:     Goal Statement: I will continue to take steps to futher support my overall mental health and emotional wellbeing over the next 6 month(s).  Barriers: Numerous life stressors  Strengths: Wanting to connect with previous therapist  Patient expressed understanding of goal: yes    Action steps to achieve this goal:  I will call to scheduling an initial appointment with a counselor.  I will follow up with scheduled appointments as  recommended  I will take medications as prescribed.   I will contact my care team with questions, concerns, support needs.   I will use the clinic as a resource and I understand I can contact my clinic with 24/7 after hours services available.   I will discuss, review, schedule and complete any recommended overdue health maintenance with my provider/care team.  I will continue to outreach to care coordination as needed for additional resources or supports.                                  Intervention/Education provided during outreach: [facility] program. [facility] message sent with long term provider options to start considering.       Outreach Frequency: monthly, more frequently as needed      Plan: Pt to follow up with [facility] as scheduled. Re-activated previous goals. Care Coordinator will follow up in monthly.     Joshua Duran [facility]  Clinic Care Coordinator  [facility]  [phone]  [email or web address]

## 2023-12-14 NOTE — LETTER
[facility] CARE COORDINATION  [address] [place] [address]    December 14, 2023        Delroy Christianson  [address]  [place] [address]          Dear Delroy,     Attached is an updated Patient Centered Plan of Care for your continued enrollment in Care Coordination. Please let us know if you have additional questions, concerns, or goals that we can assist with.    Sincerely,    Joshua Duran, [facility]  Clinic Care Coordinator  [facility]  [phone]  [email or web address]  Patient Centered Plan of Care  About Me:        Patient Name:  Delroy Christianson    YOB: 1960  Age:         63 year old   [facility] MRN:    [id number] Telephone Information:  Home Phone [phone]   Mobile [phone]       Address:  [address] [place] [address] [place] [address] Email address:  [email or web address]      Emergency Contact(s)    Name Relationship Lgl Grd Work Phone Home Phone Mobile Phone   ALEJANDRINA GUERRERO    [phone]           Primary language:  English     needed? No   [facility] [employer] [facility]:  [phone] op. 1  Other communication barriers:No data recorded  Preferred Method of Communication:  Mail  Current living arrangement: I live alone    Mobility Status/ Medical Equipment: Independent        Health Maintenance  Health Maintenance Reviewed:   Health Maintenance Due   Topic Date Due    DEPRESSION ACTION PLAN  Never done    Pneumococcal Vaccine: Pediatrics (0 to 5 Years) and At-Risk Patients (6 to 64 Years) (1 - PCV) Never done    COLORECTAL CANCER SCREENING  Never done    ZOSTER IMMUNIZATION (1 of 2) Never done    RSV VACCINE (Pregnancy & 60+) (1 - 1-dose 60+ series) Never done    INFLUENZA VACCINE (1) Never done    COVID-19 Vaccine (4 - 2023-24 season) 09/01/2023         My Access Plan  Medical Emergency 911   Primary Clinic Line [facility] - [phone]   24 Hour Appointment Line [phone] or  [phone]) (toll-free)   24 Hour Nurse Line [phone] (toll-free)   Preferred Urgent Care [facility] [place], [phone]     Preferred Hospital [facility]  [phone]     Preferred Pharmacy [id number] - [place], [place] - [address]  AT [address]     Behavioral Health Crisis Line The National Suicide Prevention Lifeline at 1-483.806.4777 or Text/Call 988           My Care Team Members  Patient Care Team         Relationship Specialty Notifications Start End    Chuy Holliday MD PCP - General Internal Medicine  4/28/20     Phone: [phone] Fax: [phone] [address] [place] [address]    Chuy Holliday MD Assigned PCP   5/3/20     Phone: [phone] Fax: [phone]         [address] [place] [address] [address] [place] [address]    Flor Mendieta LICSW   - Clinical  8/8/22     Phone: [phone]          [address] [place] [address]    Joshua Duran LSW Lead Care Coordinator Primary Care - CC Admissions 6/22/23     Phone: [phone]         Yasemin Silvestre Personal Advocate & Liaison (PAL)  Admissions 7/14/23                 My Care Plans  Self Management and Treatment Plan    Care Plan  Care Plan: Mental Health       Problem: Mental Health Symptoms Need Improvement       Goal: Improve management of mental health symptoms and establish with mental health/psychosocial supports       Start Date: 6/26/2023 Expected End Date: 3/1/2024    This Visit's Progress: 50% Recent Progress: 100%    Note:     Goal Statement: I will continue to take steps to futher support my overall mental health and emotional wellbeing over the next 6 month(s).  Barriers: Numerous life stressors  Strengths: Wanting to connect with previous therapist  Patient expressed understanding of goal:  yes    Action steps to achieve this goal:  I will call to scheduling an initial appointment with a counselor.  I will follow up with scheduled appointments as recommended  I will take medications as prescribed.   I will contact my care team with questions, concerns, support needs.   I will use the clinic as a resource and I understand I can contact my clinic with 24/7 after hours services available.   I will discuss, review, schedule and complete any recommended overdue health maintenance with my provider/care team.  I will continue to outreach to care coordination as needed for additional resources or supports.                                  Advance Care Plans/Directives:   Advanced Care Plan/Directives on file:   No    Discussed with patient/caregiver(s): No data recorded           My Medical and Care Information  Problem List   Patient Active Problem List   Diagnosis    Hypertrophic cardiomyopathy (H)    Prolonged QT interval    Hiatal hernia    CKD (chronic kidney disease) stage 3, GFR 30-59 ml/min (H)    Benign essential hypertension    Major depressive disorder, recurrent episode, moderate (H)      Current Medications and Allergies:     Allergies   Allergen Reactions    Seasonal Allergies      Current Outpatient Medications   Medication    calcium carbonate (TUMS) 500 MG chewable tablet    fluticasone (FLONASE) 50 MCG/ACT nasal spray    hydrOXYzine HCl (ATARAX) 25 MG tablet    rosuvastatin (CRESTOR) 10 MG tablet    spironolactone (ALDACTONE) 25 MG tablet     No current facility-administered medications for this visit.         Care Coordination Start Date: 6/21/2023   Frequency of Care Coordination: monthly, more frequently as needed     Form Last Updated: 12/14/2023

## 2024-01-02 ASSESSMENT — PATIENT HEALTH QUESTIONNAIRE - PHQ9
SUM OF ALL RESPONSES TO PHQ QUESTIONS 1-9: 5
10. IF YOU CHECKED OFF ANY PROBLEMS, HOW DIFFICULT HAVE THESE PROBLEMS MADE IT FOR YOU TO DO YOUR WORK, TAKE CARE OF THINGS AT HOME, OR GET ALONG WITH OTHER PEOPLE: SOMEWHAT DIFFICULT
SUM OF ALL RESPONSES TO PHQ QUESTIONS 1-9: 5

## 2024-01-03 ENCOUNTER — OFFICE VISIT (OUTPATIENT)
Dept: BEHAVIORAL HEALTH | Facility: CLINIC | Age: 64
End: 2024-01-03
Payer: COMMERCIAL

## 2024-01-03 DIAGNOSIS — F33.1 MAJOR DEPRESSIVE DISORDER, RECURRENT EPISODE, MODERATE (H): Primary | ICD-10-CM

## 2024-01-03 PROCEDURE — 90791 PSYCH DIAGNOSTIC EVALUATION: CPT | Performed by: SOCIAL WORKER
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
## 2024-01-12 ASSESSMENT — COLUMBIA-SUICIDE SEVERITY RATING SCALE - C-SSRS
6. HAVE YOU EVER DONE ANYTHING, STARTED TO DO ANYTHING, OR PREPARED TO DO ANYTHING TO END YOUR LIFE?: NO
TOTAL  NUMBER OF ABORTED OR SELF INTERRUPTED ATTEMPTS LIFETIME: NO
TOTAL  NUMBER OF INTERRUPTED ATTEMPTS LIFETIME: NO
ATTEMPT LIFETIME: NO
2. HAVE YOU ACTUALLY HAD ANY THOUGHTS OF KILLING YOURSELF?: NO
1. HAVE YOU WISHED YOU WERE DEAD OR WISHED YOU COULD GO TO SLEEP AND NOT WAKE UP?: NO

## 2024-01-16 ENCOUNTER — PATIENT OUTREACH (OUTPATIENT)
Dept: CARE COORDINATION | Facility: CLINIC | Age: 64
End: 2024-01-16
Payer: COMMERCIAL

## 2024-01-16 ASSESSMENT — ACTIVITIES OF DAILY LIVING (ADL): DEPENDENT_IADLS:: INDEPENDENT

## 2024-01-16 NOTE — Clinical Note
OSVALDO- I did provide him a list of clinics in the area to review providers. He says you two have decided to hold of on finding anything long term at this time. I will follow along. Let me know if there is anything else needed.   Joshua Duran, [facility] Care Coordinator [facility] [phone] [email or web address]

## 2024-01-16 NOTE — PROGRESS NOTES
Clinic Care Coordination Contact  Follow Up Progress Note      Assessment: [facility] followed up with pt on this date to assess for needs. Pt confirms he's been connected with [facility]. [facility] and pt have planned for ongoing visits for now. Pt did review list of potential long term providers outside of  but overall felt overwhelmed and in talking with [facility] did not feel it necessary to find an alternate/long term provider immediately.     Pt agreed to continue to see [facility] for now and feels his goal is complete for the time being. Will move to maintenance at this time and reassess for new needs in 2 months. FYI sent to [facility].     Care Gaps:    Health Maintenance Due   Topic Date Due    DEPRESSION ACTION PLAN  Never done    Pneumococcal Vaccine: Pediatrics (0 to 5 Years) and At-Risk Patients (6 to 64 Years) (1 of 2 - PCV) Never done    COLORECTAL CANCER SCREENING  Never done    ZOSTER IMMUNIZATION (1 of 2) Never done    RSV VACCINE (Pregnancy & 60+) (1 - 1-dose 60+ series) Never done    INFLUENZA VACCINE (1) Never done    COVID-19 Vaccine (4 - 2023-24 season) 09/01/2023         Care Plans  Care Plan: Mental Health       Problem: Mental Health Symptoms Need Improvement       Goal: Improve management of mental health symptoms and establish with mental health/psychosocial supports  Completed 1/16/2024      Start Date: 6/26/2023 Expected End Date: 3/1/2024    This Visit's Progress: 100% Recent Progress: 50%    Note:     Goal Statement: I will continue to take steps to futher support my overall mental health and emotional wellbeing over the next 6 month(s).  Barriers: Numerous life stressors  Strengths: Wanting to connect with previous therapist  Patient expressed understanding of goal: yes    Action steps to achieve this goal:  I will call to scheduling an initial appointment with a counselor.  I will follow up with scheduled appointments as recommended  I will take medications as prescribed.   I will contact my care team with questions,  concerns, support needs.   I will use the clinic as a resource and I understand I can contact my clinic with 24/7 after hours services available.   I will discuss, review, schedule and complete any recommended overdue health maintenance with my provider/care team.  I will continue to outreach to care coordination as needed for additional resources or supports.                                  Intervention/Education provided during outreach: None at this time.      Outreach Frequency: 2 months or as needed.     Plan: Pt to continue to follow up with [facility] as scheduled. FYI routed to [facility]. Care Coordinator will follow up in 2 months.     Joshua Duran [facility]  Clinic Care Coordinator  [facility]  [phone]  [email or web address]

## 2024-01-17 ENCOUNTER — OFFICE VISIT (OUTPATIENT)
Dept: BEHAVIORAL HEALTH | Facility: CLINIC | Age: 64
End: 2024-01-17
Payer: COMMERCIAL

## 2024-01-17 DIAGNOSIS — F33.1 MAJOR DEPRESSIVE DISORDER, RECURRENT EPISODE, MODERATE (H): Primary | ICD-10-CM

## 2024-01-17 PROCEDURE — 90834 PSYTX W PT 45 MINUTES: CPT | Performed by: SOCIAL WORKER

## 2024-01-21 ENCOUNTER — NURSE TRIAGE (OUTPATIENT)
Dept: NURSING | Facility: CLINIC | Age: 64
End: 2024-01-21
Payer: COMMERCIAL

## 2024-01-22 NOTE — TELEPHONE ENCOUNTER
Diarrhea every 1/2 hr or more often since he got up this morning @ 11am. He had eaten a salad for dinner last night and wonders about food poisoning. He took Pepto Bismol a few hours ago and it did not help. He states he has been drinking water to prevent dehydration. He felt weak 1/2 hr ago when in the kitchen. He ate a protein bar and feels better. Cramping earlier today has stopped. He last urinated =1 hr ago.   [facility].   Triaged to a disposition of See HCP or PCP triage within 4 hrs.   Dr Aj on call, Answering service contacted @  [phone] 6:40pm If patient can remain hydrated, then he call clinic in am and speak to nurse, see in clinic if needed. Otherwise he may need to be seen in ER.   Contacted patient with this information.   Deb Gee RN Triage Nurse Advisor 6:50 PM 1/21/2024  Reason for Disposition   [1] SEVERE diarrhea (e.g., 7 or more times / day more than normal) AND [2] age > 60 years    Additional Information   Negative: Shock suspected (e.g., cold/pale/clammy skin, too weak to stand, low BP, rapid pulse)   Negative: Difficult to awaken or acting confused (e.g., disoriented, slurred speech)   Negative: Sounds like a life-threatening emergency to the triager   Negative: Vomiting also present and worse than the diarrhea   Negative: [1] Blood in stool AND [2] without diarrhea   Negative: Diarrhea in a cancer patient who is currently (or recently) receiving chemotherapy or radiation therapy, or cancer patient who has metastatic or end-stage cancer and is receiving palliative care   Negative: Diarrhea begins while taking an antibiotic by mouth (oral antibiotic)   Negative: [1] SEVERE abdominal pain (e.g., excruciating) AND [2] present > 1 hour   Negative: [1] SEVERE abdominal pain AND [2] age > 60 years   Negative: [1] Blood in the stool AND [2] moderate or large amount of blood   Negative: Black or tarry bowel movements  (Exception: Chronic-unchanged black-grey BMs AND is  taking iron pills or Pepto-Bismol.)   Negative: [1] Drinking very little AND [2] dehydration suspected (e.g., no urine > 12 hours, very dry mouth, very lightheaded)   Negative: Patient sounds very sick or weak to the triager    Protocols used: Diarrhea-A-AH

## 2024-02-08 ENCOUNTER — OFFICE VISIT (OUTPATIENT)
Dept: BEHAVIORAL HEALTH | Facility: CLINIC | Age: 64
End: 2024-02-08
Payer: COMMERCIAL

## 2024-02-08 DIAGNOSIS — F33.1 MAJOR DEPRESSIVE DISORDER, RECURRENT EPISODE, MODERATE (H): Primary | ICD-10-CM

## 2024-02-08 PROCEDURE — 90834 PSYTX W PT 45 MINUTES: CPT | Performed by: SOCIAL WORKER

## 2024-02-08 NOTE — PROGRESS NOTES
"[facility] Primary Care: Integrated Behavioral Health  February 8, 2024      Behavioral Health Clinician Progress Note    Patient Name: Delroy Christianson           Service Type:  Individual      Service Location:   Face to Face in Clinic     Session Start Time: 2:01 PM  Session End Time: 3:00 PM      Session Length: 53 - 60      Attendees: Client     Service Modality:  In-person    Visit Activities (Refresh list every visit): NEW and [facility] Only    Diagnostic Assessment Date: 1/3/24  Treatment Plan Review Date: n/a  See Flowsheets for today's PHQ-9 and ED-7 results  Previous PHQ-9:       9/7/2023     2:49 PM 12/7/2023     2:55 PM 1/2/2024     1:53 PM   PHQ-9 SCORE   PHQ-9 Total Score MyChart 4 (Minimal depression) 3 (Minimal depression) 5 (Mild depression)   PHQ-9 Total Score 4 3 5     Previous ED-7:       9/7/2023     2:50 PM   ED-7 SCORE   Total Score 4 (minimal anxiety)   Total Score 4         DATA  Extended Session (60+ minutes): No  Interactive Complexity: No  Crisis: No  [facility] Patient: No    Treatment Objective(s) Addressed in This Session:  Target Behavior(s):  decreasing depression symptoms    Depressed Mood: Increase interest, engagement, and pleasure in doing things  Decrease frequency and intensity of feeling down, depressed, hopeless    Current Stressors / Issues:  Delroy reports feeling better lately and attributes his decreased depression symptoms to having immersed himself in a project.  He is working on a an electronic music device that he calls a \"DYI pod\" like an iPod, but that plays a higher quality sounding electronic file of music. This has kept him feeling occupied and happy.  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  He collaborates on it with his friend in [place].       Progress on Treatment Objective(s) / Homework:  Minimal progress - CONTEMPLATION (Considering change and yet undecided); Intervened by assessing the negative and positive thinking (ambivalence) " about behavior change    Motivational Interviewing    MI Intervention: Expressed Empathy/Understanding, Supported Autonomy, Collaboration, Evocation, and Reflections: simple and complex     Change Talk Expressed by the Patient: Desire to change    Provider Response to Change Talk: E - Evoked more info from patient about behavior change    Assessments completed prior to visit:  The following assessments were completed by patient for this visit:  PHQ9:       4/20/2022    11:33 AM 10/28/2022     8:38 AM 6/21/2023     2:20 PM 9/7/2023     2:49 PM 12/7/2023     2:55 PM 1/2/2024     1:53 PM   PHQ-9 SCORE   PHQ-9 Total Score MyChart 9 (Mild depression) 4 (Minimal depression) 12 (Moderate depression) 4 (Minimal depression) 3 (Minimal depression) 5 (Mild depression)   PHQ-9 Total Score 9 4 12 4 3 5     GAD7:       9/7/2023     2:50 PM   ED-7 SCORE   Total Score 4 (minimal anxiety)   Total Score 4     CAGE-AID:       12/28/2023     6:20 PM   CAGE-AID Total Score   Total Score 0   Total Score MyChart 0 (A total score of 2 or greater is considered clinically significant)     PROMIS 10-Global Health (all questions and answers displayed):       8/3/2022     1:28 PM 8/10/2022     1:23 PM 12/28/2023     6:19 PM   PROMIS 10   In general, would you say your health is: Fair Good Good   In general, would you say your quality of life is: Fair Fair Fair   In general, how would you rate your physical health? Fair Good Good   In general, how would you rate your mental health, including your mood and your ability to think? Fair Fair Fair   In general, how would you rate your satisfaction with your social activities and relationships? Poor Poor Fair   In general, please rate how well you carry out your usual social activities and roles Fair Fair Fair   To what extent are you able to carry out your everyday physical activities such as walking, climbing stairs, carrying groceries, or moving a chair? Completely Completely Mostly   In the past  7 days, how often have you been bothered by emotional problems such as feeling anxious, depressed, or irritable? Sometimes Often Often   In the past 7 days, how would you rate your fatigue on average? Moderate Moderate Mild   In the past 7 days, how would you rate your pain on average, where 0 means no pain, and 10 means worst imaginable pain? 1 1 1   In general, would you say your health is: 2 3 3   In general, would you say your quality of life is: 2 2 2   In general, how would you rate your physical health? 2 3 3   In general, how would you rate your mental health, including your mood and your ability to think? 2 2 2   In general, how would you rate your satisfaction with your social activities and relationships? 1 1 2   In general, please rate how well you carry out your usual social activities and roles. (This includes activities at home, at work and in your community, and responsibilities as a parent, child, spouse, employee, friend, etc.) 2 2 2   To what extent are you able to carry out your everyday physical activities such as walking, climbing stairs, carrying groceries, or moving a chair? 5 5 4   In the past 7 days, how often have you been bothered by emotional problems such as feeling anxious, depressed, or irritable? 3 4 4   In the past 7 days, how would you rate your fatigue on average? 3 3 2   In the past 7 days, how would you rate your pain on average, where 0 means no pain, and 10 means worst imaginable pain? 1 1 1   Global Mental Health Score 8 7 8   Global Physical Health Score 14 15 15   PROMIS TOTAL - SUBSCORES 22 22 23     [place] Suicide Severity Rating Scale (Lifetime/Recent)      8/8/2022     2:14 PM 1/12/2024     3:14 PM   [place] Suicide Severity Rating (Lifetime/Recent)   Q1 Wish to be Dead (Lifetime) N N   Q2 Non-Specific Active Suicidal Thoughts (Lifetime) N N   Actual Attempt (Lifetime)  N   Has subject engaged in non-suicidal self-injurious behavior? (Lifetime)  N   Interrupted  Attempts (Lifetime)  N   Aborted or Self-Interrupted Attempt (Lifetime)  N   Preparatory Acts or Behavior (Lifetime)  N   Calculated C-SSRS Risk Score (Lifetime/Recent)  No Risk Indicated       Care Plan review completed: No    Medication Review:  No changes to current psychiatric medication(s)    Medication Compliance:  Yes    Changes in Health Issues:   None reported    Chemical Use Review:   Substance Use: Chemical use reviewed, no active concerns identified      Tobacco Use: No current tobacco use.      Assessment: Current Emotional / Mental Status (status of significant symptoms):  Risk status (Self / Other harm or suicidal ideation)  Patient denies a history of suicidal ideation, suicide attempts, self-injurious behavior, homicidal ideation, homicidal behavior, and and other safety concerns  Patient denies current fears or concerns for personal safety.  Patient denies current or recent suicidal ideation or behaviors.  Patient denies current or recent homicidal ideation or behaviors.  Patient denies current or recent self injurious behavior or ideation.  Patient denies other safety concerns.  A safety and risk management plan has not been developed at this time, however patient was encouraged to call [place] [phone] should there be a change in any of these risk factors.    Appearance:   Appropriate   Eye Contact:   Good   Psychomotor Behavior: Normal   Attitude:   Cooperative   Orientation:   All  Speech   Rate / Production: Normal    Volume:  Normal   Mood:    Normal  Affect:    Appropriate   Thought Content:  Clear   Thought Form:  Coherent  Logical   Insight:    Fair     Diagnoses:  1. Major depressive disorder, recurrent episode, moderate (H)        Collateral Reports Completed:  Not Applicable    Plan: (Homework, other):  Patient was given information about behavioral services and encouraged to schedule a follow up appointment with the clinic [facility] in 2 weeks.      Yasemin Jackson MSW,  [facility]  Behavioral Health Clinician  [facility]  [phone] direct  [phone] main number  [phone] Behavioral Health Access main phone line      _

## 2024-03-05 ASSESSMENT — PATIENT HEALTH QUESTIONNAIRE - PHQ9
SUM OF ALL RESPONSES TO PHQ QUESTIONS 1-9: 4
SUM OF ALL RESPONSES TO PHQ QUESTIONS 1-9: 4
10. IF YOU CHECKED OFF ANY PROBLEMS, HOW DIFFICULT HAVE THESE PROBLEMS MADE IT FOR YOU TO DO YOUR WORK, TAKE CARE OF THINGS AT HOME, OR GET ALONG WITH OTHER PEOPLE: SOMEWHAT DIFFICULT

## 2024-03-06 ENCOUNTER — OFFICE VISIT (OUTPATIENT)
Dept: BEHAVIORAL HEALTH | Facility: CLINIC | Age: 64
End: 2024-03-06
Payer: COMMERCIAL

## 2024-03-06 DIAGNOSIS — F33.1 MAJOR DEPRESSIVE DISORDER, RECURRENT EPISODE, MODERATE (H): Primary | ICD-10-CM

## 2024-03-06 PROCEDURE — 90834 PSYTX W PT 45 MINUTES: CPT | Performed by: SOCIAL WORKER

## 2024-03-06 NOTE — PROGRESS NOTES
[facility] Primary Care: Integrated Behavioral Health  March 6, 2024      Behavioral Health Clinician Progress Note    Patient Name: Delroy Christianson           Service Type:  Individual      Service Location:   Face to Face in Clinic     Session Start Time: 3:04 PM  Session End Time: 3:55 PM      Session Length: 53 - 60      Attendees: Client     Service Modality:  In-person    Visit Activities (Refresh list every visit): NEW and [facility] Only    Diagnostic Assessment Date: 1/3/24  Treatment Plan Review Date: n/a  See Flowsheets for today's PHQ-9 and ED-7 results  Previous PHQ-9:       12/7/2023     2:55 PM 1/2/2024     1:53 PM 3/5/2024    10:31 AM   PHQ-9 SCORE   PHQ-9 Total Score MyChart 3 (Minimal depression) 5 (Mild depression) 4 (Minimal depression)   PHQ-9 Total Score 3 5 4     Previous ED-7:       9/7/2023     2:50 PM   ED-7 SCORE   Total Score 4 (minimal anxiety)   Total Score 4         DATA  Extended Session (60+ minutes): No  Interactive Complexity: No  Crisis: No  [facility] Patient: No    Treatment Objective(s) Addressed in This Session:  Target Behavior(s):  decreasing depression symptoms    Depressed Mood: Increase interest, engagement, and pleasure in doing things  Decrease frequency and intensity of feeling down, depressed, hopeless    Current Stressors / Issues:  Delroy reports his depressive symptoms are slightly better.  When he is immersed in projects, this helps him.  He is struggling with isolation and loneliness.  He denies suicidal ideation, plans, or past attempts.         Progress on Treatment Objective(s) / Homework:  Minimal progress - CONTEMPLATION (Considering change and yet undecided); Intervened by assessing the negative and positive thinking (ambivalence) about behavior change    Motivational Interviewing    MI Intervention: Expressed Empathy/Understanding, Supported Autonomy, Collaboration, Evocation, and Reflections: simple and complex     Change Talk Expressed by the  Patient: Desire to change    Provider Response to Change Talk: E - Evoked more info from patient about behavior change    Assessments completed prior to visit:  The following assessments were completed by patient for this visit:  PHQ9:       4/20/2022    11:33 AM 10/28/2022     8:38 AM 6/21/2023     2:20 PM 9/7/2023     2:49 PM 12/7/2023     2:55 PM 1/2/2024     1:53 PM 3/5/2024    10:31 AM   PHQ-9 SCORE   PHQ-9 Total Score MyChart 9 (Mild depression) 4 (Minimal depression) 12 (Moderate depression) 4 (Minimal depression) 3 (Minimal depression) 5 (Mild depression) 4 (Minimal depression)   PHQ-9 Total Score 9 4 12 4 3 5 4     GAD7:       9/7/2023     2:50 PM   ED-7 SCORE   Total Score 4 (minimal anxiety)   Total Score 4     CAGE-AID:       12/28/2023     6:20 PM   CAGE-AID Total Score   Total Score 0   Total Score MyChart 0 (A total score of 2 or greater is considered clinically significant)     PROMIS 10-Global Health (all questions and answers displayed):       8/3/2022     1:28 PM 8/10/2022     1:23 PM 12/28/2023     6:19 PM   PROMIS 10   In general, would you say your health is: Fair Good Good   In general, would you say your quality of life is: Fair Fair Fair   In general, how would you rate your physical health? Fair Good Good   In general, how would you rate your mental health, including your mood and your ability to think? Fair Fair Fair   In general, how would you rate your satisfaction with your social activities and relationships? Poor Poor Fair   In general, please rate how well you carry out your usual social activities and roles Fair Fair Fair   To what extent are you able to carry out your everyday physical activities such as walking, climbing stairs, carrying groceries, or moving a chair? Completely Completely Mostly   In the past 7 days, how often have you been bothered by emotional problems such as feeling anxious, depressed, or irritable? Sometimes Often Often   In the past 7 days, how would you  rate your fatigue on average? Moderate Moderate Mild   In the past 7 days, how would you rate your pain on average, where 0 means no pain, and 10 means worst imaginable pain? 1 1 1   In general, would you say your health is: 2 3 3   In general, would you say your quality of life is: 2 2 2   In general, how would you rate your physical health? 2 3 3   In general, how would you rate your mental health, including your mood and your ability to think? 2 2 2   In general, how would you rate your satisfaction with your social activities and relationships? 1 1 2   In general, please rate how well you carry out your usual social activities and roles. (This includes activities at home, at work and in your community, and responsibilities as a parent, child, spouse, employee, friend, etc.) 2 2 2   To what extent are you able to carry out your everyday physical activities such as walking, climbing stairs, carrying groceries, or moving a chair? 5 5 4   In the past 7 days, how often have you been bothered by emotional problems such as feeling anxious, depressed, or irritable? 3 4 4   In the past 7 days, how would you rate your fatigue on average? 3 3 2   In the past 7 days, how would you rate your pain on average, where 0 means no pain, and 10 means worst imaginable pain? 1 1 1   Global Mental Health Score 8 7 8   Global Physical Health Score 14 15 15   PROMIS TOTAL - SUBSCORES 22 22 23     [place] Suicide Severity Rating Scale (Lifetime/Recent)      8/8/2022     2:14 PM 1/12/2024     3:14 PM   [place] Suicide Severity Rating (Lifetime/Recent)   Q1 Wish to be Dead (Lifetime) N N   Q2 Non-Specific Active Suicidal Thoughts (Lifetime) N N   Actual Attempt (Lifetime)  N   Has subject engaged in non-suicidal self-injurious behavior? (Lifetime)  N   Interrupted Attempts (Lifetime)  N   Aborted or Self-Interrupted Attempt (Lifetime)  N   Preparatory Acts or Behavior (Lifetime)  N   Calculated C-SSRS Risk Score (Lifetime/Recent)  No  Risk Indicated       Care Plan review completed: No    Medication Review:  No changes to current psychiatric medication(s)    Medication Compliance:  Yes    Changes in Health Issues:   None reported    Chemical Use Review:   Substance Use: Chemical use reviewed, no active concerns identified      Tobacco Use: No current tobacco use.      Assessment: Current Emotional / Mental Status (status of significant symptoms):  Risk status (Self / Other harm or suicidal ideation)  Patient denies a history of suicidal ideation, suicide attempts, self-injurious behavior, homicidal ideation, homicidal behavior, and and other safety concerns  Patient denies current fears or concerns for personal safety.  Patient denies current or recent suicidal ideation or behaviors.  Patient denies current or recent homicidal ideation or behaviors.  Patient denies current or recent self injurious behavior or ideation.  Patient denies other safety concerns.  A safety and risk management plan has not been developed at this time, however patient was encouraged to call [place] [phone] should there be a change in any of these risk factors.    Appearance:   Appropriate   Eye Contact:   Good   Psychomotor Behavior: Normal   Attitude:   Cooperative   Orientation:   All  Speech   Rate / Production: Normal    Volume:  Normal   Mood:    Normal  Affect:    Appropriate   Thought Content:  Clear   Thought Form:  Coherent  Logical   Insight:    Fair     Diagnoses:  1. Major depressive disorder, recurrent episode, moderate (H)        Collateral Reports Completed:  Not Applicable    Plan: (Homework, other):  Patient was given information about behavioral services and encouraged to schedule a follow up appointment with the clinic [facility] in 2 weeks.      HATTIE Madrigal, [facility]  Behavioral Health Clinician  [facility]  [phone] direct  [phone] main number  [phone] Behavioral Health Access main phone line    March 6,  2024  Answers submitted by the patient for this visit:  Patient Health Questionnaire (Submitted on 3/5/2024)  If you checked off any problems, how difficult have these problems made it for you to do your work, take care of things at home, or get along with other people?: Somewhat difficult  PHQ9 TOTAL SCORE: 4

## 2024-03-08 DIAGNOSIS — I42.2 HYPERTROPHIC CARDIOMYOPATHY (H): Primary | ICD-10-CM

## 2024-03-10 NOTE — PROGRESS NOTES
[facility] Primary Care: Integrated Behavioral Health  January 17, 2024      Behavioral Health Clinician Progress Note    Patient Name: Delroy Christianson           Service Type:  Individual      Service Location:   Face to Face in Clinic     Session Start Time: 3:07 PM  Session End Time: 4:00 PM      Session Length: 53 - 60      Attendees: Client     Service Modality:  In-person    Visit Activities (Refresh list every visit): NEW and [facility] Only    Diagnostic Assessment Date: 1/3/2024  Treatment Plan Review Date: April 3, 2024  See Flowsheets for today's PHQ-9 and ED-7 results  Previous PHQ-9:       1/2/2024    10:43 AM   PHQ-9 SCORE   PHQ-A Total Score 14     DATA  Extended Session (60+ minutes): No  Interactive Complexity: No  Crisis: No  [facility] Patient: No    Treatment Objective(s) Addressed in This Session:  Target Behavior(s):  decreasing depression symptoms     Depressed Mood: Increase interest, engagement, and pleasure in doing things  Decrease frequency and intensity of feeling down, depressed, hopeless     Current Stressors / Issues:  Delroy reports that he is not experiencing much change.  He did complete a sleep log.  We discussed is sleep patterns, and he agreed to try to sleep less during the day and get outside for a walk most days to help active him and increase energy levels. We also discuss his need for more social contacts.        Progress on Treatment Objective(s) / Homework:  Minimal progress - CONTEMPLATION (Considering change and yet undecided); Intervened by assessing the negative and positive thinking (ambivalence) about behavior change     Motivational Interviewing     MI Intervention: Expressed Empathy/Understanding, Supported Autonomy, Collaboration, Evocation, and Reflections: simple and complex      Change Talk Expressed by the Patient: Desire to change     Provider Response to Change Talk: E - Evoked more info from patient about behavior change     Assessments completed  prior to visit:  The following assessments were completed by patient for this visit:  PHQ9:        4/20/2022    11:33 AM 10/28/2022     8:38 AM 6/21/2023     2:20 PM 9/7/2023     2:49 PM 12/7/2023     2:55 PM 1/2/2024     1:53 PM   PHQ-9 SCORE   PHQ-9 Total Score MyChart 9 (Mild depression) 4 (Minimal depression) 12 (Moderate depression) 4 (Minimal depression) 3 (Minimal depression) 5 (Mild depression)   PHQ-9 Total Score 9 4 12 4 3 5      GAD7:        9/7/2023     2:50 PM   ED-7 SCORE   Total Score 4 (minimal anxiety)   Total Score 4      CAGE-AID:        12/28/2023     6:20 PM   CAGE-AID Total Score   Total Score 0   Total Score MyChart 0 (A total score of 2 or greater is considered clinically significant)      PROMIS 10-Global Health (all questions and answers displayed):        8/3/2022     1:28 PM 8/10/2022     1:23 PM 12/28/2023     6:19 PM   PROMIS 10   In general, would you say your health is: Fair Good Good   In general, would you say your quality of life is: Fair Fair Fair   In general, how would you rate your physical health? Fair Good Good   In general, how would you rate your mental health, including your mood and your ability to think? Fair Fair Fair   In general, how would you rate your satisfaction with your social activities and relationships? Poor Poor Fair   In general, please rate how well you carry out your usual social activities and roles Fair Fair Fair   To what extent are you able to carry out your everyday physical activities such as walking, climbing stairs, carrying groceries, or moving a chair? Completely Completely Mostly   In the past 7 days, how often have you been bothered by emotional problems such as feeling anxious, depressed, or irritable? Sometimes Often Often   In the past 7 days, how would you rate your fatigue on average? Moderate Moderate Mild   In the past 7 days, how would you rate your pain on average, where 0 means no pain, and 10 means worst imaginable pain? 1 1 1   In  general, would you say your health is: 2 3 3   In general, would you say your quality of life is: 2 2 2   In general, how would you rate your physical health? 2 3 3   In general, how would you rate your mental health, including your mood and your ability to think? 2 2 2   In general, how would you rate your satisfaction with your social activities and relationships? 1 1 2   In general, please rate how well you carry out your usual social activities and roles. (This includes activities at home, at work and in your community, and responsibilities as a parent, child, spouse, employee, friend, etc.) 2 2 2   To what extent are you able to carry out your everyday physical activities such as walking, climbing stairs, carrying groceries, or moving a chair? 5 5 4   In the past 7 days, how often have you been bothered by emotional problems such as feeling anxious, depressed, or irritable? 3 4 4   In the past 7 days, how would you rate your fatigue on average? 3 3 2   In the past 7 days, how would you rate your pain on average, where 0 means no pain, and 10 means worst imaginable pain? 1 1 1   Global Mental Health Score 8 7 8   Global Physical Health Score 14 15 15   PROMIS TOTAL - SUBSCORES 22 22 23      [place] Suicide Severity Rating Scale (Lifetime/Recent)       8/8/2022     2:14 PM 1/12/2024     3:14 PM   [place] Suicide Severity Rating (Lifetime/Recent)   Q1 Wish to be Dead (Lifetime) N N   Q2 Non-Specific Active Suicidal Thoughts (Lifetime) N N   Actual Attempt (Lifetime)   N   Has subject engaged in non-suicidal self-injurious behavior? (Lifetime)   N   Interrupted Attempts (Lifetime)   N   Aborted or Self-Interrupted Attempt (Lifetime)   N   Preparatory Acts or Behavior (Lifetime)   N   Calculated C-SSRS Risk Score (Lifetime/Recent)   No Risk Indicated         Care Plan review completed: No     Medication Review:  No changes to current psychiatric medication(s)     Medication Compliance:  Yes     Changes in Health  Issues:              None reported     Chemical Use Review:              Substance Use: Chemical use reviewed, no active concerns identified                  Tobacco Use: No current tobacco use.       Assessment:  Current Emotional / Mental Status (status of significant symptoms):  Risk status (Self / Other harm or suicidal ideation)  Patient denies a history of suicidal ideation, suicide attempts, self-injurious behavior, homicidal ideation, homicidal behavior, and and other safety concerns  Patient denies current fears or concerns for personal safety.  Patient denies current or recent suicidal ideation or behaviors.  Patient denies current or recent homicidal ideation or behaviors.  Patient denies current or recent self injurious behavior or ideation.  Patient denies other safety concerns.  A safety and risk management plan has not been developed at this time, however patient was encouraged to call [place] [phone] should there be a change in any of these risk factors.     Appearance:                            Appropriate   Eye Contact:                           Good   Psychomotor Behavior:          Normal   Attitude:                                   Cooperative   Orientation:                             All  Speech              Rate / Production:       Normal               Volume:                       Normal   Mood:                                      Normal  Affect:                                      Appropriate   Thought Content:                    Clear   Thought Form:                        Coherent  Logical   Insight:                                     Fair      Diagnoses:  1. Major depressive disorder, recurrent episode, moderate (H)          Collateral Reports Completed:  Not Applicable     Plan: (Homework, other):  Patient was given information about behavioral services and encouraged to schedule a follow up appointment with the clinic [facility] in 2 weeks.      HATTIE Madrigal,  [facility]  Behavioral Health Clinician  [facility]  [phone] direct  [phone] main number  [phone] Behavioral Health Access main phone line

## 2024-03-11 ENCOUNTER — TELEPHONE (OUTPATIENT)
Dept: CARDIOLOGY | Facility: CLINIC | Age: 64
End: 2024-03-11
Payer: COMMERCIAL

## 2024-03-11 NOTE — TELEPHONE ENCOUNTER
Pt call ed with questions and concerns in regard to needing labs prior to seeing Dr. Haddad. He stated he did not want labs drawn and just wanted to see the doctor. His lab appt will be canceled.

## 2024-03-11 NOTE — TELEPHONE ENCOUNTER
Spoke with patient regarding labs needed for appointment. Patient declined to have lab work done.

## 2024-03-12 ENCOUNTER — OFFICE VISIT (OUTPATIENT)
Dept: CARDIOLOGY | Facility: CLINIC | Age: 64
End: 2024-03-12
Attending: INTERNAL MEDICINE
Payer: COMMERCIAL

## 2024-03-12 VITALS
OXYGEN SATURATION: 96 % | HEART RATE: 94 BPM | WEIGHT: 190.9 LBS | DIASTOLIC BLOOD PRESSURE: 70 MMHG | SYSTOLIC BLOOD PRESSURE: 128 MMHG | BODY MASS INDEX: 28.19 KG/M2

## 2024-03-12 DIAGNOSIS — I10 BENIGN ESSENTIAL HYPERTENSION: ICD-10-CM

## 2024-03-12 DIAGNOSIS — I42.2 NON-OBSTRUCTIVE HYPERTROPHIC CARDIOMYOPATHY (H): Primary | ICD-10-CM

## 2024-03-12 PROCEDURE — G0463 HOSPITAL OUTPT CLINIC VISIT: HCPCS | Performed by: INTERNAL MEDICINE

## 2024-03-12 PROCEDURE — 99205 OFFICE O/P NEW HI 60 MIN: CPT | Performed by: INTERNAL MEDICINE

## 2024-03-12 ASSESSMENT — PAIN SCALES - GENERAL: PAINLEVEL: NO PAIN (0)

## 2024-03-12 NOTE — PROGRESS NOTES
Chief complaint: HCM, establish care    HPI:   Delroy Christianson is a 64 year old male with history of hypertrophic cardiomyopathy who presents to establish care for HCM.    This was noted several years ago when he was having testing prior to an abdominal surgery. He had echocardiogram and subsequently a cardiac MRI which diagnosed HCM. He does recall being told he had a murmur during several health screenings during his childhood.     He can walk up 3 flights of stairs without stopping. He feels limitation pushing a refrigerator up a flight of stairs.     He had 1 episode of near-syncope several months ago with phlebotomy after overnight. He has never had syncope. He has rare palpitations after a heavy meal and had palpitations ~1 year ago which were attributed to a panic attack during multiple stressful life situations.    His older brother had a CABG recently (age 79.) His mother also had CAD and had some type of revascularization (either PCI or CABG); he is unsure of the details. Neither Delroy nor his 2 brothers have any biological children.   No additional family history of sudden death, arrhythmia, cardiomyopathy, single car accidents, drowning, or ICD/pacemaker.     He denies any chest pain, dyspnea at rest or with exertion, PND, orthopnea, peripheral edema, or syncope.       PAST MEDICAL HISTORY:  Past Medical History:   Diagnosis Date    Allergic rhinitis     Depressive disorder 2022    Heart disease 2021    Hypertension 2021    Sinusitis        CURRENT MEDICATIONS:  Current Outpatient Medications   Medication Sig Dispense Refill    calcium carbonate (TUMS) 500 MG chewable tablet Take 1 chew tab by mouth as needed for heartburn (Patient not taking: Reported on 12/4/2023)      fluticasone (FLONASE) 50 MCG/ACT nasal spray Spray 1 spray into both nostrils daily 9.9 mL 3    hydrOXYzine HCl (ATARAX) 25 MG tablet Take 1 tablet (25 mg) by mouth 3 times daily as needed for anxiety 60 tablet 3    rosuvastatin (CRESTOR)  10 MG tablet Take 1 tablet (10 mg) by mouth daily 90 tablet 3    spironolactone (ALDACTONE) 25 MG tablet Take 1 tablet (25 mg) by mouth daily 90 tablet 3       ALLERGIES:     Allergies   Allergen Reactions    Seasonal Allergies        FAMILY HISTORY:  Family History   Problem Relation Age of Onset    Diabetes Mother             Coronary Artery Disease Mother             Hypertension Father             Other Cancer Brother              No family history of premature CAD or sudden death.    SOCIAL HISTORY:  Social History     Tobacco Use    Smoking status: Never    Smokeless tobacco: Never   Vaping Use    Vaping Use: Never used   Substance Use Topics    Alcohol use: Never    Drug use: Never       ROS:   A comprehensive 14 point review of systems is negative other than as mentioned in HPI.    Exam:  /70 (BP Location: Left arm, Patient Position: Sitting, Cuff Size: Adult Regular)   Pulse 94   Wt 86.6 kg (190 lb 14.4 oz)   SpO2 96%   BMI 28.19 kg/m    GENERAL APPEARANCE: healthy, alert and no distress  EYES: no icterus, no xanthelasmas  ENT: normal palate, mucosa moist, no central cyanosis  NECK: JVP not elevated  RESPIRATORY: lungs clear to auscultation - no rales, rhonchi or wheezes, no use of accessory muscles, no retractions, respirations are unlabored, normal respiratory rate  CARDIOVASCULAR: regular rhythm, soft (I-II/VI) systolic murmur RUSB/LUSB  GI: soft, non tender, bowel sounds normal,no abdominal bruits  EXTREMITIES: no edema, no bruits  NEURO: alert and oriented to person/place/time, normal speech, gait and affect  VASC: Radial, dorsalis pedis and posterior tibialis pulses 2+ bilaterally.  SKIN: no ecchymoses, no rashes.  PSYCH: cooperative, affect appropriate.     Labs:  Reviewed.     Testing/Procedures:      I personally visualized and interpreted:  Stress CMR 21:   HCM with asymmetric septal hypertrophy (max 19-20 mm  basal anteroseptum), LVEF=65% RVEF=61%.  Mild GINA without septal contact no resting LVOTO.   No ischemia on regadenoson stress perfusion imaging.  No fibrosis on late gadolinium enhancement inmaging.     TTE 1/24/21: Resting LVOT gradient 19 mmHg->35 mmHg with Valsalva    ECG 12/4/23: sinus with PACs, VR 88.     Assessment and Plan:   Non-obstructive HCM  The patient was counseled at length regarding the nature of hypertrophic cardiomyopathy including its genetic nature, its natural history, and potential complications.     We discussed the potential complications of HCM, including outflow obstruction, arrhythmias, and heart failure.  Delroy has no clinically-important LVOT obstruction at rest. He patient was counseled regarding behavioral interventions to avoid worsening outflow obstruction, in particular avoiding dehydration and minimizing diuretics and peripheral vasodilators.      Delroy has no identified high-risk features for sudden death. In particular, he does not have significant LGE burden on his cardiac MRI (<5%). Maximal LV wall thickness is 1.9 cm. Delroy also has no family history of SCD. He declines ambulatory ECG. He has no history of unexplained syncope.      Delroy does not have any clear evidence of functional limitation. We discussed the activity recommendations for hypertrophic cardiomyopathy, and in particular that moderate intensity aerobic exercise is encouraged and that only the highest intensity competitive sports with start-stop activity (e.g. basketball and ice hockey) are felt to be contraindicated.      We also discussed the genetic nature of HCM and its generally autosomal dominant inheritance. We counseled that all 1st-degree relatives should be screened and explained the strategies of clinical screening and genetic testing. Given limited downstream benefits of cascade testing (neither Delroy nor his brothers have any children), Delroy would prefer not to have genetic testing/counseling and this is reasonable. I discussed that all  first-degree relatives should undergo clinical screening with an echocardiogram.     Overall, Delroy has NYHA class I functional capacity without clinically-important resting LVOTO. Delroy has no high-risk features for arrhythmias/sudden death. He prefers to minimize medical diagnostics and interventions as much as possible.   Specifically:  -he would prefer to avoid cardiac MRIs (especially stress MRIs) in the future as much as possible  -he would like to defer ambulatory ECG monitoring at this time  -he would likewise prefer CPX/stress echocardiogram if possible  -counseled him to increase his activity, ideally up to 150 minutes/week of moderate-intensity aerobic exercise  -he opted for follow up with me as needed    2. HTN, controlled: continue present therapy  3. HL, controlled: continue present statin.    The patient states understanding and is agreeable with plan.     I spent a total of 74 minutes on the day of the visit.   Time spent by me doing chart review, history and exam, documentation and further activities per the note      Juan Luis Boo MD  Cardiology    CC  JUAN LUIS BOO

## 2024-03-12 NOTE — NURSING NOTE
Chief Complaint   Patient presents with    New Patient     64 year old male with history of HCM presenting for evaluation.       Vitals were taken, medications reviewed.    Leatha Moore, EMT   12:50 PM

## 2024-03-12 NOTE — PROGRESS NOTES
"Optimal Vascular Metrics    Blood Pressure   {BP < 140/90:6170785::\"BP < 140/90 Yes\"}    On Aspirin  {On Aspirin Yes/No:9972414::\"Yes\"}    On Statin  {On Statin Yes/No:3530363::\"Yes\"}    Tobacco use  {Tobacco use Yes/No:3793836::\"No\"}  "

## 2024-03-12 NOTE — LETTER
3/12/2024      RE: Delroy Christianson  [address]  [place] [address]       Dear Colleague,    Thank you for the opportunity to participate in the care of your patient, Delroy Christianson, at the [facility] [place] [facility]. Please see a copy of my visit note below.    Chief complaint: HCM, establish care    HPI:   Delroy Christianson is a 64 year old male with history of hypertrophic cardiomyopathy who presents to establish care for HCM.    This was noted several years ago when he was having testing prior to an abdominal surgery. He had echocardiogram and subsequently a cardiac MRI which diagnosed HCM. He does recall being told he had a murmur during several health screenings during his childhood.     He can walk up 3 flights of stairs without stopping. He feels limitation pushing a refrigerator up a flight of stairs.     He had 1 episode of near-syncope several months ago with phlebotomy after overnight. He has never had syncope. He has rare palpitations after a heavy meal and had palpitations ~1 year ago which were attributed to a panic attack during multiple stressful life situations.    His older brother had a CABG recently (age 79.) His mother also had CAD and had some type of revascularization (either PCI or CABG); he is unsure of the details. Neither Delroy nor his 2 brothers have any biological children.   No additional family history of sudden death, arrhythmia, cardiomyopathy, single car accidents, drowning, or ICD/pacemaker.     He denies any chest pain, dyspnea at rest or with exertion, PND, orthopnea, peripheral edema, or syncope.       PAST MEDICAL HISTORY:  Past Medical History:   Diagnosis Date     Allergic rhinitis      Depressive disorder 2022     Heart disease 2021     Hypertension 2021     Sinusitis        CURRENT MEDICATIONS:  Current Outpatient Medications   Medication Sig Dispense Refill     calcium carbonate (TUMS) 500 MG  chewable tablet Take 1 chew tab by mouth as needed for heartburn (Patient not taking: Reported on 2023)       fluticasone (FLONASE) 50 MCG/ACT nasal spray Spray 1 spray into both nostrils daily 9.9 mL 3     hydrOXYzine HCl (ATARAX) 25 MG tablet Take 1 tablet (25 mg) by mouth 3 times daily as needed for anxiety 60 tablet 3     rosuvastatin (CRESTOR) 10 MG tablet Take 1 tablet (10 mg) by mouth daily 90 tablet 3     spironolactone (ALDACTONE) 25 MG tablet Take 1 tablet (25 mg) by mouth daily 90 tablet 3       ALLERGIES:     Allergies   Allergen Reactions     Seasonal Allergies        FAMILY HISTORY:  Family History   Problem Relation Age of Onset     Diabetes Mother              Coronary Artery Disease Mother              Hypertension Father              Other Cancer Brother              No family history of premature CAD or sudden death.    SOCIAL HISTORY:  Social History     Tobacco Use     Smoking status: Never     Smokeless tobacco: Never   Vaping Use     Vaping Use: Never used   Substance Use Topics     Alcohol use: Never     Drug use: Never       ROS:   A comprehensive 14 point review of systems is negative other than as mentioned in HPI.    Exam:  /70 (BP Location: Left arm, Patient Position: Sitting, Cuff Size: Adult Regular)   Pulse 94   Wt 86.6 kg (190 lb 14.4 oz)   SpO2 96%   BMI 28.19 kg/m    GENERAL APPEARANCE: healthy, alert and no distress  EYES: no icterus, no xanthelasmas  ENT: normal palate, mucosa moist, no central cyanosis  NECK: JVP not elevated  RESPIRATORY: lungs clear to auscultation - no rales, rhonchi or wheezes, no use of accessory muscles, no retractions, respirations are unlabored, normal respiratory rate  CARDIOVASCULAR: regular rhythm, soft (I-II/VI) systolic murmur RUSB/LUSB  GI: soft, non tender, bowel sounds normal,no abdominal bruits  EXTREMITIES: no edema, no bruits  NEURO: alert and oriented to person/place/time, normal speech, gait and  affect  VASC: Radial, dorsalis pedis and posterior tibialis pulses 2+ bilaterally.  SKIN: no ecchymoses, no rashes.  PSYCH: cooperative, affect appropriate.     Labs:  Reviewed.     Testing/Procedures:      I personally visualized and interpreted:  Stress CMR 1/26/21:   HCM with asymmetric septal hypertrophy (max 19-20 mm  basal anteroseptum), LVEF=65% RVEF=61%. Mild GINA without septal contact no resting LVOTO.   No ischemia on regadenoson stress perfusion imaging.  No fibrosis on late gadolinium enhancement inmaging.     TTE 1/24/21: Resting LVOT gradient 19 mmHg->35 mmHg with Valsalva    ECG 12/4/23: sinus with PACs, VR 88.     Assessment and Plan:   Non-obstructive HCM  The patient was counseled at length regarding the nature of hypertrophic cardiomyopathy including its genetic nature, its natural history, and potential complications.     We discussed the potential complications of HCM, including outflow obstruction, arrhythmias, and heart failure.  Delroy has no clinically-important LVOT obstruction at rest. He patient was counseled regarding behavioral interventions to avoid worsening outflow obstruction, in particular avoiding dehydration and minimizing diuretics and peripheral vasodilators.      Delroy has no identified high-risk features for sudden death. In particular, he does not have significant LGE burden on his cardiac MRI (<5%). Maximal LV wall thickness is 1.9 cm. Delroy also has no family history of SCD. He declines ambulatory ECG. He has no history of unexplained syncope.      Delroy does not have any clear evidence of functional limitation. We discussed the activity recommendations for hypertrophic cardiomyopathy, and in particular that moderate intensity aerobic exercise is encouraged and that only the highest intensity competitive sports with start-stop activity (e.g. basketball and ice hockey) are felt to be contraindicated.      We also discussed the genetic nature of HCM and its generally  autosomal dominant inheritance. We counseled that all 1st-degree relatives should be screened and explained the strategies of clinical screening and genetic testing. Given limited downstream benefits of cascade testing (neither Delroy nor his brothers have any children), Delroy would prefer not to have genetic testing/counseling and this is reasonable. I discussed that all first-degree relatives should undergo clinical screening with an echocardiogram.     Overall, Delroy has NYHA class I functional capacity without clinically-important resting LVOTO. Delroy has no high-risk features for arrhythmias/sudden death. He prefers to minimize medical diagnostics and interventions as much as possible.   Specifically:  -he would prefer to avoid cardiac MRIs (especially stress MRIs) in the future as much as possible  -he would like to defer ambulatory ECG monitoring at this time  -he would likewise prefer CPX/stress echocardiogram if possible  -counseled him to increase his activity, ideally up to 150 minutes/week of moderate-intensity aerobic exercise  -he opted for follow up with me as needed    2. HTN, controlled: continue present therapy  3. HL, controlled: continue present statin.    The patient states understanding and is agreeable with plan.     I spent a total of 74 minutes on the day of the visit.   Time spent by me doing chart review, history and exam, documentation and further activities per the note      Juan Luis Boo MD  Cardiology    CC  JUAN LUIS BOO          Please do not hesitate to contact me if you have any questions/concerns.     Sincerely,     Juan Luis Boo MD

## 2024-03-12 NOTE — PATIENT INSTRUCTIONS
You were seen today in the Adult Congenital and Cardiovascular Genetics Clinic at the [facility].    Cardiology Providers you saw during your visit:  Juan Luis Haddad MD    Diagnosis:  HCM    Results:  Juan Luis Haddad MD reviewed the results of your CMRI and echocardiogram testing today in clinic.    Recommendations for you:    Follow up with Dr. Haddad as needed.       General Cardiac Recommendations:  Continue to eat a heart healthy, low salt diet.  Continue to get 20-30 minutes of aerobic activity, 4-5 days per week.  Examples of aerobic activity include walking, running, swimming, cycling, etc.  Continue to observe good oral hygiene, with regular dental visits.      SBE prophylaxis:   Yes____  No__x__    Exercise restrictions:   Yes__X__  No____         If yes, list restrictions:  Must be allowed to rest if fatigued or SOB      FASTING CHOLESTEROL was checked in the last 5 years YES__x__  NO____ (2023)  If no, please follow up with your primary care physician. You should have a cholesterol screening every 5 years.      Follow-up:  As needed.     If you have questions or concerns please contact us at:    Armand Braden RN, BSN     Catherine Al (Scheduling)  Nurse Care Coordinator     Clinic   CV Genetics      Adult Congenital and CV Genetic  [facility]  (P) [phone]     (P) [phone]  (F) [phone]     (F) [phone]      For after hours urgent needs, call [phone] and ask to speak to the Adult Congenital Physician on call.  Mention Job Code 0401.    For emergencies call 911.    [facility]   Clinics and Surgery Center  Mail Code [id number]  [address] [place] [address]  [id number]

## 2024-03-14 ENCOUNTER — PATIENT OUTREACH (OUTPATIENT)
Dept: CARE COORDINATION | Facility: CLINIC | Age: 64
End: 2024-03-14
Payer: COMMERCIAL

## 2024-03-14 ASSESSMENT — ACTIVITIES OF DAILY LIVING (ADL): DEPENDENT_IADLS:: INDEPENDENT

## 2024-03-14 NOTE — PROGRESS NOTES
Clinic Care Coordination Contact    Assessment: Care Coordinator contacted patient for 2 month follow up.  Patient has continued to follow the plan of care and assessment is negative for any new needs or concerns.    Enrollment status: Graduated.      Plan: No further outreaches at this time.  Patient will continue to follow the plan of care.  If new needs arise a new Care Coordination referral may be placed.        Joshua Duran [facility]  Clinic Care Coordinator  [facility]  [phone]  [email or web address]

## 2024-03-14 NOTE — LETTER
[facility] C[facility] COORDINATION  [address] [place] [address]    March 14, 2024    Delroy Christianson  [address]  [place] [address]    Dear Delroy,  Your Care Team congratulates you on your journey to maintain wellness. This document will help guide you on your journey to maintain a healthy lifestyle.  You can use this to help you overcome any barriers you may encounter.  If you should have any questions or concerns, you can contact the members of your Care Team or contact your Primary Care Clinic for assistance.     Health Maintenance  Health Maintenance Reviewed:    Health Maintenance Due   Topic Date Due    DEPRESSION ACTION PLAN  Never done    Pneumococcal Vaccine: Pediatrics (0 to 5 Years) and At-Risk Patients (6 to 64 Years) (1 of 2 - PCV) Never done    COLORECTAL CANCER SCREENING  Never done    ZOSTER IMMUNIZATION (1 of 2) Never done    RSV VACCINE (Pregnancy & 60+) (1 - 1-dose 60+ series) Never done    INFLUENZA VACCINE (1) Never done    COVID-19 Vaccine (4 - 2023-24 season) 09/01/2023         My Access Plan  Medical Emergency 911   Primary Clinic Line [facility] - [phone]   24 Hour Appointment Line [phone] or  [phone]) (toll-free)   24 Hour Nurse Line [phone] (toll-free)   Preferred Urgent Care [facility] [place], [phone]   Preferred Hospital [facility]  [phone]   Preferred Pharmacy [id number] - [place], [place] - [address]  AT [address]     Behavioral Health Crisis Line The National Suicide Prevention Lifeline at 1-922.195.2327 or 911     My Care Team Members  Patient Care Team         Relationship Specialty Notifications Start End    Chuy Holliday MD PCP - General Internal Medicine  4/28/20     Phone: [phone] Fax: [phone] [address] [place] [address]    Chuy Holliday MD Assigned PCP   5/3/20      Phone: [phone] Fax: [phone] [address] [place] [address]    Flor Mendieta LICSW   - Clinical  8/8/22     Phone: [phone] [address] [place] [address] [address] [place] [address]    Joshua Duran LSW Lead Care Coordinator Primary Care - CC Admissions 6/22/23 3/14/24    Phone: [phone]         Yasemin Silvestre Personal Advocate & Liaison (PAL)  Admissions 7/14/23                 It has been your Clinic Care Team's pleasure to work with you on your goals.    Regards,  Your Clinic Care Team

## 2024-03-19 ENCOUNTER — OFFICE VISIT (OUTPATIENT)
Dept: BEHAVIORAL HEALTH | Facility: CLINIC | Age: 64
End: 2024-03-19
Payer: COMMERCIAL

## 2024-03-19 DIAGNOSIS — F33.1 MAJOR DEPRESSIVE DISORDER, RECURRENT EPISODE, MODERATE (H): Primary | ICD-10-CM

## 2024-03-19 PROCEDURE — 90834 PSYTX W PT 45 MINUTES: CPT | Performed by: SOCIAL WORKER

## 2024-03-19 NOTE — PROGRESS NOTES
"[facility] Primary Care: Integrated Behavioral Health  March 19, 2024      Behavioral Health Clinician Progress Note    Patient Name: Delroy Christianson           Service Type:  Individual      Service Location:   Face to Face in Clinic     Session Start Time: 3:30 PM  Session End Time: 4:32 PM      Session Length: 38 - 52      Attendees: Client     Service Modality:  In-person    Visit Activities (Refresh list every visit): NEW and [facility] Only    Diagnostic Assessment Date: 1/3/2024  Treatment Plan Review Date: 6/19/2024  See Flowsheets for today's PHQ-9 and ED-7 results  Previous PHQ-9:       1/2/2024    10:43 AM   PHQ-9 SCORE   PHQ-A Total Score 14         DATA  Extended Session (60+ minutes): No  Interactive Complexity: No  Crisis: No  [facility] Patient: No    Treatment Objective(s) Addressed in This Session:  Target Behavior(s):  depressive symptoms    Depressed Mood: Increase interest, engagement, and pleasure in doing things  Decrease frequency and intensity of feeling down, depressed, hopeless  Improve quantity and quality of night time sleep / decrease daytime naps  Feel less tired and more energy during the day   Identify negative self-talk and behaviors: challenge core beliefs, myths, and actions  Improve concentration, focus, and mindfulness in daily activities     Current Stressors / Issues:  Delroy reports that he is doing \"okay\" overall.  Not great.  Affect is flat.  Delroy is tearful today when expressing his feelings of abandonment by most of his family (death) and by the few friends he once had that he is not longer in touch with since he quit working.      Progress on Treatment Objective(s) / Homework:  Minimal progress - CONTEMPLATION (Considering change and yet undecided); Intervened by assessing the negative and positive thinking (ambivalence) about behavior change    Delroy and I discussed cognitive behavioral strategies for looking at his negative thought patterns and finding the " evidence for and against what he believes to be true about his current thoughts.      Assessments completed prior to visit:  The following assessments were completed by patient for this visit:  PHQ9:       10/28/2022     8:38 AM 6/21/2023     2:20 PM 9/7/2023     2:49 PM 12/7/2023     2:55 PM 1/2/2024     1:53 PM 3/5/2024    10:31 AM 3/19/2024     3:23 PM   PHQ-9 SCORE   PHQ-9 Total Score MyChart 4 (Minimal depression) 12 (Moderate depression) 4 (Minimal depression) 3 (Minimal depression) 5 (Mild depression) 4 (Minimal depression) 5 (Mild depression)   PHQ-9 Total Score 4 12 4 3 5 4 5       PROMIS 10-Global Health (only subscores and total score):       8/3/2022     1:28 PM 8/10/2022     1:23 PM 12/28/2023     6:19 PM   PROMIS-10 Scores Only   Global Mental Health Score 8 7 8   Global Physical Health Score 14 15 15   PROMIS TOTAL - SUBSCORES 22 22 23     [place] Suicide Severity Rating Scale (Lifetime/Recent)      8/8/2022     2:14 PM 1/12/2024     3:14 PM   [place] Suicide Severity Rating (Lifetime/Recent)   Q1 Wish to be Dead (Lifetime) N N   Q2 Non-Specific Active Suicidal Thoughts (Lifetime) N N   Actual Attempt (Lifetime)  N   Has subject engaged in non-suicidal self-injurious behavior? (Lifetime)  N   Interrupted Attempts (Lifetime)  N   Aborted or Self-Interrupted Attempt (Lifetime)  N   Preparatory Acts or Behavior (Lifetime)  N   Calculated C-SSRS Risk Score (Lifetime/Recent)  No Risk Indicated       Care Plan review completed: Yes    Medication Review:  No current psychiatric medications prescribed    Medication Compliance:  N/A    Changes in Health Issues:   None reported    Chemical Use Review:   Substance Use: Chemical use reviewed, no active concerns identified      Tobacco Use: No current tobacco use.      Assessment: Current Emotional / Mental Status (status of significant symptoms):  Risk status (Self / Other harm or suicidal ideation)  Patient denies a history of suicidal ideation, suicide  attempts, self-injurious behavior, homicidal ideation, homicidal behavior, and and other safety concerns  Patient denies current fears or concerns for personal safety.  Patient denies current or recent suicidal ideation or behaviors.  Patient denies current or recent homicidal ideation or behaviors.  Patient denies current or recent self injurious behavior or ideation.  Patient denies other safety concerns.  A safety and risk management plan has not been developed at this time, however patient was encouraged to call [place] [phone] should there be a change in any of these risk factors.    Appearance:   Appropriate   Eye Contact:   Good   Psychomotor Behavior: Normal   Attitude:   Cooperative   Orientation:   All  Speech   Rate / Production: Normal/ Responsive Normal    Volume:  Normal   Mood:    Sad   Affect:    Flat   Thought Content:  Clear   Thought Form:  Coherent  Logical   Insight:    Fair     Diagnoses:  No diagnosis found.    Collateral Reports Completed:  Not Applicable    Plan: (Homework, other):  Patient was given information about behavioral services and encouraged to schedule a follow up appointment with the clinic [facility] in 2 weeks.  She was also given     Yasemin Jackson, [facility]    ______________________________________________________________________    Integrated Primary Care Behavioral Health Treatment Plan    Patient's Name: Delroy Christianson YOB: 1960    Date of Creation: 3/19/2024  Date Treatment Plan Last Reviewed/Revised: 3/19/2024    DSM5 Diagnoses: 296.32 (F33.1) Major Depressive Disorder, Recurrent Episode, Moderate _  Psychosocial / Contextual Factors: none identified by patient  PROMIS (reviewed every 90 days):     Referral / Collaboration:  Referral to another professional/service is not indicated at this time..    Anticipated number of session for this episode of care: 6 to 8   Anticipation frequency of session: Biweekly  Anticipated Duration of each session: 38-52  minutes  Treatment plan will be reviewed in 90 days or when goals have been changed.       MeasurableTreatment Goal(s) related to diagnosis / functional impairment(s)  Goal 1: Patient will experience a decrease in depressive symptoms.    I will know I've met my goal when I feel like I can cope with life's stressors.      Objective #A (Patient Action)    Patient will Decrease frequency and intensity of feeling down, depressed, hopeless.  Status: New - Date: 3/19/2024      Intervention(s)  Therapist will teach emotional regulation skills.   .    Objective #B  Patient will Increase interest, engagement, and pleasure in doing things.  Status: New - Date: 3/19/204      Intervention(s)  Therapist will provide educational materials on cognitive skills for re framing emotional experiences .      Objective #C  Patient will Decrease frequency and intensity of feeling down, depressed, hopeless.  Status: New - Date: 3/19/2024      Intervention(s)  Therapist will assign homework to increase time spent doing pleasurable activities each week. .      Goal 2: Patient will improve his level of energy level.    I will know I've met my goal when I feel less tired during the day.      Objective #A (Patient Action)    Status: New - Date: 3/19/2024      Patient will Feel less tired and more energy during the day .    Intervention(s)  Therapist will teach sleep hygiene skills .      Patient has reviewed and agreed to the above plan.      Yasemin Jackson, [facility]  March 19, 2024

## 2024-04-29 ENCOUNTER — OFFICE VISIT (OUTPATIENT)
Dept: BEHAVIORAL HEALTH | Facility: CLINIC | Age: 64
End: 2024-04-29
Payer: COMMERCIAL

## 2024-04-29 DIAGNOSIS — F33.1 MAJOR DEPRESSIVE DISORDER, RECURRENT EPISODE, MODERATE (H): Primary | ICD-10-CM

## 2024-04-29 DIAGNOSIS — F33.1 MODERATE EPISODE OF RECURRENT MAJOR DEPRESSIVE DISORDER (H): ICD-10-CM

## 2024-04-29 PROCEDURE — 90834 PSYTX W PT 45 MINUTES: CPT | Performed by: SOCIAL WORKER

## 2024-04-29 ASSESSMENT — PATIENT HEALTH QUESTIONNAIRE - PHQ9
10. IF YOU CHECKED OFF ANY PROBLEMS, HOW DIFFICULT HAVE THESE PROBLEMS MADE IT FOR YOU TO DO YOUR WORK, TAKE CARE OF THINGS AT HOME, OR GET ALONG WITH OTHER PEOPLE: SOMEWHAT DIFFICULT
SUM OF ALL RESPONSES TO PHQ QUESTIONS 1-9: 4
SUM OF ALL RESPONSES TO PHQ QUESTIONS 1-9: 4

## 2024-04-29 NOTE — PROGRESS NOTES
[facility] Primary Care: Integrated Behavioral Health  March 19, 2024      Behavioral Health Clinician Progress Note    Patient Name: Delroy Christianson           Service Type:  Individual      Service Location:   Face to Face in Clinic     Session Start Time: 3:30 PM  Session End Time: 4:32 PM      Session Length: 38 - 52      Attendees: Client     Service Modality:  In-person    Visit Activities (Refresh list every visit): [facility] ONLY    Diagnostic Assessment Date: 1/3/2024  Treatment Plan Review Date: 6/19/2024  See Flowsheets for today's PHQ-9 and ED-7 results  Previous PHQ-9:       1/2/2024    10:43 AM   PHQ-9 SCORE   PHQ-A Total Score 14       DATA  Extended Session (60+ minutes): No  Interactive Complexity: No  Crisis: No  [facility] Patient: No    Treatment Objective(s) Addressed in This Session:  Target Behavior(s):  depressive symptoms    Depressed Mood: Increase interest, engagement, and pleasure in doing things  Decrease frequency and intensity of feeling down, depressed, hopeless  Improve quantity and quality of night time sleep / decrease daytime naps  Feel less tired and more energy during the day   Identify negative self-talk and behaviors: challenge core beliefs, myths, and actions  Improve concentration, focus, and mindfulness in daily activities     Current Stressors / Issues:  Delroy reports that he continues to struggle with anhedonia and pervasive negative thinking.  He denies SI.  Chaparro and I discussed going on an antidepressant medication, which he is reluctant to do at this time.    Progress on Treatment Objective(s) / Homework:  Minimal progress - CONTEMPLATION (Considering change and yet undecided); Intervened by assessing the negative and positive thinking (ambivalence) about behavior change    Delroy and CASIMIRO discussed cognitive behavioral strategies for looking at his negative thought patterns and finding the evidence for and against what he believes to be true about his current  thoughts.      Assessments completed prior to visit:  The following assessments were completed by patient for this visit:  PHQ9:       6/21/2023     2:20 PM 9/7/2023     2:49 PM 12/7/2023     2:55 PM 1/2/2024     1:53 PM 3/5/2024    10:31 AM 3/19/2024     3:23 PM 4/29/2024    12:52 PM   PHQ-9 SCORE   PHQ-9 Total Score MyChart 12 (Moderate depression) 4 (Minimal depression) 3 (Minimal depression) 5 (Mild depression) 4 (Minimal depression) 5 (Mild depression) 4 (Minimal depression)   PHQ-9 Total Score 12 4 3 5 4 5 4       PROMIS 10-Global Health (only subscores and total score):       8/3/2022     1:28 PM 8/10/2022     1:23 PM 12/28/2023     6:19 PM 4/23/2024    11:37 AM   PROMIS-10 Scores Only   Global Mental Health Score 8 7 8 9   Global Physical Health Score 14 15 15 15   PROMIS TOTAL - SUBSCORES 22 22 23 24     [place] Suicide Severity Rating Scale (Lifetime/Recent)      8/8/2022     2:14 PM 1/12/2024     3:14 PM   [place] Suicide Severity Rating (Lifetime/Recent)   Q1 Wish to be Dead (Lifetime) N N   Q2 Non-Specific Active Suicidal Thoughts (Lifetime) N N   Actual Attempt (Lifetime)  N   Has subject engaged in non-suicidal self-injurious behavior? (Lifetime)  N   Interrupted Attempts (Lifetime)  N   Aborted or Self-Interrupted Attempt (Lifetime)  N   Preparatory Acts or Behavior (Lifetime)  N   Calculated C-SSRS Risk Score (Lifetime/Recent)  No Risk Indicated       Care Plan review completed: Yes    Medication Review:  No current psychiatric medications prescribed    Medication Compliance:  N/A    Changes in Health Issues:   None reported    Chemical Use Review:   Substance Use: Chemical use reviewed, no active concerns identified      Tobacco Use: No current tobacco use.      Assessment: Current Emotional / Mental Status (status of significant symptoms):  Risk status (Self / Other harm or suicidal ideation)  Patient denies a history of suicidal ideation, suicide attempts, self-injurious behavior, homicidal  ideation, homicidal behavior, and and other safety concerns  Patient denies current fears or concerns for personal safety.  Patient denies current or recent suicidal ideation or behaviors.  Patient denies current or recent homicidal ideation or behaviors.  Patient denies current or recent self injurious behavior or ideation.  Patient denies other safety concerns.  A safety and risk management plan has not been developed at this time, however patient was encouraged to call [place] [phone] should there be a change in any of these risk factors.    Appearance:   Appropriate   Eye Contact:   Good   Psychomotor Behavior: Normal   Attitude:   Cooperative   Orientation:   All  Speech   Rate / Production: Normal/ Responsive Normal    Volume:  Normal   Mood:    Sad   Affect:    Flat   Thought Content:  Clear   Thought Form:  Coherent  Logical   Insight:    Fair     Diagnoses:  No diagnosis found.    Collateral Reports Completed:  Not Applicable    Plan: (Homework, other):  [facility] is leaving [place].  I discussed this with Delroy and presented options for on-going care.  He would like to stay within the [facility] system .  I am placing an order today for follow up care.  I will be routing his chart to Dr. Chuy Holliday, his PCP, and have discussed with Delroy making a follow up appointment to discuss an antidepressant medication.    Yasemin Jackson, [facility]    ______________________________________________________________________    Integrated Primary Care Behavioral Health Treatment Plan    Patient's Name: Delroy Christianson YOB: 1960    Date of Creation: 3/19/2024  Date Treatment Plan Last Reviewed/Revised: 3/19/2024    DSM5 Diagnoses: 296.32 (F33.1) Major Depressive Disorder, Recurrent Episode, Moderate _  Psychosocial / Contextual Factors: none identified by patient  PROMIS (reviewed every 90 days):     Referral / Collaboration:  Referral to another professional/service is not indicated at  this time..    Anticipated number of session for this episode of care: 6 to 8   Anticipation frequency of session: Biweekly  Anticipated Duration of each session: 38-52 minutes  Treatment plan will be reviewed in 90 days or when goals have been changed.       MeasurableTreatment Goal(s) related to diagnosis / functional impairment(s)  Goal 1: Patient will experience a decrease in depressive symptoms.    I will know I've met my goal when I feel like I can cope with life's stressors.      Objective #A (Patient Action)    Patient will Decrease frequency and intensity of feeling down, depressed, hopeless.  Status: New - Date: 3/19/2024      Intervention(s)  Therapist will teach emotional regulation skills.   .    Objective #B  Patient will Increase interest, engagement, and pleasure in doing things.  Status: New - Date: 3/19/204      Intervention(s)  Therapist will provide educational materials on cognitive skills for re framing emotional experiences .      Objective #C  Patient will Decrease frequency and intensity of feeling down, depressed, hopeless.  Status: New - Date: 3/19/2024      Intervention(s)  Therapist will assign homework to increase time spent doing pleasurable activities each week. .      Goal 2: Patient will improve his level of energy level.    I will know I've met my goal when I feel less tired during the day.      Objective #A (Patient Action)    Status: New - Date: 3/19/2024      Patient will Feel less tired and more energy during the day .    Intervention(s)  Therapist will teach sleep hygiene skills .      Patient has reviewed and agreed to the above plan.      Yaseimn Jackson, [facility]  Created on March 19, 2024

## 2024-05-23 ENCOUNTER — PATIENT OUTREACH (OUTPATIENT)
Dept: CARE COORDINATION | Facility: CLINIC | Age: 64
End: 2024-05-23
Payer: COMMERCIAL

## 2024-06-06 ENCOUNTER — PATIENT OUTREACH (OUTPATIENT)
Dept: CARE COORDINATION | Facility: CLINIC | Age: 64
End: 2024-06-06
Payer: COMMERCIAL

## 2024-06-24 ENCOUNTER — MYC REFILL (OUTPATIENT)
Dept: PEDIATRICS | Facility: CLINIC | Age: 64
End: 2024-06-24
Payer: COMMERCIAL

## 2024-06-24 DIAGNOSIS — I10 BENIGN ESSENTIAL HYPERTENSION: ICD-10-CM

## 2024-06-24 RX ORDER — SPIRONOLACTONE 25 MG/1
25 TABLET ORAL DAILY
Qty: 90 TABLET | Refills: 3 | Status: SHIPPED | OUTPATIENT
Start: 2024-06-24

## 2024-08-03 SDOH — HEALTH STABILITY: PHYSICAL HEALTH: ON AVERAGE, HOW MANY MINUTES DO YOU ENGAGE IN EXERCISE AT THIS LEVEL?: 30 MIN

## 2024-08-03 SDOH — HEALTH STABILITY: PHYSICAL HEALTH: ON AVERAGE, HOW MANY DAYS PER WEEK DO YOU ENGAGE IN MODERATE TO STRENUOUS EXERCISE (LIKE A BRISK WALK)?: 3 DAYS

## 2024-08-03 ASSESSMENT — SOCIAL DETERMINANTS OF HEALTH (SDOH): HOW OFTEN DO YOU GET TOGETHER WITH FRIENDS OR RELATIVES?: NEVER

## 2024-08-08 ENCOUNTER — OFFICE VISIT (OUTPATIENT)
Dept: PEDIATRICS | Facility: CLINIC | Age: 64
End: 2024-08-08
Payer: COMMERCIAL

## 2024-08-08 ENCOUNTER — ORDERS ONLY (AUTO-RELEASED) (OUTPATIENT)
Dept: PEDIATRICS | Facility: CLINIC | Age: 64
End: 2024-08-08

## 2024-08-08 VITALS
RESPIRATION RATE: 16 BRPM | SYSTOLIC BLOOD PRESSURE: 112 MMHG | OXYGEN SATURATION: 96 % | HEART RATE: 86 BPM | DIASTOLIC BLOOD PRESSURE: 78 MMHG | TEMPERATURE: 97.4 F | WEIGHT: 188.8 LBS | BODY MASS INDEX: 27.96 KG/M2 | HEIGHT: 69 IN

## 2024-08-08 DIAGNOSIS — F33.1 MAJOR DEPRESSIVE DISORDER, RECURRENT EPISODE, MODERATE (H): ICD-10-CM

## 2024-08-08 DIAGNOSIS — I42.2 HYPERTROPHIC CARDIOMYOPATHY (H): ICD-10-CM

## 2024-08-08 DIAGNOSIS — N18.31 STAGE 3A CHRONIC KIDNEY DISEASE (H): ICD-10-CM

## 2024-08-08 DIAGNOSIS — Z12.11 SCREEN FOR COLON CANCER: ICD-10-CM

## 2024-08-08 DIAGNOSIS — I10 BENIGN ESSENTIAL HYPERTENSION: ICD-10-CM

## 2024-08-08 DIAGNOSIS — F41.9 ANXIETY: ICD-10-CM

## 2024-08-08 DIAGNOSIS — E78.5 HYPERLIPIDEMIA LDL GOAL <100: ICD-10-CM

## 2024-08-08 DIAGNOSIS — Z00.00 ROUTINE GENERAL MEDICAL EXAMINATION AT A HEALTH CARE FACILITY: Primary | ICD-10-CM

## 2024-08-08 DIAGNOSIS — Z12.5 SCREENING FOR PROSTATE CANCER: ICD-10-CM

## 2024-08-08 LAB
ALBUMIN SERPL BCG-MCNC: 4.4 G/DL (ref 3.5–5.2)
ALP SERPL-CCNC: 63 U/L (ref 40–150)
ALT SERPL W P-5'-P-CCNC: 16 U/L (ref 0–70)
ANION GAP SERPL CALCULATED.3IONS-SCNC: 10 MMOL/L (ref 7–15)
AST SERPL W P-5'-P-CCNC: 19 U/L (ref 0–45)
BILIRUB SERPL-MCNC: 0.6 MG/DL
BUN SERPL-MCNC: 18.3 MG/DL (ref 8–23)
CALCIUM SERPL-MCNC: 9.4 MG/DL (ref 8.8–10.4)
CHLORIDE SERPL-SCNC: 106 MMOL/L (ref 98–107)
CHOLEST SERPL-MCNC: 153 MG/DL
CREAT SERPL-MCNC: 1.36 MG/DL (ref 0.67–1.17)
EGFRCR SERPLBLD CKD-EPI 2021: 58 ML/MIN/1.73M2
FASTING STATUS PATIENT QL REPORTED: NO
FASTING STATUS PATIENT QL REPORTED: NO
GLUCOSE SERPL-MCNC: 109 MG/DL (ref 70–99)
HCO3 SERPL-SCNC: 24 MMOL/L (ref 22–29)
HDLC SERPL-MCNC: 34 MG/DL
LDLC SERPL CALC-MCNC: 89 MG/DL
NONHDLC SERPL-MCNC: 119 MG/DL
POTASSIUM SERPL-SCNC: 4.3 MMOL/L (ref 3.4–5.3)
PROT SERPL-MCNC: 7.3 G/DL (ref 6.4–8.3)
PSA SERPL DL<=0.01 NG/ML-MCNC: 4.93 NG/ML (ref 0–4.5)
SODIUM SERPL-SCNC: 140 MMOL/L (ref 135–145)
TRIGL SERPL-MCNC: 149 MG/DL

## 2024-08-08 PROCEDURE — 90471 IMMUNIZATION ADMIN: CPT | Performed by: INTERNAL MEDICINE

## 2024-08-08 PROCEDURE — 80053 COMPREHEN METABOLIC PANEL: CPT | Performed by: INTERNAL MEDICINE

## 2024-08-08 PROCEDURE — 36415 COLL VENOUS BLD VENIPUNCTURE: CPT | Performed by: INTERNAL MEDICINE

## 2024-08-08 PROCEDURE — 80061 LIPID PANEL: CPT | Performed by: INTERNAL MEDICINE

## 2024-08-08 PROCEDURE — 90677 PCV20 VACCINE IM: CPT | Performed by: INTERNAL MEDICINE

## 2024-08-08 PROCEDURE — 99396 PREV VISIT EST AGE 40-64: CPT | Mod: 25 | Performed by: INTERNAL MEDICINE

## 2024-08-08 PROCEDURE — G0103 PSA SCREENING: HCPCS | Performed by: INTERNAL MEDICINE

## 2024-08-08 PROCEDURE — 99214 OFFICE O/P EST MOD 30 MIN: CPT | Mod: 25 | Performed by: INTERNAL MEDICINE

## 2024-08-08 ASSESSMENT — PAIN SCALES - GENERAL: PAINLEVEL: NO PAIN (0)

## 2024-08-08 NOTE — PROGRESS NOTES
Preventive Care Visit  [facility] TESSA Holliday MD, Internal Medicine - Pediatrics  Aug 8, 2024      Assessment & Plan     (Z00.00) Routine general medical examination at a health care facility  (primary encounter diagnosis)    (I10) Benign essential hypertension  Comment:   Plan: well controlled, continue spironolactone    (E78.5) Hyperlipidemia LDL goal <100  Comment:    Notes generalized myalgias over the past several months, possibly secondary to rosuvastatin.  Patient will start a 2-3-week trial off rosuvastatin and will follow-up regarding his symptoms by MyChart.  If myalgias resolved, discussed trial of an alternate statin.  Lab Results   Component Value Date    LDL 89 08/08/2024    Plan: Lipid panel reflex to direct LDL Non-fasting,         Comprehensive metabolic panel (BMP + Alb, Alk         Phos, ALT, AST, Total. Bili, TP)          (F33.1) Major depressive disorder, recurrent episode, moderate (H)  Comment:    History of major depression, poorly controlled.  Delroy has longstanding symptoms of depression and anxiety.  He lives alone and has few social contacts.  He admits that he has moderate to extreme anxiety regarding health related topics and any changes to his current regimen.  Anxiety is also triggered by any suggested testing or screening.  He declines any trial of medication.  We did discuss the benefits of starting to meet with a therapist again-he is anxious about finding a therapist who is a good fit.  He is willing to try reestablishing with a new therapist-referral.  Plan: Adult Mental Health  Referral          (F41.9) Anxiety  Comment: As above, anxiety mainly triggered by health concerns and health related issues.  Uses hydroxyzine rarely with some improvement.  Continue as needed  Plan: hydrOXYzine HCl (ATARAX) 25 MG table          (I42.2) Hypertrophic cardiomyopathy (H)  Comment:   Plan: Patient previously referred to cardiology for follow-up/states the  "visit with cardiology triggered a good deal of anxiety and he was reluctant regarding additional testing.  Cardiology has characterized his hypertrophic cardiomyopathy as mild.    (N18.31) Stage 3a chronic kidney disease (H)  Comment:      Lab Results   Component Value Date    CR 1.36 08/08/2024    CR 1.29 06/17/2023   Plan: CKD nomenclature has triggered a significant amount of anxiety for Delroy.  Discussed at length/reassured him that his kidney function is stable overall    (Z12.11) Screen for colon cancer  Comment: Declines colonoscopy, does agree to Cologuard screening after a discussion  Plan: COLOGUARD([employer])          (Z12.5) Screening for prostate cancer  Comment:   Plan: PSA, screen                    BMI  Estimated body mass index is 28.29 kg/m  as calculated from the following:    Height as of this encounter: 1.74 m (5' 8.5\").    Weight as of this encounter: 85.6 kg (188 lb 12.8 oz).       Counseling  Appropriate preventive services were addressed with this patient via screening, questionnaire, or discussion as appropriate for fall prevention, nutrition, physical activity, Tobacco-use cessation, weight loss and cognition.  Checklist reviewing preventive services available has been given to the patient.  Reviewed patient's diet, addressing concerns and/or questions.   He is at risk for lack of exercise and has been provided with information to increase physical activity for the benefit of his well-being.   Patient is at risk for social isolation and has been provided with information about the benefit of social connection.   The patient was instructed to see the dentist every 6 months.           Dulce Maria Potts is a 64 year old, presenting for the following:  Physical        8/8/2024     9:08 AM   Additional Questions   Roomed by Lubna Mathur   Accompanied by DALLAS        HPI              8/3/2024   General Health   How would you rate your overall physical health? (!) FAIR   Feel stress (tense, " "anxious, or unable to sleep) To some extent      (!) STRESS CONCERN      8/3/2024   Nutrition   Three or more servings of calcium each day? (!) I DON'T KNOW   Diet: Regular (no restrictions)   How many servings of fruit and vegetables per day? (!) 2-3   How many sweetened beverages each day? 0-1            8/3/2024   Exercise   Days per week of moderate/strenous exercise 3 days   Average minutes spent exercising at this level 30 min            8/3/2024   Social Factors   Frequency of gathering with friends or relatives Never   Worry food won't last until get money to buy more No   Food not last or not have enough money for food? No   Do you have housing? (Housing is defined as stable permanent housing and does not include staying ouside in a car, in a tent, in an abandoned building, in an overnight shelter, or couch-surfing.) Yes   Are you worried about losing your housing? No   Lack of transportation? No   Unable to get utilities (heat,electricity)? No      (!) SOCIAL CONNECTIONS CONCERN      8/3/2024   Fall Risk   Fallen 2 or more times in the past year? No   Trouble with walking or balance? No             8/3/2024   Dental   Dentist two times every year? (!) NO            8/3/2024   TB Screening   Were you born outside of the [place]? No            Today's PHQ-9 Score:       4/29/2024    12:52 PM   PHQ-9 SCORE   PHQ-9 Total Score MyChart 4 (Minimal depression)   PHQ-9 Total Score 4           8/3/2024   Substance Use   Alcohol more than 3/day or more than 7/wk Not Applicable   Do you use any other substances recreationally? No        Social History     Tobacco Use    Smoking status: Never    Smokeless tobacco: Never   Vaping Use    Vaping status: Never Used   Substance Use Topics    Alcohol use: Never    Drug use: Never           8/3/2024   STI Screening   New sexual partner(s) since last STI/HIV test? No      Last PSA: No results found for: \"PSA\"  ASCVD Risk   The 10-year ASCVD risk score (Amaya ALVAREZ, et al., " 2019) is: 8.8%    Values used to calculate the score:      Age: 64 years      Sex: Male      Is Non- : No      Diabetic: No      Tobacco smoker: No      Systolic Blood Pressure: 112 mmHg      Is BP treated: No      HDL Cholesterol: 39 mg/dL      Total Cholesterol: 149 mg/dL         Reviewed and updated as needed this visit by Provider                    Patient Active Problem List   Diagnosis    Hypertrophic cardiomyopathy (H)    Prolonged QT interval    Hiatal hernia    CKD (chronic kidney disease) stage 3, GFR 30-59 ml/min (H)    Benign essential hypertension    Major depressive disorder, recurrent episode, moderate (H)     Past Surgical History:   Procedure Laterality Date    ABDOMEN SURGERY      ESOPHAGOSCOPY, GASTROSCOPY, DUODENOSCOPY (EGD), COMBINED N/A 2021    Procedure: ESOPHAGOGASTRODUODENOSCOPY (EGD) WITH FLUOROSCOPIC GUIDED PLACEMENT OF NASOGASTRIC TUBE;  Surgeon: Deejay Donovan MD;  Location: [facility] OR    LAPAROSCOPIC HERNIORRHAPHY HIATAL N/A 2021    Procedure: HERNIORRHAPHY, HIATAL, LAPAROSCOPIC, bilateral chest tubes, gastropexy with peg tube;  Surgeon: Nancy Flores MD;  Location: [facility] OR       Social History     Tobacco Use    Smoking status: Never    Smokeless tobacco: Never   Substance Use Topics    Alcohol use: Never     Family History   Problem Relation Age of Onset    Diabetes Mother             Coronary Artery Disease Mother             Hypertension Father             Other Cancer Brother                  Current Outpatient Medications   Medication Sig Dispense Refill    hydrOXYzine HCl (ATARAX) 25 MG tablet Take 1 tablet (25 mg) by mouth 3 times daily as needed for anxiety      rosuvastatin (CRESTOR) 10 MG tablet Take 1 tablet (10 mg) by mouth daily 90 tablet 3    spironolactone (ALDACTONE) 25 MG tablet Take 1 tablet (25 mg) by mouth daily 90 tablet 3         Review of Systems  Constitutional, neuro, ENT, endocrine,  "pulmonary, cardiac, gastrointestinal, genitourinary, musculoskeletal, integument and psychiatric systems are negative, except as otherwise noted.     Objective    Exam  /78 (BP Location: Right arm, Patient Position: Sitting, Cuff Size: Adult Regular)   Pulse 86   Temp 97.4  F (36.3  C) (Tympanic)   Resp 16   Ht 1.74 m (5' 8.5\")   Wt 85.6 kg (188 lb 12.8 oz)   SpO2 96%   BMI 28.29 kg/m     Estimated body mass index is 28.29 kg/m  as calculated from the following:    Height as of this encounter: 1.74 m (5' 8.5\").    Weight as of this encounter: 85.6 kg (188 lb 12.8 oz).    Physical Exam  GENERAL: alert and no distress  EYES: Eyes grossly normal to inspection, PERRL and conjunctivae and sclerae normal  HENT: ear canals and TM's normal, nose and mouth without ulcers or lesions  NECK: no adenopathy, no asymmetry, masses, or scars  RESP: lungs clear to auscultation - no rales, rhonchi or wheezes  CV: regular rate and rhythm, normal S1 S2, no S3 or S4, no murmur, click or rub, no peripheral edema  ABDOMEN: soft, nontender, no hepatosplenomegaly, no masses and bowel sounds normal  MS: no gross musculoskeletal defects noted, no edema  SKIN: no suspicious lesions or rashes  NEURO: Normal strength and tone, mentation intact and speech normal  PSYCH: mentation appears normal, affect normal/bright        Signed Electronically by: Chuy Holliday MD    "

## 2024-08-08 NOTE — PATIENT INSTRUCTIONS
Stop rosuvastatin for 2-3 weeks.  And let us know how you are feeling.  If the soreness resolves, we can try a different statin    Preventive Care Advice   This is general advice given by our system to help you stay healthy. However, your care team may have specific advice just for you. Please talk to your care team about your preventive care needs.  Nutrition  Eat 5 or more servings of fruits and vegetables each day.  Try wheat bread, brown rice and whole grain pasta (instead of white bread, rice, and pasta).  Get enough calcium and vitamin D. Check the label on foods and aim for 100% of the RDA (recommended daily allowance).  Lifestyle  Exercise at least 150 minutes each week  (30 minutes a day, 5 days a week).  Do muscle strengthening activities 2 days a week. These help control your weight and prevent disease.  No smoking.  Wear sunscreen to prevent skin cancer.  Have a dental exam and cleaning every 6 months.  Yearly exams  See your health care team every year to talk about:  Any changes in your health.  Any medicines your care team has prescribed.  Preventive care, family planning, and ways to prevent chronic diseases.  Shots (vaccines)   HPV shots (up to age 26), if you've never had them before.  Hepatitis B shots (up to age 59), if you've never had them before.  COVID-19 shot: Get this shot when it's due.  Flu shot: Get a flu shot every year.  Tetanus shot: Get a tetanus shot every 10 years.  Pneumococcal, hepatitis A, and RSV shots: Ask your care team if you need these based on your risk.  Shingles shot (for age 50 and up)  General health tests  Diabetes screening:  Starting at age 35, Get screened for diabetes at least every 3 years.  If you are younger than age 35, ask your care team if you should be screened for diabetes.  Cholesterol test: At age 39, start having a cholesterol test every 5 years, or more often if advised.  Bone density scan (DEXA): At age 50, ask your care team if you should have this  scan for osteoporosis (brittle bones).  Hepatitis C: Get tested at least once in your life.  STIs (sexually transmitted infections)  Before age 24: Ask your care team if you should be screened for STIs.  After age 24: Get screened for STIs if you're at risk. You are at risk for STIs (including HIV) if:  You are sexually active with more than one person.  You don't use condoms every time.  You or a partner was diagnosed with a sexually transmitted infection.  If you are at risk for HIV, ask about PrEP medicine to prevent HIV.  Get tested for HIV at least once in your life, whether you are at risk for HIV or not.  Cancer screening tests  Cervical cancer screening: If you have a cervix, begin getting regular cervical cancer screening tests starting at age 21.  Breast cancer scan (mammogram): If you've ever had breasts, begin having regular mammograms starting at age 40. This is a scan to check for breast cancer.  Colon cancer screening: It is important to start screening for colon cancer at age 45.  Have a colonoscopy test every 10 years (or more often if you're at risk) Or, ask your provider about stool tests like a FIT test every year or Cologuard test every 3 years.  To learn more about your testing options, visit:   .  For help making a decision, visit:   https://bit.ly/po16025.  Prostate cancer screening test: If you have a prostate, ask your care team if a prostate cancer screening test (PSA) at age 55 is right for you.  Lung cancer screening: If you are a current or former smoker ages 50 to 80, ask your care team if ongoing lung cancer screenings are right for you.  For informational purposes only. Not to replace the advice of your health care provider. Copyright   2023 [employer]. All rights reserved. Clinically reviewed by the [facility] Transitions Program. [employer] [id number] - REV 01/24.  Eating Healthy Foods: Care Instructions  With every meal, you can make healthy food choices. Try to  "eat a variety of fruits, vegetables, whole grains, lean proteins, and low-fat dairy products. This can help you get the right balance of nutrients, including vitamins and minerals. Small changes add up over time. You can start by adding one healthy food to your meals each day.    Try to make half your plate fruits and vegetables, one-fourth whole grains, and one-fourth lean proteins. Try including dairy with your meals.   Eat more fruits and vegetables. Try to have them with most meals and snacks.   Foods for healthy eating    Fruits    These can be fresh, frozen, canned, or dried.  Try to choose whole fruit rather than fruit juice.  Eat a variety of colors.    Vegetables    These can be fresh, frozen, canned, or dried.  Beans, peas, and lentils count too.    Whole grains    Choose whole-grain breads, cereals, and noodles.  Try brown rice.    Lean proteins    These can include lean meat, poultry, fish, and eggs.  You can also have tofu, beans, peas, lentils, nuts, and seeds.    Dairy    Try milk, yogurt, and cheese.  Choose low-fat or fat-free when you can.  If you need to, use lactose-free milk or fortified plant-based milk products, such as soy milk.    Water    Drink water when you're thirsty.  Limit sugar-sweetened drinks, including soda, fruit drinks, and sports drinks.  Where can you learn more?  Go to https://www.[employer].net/patiented  Enter T756 in the search box to learn more about \"Eating Healthy Foods: Care Instructions.\"  Current as of: September 20, 2023               Content Version: 14.0    3791-3139 [employer].   Care instructions adapted under license by your healthcare professional. If you have questions about a medical condition or this instruction, always ask your healthcare professional. [employer] disclaims any warranty or liability for your use of this information.         "

## 2024-08-10 RX ORDER — HYDROXYZINE HYDROCHLORIDE 25 MG/1
25 TABLET, FILM COATED ORAL 3 TIMES DAILY PRN
COMMUNITY
Start: 2024-08-10

## 2024-08-11 ENCOUNTER — TELEPHONE (OUTPATIENT)
Dept: PEDIATRICS | Facility: CLINIC | Age: 64
End: 2024-08-11
Payer: COMMERCIAL

## 2024-08-11 DIAGNOSIS — R97.20 ELEVATED PROSTATE SPECIFIC ANTIGEN (PSA): Primary | ICD-10-CM

## 2024-08-11 NOTE — TELEPHONE ENCOUNTER
Please call Delroy.  His recent prostate blood test (PSA) was minimally elevated.  This may represent a false positive result.  We need to recheck it in the next month or so.  Please schedule a nonfasting lab draw for Delroy in about 4 weeks    Lab Results   Component Value Date    PSA 4.93 08/08/2024

## 2024-08-12 NOTE — TELEPHONE ENCOUNTER
Pt returned call. Message relayed regarding results. Scheduled lab only appointment.    Future Appointments 8/12/2024 - 2/8/2025        Date Visit Type Length Department Provider     9/9/2024 11:00 AM LAB 15 min [facility] LAB[facility]    Location Instructions:     [facility] is located at [address] [place] [id number]                   Priscila Foster RN on 8/12/2024 at 9:38 AM

## 2024-08-12 NOTE — TELEPHONE ENCOUNTER
Called the patient at [phone] and left a message requesting a call back   - Provided call back number   - Awaiting a call back at this time     Britt DARBY RN   [facility]

## 2024-08-19 ENCOUNTER — MYC MEDICAL ADVICE (OUTPATIENT)
Dept: PEDIATRICS | Facility: CLINIC | Age: 64
End: 2024-08-19
Payer: COMMERCIAL

## 2024-08-19 DIAGNOSIS — E78.5 HYPERLIPIDEMIA LDL GOAL <100: Primary | ICD-10-CM

## 2024-08-20 RX ORDER — PRAVASTATIN SODIUM 20 MG
20 TABLET ORAL DAILY
Qty: 60 TABLET | Refills: 1 | Status: SHIPPED | OUTPATIENT
Start: 2024-08-20

## 2024-08-20 NOTE — TELEPHONE ENCOUNTER
"Per Chart review:    OV with Dr. Holliday 8/8/2024:    \"Stop rosuvastatin for 2-3 weeks.  And let us know how you are feeling.  If the soreness resolves, we can try a different statin\"      \"Medication Changes         Patient not taking: Reported on 12/4/2023\"        Dr. Holliday please review and advise new statin and opinion on PRN tums.  "

## 2024-08-21 LAB — NONINV COLON CA DNA+OCC BLD SCRN STL QL: NEGATIVE

## 2024-09-09 ENCOUNTER — LAB (OUTPATIENT)
Dept: LAB | Facility: CLINIC | Age: 64
End: 2024-09-09
Payer: COMMERCIAL

## 2024-09-09 DIAGNOSIS — R97.20 ELEVATED PROSTATE SPECIFIC ANTIGEN (PSA): ICD-10-CM

## 2024-09-09 LAB — PSA SERPL DL<=0.01 NG/ML-MCNC: 4.73 NG/ML (ref 0–4.5)

## 2024-09-09 PROCEDURE — G0103 PSA SCREENING: HCPCS

## 2024-09-09 PROCEDURE — 36415 COLL VENOUS BLD VENIPUNCTURE: CPT

## 2024-10-08 DIAGNOSIS — E78.5 HYPERLIPIDEMIA LDL GOAL <100: ICD-10-CM

## 2024-10-08 RX ORDER — PRAVASTATIN SODIUM 20 MG
20 TABLET ORAL DAILY
Qty: 60 TABLET | Refills: 1 | OUTPATIENT
Start: 2024-10-08

## 2024-10-08 NOTE — TELEPHONE ENCOUNTER
Pt calls regarding pravastatin prescription. Pt states that the pharmacy called him and told him there was an issue with his prescription but he cannot remember or understand what was wrong.     Called pharmacy to inquire about difficulty refilling medication. Spoke to staff who states insurance is denying the medication d/t early pick-up before 10/10. Staff member then runs the medication and states this was a clerical error. She states the patient may pick-up the medication today but to wait at least 45 minutes before doing so.     Called patient back and relayed pharmacy message that medication can be picked up at least 45 minutes from now. Pt verbalized understanding and agrees with plan.     Rober Stanley RN on 10/8/2024 at 3:13 PM

## 2024-12-09 ENCOUNTER — MYC MEDICAL ADVICE (OUTPATIENT)
Dept: PEDIATRICS | Facility: CLINIC | Age: 64
End: 2024-12-09
Payer: COMMERCIAL

## 2024-12-09 DIAGNOSIS — R97.20 ELEVATED PROSTATE SPECIFIC ANTIGEN (PSA): Primary | ICD-10-CM

## 2024-12-09 DIAGNOSIS — E78.5 HYPERLIPIDEMIA LDL GOAL <100: ICD-10-CM

## 2024-12-10 RX ORDER — PRAVASTATIN SODIUM 20 MG
20 TABLET ORAL DAILY
Qty: 90 TABLET | Refills: 1 | Status: SHIPPED | OUTPATIENT
Start: 2024-12-10

## 2024-12-10 NOTE — TELEPHONE ENCOUNTER
Patient just recently reviewed result note from PCP r/t PSA. Result note: Dear Delroy,  Here are your recent results.    Your PSA continues to be mildly elevated but has improved since last check.  Suspect this mild elevation is secondary to prostate enlargement.  We can recheck this at your next follow-up visit.     Chuy Holliday MD    Please advise, do you advise sooner appointment?     Solomon PRABHAKAR RN 12/10/2024 at 8:00 AM

## 2024-12-11 NOTE — TELEPHONE ENCOUNTER
Received call from patient.  - scheduled a lab only for 12/14/24 as per provider message in [employer] thread.      [place] NICKERSON, - [place] [address]  Primary Care- [facility]

## 2024-12-18 ENCOUNTER — LAB (OUTPATIENT)
Dept: LAB | Facility: CLINIC | Age: 64
End: 2024-12-18
Payer: COMMERCIAL

## 2024-12-18 DIAGNOSIS — R97.20 ELEVATED PROSTATE SPECIFIC ANTIGEN (PSA): ICD-10-CM

## 2024-12-18 LAB
PSA FREE MFR SERPL: 20.98 %
PSA FREE SERPL-MCNC: 0.9 NG/ML
PSA SERPL DL<=0.01 NG/ML-MCNC: 4.29 NG/ML (ref 0–4.5)

## 2024-12-18 PROCEDURE — 84154 ASSAY OF PSA FREE: CPT

## 2024-12-18 PROCEDURE — 84153 ASSAY OF PSA TOTAL: CPT

## 2024-12-18 PROCEDURE — 36415 COLL VENOUS BLD VENIPUNCTURE: CPT

## 2025-03-29 NOTE — PROVIDER NOTIFICATION
5:30 AM  Provider notified: X cover  Reason: pt bp: 163/103 after IV hydralazine. Pt asymptomatic.  Order:   March 29, 2025     Patient: Shine Lopez   YOB: 2009   Date of Visit: 3/29/2025       To Whom It May Concern:    Shine Lopez was seen in my clinic on 3/29/2025. Recommend No heavy lifting and forward flexion exercises until cleared by Ortho   If you have any questions or concerns, please don't hesitate to call.         Sincerely,         Serene Carter PA-C        CC: No Recipients

## 2025-06-12 ENCOUNTER — MYC REFILL (OUTPATIENT)
Dept: PEDIATRICS | Facility: CLINIC | Age: 65
End: 2025-06-12
Payer: COMMERCIAL

## 2025-06-12 DIAGNOSIS — I10 BENIGN ESSENTIAL HYPERTENSION: ICD-10-CM

## 2025-06-12 DIAGNOSIS — E78.5 HYPERLIPIDEMIA LDL GOAL <100: ICD-10-CM

## 2025-06-12 RX ORDER — SPIRONOLACTONE 25 MG/1
25 TABLET ORAL DAILY
Qty: 90 TABLET | Refills: 0 | Status: SHIPPED | OUTPATIENT
Start: 2025-06-12

## 2025-06-12 RX ORDER — PRAVASTATIN SODIUM 20 MG
20 TABLET ORAL DAILY
Qty: 90 TABLET | Refills: 0 | Status: SHIPPED | OUTPATIENT
Start: 2025-06-12

## (undated) DEVICE — SU VICRYL 3-0 SH 27" UND J416H

## (undated) DEVICE — ENDO SCOPE WARMER SEAL  C3101

## (undated) DEVICE — ENDO TROCAR FIRST ENTRY KII FIOS Z-THRD 12X100MM CTF73

## (undated) DEVICE — APPLICATOR COTTON TIP 6"X2 STERILE LF 6012

## (undated) DEVICE — WIRE GUIDE AMPLATZ SUPER STIFF 0.035"X260CM M001465261

## (undated) DEVICE — CONNECTOR CARDIO BLAKE DRAIN BCC2

## (undated) DEVICE — Device

## (undated) DEVICE — SU TICRON 2 GS-21DA 36" 3146-81

## (undated) DEVICE — TUBING SUCTION 10'X3/16" N510

## (undated) DEVICE — TUBE GASTROSTOMY PONSKY PULL DELUXE 20FR 000792

## (undated) DEVICE — JELLY LUBRICATING SURGILUBE 2OZ TUBE

## (undated) DEVICE — DRAPE SHEET MED 44X70" 9355

## (undated) DEVICE — DRAIN JACKSON PRATT CHANNEL 19FR ROUND HUBLESS SIL JP-2230

## (undated) DEVICE — CATH TRAY FOLEY SURESTEP 16FR W/URNE MTR STLK LATEX A303316A

## (undated) DEVICE — SU SILK 2-0 SH 30" K833H

## (undated) DEVICE — LINEN GOWN XLG 5407

## (undated) DEVICE — SU PLEDGET SOFT TFE 13MMX7MMX1.5MM D7044

## (undated) DEVICE — SUCTION MANIFOLD NEPTUNE 2 SYS 4 PORT 0702-020-000

## (undated) DEVICE — SOL NACL 0.9% IRRIG 1000ML BOTTLE 2F7124

## (undated) DEVICE — SYR 10ML LL W/O NDL 302995

## (undated) DEVICE — ENDO SYSTEM WATER BOTTLE & TUBING W/CO2 FILTER 00711549

## (undated) DEVICE — DRAPE SHEET REV FOLD 3/4 9349

## (undated) DEVICE — GLOVE PROTEXIS MICRO 6.0  2D73PM60

## (undated) DEVICE — ESU LIGASURE MARYLAND VESSEL LAP 44CM XLONG LF1944

## (undated) DEVICE — GOWN IMPERVIOUS BREATHABLE SMART XLG 89045

## (undated) DEVICE — KIT ENDO FIRST STEP DISINFECTANT 200ML W/POUCH EP-4

## (undated) DEVICE — SU MONOCRYL 4-0 PS-2 27" UND Y426H

## (undated) DEVICE — DRSG GAUZE 4X4" TRAY 6939

## (undated) DEVICE — LINEN TOWEL PACK X6 WHITE 5487

## (undated) DEVICE — PITCHER STERILE 1000ML  SSK9004A

## (undated) DEVICE — DRSG PRIMAPORE 02X3" 7133

## (undated) DEVICE — ADH SKIN CLOSURE PREMIERPRO EXOFIN 1.0ML 3470

## (undated) DEVICE — SYR 30ML SLIP TIP W/O NDL 302833

## (undated) DEVICE — SU SILK 0 TIE 6X30" A306H

## (undated) DEVICE — BLADE CLIPPER SGL USE 9680

## (undated) DEVICE — SUCTION DRY CHEST DRAIN OASIS 3600-100

## (undated) DEVICE — LINEN TOWEL PACK X5 5464

## (undated) DEVICE — ESU PENCIL W/COATED BLADE E2450H

## (undated) DEVICE — DRAPE IOBAN INCISE 23X17" 6650EZ

## (undated) DEVICE — PREP CHLORAPREP 26ML TINTED ORANGE  260815

## (undated) DEVICE — SUCTION MANIFOLD DORNOCH ULTRA CART UL-CL500

## (undated) DEVICE — DRAIN JACKSON PRATT ROUND SIL 19FR W/TROCAR LF JP-2232

## (undated) DEVICE — SOL WATER IRRIG 1000ML BOTTLE 2F7114

## (undated) DEVICE — SU VICRYL 0 UR-6 27" J603H

## (undated) DEVICE — SU SILK 0 SH 30" K834H

## (undated) DEVICE — ESU ELEC BLADE 2.75" COATED/INSULATED E1455

## (undated) RX ORDER — HYDROMORPHONE HYDROCHLORIDE 1 MG/ML
INJECTION, SOLUTION INTRAMUSCULAR; INTRAVENOUS; SUBCUTANEOUS
Status: DISPENSED
Start: 2021-01-29

## (undated) RX ORDER — LORAZEPAM 2 MG/ML
INJECTION INTRAMUSCULAR
Status: DISPENSED
Start: 2021-01-26

## (undated) RX ORDER — AMINOPHYLLINE 25 MG/ML
INJECTION, SOLUTION INTRAVENOUS
Status: DISPENSED
Start: 2021-01-26

## (undated) RX ORDER — SODIUM CHLORIDE, SODIUM LACTATE, POTASSIUM CHLORIDE, CALCIUM CHLORIDE 600; 310; 30; 20 MG/100ML; MG/100ML; MG/100ML; MG/100ML
INJECTION, SOLUTION INTRAVENOUS
Status: DISPENSED
Start: 2021-01-29

## (undated) RX ORDER — REGADENOSON 0.08 MG/ML
INJECTION, SOLUTION INTRAVENOUS
Status: DISPENSED
Start: 2021-01-26

## (undated) RX ORDER — FENTANYL CITRATE 50 UG/ML
INJECTION, SOLUTION INTRAMUSCULAR; INTRAVENOUS
Status: DISPENSED
Start: 2021-01-24

## (undated) RX ORDER — FENTANYL CITRATE 50 UG/ML
INJECTION, SOLUTION INTRAMUSCULAR; INTRAVENOUS
Status: DISPENSED
Start: 2021-01-29